# Patient Record
Sex: MALE | Race: WHITE | NOT HISPANIC OR LATINO | Employment: FULL TIME | URBAN - NONMETROPOLITAN AREA
[De-identification: names, ages, dates, MRNs, and addresses within clinical notes are randomized per-mention and may not be internally consistent; named-entity substitution may affect disease eponyms.]

---

## 2017-12-04 RX ORDER — ATORVASTATIN CALCIUM 40 MG/1
TABLET, FILM COATED ORAL
Qty: 90 TABLET | Refills: 0 | Status: SHIPPED | OUTPATIENT
Start: 2017-12-04 | End: 2017-12-27 | Stop reason: SDUPTHER

## 2017-12-18 RX ORDER — LISINOPRIL 5 MG/1
TABLET ORAL
Qty: 90 TABLET | Refills: 3 | OUTPATIENT
Start: 2017-12-18

## 2017-12-27 ENCOUNTER — OFFICE VISIT (OUTPATIENT)
Dept: CARDIOLOGY | Facility: CLINIC | Age: 52
End: 2017-12-27

## 2017-12-27 VITALS
HEART RATE: 79 BPM | OXYGEN SATURATION: 98 % | DIASTOLIC BLOOD PRESSURE: 72 MMHG | HEIGHT: 69 IN | WEIGHT: 237 LBS | SYSTOLIC BLOOD PRESSURE: 147 MMHG | BODY MASS INDEX: 35.1 KG/M2

## 2017-12-27 DIAGNOSIS — E78.2 MIXED HYPERLIPIDEMIA: ICD-10-CM

## 2017-12-27 DIAGNOSIS — I25.10 CORONARY ARTERY DISEASE INVOLVING NATIVE CORONARY ARTERY OF NATIVE HEART WITHOUT ANGINA PECTORIS: Primary | ICD-10-CM

## 2017-12-27 DIAGNOSIS — I10 ESSENTIAL HYPERTENSION: ICD-10-CM

## 2017-12-27 DIAGNOSIS — E11.59 TYPE 2 DIABETES MELLITUS WITH OTHER CIRCULATORY COMPLICATION, WITH LONG-TERM CURRENT USE OF INSULIN (HCC): ICD-10-CM

## 2017-12-27 DIAGNOSIS — Z79.4 TYPE 2 DIABETES MELLITUS WITH OTHER CIRCULATORY COMPLICATION, WITH LONG-TERM CURRENT USE OF INSULIN (HCC): ICD-10-CM

## 2017-12-27 PROCEDURE — 99214 OFFICE O/P EST MOD 30 MIN: CPT | Performed by: INTERNAL MEDICINE

## 2017-12-27 PROCEDURE — 93000 ELECTROCARDIOGRAM COMPLETE: CPT | Performed by: INTERNAL MEDICINE

## 2017-12-27 RX ORDER — LISINOPRIL 10 MG/1
10 TABLET ORAL DAILY
Qty: 90 TABLET | Refills: 3 | Status: SHIPPED | OUTPATIENT
Start: 2017-12-27 | End: 2018-11-26 | Stop reason: SDUPTHER

## 2017-12-27 RX ORDER — LISINOPRIL 5 MG/1
5 TABLET ORAL DAILY
Qty: 90 TABLET | Refills: 5 | Status: SHIPPED | OUTPATIENT
Start: 2017-12-27 | End: 2017-12-27 | Stop reason: DRUGHIGH

## 2017-12-27 RX ORDER — ATORVASTATIN CALCIUM 40 MG/1
40 TABLET, FILM COATED ORAL DAILY
Qty: 90 TABLET | Refills: 0 | Status: SHIPPED | OUTPATIENT
Start: 2017-12-27 | End: 2018-06-06 | Stop reason: SDUPTHER

## 2017-12-27 NOTE — PROGRESS NOTES
"Subjective    Luis Echeverria is a 52 y.o. male. Fu of ihd and risks    History of Present Illness     IHD:  Does not do regular aerobics but \"play basketball on Monday's\". Has plans to start regular exercise after 1/1/18.  No exertional cp and no decline in stamina over the past year. Is compliant with meds that are well tolerated. EKG today is nsc.    HLD:  He is on a potent statin and gets good lab reports from his pcp    HTN:  Checks at home '\"120-130/70\". Higher in office today    T2DM:  A1c = 7.6. Have discussed the target of <7 and he understands and is working with his pcp on this      The following portions of the patient's history were reviewed and updated as appropriate: allergies, current medications, past family history, past medical history, past social history, past surgical history and problem list.    Patient Active Problem List   Diagnosis   • CAD (coronary artery disease)   • Hypertension   • Hyperlipidemia   • MI (myocardial infarction)   • Diabetes   • Carotid stent occlusion       No Known Allergies    Family History   Problem Relation Age of Onset   • Heart disease Maternal Grandmother    • No Known Problems Mother    • No Known Problems Father        Social History     Social History   • Marital status:      Spouse name: N/A   • Number of children: N/A   • Years of education: N/A     Occupational History   • Not on file.     Social History Main Topics   • Smoking status: Never Smoker   • Smokeless tobacco: Never Used   • Alcohol use No   • Drug use: No   • Sexual activity: Defer     Other Topics Concern   • Not on file     Social History Narrative         Current Outpatient Prescriptions:   •  aspirin 81 MG EC tablet, Take 81 mg by mouth Daily., Disp: , Rfl:   •  atorvastatin (LIPITOR) 40 MG tablet, Take 1 tablet by mouth Daily., Disp: 90 tablet, Rfl: 0  •  gabapentin (NEURONTIN) 300 MG capsule, Take 300 mg by mouth 2 (Two) Times a Day., Disp: , Rfl:   •  insulin aspart (novoLOG FLEXPEN) " "100 UNIT/ML solution pen-injector sc pen, Inject  under the skin 3 (Three) Times a Day With Meals., Disp: , Rfl:   •  Insulin Degludec (TRESIBA FLEXTOUCH) 100 UNIT/ML solution pen-injector, Inject 42 mg under the skin Daily., Disp: , Rfl:   •  metFORMIN (GLUCOPHAGE) 500 MG tablet, Take 500 mg by mouth 2 (Two) Times a Day With Meals., Disp: , Rfl:   •  metoprolol tartrate (LOPRESSOR) 25 MG tablet, 1 po bid, Disp: 180 tablet, Rfl: 5  •  nitroglycerin (NITROSTAT) 0.4 MG SL tablet, Place 0.4 mg under the tongue Every 5 (Five) Minutes As Needed for chest pain. Take no more than 3 doses in 15 minutes., Disp: , Rfl:   •  Turmeric Curcumin 500 MG capsule, Take  by mouth., Disp: , Rfl:   •  lisinopril (PRINIVIL,ZESTRIL) 10 MG tablet, Take 1 tablet by mouth Daily., Disp: 90 tablet, Rfl: 3    Past Surgical History:   Procedure Laterality Date   • CAROTID STENT         Review of Systems   Constitutional: Negative for fatigue, fever and unexpected weight change.   Respiratory: Negative for apnea, chest tightness and shortness of breath.    Cardiovascular: Positive for leg swelling. Negative for chest pain and palpitations.        Occasional dep edema   Gastrointestinal: Negative for abdominal pain.   Genitourinary: Negative for dysuria.   Musculoskeletal: Negative for myalgias.   Neurological: Negative for weakness and light-headedness.   Psychiatric/Behavioral: Negative for sleep disturbance.       /72  Pulse 79  Ht 175.3 cm (69.02\")  Wt 108 kg (237 lb)  SpO2 98%  BMI 34.98 kg/m2  Procedures    Objective   Physical Exam   Constitutional: He is oriented to person, place, and time.   Mod obese   HENT:   Head: Normocephalic.   Eyes: Pupils are equal, round, and reactive to light.   Neck: No thyromegaly present.   Cardiovascular: Normal rate, regular rhythm and normal heart sounds.  Exam reveals no gallop and no friction rub.    No murmur heard.  Pulses:       Dorsalis pedis pulses are 0 on the right side, and 0 on the " left side.        Posterior tibial pulses are 0 on the right side, and 0 on the left side.   Pulmonary/Chest: Effort normal and breath sounds normal. No respiratory distress. He has no wheezes. He has no rales.   Abdominal: Soft. Bowel sounds are normal. He exhibits no distension. There is no tenderness.   Musculoskeletal: He exhibits no edema or tenderness.   Neurological: He is alert and oriented to person, place, and time.   Skin: Skin is warm and dry.   Psychiatric: He has a normal mood and affect.       Assessment/Plan   Luis was seen today for coronary artery disease, hypertension, hyperlipidemia and med refill.    Diagnoses and all orders for this visit:    Coronary artery disease involving native coronary artery of native heart without angina pectoris  Comments:  no angina - encourage daily aerobics  Orders:  -     ECG 12 Lead  -     atorvastatin (LIPITOR) 40 MG tablet; Take 1 tablet by mouth Daily.    Essential hypertension  Comments:  increase Lisinopril for tighter control    Mixed hyperlipidemia  Comments:  on potent statin    Type 2 diabetes mellitus with other circulatory complication, with long-term current use of insulin  Comments:  needs tighter control    Other orders  -     metoprolol tartrate (LOPRESSOR) 25 MG tablet; 1 po bid  -     Discontinue: lisinopril (PRINIVIL,ZESTRIL) 5 MG tablet; Take 1 tablet by mouth Daily.  -     lisinopril (PRINIVIL,ZESTRIL) 10 MG tablet; Take 1 tablet by mouth Daily.                 Return in about 1 year (around 12/27/2018) for Next scheduled follow up.  Orders Placed This Encounter   Procedures   • ECG 12 Lead     Order Specific Question:   Reason for Exam:     Answer:   fu of ihd

## 2018-06-06 DIAGNOSIS — I25.10 CORONARY ARTERY DISEASE INVOLVING NATIVE CORONARY ARTERY OF NATIVE HEART WITHOUT ANGINA PECTORIS: ICD-10-CM

## 2018-06-06 RX ORDER — ATORVASTATIN CALCIUM 40 MG/1
TABLET, FILM COATED ORAL
Qty: 90 TABLET | Refills: 1 | Status: ON HOLD | OUTPATIENT
Start: 2018-06-06 | End: 2018-11-07

## 2018-11-06 ENCOUNTER — HOSPITAL ENCOUNTER (OUTPATIENT)
Facility: HOSPITAL | Age: 53
Discharge: HOME | End: 2018-11-07
Attending: INTERNAL MEDICINE | Admitting: INTERNAL MEDICINE

## 2018-11-06 DIAGNOSIS — I25.10 CORONARY ARTERY DISEASE INVOLVING NATIVE CORONARY ARTERY OF NATIVE HEART WITHOUT ANGINA PECTORIS: ICD-10-CM

## 2018-11-06 DIAGNOSIS — I20.0 UNSTABLE ANGINA (HCC): Primary | ICD-10-CM

## 2018-11-06 LAB — GLUCOSE BLDC GLUCOMTR-MCNC: 422 MG/DL (ref 70–130)

## 2018-11-06 PROCEDURE — 93010 ELECTROCARDIOGRAM REPORT: CPT | Performed by: INTERNAL MEDICINE

## 2018-11-06 PROCEDURE — 94799 UNLISTED PULMONARY SVC/PX: CPT

## 2018-11-06 PROCEDURE — G0378 HOSPITAL OBSERVATION PER HR: HCPCS

## 2018-11-06 PROCEDURE — 0 IOPAMIDOL PER 1 ML: Performed by: INTERNAL MEDICINE

## 2018-11-06 PROCEDURE — C1760 CLOSURE DEV, VASC: HCPCS | Performed by: INTERNAL MEDICINE

## 2018-11-06 PROCEDURE — 99152 MOD SED SAME PHYS/QHP 5/>YRS: CPT | Performed by: INTERNAL MEDICINE

## 2018-11-06 PROCEDURE — 93458 L HRT ARTERY/VENTRICLE ANGIO: CPT | Performed by: INTERNAL MEDICINE

## 2018-11-06 PROCEDURE — 25010000002 HEPARIN (PORCINE) PER 1000 UNITS: Performed by: INTERNAL MEDICINE

## 2018-11-06 PROCEDURE — C1769 GUIDE WIRE: HCPCS | Performed by: INTERNAL MEDICINE

## 2018-11-06 PROCEDURE — C1894 INTRO/SHEATH, NON-LASER: HCPCS | Performed by: INTERNAL MEDICINE

## 2018-11-06 PROCEDURE — C9600 PERC DRUG-EL COR STENT SING: HCPCS | Performed by: INTERNAL MEDICINE

## 2018-11-06 PROCEDURE — 92928 PRQ TCAT PLMT NTRAC ST 1 LES: CPT | Performed by: INTERNAL MEDICINE

## 2018-11-06 PROCEDURE — 25010000002 MIDAZOLAM PER 1 MG: Performed by: INTERNAL MEDICINE

## 2018-11-06 PROCEDURE — C1887 CATHETER, GUIDING: HCPCS | Performed by: INTERNAL MEDICINE

## 2018-11-06 PROCEDURE — 94760 N-INVAS EAR/PLS OXIMETRY 1: CPT

## 2018-11-06 PROCEDURE — 82962 GLUCOSE BLOOD TEST: CPT

## 2018-11-06 PROCEDURE — 25010000002 FENTANYL CITRATE (PF) 100 MCG/2ML SOLUTION: Performed by: INTERNAL MEDICINE

## 2018-11-06 PROCEDURE — 99219 PR INITIAL OBSERVATION CARE/DAY 50 MINUTES: CPT | Performed by: INTERNAL MEDICINE

## 2018-11-06 PROCEDURE — 93005 ELECTROCARDIOGRAM TRACING: CPT | Performed by: INTERNAL MEDICINE

## 2018-11-06 PROCEDURE — C1874 STENT, COATED/COV W/DEL SYS: HCPCS | Performed by: INTERNAL MEDICINE

## 2018-11-06 DEVICE — XIENCE SIERRA™ EVEROLIMUS ELUTING CORONARY STENT SYSTEM 2.75 MM X 08 MM / RAPID-EXCHANGE
Type: IMPLANTABLE DEVICE | Status: FUNCTIONAL
Brand: XIENCE SIERRA™

## 2018-11-06 RX ORDER — SODIUM CHLORIDE 0.9 % (FLUSH) 0.9 %
3-10 SYRINGE (ML) INJECTION AS NEEDED
Status: DISCONTINUED | OUTPATIENT
Start: 2018-11-06 | End: 2018-11-06 | Stop reason: HOSPADM

## 2018-11-06 RX ORDER — LIDOCAINE HYDROCHLORIDE 20 MG/ML
INJECTION, SOLUTION INFILTRATION; PERINEURAL AS NEEDED
Status: DISCONTINUED | OUTPATIENT
Start: 2018-11-06 | End: 2018-11-06 | Stop reason: HOSPADM

## 2018-11-06 RX ORDER — DULOXETIN HYDROCHLORIDE 20 MG/1
30 CAPSULE, DELAYED RELEASE ORAL DAILY
COMMUNITY

## 2018-11-06 RX ORDER — LIDOCAINE HYDROCHLORIDE 10 MG/ML
0.1 INJECTION, SOLUTION EPIDURAL; INFILTRATION; INTRACAUDAL; PERINEURAL ONCE AS NEEDED
Status: DISCONTINUED | OUTPATIENT
Start: 2018-11-06 | End: 2018-11-06 | Stop reason: HOSPADM

## 2018-11-06 RX ORDER — DIPHENHYDRAMINE HCL 25 MG
25 CAPSULE ORAL EVERY 6 HOURS PRN
Status: DISCONTINUED | OUTPATIENT
Start: 2018-11-06 | End: 2018-11-07 | Stop reason: HOSPADM

## 2018-11-06 RX ORDER — LORAZEPAM 2 MG/ML
1 INJECTION INTRAMUSCULAR EVERY 6 HOURS PRN
Status: DISCONTINUED | OUTPATIENT
Start: 2018-11-06 | End: 2018-11-07 | Stop reason: HOSPADM

## 2018-11-06 RX ORDER — CALCIUM CARBONATE 200(500)MG
2 TABLET,CHEWABLE ORAL 2 TIMES DAILY PRN
Status: DISCONTINUED | OUTPATIENT
Start: 2018-11-06 | End: 2018-11-07 | Stop reason: HOSPADM

## 2018-11-06 RX ORDER — SODIUM CHLORIDE 9 MG/ML
3 INJECTION, SOLUTION INTRAVENOUS CONTINUOUS
Status: DISPENSED | OUTPATIENT
Start: 2018-11-06 | End: 2018-11-06

## 2018-11-06 RX ORDER — MIDAZOLAM HYDROCHLORIDE 1 MG/ML
INJECTION INTRAMUSCULAR; INTRAVENOUS AS NEEDED
Status: DISCONTINUED | OUTPATIENT
Start: 2018-11-06 | End: 2018-11-06 | Stop reason: HOSPADM

## 2018-11-06 RX ORDER — TIMOLOL MALEATE 5 MG/ML
1 SOLUTION/ DROPS OPHTHALMIC DAILY
Status: ON HOLD | COMMUNITY
End: 2021-08-30

## 2018-11-06 RX ORDER — SENNA AND DOCUSATE SODIUM 50; 8.6 MG/1; MG/1
2 TABLET, FILM COATED ORAL NIGHTLY
Status: DISCONTINUED | OUTPATIENT
Start: 2018-11-06 | End: 2018-11-07 | Stop reason: HOSPADM

## 2018-11-06 RX ORDER — LISINOPRIL 10 MG/1
10 TABLET ORAL DAILY
Status: DISCONTINUED | OUTPATIENT
Start: 2018-11-07 | End: 2018-11-07 | Stop reason: HOSPADM

## 2018-11-06 RX ORDER — BIVALIRUDIN 250 MG/5ML
INJECTION, POWDER, LYOPHILIZED, FOR SOLUTION INTRAVENOUS AS NEEDED
Status: DISCONTINUED | OUTPATIENT
Start: 2018-11-06 | End: 2018-11-06 | Stop reason: HOSPADM

## 2018-11-06 RX ORDER — ACETAMINOPHEN 325 MG/1
650 TABLET ORAL EVERY 4 HOURS PRN
Status: DISCONTINUED | OUTPATIENT
Start: 2018-11-06 | End: 2018-11-06 | Stop reason: HOSPADM

## 2018-11-06 RX ORDER — SODIUM CHLORIDE 0.9 % (FLUSH) 0.9 %
3 SYRINGE (ML) INJECTION EVERY 12 HOURS SCHEDULED
Status: DISCONTINUED | OUTPATIENT
Start: 2018-11-06 | End: 2018-11-06 | Stop reason: HOSPADM

## 2018-11-06 RX ORDER — ONDANSETRON 2 MG/ML
4 INJECTION INTRAMUSCULAR; INTRAVENOUS EVERY 6 HOURS PRN
Status: DISCONTINUED | OUTPATIENT
Start: 2018-11-06 | End: 2018-11-07 | Stop reason: HOSPADM

## 2018-11-06 RX ORDER — ONDANSETRON 4 MG/1
4 TABLET, ORALLY DISINTEGRATING ORAL EVERY 6 HOURS PRN
Status: DISCONTINUED | OUTPATIENT
Start: 2018-11-06 | End: 2018-11-07 | Stop reason: HOSPADM

## 2018-11-06 RX ORDER — ACETAMINOPHEN 325 MG/1
650 TABLET ORAL EVERY 4 HOURS PRN
Status: DISCONTINUED | OUTPATIENT
Start: 2018-11-06 | End: 2018-11-07 | Stop reason: HOSPADM

## 2018-11-06 RX ORDER — ONDANSETRON 4 MG/1
4 TABLET, FILM COATED ORAL EVERY 6 HOURS PRN
Status: DISCONTINUED | OUTPATIENT
Start: 2018-11-06 | End: 2018-11-07 | Stop reason: HOSPADM

## 2018-11-06 RX ORDER — FENTANYL CITRATE 50 UG/ML
INJECTION, SOLUTION INTRAMUSCULAR; INTRAVENOUS AS NEEDED
Status: DISCONTINUED | OUTPATIENT
Start: 2018-11-06 | End: 2018-11-06 | Stop reason: HOSPADM

## 2018-11-06 RX ORDER — NITROGLYCERIN 0.4 MG/1
0.4 TABLET SUBLINGUAL
Status: DISCONTINUED | OUTPATIENT
Start: 2018-11-06 | End: 2018-11-06 | Stop reason: HOSPADM

## 2018-11-06 RX ORDER — ZOLPIDEM TARTRATE 5 MG/1
5 TABLET ORAL NIGHTLY PRN
Status: DISCONTINUED | OUTPATIENT
Start: 2018-11-06 | End: 2018-11-07 | Stop reason: HOSPADM

## 2018-11-06 RX ORDER — ASPIRIN 81 MG/1
81 TABLET ORAL DAILY
Status: DISCONTINUED | OUTPATIENT
Start: 2018-11-07 | End: 2018-11-07 | Stop reason: HOSPADM

## 2018-11-06 RX ORDER — SODIUM CHLORIDE 9 MG/ML
1-3 INJECTION, SOLUTION INTRAVENOUS CONTINUOUS
Status: DISCONTINUED | OUTPATIENT
Start: 2018-11-06 | End: 2018-11-06 | Stop reason: HOSPADM

## 2018-11-06 RX ORDER — NITROGLYCERIN 0.4 MG/1
0.4 TABLET SUBLINGUAL
Status: DISCONTINUED | OUTPATIENT
Start: 2018-11-06 | End: 2018-11-07 | Stop reason: HOSPADM

## 2018-11-06 RX ORDER — ATORVASTATIN CALCIUM 40 MG/1
40 TABLET, FILM COATED ORAL NIGHTLY
Status: DISCONTINUED | OUTPATIENT
Start: 2018-11-06 | End: 2018-11-07 | Stop reason: HOSPADM

## 2018-11-06 RX ORDER — LORAZEPAM 2 MG/ML
1 INJECTION INTRAMUSCULAR ONCE
Status: DISCONTINUED | OUTPATIENT
Start: 2018-11-06 | End: 2018-11-06 | Stop reason: HOSPADM

## 2018-11-06 RX ORDER — SPIRONOLACTONE 25 MG/1
25 TABLET ORAL DAILY
Status: DISCONTINUED | OUTPATIENT
Start: 2018-11-06 | End: 2018-11-07 | Stop reason: HOSPADM

## 2018-11-06 RX ORDER — ONDANSETRON 2 MG/ML
4 INJECTION INTRAMUSCULAR; INTRAVENOUS EVERY 6 HOURS PRN
Status: DISCONTINUED | OUTPATIENT
Start: 2018-11-06 | End: 2018-11-06 | Stop reason: HOSPADM

## 2018-11-06 RX ADMIN — DESMOPRESSIN ACETATE 40 MG: 0.2 TABLET ORAL at 21:51

## 2018-11-06 RX ADMIN — SPIRONOLACTONE 25 MG: 25 TABLET ORAL at 17:28

## 2018-11-06 RX ADMIN — SODIUM CHLORIDE 1 ML/KG/HR: 9 INJECTION, SOLUTION INTRAVENOUS at 17:04

## 2018-11-06 RX ADMIN — METOPROLOL TARTRATE 25 MG: 25 TABLET, FILM COATED ORAL at 21:51

## 2018-11-06 NOTE — H&P
[facility] - CARDIOLOGY  HISTORY AND PHYSICAL    Date of Admission: 11/6/2018  Primary Care Physician: Rene Hogue DO    Subjective     Chief Complaint: [facility]    History of Present Illness   Has known CAD. Had 2v STEMI '13 with emergent stenting to the Cx and LAD. Has not had that kind of cp since and is compliant with meds. Has been having some non-exertional cp for 3-4 weeks that is substernal and pressure like and resolves spontaneously. Is active at work with lots of walking and has noted improving stamina. Today, had cp at rest but this time was assoc with diaphoresis and nausea like his mi pain was but this did not radiate like before. He presented to [facility] [place] where his EKG and first troponin were negative for MI. He is transferred here for cardiac cath.        Review of Systems   Constitutional: Negative for fatigue, fever and unexpected weight change.   HENT: Negative for congestion.    Eyes: Negative for visual disturbance.   Respiratory: Negative for apnea, shortness of breath and wheezing.    Cardiovascular: Positive for chest pain. Negative for palpitations and leg swelling.   Gastrointestinal: Negative for abdominal pain, blood in stool and vomiting.   Endocrine: Negative for cold intolerance and heat intolerance.   Genitourinary: Negative for difficulty urinating and hematuria.   Musculoskeletal: Negative for myalgias.   Skin: Negative for rash.   Neurological: Negative for syncope.   Hematological: Does not bruise/bleed easily.   Psychiatric/Behavioral: Negative for sleep disturbance.        Otherwise complete ROS reviewed and negative except as mentioned in the HPI.      Past Medical History:   Past Medical History:   Diagnosis Date   • CAD (coronary artery disease)    • CAD in native artery     2V STEMI 7/13 STENT TO CX AND STENTX2 TO MID AND DISTAL LAD   • Diabetes mellitus (CMS/HCC)    • History of coronary artery stent placement      x 3 - for ACUTE MI 7/2013   • Hyperlipidemia    •  Hyperlipidemia, mild    • Myocardial infarct (CMS/HCC)    • Myocardial infarction (CMS/HCC)    • Stented coronary artery        Past Surgical History:  Past Surgical History:   Procedure Laterality Date   • CAROTID STENT         Family History: family history includes Heart disease in his maternal grandmother; No Known Problems in his father and mother.    Social History:  reports that he has never smoked. He has never used smokeless tobacco. He reports that he does not drink alcohol or use drugs.    Medications:  Prior to Admission medications    Medication Sig Start Date End Date Taking? Authorizing Provider   aspirin 81 MG EC tablet Take 81 mg by mouth Daily.   Yes Neli Flores MD   atorvastatin (LIPITOR) 40 MG tablet TAKE 1 TABLET BY MOUTH EVERY DAY 6/6/18  Yes Ranjan Archer MD   Dulaglutide (TRULICITY) 0.75 MG/0.5ML solution pen-injector Inject 1 pen under the skin into the appropriate area as directed 1 (One) Time Per Week.   Yes Neli Flores MD   insulin aspart (novoLOG FLEXPEN) 100 UNIT/ML solution pen-injector sc pen Inject  under the skin 3 (Three) Times a Day With Meals.   Yes Neli Flores MD   Insulin Degludec (TRESIBA FLEXTOUCH) 100 UNIT/ML solution pen-injector Inject 42 mg under the skin Daily.   Yes Neli Flores MD   lisinopril (PRINIVIL,ZESTRIL) 10 MG tablet Take 1 tablet by mouth Daily. 12/27/17  Yes Tru Rai MD   metFORMIN (GLUCOPHAGE) 500 MG tablet Take 500 mg by mouth 2 (Two) Times a Day With Meals.   Yes Neli Flores MD   metoprolol tartrate (LOPRESSOR) 25 MG tablet 1 po bid 12/27/17  Yes Tru Rai MD   Turmeric Curcumin 500 MG capsule Take  by mouth.   Yes Neli Flores MD   nitroglycerin (NITROSTAT) 0.4 MG SL tablet Place 0.4 mg under the tongue Every 5 (Five) Minutes As Needed for chest pain. Take no more than 3 doses in 15 minutes.    Neli Flores MD   gabapentin (NEURONTIN) 300 MG capsule  "Take 300 mg by mouth 2 (Two) Times a Day.  11/6/18  Provider, MD Neli     Allergies:  No Known Allergies    Objective     Vital Signs: /65   Pulse 83   Resp 12   Ht 175.3 cm (69\")   Wt 101 kg (222 lb)   SpO2 98%   BMI 32.78 kg/m²   Physical Exam   Constitutional: He is oriented to person, place, and time. No distress.   Mod obese   HENT:   Head: Normocephalic.   Eyes: Pupils are equal, round, and reactive to light.   Neck: No thyromegaly present.   Cardiovascular: Normal rate, regular rhythm, normal heart sounds and intact distal pulses.  Exam reveals no gallop and no friction rub.    No murmur heard.  Pulmonary/Chest: Effort normal and breath sounds normal. No respiratory distress. He has no wheezes. He has no rales. He exhibits no tenderness.   Abdominal: Soft. Bowel sounds are normal. He exhibits no distension and no mass. There is no tenderness. There is no guarding.   Musculoskeletal: He exhibits no edema, tenderness or deformity.   Neurological: He is alert and oriented to person, place, and time.   Skin: Skin is warm and dry. He is not diaphoretic.   Psychiatric: He has a normal mood and affect.       Results Reviewed: labs and EKG from OSH        Assessment / Plan      Assessment & Plan  Active Hospital Problems    Diagnosis   • **Unstable angina (CMS/HCC)   • Hypertension   • Hyperlipidemia   • Diabetes (CMS/HCC)     PLACE IN COU AND PROCEED WITH CARDIAC CATH       Code Status: FULL     I discussed the patient's findings and my recommendations with: PT AND FAMILY    Estimated length of stay:  1 DAY    Tru Rai MD   11/06/18   1:27 PM              "

## 2018-11-06 NOTE — PLAN OF CARE
Problem: Patient Care Overview  Goal: Plan of Care Review  Outcome: Ongoing (interventions implemented as appropriate)   11/06/18 8174   Coping/Psychosocial   Plan of Care Reviewed With patient;family   Plan of Care Review   Progress no change   OTHER   Outcome Summary Patient s/p heart cath with stent to RCA, EF 30-35%, will need Lifevest at DC (notified tech for [employer]). No complaints, drsg has small amount of drainage, continue to monitor.     Goal: Individualization and Mutuality  Outcome: Ongoing (interventions implemented as appropriate)    Goal: Discharge Needs Assessment  Outcome: Ongoing (interventions implemented as appropriate)    Goal: Interprofessional Rounds/Family Conf  Outcome: Ongoing (interventions implemented as appropriate)      Problem: Cardiac Catheterization (Diagnostic/Interventional) (Adult)  Goal: Signs and Symptoms of Listed Potential Problems Will be Absent, Minimized or Managed (Cardiac Catheterization)  Outcome: Ongoing (interventions implemented as appropriate)    Goal: Anesthesia/Sedation Recovery  Outcome: Outcome(s) achieved Date Met: 11/06/18      Problem: Cardiac: Heart Failure (Adult)  Goal: Signs and Symptoms of Listed Potential Problems Will be Absent, Minimized or Managed (Cardiac: Heart Failure)  Outcome: Ongoing (interventions implemented as appropriate)

## 2018-11-06 NOTE — NURSING NOTE
Spoke with Jaden Foster with [facility] regarding patient order for a wearable defibrillator. I faxed over all important documents to [employer] fax line. Will notify  for tomorrow. Jaden with be here in the AM.  Debbie Long RN  11/6/2018   5:00 PM

## 2018-11-07 VITALS
WEIGHT: 222 LBS | TEMPERATURE: 97.5 F | HEART RATE: 75 BPM | SYSTOLIC BLOOD PRESSURE: 110 MMHG | DIASTOLIC BLOOD PRESSURE: 70 MMHG | OXYGEN SATURATION: 98 % | HEIGHT: 69 IN | BODY MASS INDEX: 32.88 KG/M2 | RESPIRATION RATE: 16 BRPM

## 2018-11-07 LAB
ANION GAP SERPL CALCULATED.3IONS-SCNC: 9 MMOL/L (ref 4–13)
ARTICHOKE IGE QN: 69 MG/DL (ref 0–99)
BUN BLD-MCNC: 20 MG/DL (ref 5–21)
BUN/CREAT SERPL: 18 (ref 7–25)
CALCIUM SPEC-SCNC: 8.6 MG/DL (ref 8.4–10.4)
CHLORIDE SERPL-SCNC: 102 MMOL/L (ref 98–110)
CHOLEST SERPL-MCNC: 102 MG/DL (ref 130–200)
CO2 SERPL-SCNC: 29 MMOL/L (ref 24–31)
CREAT BLD-MCNC: 1.11 MG/DL (ref 0.5–1.4)
DEPRECATED RDW RBC AUTO: 41.9 FL (ref 40–54)
ERYTHROCYTE [DISTWIDTH] IN BLOOD BY AUTOMATED COUNT: 12.9 % (ref 12–15)
GFR SERPL CREATININE-BSD FRML MDRD: 70 ML/MIN/1.73
GLUCOSE BLD-MCNC: 177 MG/DL (ref 70–100)
HCT VFR BLD AUTO: 35.6 % (ref 40–52)
HDLC SERPL-MCNC: 26 MG/DL
HGB BLD-MCNC: 11.7 G/DL (ref 14–18)
LDLC/HDLC SERPL: 2.09 {RATIO}
MCH RBC QN AUTO: 29.1 PG (ref 28–32)
MCHC RBC AUTO-ENTMCNC: 32.9 G/DL (ref 33–36)
MCV RBC AUTO: 88.6 FL (ref 82–95)
PLATELET # BLD AUTO: 237 10*3/MM3 (ref 130–400)
PMV BLD AUTO: 9.4 FL (ref 6–12)
POTASSIUM BLD-SCNC: 4.7 MMOL/L (ref 3.5–5.3)
RBC # BLD AUTO: 4.02 10*6/MM3 (ref 4.8–5.9)
SODIUM BLD-SCNC: 140 MMOL/L (ref 135–145)
TRIGL SERPL-MCNC: 108 MG/DL (ref 0–149)
WBC NRBC COR # BLD: 4.78 10*3/MM3 (ref 4.8–10.8)

## 2018-11-07 PROCEDURE — G0378 HOSPITAL OBSERVATION PER HR: HCPCS

## 2018-11-07 PROCEDURE — 85027 COMPLETE CBC AUTOMATED: CPT | Performed by: INTERNAL MEDICINE

## 2018-11-07 PROCEDURE — 94799 UNLISTED PULMONARY SVC/PX: CPT

## 2018-11-07 PROCEDURE — 80048 BASIC METABOLIC PNL TOTAL CA: CPT | Performed by: INTERNAL MEDICINE

## 2018-11-07 PROCEDURE — 99217 PR OBSERVATION CARE DISCHARGE MANAGEMENT: CPT | Performed by: INTERNAL MEDICINE

## 2018-11-07 PROCEDURE — 80061 LIPID PANEL: CPT | Performed by: NURSE PRACTITIONER

## 2018-11-07 PROCEDURE — 94760 N-INVAS EAR/PLS OXIMETRY 1: CPT

## 2018-11-07 RX ORDER — SPIRONOLACTONE 25 MG/1
25 TABLET ORAL DAILY
Qty: 30 TABLET | Refills: 11 | Status: SHIPPED | OUTPATIENT
Start: 2018-11-08 | End: 2018-11-26 | Stop reason: SDUPTHER

## 2018-11-07 RX ORDER — NITROGLYCERIN 0.4 MG/1
0.4 TABLET SUBLINGUAL
Qty: 25 TABLET | Refills: 1 | Status: SHIPPED | OUTPATIENT
Start: 2018-11-07 | End: 2020-08-05 | Stop reason: SDUPTHER

## 2018-11-07 RX ORDER — ATORVASTATIN CALCIUM 40 MG/1
40 TABLET, FILM COATED ORAL NIGHTLY
Status: ON HOLD | COMMUNITY
End: 2018-11-07

## 2018-11-07 RX ORDER — LEVOCETIRIZINE DIHYDROCHLORIDE 5 MG/1
5 TABLET, FILM COATED ORAL AS NEEDED
Status: ON HOLD | COMMUNITY
End: 2021-08-30

## 2018-11-07 RX ORDER — ATORVASTATIN CALCIUM 80 MG/1
80 TABLET, FILM COATED ORAL NIGHTLY
Qty: 30 TABLET | Refills: 11 | Status: SHIPPED | OUTPATIENT
Start: 2018-11-07 | End: 2018-11-26 | Stop reason: SDUPTHER

## 2018-11-07 RX ORDER — METOPROLOL SUCCINATE 50 MG/1
50 TABLET, EXTENDED RELEASE ORAL DAILY
Qty: 30 TABLET | Refills: 11 | Status: SHIPPED | OUTPATIENT
Start: 2018-11-07 | End: 2018-11-26 | Stop reason: SDUPTHER

## 2018-11-07 RX ADMIN — LISINOPRIL 10 MG: 10 TABLET ORAL at 09:19

## 2018-11-07 RX ADMIN — METOPROLOL TARTRATE 25 MG: 25 TABLET, FILM COATED ORAL at 09:18

## 2018-11-07 RX ADMIN — SPIRONOLACTONE 25 MG: 25 TABLET ORAL at 09:19

## 2018-11-07 RX ADMIN — ASPIRIN 81 MG: 81 TABLET, DELAYED RELEASE ORAL at 09:19

## 2018-11-07 RX ADMIN — TICAGRELOR 90 MG: 90 TABLET ORAL at 09:19

## 2018-11-07 NOTE — NURSING NOTE
Pt checked blood sugar and it was 374. Pt then administered 13 units of his own personal insulin. Please be advised.  Thanks,  Claudette Hines RN  11/06/18  9:49 PM

## 2018-11-07 NOTE — PROGRESS NOTES
Continued Stay Note   [place]     Patient Name: Luis Echeverria  MRN: [id number]  Today's Date: 11/7/2018    Admit Date: 11/6/2018          Discharge Plan     Row Name 11/07/18 1215       Plan    Final Discharge Disposition Code 01 - home or self-care    Final Note PT IS BEING DCD HOME TODAY. ORDER FOR LIFEVEST. PT WILL BE FITTED FOR LIFEVEST TODAY AND DC HOME AFTER. NO OTHER DC NEEDS              Discharge Codes    No documentation.       Expected Discharge Date and Time     Expected Discharge Date Expected Discharge Time    Nov 7, 2018             GEENA Otero

## 2018-11-07 NOTE — NURSING NOTE
Jaden Miguel called and notified me that patient's insurance has approved him for the lifevest and will be here in the morning to fit the patient.  Debbie Long RN  11/6/2018  6:28 PM

## 2018-11-07 NOTE — DISCHARGE SUMMARY
[facility] HEART GROUP DISCHARGE    Date of Discharge:  11/7/2018    Discharge Diagnosis:   -Unstable angina   -Coronary artery disease s/p KIMBERLY x 2 to mid RCA this admission; severe diabetic disease  -Ischemic cardiomyopathy- LVEF 30-35% by ventriculogram   -Chronic systolic heart failure, Stage C, Class III, currently compensated/euvolemic   -Essential hypertension  -Hyperlipidemia  -Type 2 diabetes mellitus   -Obesity       Presenting Problem/History of Present Illness  Unstable angina (CMS/HCC) [I20.0]    Per HPI on admission:   The patient has known CAD. Had 2v STEMI '13 with emergent stenting to the Cx and LAD. Has not had that kind of cp since and is compliant with meds. Has been having some non-exertional cp for 3-4 weeks that is substernal and pressure like and resolves spontaneously. Is active at work with lots of walking and has noted improving stamina. Today, had cp at rest but this time was assoc with diaphoresis and nausea like his mi pain was but this did not radiate like before. He presented to [facility] [place] [place] where his EKG and first troponin were negative for MI. He is transferred here for cardiac cath.    Hospital Course  Patient is a 53 y.o. male presented with unstable angina. He underwent cardiac cath yesterday per Dr. Rai. This revealed severe diabetic coronary artery disease, severe ischemic cardiomyopathy from previous Mis with an LVEF of 30-35%, mildly elevated LVEDP, and severe new stenosis in the mid RCA. The patient underwent successful PCI to the mid RCA with KIMBERLY x 2. A localized dissection was created proximal to the first stent and the dissected area was then stented without complication. Aldactone and Brilinta have been added to the patient's medication list, his atorvastatin has been increased and his lopressor has been changed to toprol xl due to his cardiomyopathy. He will have a BMP in 1 week. He has remained euvolemic and compensated from a heart failure standpoint. He will  be fitted with a LifeVest prior to discharge and have a repeat echo in 90 days to determine the need for a permanent AICD. He has been up ambulating without difficulty and has had no further chest pain or shortness of breath. Telemetry has been reviewed and there has been no significant ectopy. He is felt to be stable for discharge with follow ups and medical therapy as listed below, after he is fitted for his LifeVest.      Procedures Performed  Procedure(s):  Left Heart Cath     Lab Results (last 72 hours)     Procedure Component Value Units Date/Time    Lipid Panel [[id number]]  (Abnormal) Collected:  11/07/18 0636    Specimen:  Blood Updated:  11/07/18 1030     Total Cholesterol 102 (L) mg/dL      Triglycerides 108 mg/dL      HDL Cholesterol 26 (L) mg/dL      LDL Cholesterol  69 mg/dL      LDL/HDL Ratio 2.09    Basic Metabolic Panel [[id number]]  (Abnormal) Collected:  11/07/18 0636    Specimen:  Blood Updated:  11/07/18 0729     Glucose 177 (H) mg/dL      BUN 20 mg/dL      Creatinine 1.11 mg/dL      Sodium 140 mmol/L      Potassium 4.7 mmol/L      Chloride 102 mmol/L      CO2 29.0 mmol/L      Calcium 8.6 mg/dL      eGFR Non African Amer 70 mL/min/1.73      BUN/Creatinine Ratio 18.0     Anion Gap 9.0 mmol/L     Narrative:       GFR Normal >60  Chronic Kidney Disease <60  Kidney Failure <15    CBC (No Diff) [[id number]]  (Abnormal) Collected:  11/07/18 0636    Specimen:  Blood Updated:  11/07/18 0716     WBC 4.78 (L) 10*3/mm3      RBC 4.02 (L) 10*6/mm3      Hemoglobin 11.7 (L) g/dL      Hematocrit 35.6 (L) %      MCV 88.6 fL      MCH 29.1 pg      MCHC 32.9 (L) g/dL      RDW 12.9 %      RDW-SD 41.9 fl      MPV 9.4 fL      Platelets 237 10*3/mm3     POC Glucose Once [[id number]]  (Abnormal) Collected:  11/06/18 2101    Specimen:  Blood Updated:  11/06/18 2112     Glucose 422 (H) mg/dL      Comment: : [id number] Carlo LindaMeter ID: [id number]             Condition on Discharge:  Stable     Physical Exam at  Discharge  General: alert and oriented  Card: RRR  Resp: CTA  Extrem: PPP, no edema, right groin cath site CDI, no hematoma    Vital Signs  Temp:  [97.5 °F (36.4 °C)-100.7 °F (38.2 °C)] 97.5 °F (36.4 °C)  Heart Rate:  [70-95] 70  Resp:  [12-20] 16  BP: (101-139)/(62-87) 110/70    Discharge Disposition  Home or Self Care    Discharge Medications     Discharge Medications      New Medications      Instructions Start Date   metoprolol succinate XL 50 MG 24 hr tablet  Commonly known as:  TOPROL-XL   50 mg, Oral, Daily      spironolactone 25 MG tablet  Commonly known as:  ALDACTONE   25 mg, Oral, Daily      ticagrelor 90 MG tablet tablet  Commonly known as:  BRILINTA   90 mg, Oral, 2 Times Daily         Changes to Medications      Instructions Start Date   atorvastatin 80 MG tablet  Commonly known as:  LIPITOR  What changed:  · medication strength  · how much to take   80 mg, Oral, Nightly      metFORMIN 500 MG tablet  Commonly known as:  GLUCOPHAGE  What changed:  additional instructions   500 mg, Oral, 2 Times Daily With Meals, HOLD FOR 48 HOURS POST CATH         Continue These Medications      Instructions Start Date   aspirin 81 MG EC tablet   81 mg, Oral, Daily      DULoxetine 20 MG capsule  Commonly known as:  CYMBALTA   20 mg, Oral, Daily      insulin aspart 100 UNIT/ML solution pen-injector sc pen  Commonly known as:  novoLOG FLEXPEN   18 Units, Subcutaneous, 3 Times Daily With Meals      insulin aspart 100 UNIT/ML solution pen-injector sc pen  Commonly known as:  novoLOG FLEXPEN   2-7 Units, Subcutaneous, As Needed      levocetirizine 5 MG tablet  Commonly known as:  XYZAL   5 mg, Oral, Every Evening      lisinopril 10 MG tablet  Commonly known as:  PRINIVIL,ZESTRIL   10 mg, Oral, Daily      nitroglycerin 0.4 MG SL tablet  Commonly known as:  NITROSTAT   0.4 mg, Sublingual, Every 5 Minutes PRN, Take no more than 3 doses in 15 minutes.       timolol 0.5 % ophthalmic solution  Commonly known as:  TIMOPTIC   1  drop, Both Eyes, Daily      TRESIBA FLEXTOUCH 100 UNIT/ML solution pen-injector injection  Generic drug:  insulin degludec   42 Units, Subcutaneous, Every Morning      TRULICITY 0.75 MG/0.5ML solution pen-injector  Generic drug:  Dulaglutide   1 pen, Subcutaneous, Weekly         Stop These Medications    metoprolol tartrate 25 MG tablet  Commonly known as:  LOPRESSOR     Turmeric Curcumin 500 MG capsule            Discharge Diet: cardiac, diabetic, low sodium     Activity at Discharge:     Follow-up Appointments  PCP 1 week  [facility] 1-2 weeks  Dr. Rai 6-8 weeks    Additional Instructions for the Follow-ups that You Need to Schedule     Ambulatory Referral to Cardiac Rehab    As directed      Basic Metabolic Panel     Nov 14, 2018 (Approximate)      BMP 1 week    Order Comments:  BMP 1 week               The patient has been instructed on the importance of compliance with medications, signs and symptoms to report, and temporary activity restrictions following cardiac catheterization . The patient voices understanding of these instructions.     Reviewed signs and symptoms of CHF and what to report with the patient. Patient instructed to restrict sodium and weigh daily. Report weight gain of greater than 2 lbs overnight or 5 lbs in 1 week. Pt verbalized understanding of instructions and plan of care.     Laura Gomez, APRN  11/07/18  10:26 AM

## 2018-11-07 NOTE — PLAN OF CARE
Problem: Patient Care Overview  Goal: Plan of Care Review  Outcome: Ongoing (interventions implemented as appropriate)   11/07/18 0056   Coping/Psychosocial   Plan of Care Reviewed With patient;spouse   Plan of Care Review   Progress improving   OTHER   Outcome Summary VSS, no c/o pain. Right groin was oozing at start of shift but now dry and clean. Safeguard removed and dressing changed. Pt to be fitted for lifevest tomorrow. EF 30-35%. Pt administered his own insulin last night after checking his sugar. No SSI ordered. Long discussion about risk involoved in self administering meds while in hospital. His blood sugar was 376 per his device and over 400 with ours. Please be advised. Cont to monitor     Goal: Individualization and Mutuality  Outcome: Ongoing (interventions implemented as appropriate)    Goal: Discharge Needs Assessment  Outcome: Ongoing (interventions implemented as appropriate)      Problem: Cardiac Catheterization (Diagnostic/Interventional) (Adult)  Goal: Signs and Symptoms of Listed Potential Problems Will be Absent, Minimized or Managed (Cardiac Catheterization)  Outcome: Ongoing (interventions implemented as appropriate)   11/06/18 1723   Goal/Outcome Evaluation   Problems Assessed (Cardiac Catheterization) all   Problems Present (Cardiac Cath) none       Problem: Cardiac: Heart Failure (Adult)  Goal: Signs and Symptoms of Listed Potential Problems Will be Absent, Minimized or Managed (Cardiac: Heart Failure)  Outcome: Ongoing (interventions implemented as appropriate)   11/06/18 1723   Goal/Outcome Evaluation   Problems Assessed (Heart Failure) all   Problems Present (Heart Failure) cardiac pump dysfunction

## 2018-11-26 ENCOUNTER — OFFICE VISIT (OUTPATIENT)
Dept: CARDIOLOGY | Facility: CLINIC | Age: 53
End: 2018-11-26

## 2018-11-26 VITALS
DIASTOLIC BLOOD PRESSURE: 75 MMHG | WEIGHT: 223 LBS | SYSTOLIC BLOOD PRESSURE: 118 MMHG | HEART RATE: 79 BPM | OXYGEN SATURATION: 100 % | BODY MASS INDEX: 33.03 KG/M2 | HEIGHT: 69 IN | RESPIRATION RATE: 18 BRPM

## 2018-11-26 DIAGNOSIS — Z79.4 TYPE 2 DIABETES MELLITUS WITH OTHER CIRCULATORY COMPLICATION, WITH LONG-TERM CURRENT USE OF INSULIN (HCC): ICD-10-CM

## 2018-11-26 DIAGNOSIS — I25.5 ISCHEMIC CARDIOMYOPATHY: ICD-10-CM

## 2018-11-26 DIAGNOSIS — I25.10 CORONARY ARTERY DISEASE INVOLVING NATIVE CORONARY ARTERY OF NATIVE HEART WITHOUT ANGINA PECTORIS: Primary | ICD-10-CM

## 2018-11-26 DIAGNOSIS — E11.59 TYPE 2 DIABETES MELLITUS WITH OTHER CIRCULATORY COMPLICATION, WITH LONG-TERM CURRENT USE OF INSULIN (HCC): ICD-10-CM

## 2018-11-26 DIAGNOSIS — I10 ESSENTIAL HYPERTENSION: ICD-10-CM

## 2018-11-26 DIAGNOSIS — E78.2 MIXED HYPERLIPIDEMIA: ICD-10-CM

## 2018-11-26 PROCEDURE — 99214 OFFICE O/P EST MOD 30 MIN: CPT | Performed by: PHYSICIAN ASSISTANT

## 2018-11-26 PROCEDURE — 93000 ELECTROCARDIOGRAM COMPLETE: CPT | Performed by: PHYSICIAN ASSISTANT

## 2018-11-26 RX ORDER — LISINOPRIL 10 MG/1
10 TABLET ORAL DAILY
Qty: 90 TABLET | Refills: 3 | Status: ON HOLD | OUTPATIENT
Start: 2018-11-26 | End: 2018-12-13 | Stop reason: SDUPTHER

## 2018-11-26 RX ORDER — SPIRONOLACTONE 25 MG/1
25 TABLET ORAL DAILY
Qty: 90 TABLET | Refills: 3 | Status: SHIPPED | OUTPATIENT
Start: 2018-11-26 | End: 2020-03-02 | Stop reason: SDUPTHER

## 2018-11-26 RX ORDER — ATORVASTATIN CALCIUM 80 MG/1
80 TABLET, FILM COATED ORAL NIGHTLY
Qty: 90 TABLET | Refills: 3 | Status: SHIPPED | OUTPATIENT
Start: 2018-11-26 | End: 2019-12-09 | Stop reason: SDUPTHER

## 2018-11-26 RX ORDER — METOPROLOL SUCCINATE 50 MG/1
50 TABLET, EXTENDED RELEASE ORAL DAILY
Qty: 90 TABLET | Refills: 3 | Status: SHIPPED | OUTPATIENT
Start: 2018-11-26 | End: 2020-02-04 | Stop reason: SDUPTHER

## 2018-11-26 NOTE — PROGRESS NOTES
"    Subjective:     Encounter Date:11/26/2018      Patient ID: Luis Echeverria is a 53 y.o. male who presents for 3 week follow-up after KIMBERLY to RCA following unstable angina. His cardiac cath revealed a LVEF of 30-35%.  He notes he's been doing well since this time. He reports living an active lifestyle and feeling well. He reports prior to this event he was walking 6-7 miles a day and playing basketball regularly without issue. He relates that his blood pressure and weight have been stable. He is trying to improve his diet, but reports this to be difficult. He does not want to wear the lifevest anymore due to it being cumbersome. He states \"if I die, I just see it as my time to go.\"     Chief Complaint: feeling well after unstable angina    Coronary Artery Disease   Presents for follow-up visit. Pertinent negatives include no chest pain, leg swelling, palpitations, shortness of breath or weight gain. Risk factors include hyperlipidemia. The symptoms have been stable. Compliance with diet is variable. Compliance with exercise is variable. Compliance with medications is good.   Hypertension   This is a chronic problem. The current episode started more than 1 year ago. The problem is unchanged. The problem is controlled. Pertinent negatives include no chest pain, malaise/fatigue, orthopnea, palpitations, PND or shortness of breath. There are no associated agents to hypertension. Risk factors for coronary artery disease include diabetes mellitus, dyslipidemia, male gender and obesity. Past treatments include beta blockers and ACE inhibitors. Current antihypertension treatment includes ACE inhibitors and beta blockers. The current treatment provides significant improvement. Compliance problems include exercise and diet.  Hypertensive end-organ damage includes CAD/MI. There is no history of chronic renal disease.   Hyperlipidemia   This is a chronic problem. The current episode started more than 1 year ago. He has no " history of chronic renal disease. Factors aggravating his hyperlipidemia include fatty foods. Pertinent negatives include no chest pain, focal weakness, myalgias or shortness of breath. Current antihyperlipidemic treatment includes statins. Compliance problems include adherence to exercise and adherence to diet.  Risk factors for coronary artery disease include dyslipidemia, diabetes mellitus, hypertension, male sex and obesity.       The following portions of the patient's history were reviewed and updated as appropriate: allergies, current medications, past family history, past medical history, past social history, past surgical history and problem list.    Review of Systems   Constitution: Negative for malaise/fatigue and weight gain.   Cardiovascular: Negative for chest pain, claudication, dyspnea on exertion, irregular heartbeat, leg swelling, near-syncope, orthopnea, palpitations, paroxysmal nocturnal dyspnea and syncope.   Respiratory: Negative for hemoptysis and shortness of breath.    Hematologic/Lymphatic: Negative for bleeding problem.   Skin: Negative for poor wound healing.   Musculoskeletal: Negative for myalgias.   Gastrointestinal: Negative for melena, nausea and vomiting.   Genitourinary: Negative for hematuria.   Neurological: Negative for focal weakness and light-headedness.   Psychiatric/Behavioral: Negative for memory loss.   All other systems reviewed and are negative.        ECG 12 Lead  Date/Time: 11/26/2018 3:21 PM  Performed by: Bernarda Henriquez PA-C  Authorized by: Bernarda Henriquez PA-C   Comparison: compared with previous ECG from 11/6/2018  Similar to previous ECG  Rhythm: sinus rhythm  Rate: normal  QRS axis: left  Q waves: II, III, aVF, V3 and V4  Clinical impression: abnormal ECG               Objective:     Physical Exam   Constitutional: He is oriented to person, place, and time. He appears well-developed and well-nourished.   HENT:   Head: Normocephalic and atraumatic.   Eyes:  "Conjunctivae and EOM are normal. Pupils are equal, round, and reactive to light.   Neck: Normal range of motion. Neck supple. No JVD present.   Cardiovascular: Normal rate, regular rhythm, S1 normal, S2 normal, normal heart sounds, intact distal pulses and normal pulses.   No murmur heard.  Pulmonary/Chest: Effort normal and breath sounds normal. No respiratory distress.   Abdominal: Soft. Bowel sounds are normal. He exhibits no distension.   Musculoskeletal: He exhibits no edema or tenderness.   Neurological: He is alert and oriented to person, place, and time.   Skin: Skin is warm and dry.   Psychiatric: He has a normal mood and affect. Judgment normal.   Vitals reviewed.      Lab Review:  As below  /75 (BP Location: Right arm, Patient Position: Sitting, Cuff Size: Adult)   Pulse 79   Resp 18   Ht 175.3 cm (69\")   Wt 101 kg (223 lb)   SpO2 100%   BMI 32.93 kg/m²     Current Outpatient Medications:   •  aspirin 81 MG EC tablet, Take 81 mg by mouth Daily., Disp: , Rfl:   •  atorvastatin (LIPITOR) 80 MG tablet, Take 1 tablet by mouth Every Night., Disp: 90 tablet, Rfl: 3  •  Dulaglutide (TRULICITY) 0.75 MG/0.5ML solution pen-injector, Inject 1 pen under the skin into the appropriate area as directed 1 (One) Time Per Week., Disp: , Rfl:   •  DULoxetine (CYMBALTA) 20 MG capsule, Take 20 mg by mouth Daily., Disp: , Rfl:   •  insulin aspart (novoLOG FLEXPEN) 100 UNIT/ML solution pen-injector sc pen, Inject 18 Units under the skin into the appropriate area as directed 3 (Three) Times a Day With Meals., Disp: , Rfl:   •  insulin aspart (novoLOG FLEXPEN) 100 UNIT/ML solution pen-injector sc pen, Inject 2-7 Units under the skin into the appropriate area as directed As Needed., Disp: , Rfl:   •  insulin degludec (TRESIBA FLEXTOUCH) 100 UNIT/ML solution pen-injector injection, Inject 42 Units under the skin into the appropriate area as directed Every Morning., Disp: , Rfl:   •  levocetirizine (XYZAL) 5 MG tablet, " Take 5 mg by mouth Every Evening., Disp: , Rfl:   •  lisinopril (PRINIVIL,ZESTRIL) 10 MG tablet, Take 1 tablet by mouth Daily., Disp: 90 tablet, Rfl: 3  •  metFORMIN (GLUCOPHAGE) 500 MG tablet, Take 1 tablet by mouth 2 (Two) Times a Day With Meals. HOLD FOR 48 HOURS POST CATH, Disp: , Rfl:   •  metoprolol succinate XL (TOPROL-XL) 50 MG 24 hr tablet, Take 1 tablet by mouth Daily., Disp: 90 tablet, Rfl: 3  •  nitroglycerin (NITROSTAT) 0.4 MG SL tablet, Place 1 tablet under the tongue Every 5 (Five) Minutes As Needed for Chest Pain. Take no more than 3 doses in 15 minutes., Disp: 25 tablet, Rfl: 1  •  spironolactone (ALDACTONE) 25 MG tablet, Take 1 tablet by mouth Daily., Disp: 90 tablet, Rfl: 3  •  ticagrelor (BRILINTA) 90 MG tablet tablet, Take 1 tablet by mouth 2 (Two) Times a Day., Disp: 180 tablet, Rfl: 3  •  timolol (TIMOPTIC) 0.5 % ophthalmic solution, Administer 1 drop to both eyes Daily., Disp: , Rfl:   Past Medical History:   Diagnosis Date   • CAD (coronary artery disease)    • CAD in native artery     2V STEMI 7/13 STENT TO CX AND STENTX2 TO MID AND DISTAL LAD   • Diabetes mellitus (CMS/HCC)    • History of coronary artery stent placement      x 3 - for ACUTE MI 7/2013   • Hyperlipidemia    • Hyperlipidemia, mild    • Myocardial infarct (CMS/HCC)    • Myocardial infarction (CMS/HCC)    • Stented coronary artery      Past Surgical History:   Procedure Laterality Date   • CAROTID STENT       No Known Allergies  Social History     Socioeconomic History   • Marital status:      Spouse name: Not on file   • Number of children: Not on file   • Years of education: Not on file   • Highest education level: Not on file   Social Needs   • Financial resource strain: Not on file   • Food insecurity - worry: Not on file   • Food insecurity - inability: Not on file   • Transportation needs - medical: Not on file   • Transportation needs - non-medical: Not on file   Occupational History   • Not on file   Tobacco Use    • Smoking status: Never Smoker   • Smokeless tobacco: Never Used   Substance and Sexual Activity   • Alcohol use: No   • Drug use: No   • Sexual activity: Defer   Other Topics Concern   • Not on file   Social History Narrative   • Not on file     Family History   Problem Relation Age of Onset   • Heart disease Maternal Grandmother    • No Known Problems Mother    • No Known Problems Father      Patient's Body mass index is 32.93 kg/m². BMI is above normal parameters. Recommendations include: educational material, exercise counseling and nutrition counseling.  Current outpatient and discharge medications have been reconciled for the patient.  Reviewed by: Bernarda Henriquez PA-C        Assessment:          Diagnosis Plan   1. Coronary artery disease involving native coronary artery of native heart without angina pectoris      Doing well after unstable angina s/p KIMBERLY to RCA   2. Essential hypertension      Well controlled.    3. Mixed hyperlipidemia      On statin  LDL 69, HDL 26 on 11/7/18   4. Type 2 diabetes mellitus with other circulatory complication, with long-term current use of insulin (CMS/[facility])     5. Ischemic cardiomyopathy      LVEF 30-35% on cardiac cath 11/7/18          Plan:       1. Discussed at length cardiovascular risk reduction. Continue optimal medical therapy. Repeat echo in 3 months after continued optimal medical therapy. Repeat BMP in end of Jan 2019.   2. Patient does not want to wear lifevest. We discussed risks, up to including death, with associated potential for arrhythmias with LVEF < 35%. He understands but does not want to wear lifevest.   3. Follow-up as scheduled in 1 month with Dr. Rai, unless needed sooner. Monitor for signs of acute volume overload and call if any occur.   4. Verbalized understanding of instructions.

## 2018-11-26 NOTE — PATIENT INSTRUCTIONS
Preventing Heart Failure  Heart failure is a condition in which the heart has trouble pumping blood. This may mean that the heart cannot pump enough blood out to the body, or that the heart does not fill up with enough blood. Either of those problems can lead to symptoms such as fatigue, trouble breathing, and swelling throughout the body.  This is a common medical condition that affects not only the heart, but the entire body. Making certain nutrition and lifestyle changes can help you prevent heart failure and avoid serious health problems.  What nutrition changes can be made?  · If you are overweight or obese, reduce how many calories you eat each day so that you lose weight. Work with your health care provider or a diet and nutrition specialist (dietitian) to determine how many calories you need each day.  · Eat foods that are low in salt (sodium). Avoid adding extra salt to foods.  · Eat a well-balanced diet that includes a lot of:  ? Fresh fruits and vegetables.  ? Whole grains.  ? Lean meats.  ? Beans.  ? Fat-free or low-fat dairy products.  · Avoid foods that contain a lot of:  ? Trans fats.  ? Saturated fats.  ? Sugar.  ? Cholesterol.  What lifestyle changes can be made?  · Do not use any products that contain nicotine or tobacco, such as cigarettes and e-cigarettes. If you need help quitting or reducing how much you smoke, ask your health care provider.  · Stop using alcohol, or limit alcohol intake to no more than 1 drink a day for nonpregnant women and 2 drinks a day for men. One drink equals 12 oz of beer, 5 oz of wine, or 1½ oz of hard liquor.  · Exercise for at least 150 minutes each week, or as much as told by your health care provider.  ? Do moderate-intensity exercise, such as brisk walking, bicycling, or water aerobics.  ? Ask your health care provider which activities are safe for you.  · See a health care provider regularly for screening and wellness checks. Know your heart health indicators,  such as:  ? Blood pressure.  ? Cholesterol levels.  ? Blood sugar (glucose) levels.  ? Weight and BMI.  · If you have diabetes, manage your condition and follow your treatment plan as instructed.  · Try to get 7-9 hours of sleep each night. To help with sleep:  ? Keep your bedroom cool and dark.  ? Do not eat a heavy meal during the hour before you go to bed.  ? Do not drink alcohol or caffeinated drinks before bed.  ? Avoid screen time before bedtime. This means avoiding television, computers, tablets, and cell phones.  · Find ways to relax and manage stress. These may include:  ? Breathing exercises.  ? Meditation.  ? Yoga.  ? Listening to music.  Why are these changes important?  · A well-balanced diet with the appropriate amount of calories can keep your body weight at a healthy level, which reduces strain on your heart.  · A low-sodium diet can help keep your blood pressure in a normal range and keep your blood vessels working properly.  · Quitting smoking and limiting alcohol intake can reduce harmful effects that these substances have on your heart and blood vessels.  · Regular exercise can keep your heart strong so it can pump blood normally.  · Managing diabetes helps your blood circulate and can help you maintain a healthy weight.  · Managing stress helps to reduce the risk of high blood pressure and heart problems.  What can happen if changes are not made?  Heart failure can cause very serious problems that may get worse over time, such as:  · Extreme fatigue during normal physical activities.  · Shortness of breath or trouble breathing.  · Swelling in your abdomen, legs, ankles, feet, or neck.  · Fluid buildup throughout the body.  · Weight gain.  · Cough.  · Frequent urination.    What can I do to lower my risk?  You may be able to lower your risk of heart failure by:  · Losing weight or keeping your weight under control.  · Working with your health care provider to manage your:  ? Cholesterol.  ? Blood  pressure.  ? Diabetes, if this applies.  · Eating a healthy diet.  · Exercising regularly.  · Avoiding unhealthy habits, such as smoking, drinking, or using drugs.  · Getting plenty of sleep.  · Managing your stress.    How is this treated?  Heart failure cannot be cured except by heart transplant, but treatment can help to improve your quality of life. Treatment may include:  · Medicines to help:  ? Lower blood pressure.  ? Remove excess sodium from your body.  ? Relax blood vessels.  ? Improve heart function.  ? Control other symptoms of heart failure.  · Surgery to open blocked coronary arteries or repair damaged heart valves.  · Implantation of a biventricular pacemaker to improve heart muscle function (cardiac resynchronization therapy). This device paces both the right ventricle and left ventricle.  · Implantation of a device to treat serious abnormal heart rhythms (implantable cardioverter defibrillator, ICD).  · Implantation of a mechanical heart pump to improve the pumping ability of your heart (left ventricular assist device, LVAD).  · Heart transplant. This treatment is considered for certain people who do not improve with other treatments.    Where to find more information:  · National Heart, Lung, and Blood [place]: www.nhlbi.nih.gov/health/health-topics/topics/hf  · Centers for Disease Control and Prevention: www.cdc.gov/dhdsp/data_statistics/fact_sheets/fs_heart_failure.htm  · [facility]: [facility].gov/heartfailure/heartfailuredefined/01.html  · American Heart Association: www.heart.org/HEARTORG/Conditions/HeartFailure/Heart-Failure_UCM_002019_SubHomePage.jsp  Contact a health care provider if:  · You have rapid weight gain.  · You have increasing shortness of breath that is unusual for you.  · You tire easily, or you are unable to participate in your usual activities.  · You cough more than normal, especially with physical activity.  · You have any swelling or more swelling in areas  "such as your hands, feet, ankles, or abdomen.  Summary  · Heart failure can be prevented by making changes to your diet and your lifestyle.  · It is important to eat a healthy diet, manage your weight, exercise regularly, manage stress, avoid drugs and alcohol, and keep your cholesterol and blood pressure under control.  · Heart failure can cause very serious problems over time.  This information is not intended to replace advice given to you by your health care provider. Make sure you discuss any questions you have with your health care provider.  Document Released: 08/08/2017 Document Revised: 03/06/2018 Document Reviewed: 08/08/2017  [employer] Interactive Patient Education © 2018 [employer]    DASH Eating Plan  DASH stands for \"Dietary Approaches to Stop Hypertension.\" The DASH eating plan is a healthy eating plan that has been shown to reduce high blood pressure (hypertension). It may also reduce your risk for type 2 diabetes, heart disease, and stroke. The DASH eating plan may also help with weight loss.  What are tips for following this plan?  General guidelines  · Avoid eating more than 2,300 mg (milligrams) of salt (sodium) a day. If you have hypertension, you may need to reduce your sodium intake to 1,500 mg a day.  · Limit alcohol intake to no more than 1 drink a day for nonpregnant women and 2 drinks a day for men. One drink equals 12 oz of beer, 5 oz of wine, or 1½ oz of hard liquor.  · Work with your health care provider to maintain a healthy body weight or to lose weight. Ask what an ideal weight is for you.  · Get at least 30 minutes of exercise that causes your heart to beat faster (aerobic exercise) most days of the week. Activities may include walking, swimming, or biking.  · Work with your health care provider or diet and nutrition specialist (dietitian) to adjust your eating plan to your individual calorie needs.  Reading food labels  · Check food labels for the amount of sodium per serving. " "Choose foods with less than 5 percent of the Daily Value of sodium. Generally, foods with less than 300 mg of sodium per serving fit into this eating plan.  · To find whole grains, look for the word \"whole\" as the first word in the ingredient list.  Shopping  · Buy products labeled as \"low-sodium\" or \"no salt added.\"  · Buy fresh foods. Avoid canned foods and premade or frozen meals.  Cooking  · Avoid adding salt when cooking. Use salt-free seasonings or herbs instead of table salt or sea salt. Check with your health care provider or pharmacist before using salt substitutes.  · Do not randle foods. Cook foods using healthy methods such as baking, boiling, grilling, and broiling instead.  · Cook with heart-healthy oils, such as olive, canola, soybean, or sunflower oil.  Meal planning    · Eat a balanced diet that includes:  ? 5 or more servings of fruits and vegetables each day. At each meal, try to fill half of your plate with fruits and vegetables.  ? Up to 6-8 servings of whole grains each day.  ? Less than 6 oz of lean meat, poultry, or fish each day. A 3-oz serving of meat is about the same size as a deck of cards. One egg equals 1 oz.  ? 2 servings of low-fat dairy each day.  ? A serving of nuts, seeds, or beans 5 times each week.  ? Heart-healthy fats. Healthy fats called Omega-3 fatty acids are found in foods such as flaxseeds and coldwater fish, like sardines, salmon, and mackerel.  · Limit how much you eat of the following:  ? Canned or prepackaged foods.  ? Food that is high in trans fat, such as fried foods.  ? Food that is high in saturated fat, such as fatty meat.  ? Sweets, desserts, sugary drinks, and other foods with added sugar.  ? Full-fat dairy products.  · Do not salt foods before eating.  · Try to eat at least 2 vegetarian meals each week.  · Eat more home-cooked food and less restaurant, buffet, and fast food.  · When eating at a restaurant, ask that your food be prepared with less salt or no " salt, if possible.  What foods are recommended?  The items listed may not be a complete list. Talk with your dietitian about what dietary choices are best for you.  Grains  Whole-grain or whole-wheat bread. Whole-grain or whole-wheat pasta. Brown rice. Oatmeal. Quinoa. Bulgur. Whole-grain and low-sodium cereals. Kirstie bread. Low-fat, low-sodium crackers. Whole-wheat flour tortillas.  Vegetables  Fresh or frozen vegetables (raw, steamed, roasted, or grilled). Low-sodium or reduced-sodium tomato and vegetable juice. Low-sodium or reduced-sodium tomato sauce and tomato paste. Low-sodium or reduced-sodium canned vegetables.  Fruits  All fresh, dried, or frozen fruit. Canned fruit in natural juice (without added sugar).  Meat and other protein foods  Skinless chicken or turkey. Ground chicken or turkey. Pork with fat trimmed off. Fish and seafood. Egg whites. Dried beans, peas, or lentils. Unsalted nuts, nut butters, and seeds. Unsalted canned beans. Lean cuts of beef with fat trimmed off. Low-sodium, lean deli meat.  Dairy  Low-fat (1%) or fat-free (skim) milk. Fat-free, low-fat, or reduced-fat cheeses. Nonfat, low-sodium ricotta or cottage cheese. Low-fat or nonfat yogurt. Low-fat, low-sodium cheese.  Fats and oils  Soft margarine without trans fats. Vegetable oil. Low-fat, reduced-fat, or light mayonnaise and salad dressings (reduced-sodium). Canola, safflower, olive, soybean, and sunflower oils. Avocado.  Seasoning and other foods  Herbs. Spices. Seasoning mixes without salt. Unsalted popcorn and pretzels. Fat-free sweets.  What foods are not recommended?  The items listed may not be a complete list. Talk with your dietitian about what dietary choices are best for you.  Grains  Baked goods made with fat, such as croissants, muffins, or some breads. Dry pasta or rice meal packs.  Vegetables  Creamed or fried vegetables. Vegetables in a cheese sauce. Regular canned vegetables (not low-sodium or reduced-sodium). Regular  canned tomato sauce and paste (not low-sodium or reduced-sodium). Regular tomato and vegetable juice (not low-sodium or reduced-sodium). Pickles. Olives.  Fruits  Canned fruit in a light or heavy syrup. Fried fruit. Fruit in cream or butter sauce.  Meat and other protein foods  Fatty cuts of meat. Ribs. Fried meat. Rhodes. Sausage. Bologna and other processed lunch meats. Salami. Fatback. Hotdogs. Bratwurst. Salted nuts and seeds. Canned beans with added salt. Canned or smoked fish. Whole eggs or egg yolks. Chicken or turkey with skin.  Dairy  Whole or 2% milk, cream, and half-and-half. Whole or full-fat cream cheese. Whole-fat or sweetened yogurt. Full-fat cheese. Nondairy creamers. Whipped toppings. Processed cheese and cheese spreads.  Fats and oils  Butter. Stick margarine. Lard. Shortening. Ghee. Rhodes fat. Tropical oils, such as coconut, palm kernel, or palm oil.  Seasoning and other foods  Salted popcorn and pretzels. Onion salt, garlic salt, seasoned salt, table salt, and sea salt. Worcestershire sauce. Tartar sauce. Barbecue sauce. Teriyaki sauce. Soy sauce, including reduced-sodium. Steak sauce. Canned and packaged gravies. Fish sauce. Oyster sauce. Cocktail sauce. Horseradish that you find on the shelf. Ketchup. Mustard. Meat flavorings and tenderizers. Bouillon cubes. Hot sauce and Tabasco sauce. Premade or packaged marinades. Premade or packaged taco seasonings. Relishes. Regular salad dressings.  Where to find more information:  · National Heart, Lung, and Blood [place]: www.nhlbi.nih.gov  · American Heart Association: www.heart.org  Summary  · The DASH eating plan is a healthy eating plan that has been shown to reduce high blood pressure (hypertension). It may also reduce your risk for type 2 diabetes, heart disease, and stroke.  · With the DASH eating plan, you should limit salt (sodium) intake to 2,300 mg a day. If you have hypertension, you may need to reduce your sodium intake to 1,500 mg a  day.  · When on the DASH eating plan, aim to eat more fresh fruits and vegetables, whole grains, lean proteins, low-fat dairy, and heart-healthy fats.  · Work with your health care provider or diet and nutrition specialist (dietitian) to adjust your eating plan to your individual calorie needs.  This information is not intended to replace advice given to you by your health care provider. Make sure you discuss any questions you have with your health care provider.  Document Released: 12/06/2012 Document Revised: 12/11/2017 Document Reviewed: 12/11/2017  [employer] Patient Education © 2018 [employer]    Exercising to Lose Weight  Exercising can help you to lose weight. In order to lose weight through exercise, you need to do vigorous-intensity exercise. You can tell that you are exercising with vigorous intensity if you are breathing very hard and fast and cannot hold a conversation while exercising.  Moderate-intensity exercise helps to maintain your current weight. You can tell that you are exercising at a moderate level if you have a higher heart rate and faster breathing, but you are still able to hold a conversation.  How often should I exercise?  Choose an activity that you enjoy and set realistic goals. Your health care provider can help you to make an activity plan that works for you. Exercise regularly as directed by your health care provider. This may include:  · Doing resistance training twice each week, such as:  ? Push-ups.  ? Sit-ups.  ? Lifting weights.  ? Using resistance bands.  · Doing a given intensity of exercise for a given amount of time. Choose from these options:  ? 150 minutes of moderate-intensity exercise every week.  ? 75 minutes of vigorous-intensity exercise every week.  ? A mix of moderate-intensity and vigorous-intensity exercise every week.    Children, pregnant women, people who are out of shape, people who are overweight, and older adults may need to consult a health  care provider for individual recommendations. If you have any sort of medical condition, be sure to consult your health care provider before starting a new exercise program.  What are some activities that can help me to lose weight?  · Walking at a rate of at least 4.5 miles an hour.  · Jogging or running at a rate of 5 miles per hour.  · Biking at a rate of at least 10 miles per hour.  · Lap swimming.  · Roller-skating or in-line skating.  · Cross-country skiing.  · Vigorous competitive sports, such as football, basketball, and soccer.  · Jumping rope.  · Aerobic dancing.  How can I be more active in my day-to-day activities?  · Use the stairs instead of the elevator.  · Take a walk during your lunch break.  · If you drive, park your car farther away from work or school.  · If you take public transportation, get off one stop early and walk the rest of the way.  · Make all of your phone calls while standing up and walking around.  · Get up, stretch, and walk around every 30 minutes throughout the day.  What guidelines should I follow while exercising?  · Do not exercise so much that you hurt yourself, feel dizzy, or get very short of breath.  · Consult your health care provider prior to starting a new exercise program.  · Wear comfortable clothes and shoes with good support.  · Drink plenty of water while you exercise to prevent dehydration or heat stroke. Body water is lost during exercise and must be replaced.  · Work out until you breathe faster and your heart beats faster.  This information is not intended to replace advice given to you by your health care provider. Make sure you discuss any questions you have with your health care provider.  Document Released: 01/20/2012 Document Revised: 05/25/2017 Document Reviewed: 05/21/2015  [employer] Interactive Patient Education © 2018 [employer].

## 2018-12-10 ENCOUNTER — HOSPITAL ENCOUNTER (INPATIENT)
Facility: HOSPITAL | Age: 53
LOS: 3 days | Discharge: HOME | End: 2018-12-13
Attending: EMERGENCY MEDICINE | Admitting: FAMILY MEDICINE

## 2018-12-10 ENCOUNTER — APPOINTMENT (OUTPATIENT)
Dept: GENERAL RADIOLOGY | Facility: HOSPITAL | Age: 53
End: 2018-12-10

## 2018-12-10 ENCOUNTER — APPOINTMENT (OUTPATIENT)
Dept: ULTRASOUND IMAGING | Facility: HOSPITAL | Age: 53
End: 2018-12-10

## 2018-12-10 DIAGNOSIS — N17.9 AKI (ACUTE KIDNEY INJURY) (HCC): ICD-10-CM

## 2018-12-10 DIAGNOSIS — R11.2 NAUSEA AND VOMITING, INTRACTABILITY OF VOMITING NOT SPECIFIED, UNSPECIFIED VOMITING TYPE: Primary | ICD-10-CM

## 2018-12-10 PROBLEM — E86.0 DEHYDRATION: Status: ACTIVE | Noted: 2018-12-10

## 2018-12-10 PROBLEM — R10.11 RUQ PAIN: Status: ACTIVE | Noted: 2018-12-10

## 2018-12-10 LAB
ABO GROUP BLD: NORMAL
ALBUMIN SERPL-MCNC: 4.4 G/DL (ref 3.5–5)
ALBUMIN/GLOB SERPL: 1.3 G/DL (ref 1.1–2.5)
ALP SERPL-CCNC: 109 U/L (ref 24–120)
ALT SERPL W P-5'-P-CCNC: 24 U/L (ref 0–54)
ANION GAP SERPL CALCULATED.3IONS-SCNC: 17 MMOL/L (ref 4–13)
APTT PPP: 28.9 SECONDS (ref 24.1–34.8)
ARTICHOKE IGE QN: 50 MG/DL (ref 0–99)
AST SERPL-CCNC: 42 U/L (ref 7–45)
BASOPHILS # BLD AUTO: 0.04 10*3/MM3 (ref 0–0.2)
BASOPHILS NFR BLD AUTO: 0.3 % (ref 0–2)
BILIRUB SERPL-MCNC: 0.9 MG/DL (ref 0.1–1)
BILIRUB UR QL STRIP: ABNORMAL
BLD GP AB SCN SERPL QL: NEGATIVE
BUN BLD-MCNC: 36 MG/DL (ref 5–21)
BUN/CREAT SERPL: 14.8 (ref 7–25)
CALCIUM SPEC-SCNC: 9.3 MG/DL (ref 8.4–10.4)
CHLORIDE SERPL-SCNC: 94 MMOL/L (ref 98–110)
CHOLEST SERPL-MCNC: 90 MG/DL (ref 130–200)
CLARITY UR: CLEAR
CO2 SERPL-SCNC: 29 MMOL/L (ref 24–31)
COLOR UR: YELLOW
CREAT BLD-MCNC: 2.44 MG/DL (ref 0.5–1.4)
D-LACTATE SERPL-SCNC: 1.5 MMOL/L (ref 0.5–2)
D-LACTATE SERPL-SCNC: 2.8 MMOL/L (ref 0.5–2)
DEPRECATED RDW RBC AUTO: 42.6 FL (ref 40–54)
EOSINOPHIL # BLD AUTO: 0.06 10*3/MM3 (ref 0–0.7)
EOSINOPHIL NFR BLD AUTO: 0.4 % (ref 0–4)
ERYTHROCYTE [DISTWIDTH] IN BLOOD BY AUTOMATED COUNT: 13.1 % (ref 12–15)
GFR SERPL CREATININE-BSD FRML MDRD: 28 ML/MIN/1.73
GLOBULIN UR ELPH-MCNC: 3.5 GM/DL
GLUCOSE BLD-MCNC: 149 MG/DL (ref 70–100)
GLUCOSE BLDC GLUCOMTR-MCNC: 106 MG/DL (ref 70–130)
GLUCOSE BLDC GLUCOMTR-MCNC: 138 MG/DL (ref 70–130)
GLUCOSE BLDC GLUCOMTR-MCNC: 146 MG/DL (ref 70–130)
GLUCOSE UR STRIP-MCNC: ABNORMAL MG/DL
HBA1C MFR BLD: 8.3 %
HCT VFR BLD AUTO: 39.6 % (ref 40–52)
HDLC SERPL-MCNC: 26 MG/DL
HGB BLD-MCNC: 13.3 G/DL (ref 14–18)
HGB UR QL STRIP.AUTO: NEGATIVE
HOLD SPECIMEN: NORMAL
IMM GRANULOCYTES # BLD: 0.09 10*3/MM3 (ref 0–0.03)
IMM GRANULOCYTES NFR BLD: 0.6 % (ref 0–5)
INR PPP: 1 (ref 0.91–1.09)
KETONES UR QL STRIP: ABNORMAL
LDLC/HDLC SERPL: 1.79 {RATIO}
LEUKOCYTE ESTERASE UR QL STRIP.AUTO: NEGATIVE
LIPASE SERPL-CCNC: 44 U/L (ref 23–203)
LYMPHOCYTES # BLD AUTO: 0.93 10*3/MM3 (ref 0.72–4.86)
LYMPHOCYTES NFR BLD AUTO: 6.7 % (ref 15–45)
MCH RBC QN AUTO: 30 PG (ref 28–32)
MCHC RBC AUTO-ENTMCNC: 33.6 G/DL (ref 33–36)
MCV RBC AUTO: 89.2 FL (ref 82–95)
MONOCYTES # BLD AUTO: 0.89 10*3/MM3 (ref 0.19–1.3)
MONOCYTES NFR BLD AUTO: 6.4 % (ref 4–12)
NEUTROPHILS # BLD AUTO: 11.97 10*3/MM3 (ref 1.87–8.4)
NEUTROPHILS NFR BLD AUTO: 85.6 % (ref 39–78)
NITRITE UR QL STRIP: NEGATIVE
NRBC BLD MANUAL-RTO: 0 /100 WBC (ref 0–0)
PH UR STRIP.AUTO: 5.5 [PH] (ref 5–8)
PLATELET # BLD AUTO: 324 10*3/MM3 (ref 130–400)
PMV BLD AUTO: 9.5 FL (ref 6–12)
POTASSIUM BLD-SCNC: 4.1 MMOL/L (ref 3.5–5.3)
PROT SERPL-MCNC: 7.9 G/DL (ref 6.3–8.7)
PROT UR QL STRIP: ABNORMAL
PROTHROMBIN TIME: 13.5 SECONDS (ref 11.9–14.6)
RBC # BLD AUTO: 4.44 10*6/MM3 (ref 4.8–5.9)
RH BLD: POSITIVE
SODIUM BLD-SCNC: 140 MMOL/L (ref 135–145)
SP GR UR STRIP: >=1.03 (ref 1–1.03)
T&S EXPIRATION DATE: NORMAL
TRIGL SERPL-MCNC: 87 MG/DL (ref 0–149)
UROBILINOGEN UR QL STRIP: ABNORMAL
WBC NRBC COR # BLD: 13.98 10*3/MM3 (ref 4.8–10.8)

## 2018-12-10 PROCEDURE — 85730 THROMBOPLASTIN TIME PARTIAL: CPT | Performed by: EMERGENCY MEDICINE

## 2018-12-10 PROCEDURE — 85610 PROTHROMBIN TIME: CPT | Performed by: EMERGENCY MEDICINE

## 2018-12-10 PROCEDURE — 83036 HEMOGLOBIN GLYCOSYLATED A1C: CPT | Performed by: NURSE PRACTITIONER

## 2018-12-10 PROCEDURE — 71045 X-RAY EXAM CHEST 1 VIEW: CPT

## 2018-12-10 PROCEDURE — 25010000002 ONDANSETRON PER 1 MG: Performed by: EMERGENCY MEDICINE

## 2018-12-10 PROCEDURE — 82962 GLUCOSE BLOOD TEST: CPT

## 2018-12-10 PROCEDURE — 99284 EMERGENCY DEPT VISIT MOD MDM: CPT

## 2018-12-10 PROCEDURE — 87040 BLOOD CULTURE FOR BACTERIA: CPT | Performed by: EMERGENCY MEDICINE

## 2018-12-10 PROCEDURE — 94799 UNLISTED PULMONARY SVC/PX: CPT

## 2018-12-10 PROCEDURE — 86900 BLOOD TYPING SEROLOGIC ABO: CPT | Performed by: EMERGENCY MEDICINE

## 2018-12-10 PROCEDURE — 36415 COLL VENOUS BLD VENIPUNCTURE: CPT

## 2018-12-10 PROCEDURE — 83690 ASSAY OF LIPASE: CPT | Performed by: EMERGENCY MEDICINE

## 2018-12-10 PROCEDURE — 80061 LIPID PANEL: CPT | Performed by: NURSE PRACTITIONER

## 2018-12-10 PROCEDURE — 81003 URINALYSIS AUTO W/O SCOPE: CPT | Performed by: EMERGENCY MEDICINE

## 2018-12-10 PROCEDURE — 76705 ECHO EXAM OF ABDOMEN: CPT

## 2018-12-10 PROCEDURE — 94760 N-INVAS EAR/PLS OXIMETRY 1: CPT

## 2018-12-10 PROCEDURE — 80053 COMPREHEN METABOLIC PANEL: CPT | Performed by: EMERGENCY MEDICINE

## 2018-12-10 PROCEDURE — 86850 RBC ANTIBODY SCREEN: CPT | Performed by: EMERGENCY MEDICINE

## 2018-12-10 PROCEDURE — 25010000002 METOCLOPRAMIDE PER 10 MG: Performed by: EMERGENCY MEDICINE

## 2018-12-10 PROCEDURE — 86901 BLOOD TYPING SEROLOGIC RH(D): CPT | Performed by: EMERGENCY MEDICINE

## 2018-12-10 PROCEDURE — 85025 COMPLETE CBC W/AUTO DIFF WBC: CPT | Performed by: EMERGENCY MEDICINE

## 2018-12-10 PROCEDURE — 83605 ASSAY OF LACTIC ACID: CPT | Performed by: EMERGENCY MEDICINE

## 2018-12-10 PROCEDURE — 25010000002 CEFTRIAXONE PER 250 MG: Performed by: EMERGENCY MEDICINE

## 2018-12-10 RX ORDER — ASPIRIN 81 MG/1
81 TABLET ORAL DAILY
Status: DISCONTINUED | OUTPATIENT
Start: 2018-12-11 | End: 2018-12-13 | Stop reason: HOSPADM

## 2018-12-10 RX ORDER — SODIUM CHLORIDE 9 MG/ML
75 INJECTION, SOLUTION INTRAVENOUS CONTINUOUS
Status: DISCONTINUED | OUTPATIENT
Start: 2018-12-10 | End: 2018-12-12

## 2018-12-10 RX ORDER — METOPROLOL SUCCINATE 50 MG/1
50 TABLET, EXTENDED RELEASE ORAL DAILY
Status: DISCONTINUED | OUTPATIENT
Start: 2018-12-10 | End: 2018-12-13 | Stop reason: HOSPADM

## 2018-12-10 RX ORDER — HYDROCODONE BITARTRATE AND ACETAMINOPHEN 7.5; 325 MG/1; MG/1
1 TABLET ORAL EVERY 6 HOURS PRN
Status: DISCONTINUED | OUTPATIENT
Start: 2018-12-10 | End: 2018-12-13 | Stop reason: HOSPADM

## 2018-12-10 RX ORDER — TIMOLOL MALEATE 5 MG/ML
1 SOLUTION/ DROPS OPHTHALMIC DAILY
Status: DISCONTINUED | OUTPATIENT
Start: 2018-12-10 | End: 2018-12-13 | Stop reason: HOSPADM

## 2018-12-10 RX ORDER — ONDANSETRON 2 MG/ML
4 INJECTION INTRAMUSCULAR; INTRAVENOUS ONCE
Status: COMPLETED | OUTPATIENT
Start: 2018-12-10 | End: 2018-12-10

## 2018-12-10 RX ORDER — LEVOFLOXACIN 500 MG/1
500 TABLET, FILM COATED ORAL DAILY
Qty: 7 TABLET | Refills: 0 | Status: SHIPPED | OUTPATIENT
Start: 2018-12-10 | End: 2018-12-15

## 2018-12-10 RX ORDER — SODIUM CHLORIDE 0.9 % (FLUSH) 0.9 %
3 SYRINGE (ML) INJECTION EVERY 12 HOURS SCHEDULED
Status: DISCONTINUED | OUTPATIENT
Start: 2018-12-10 | End: 2018-12-13 | Stop reason: HOSPADM

## 2018-12-10 RX ORDER — ONDANSETRON 2 MG/ML
4 INJECTION INTRAMUSCULAR; INTRAVENOUS EVERY 6 HOURS PRN
Status: DISCONTINUED | OUTPATIENT
Start: 2018-12-10 | End: 2018-12-13 | Stop reason: HOSPADM

## 2018-12-10 RX ORDER — FAMOTIDINE 10 MG/ML
20 INJECTION, SOLUTION INTRAVENOUS EVERY 12 HOURS SCHEDULED
Status: DISCONTINUED | OUTPATIENT
Start: 2018-12-10 | End: 2018-12-12

## 2018-12-10 RX ORDER — DULOXETIN HYDROCHLORIDE 20 MG/1
20 CAPSULE, DELAYED RELEASE ORAL DAILY
Status: DISCONTINUED | OUTPATIENT
Start: 2018-12-10 | End: 2018-12-13 | Stop reason: HOSPADM

## 2018-12-10 RX ORDER — ONDANSETRON 4 MG/1
4 TABLET, FILM COATED ORAL EVERY 6 HOURS PRN
Status: DISCONTINUED | OUTPATIENT
Start: 2018-12-10 | End: 2018-12-13 | Stop reason: HOSPADM

## 2018-12-10 RX ORDER — SULFAMETHOXAZOLE AND TRIMETHOPRIM 400; 80 MG/1; MG/1
1 TABLET ORAL 2 TIMES DAILY
COMMUNITY
End: 2018-12-13 | Stop reason: HOSPADM

## 2018-12-10 RX ORDER — METOCLOPRAMIDE HYDROCHLORIDE 5 MG/ML
10 INJECTION INTRAMUSCULAR; INTRAVENOUS ONCE
Status: COMPLETED | OUTPATIENT
Start: 2018-12-10 | End: 2018-12-10

## 2018-12-10 RX ORDER — NICOTINE POLACRILEX 4 MG
15 LOZENGE BUCCAL
Status: DISCONTINUED | OUTPATIENT
Start: 2018-12-10 | End: 2018-12-13 | Stop reason: HOSPADM

## 2018-12-10 RX ORDER — ONDANSETRON HCL 8 MG
8 TABLET ORAL EVERY 8 HOURS PRN
Qty: 10 TABLET | Refills: 1 | Status: SHIPPED | OUTPATIENT
Start: 2018-12-10 | End: 2018-12-13 | Stop reason: SDUPTHER

## 2018-12-10 RX ORDER — DEXTROSE MONOHYDRATE 25 G/50ML
25 INJECTION, SOLUTION INTRAVENOUS
Status: DISCONTINUED | OUTPATIENT
Start: 2018-12-10 | End: 2018-12-13 | Stop reason: HOSPADM

## 2018-12-10 RX ORDER — SODIUM CHLORIDE 0.9 % (FLUSH) 0.9 %
3-10 SYRINGE (ML) INJECTION AS NEEDED
Status: DISCONTINUED | OUTPATIENT
Start: 2018-12-10 | End: 2018-12-13 | Stop reason: HOSPADM

## 2018-12-10 RX ORDER — ONDANSETRON 4 MG/1
4 TABLET, ORALLY DISINTEGRATING ORAL EVERY 6 HOURS PRN
Status: DISCONTINUED | OUTPATIENT
Start: 2018-12-10 | End: 2018-12-13 | Stop reason: HOSPADM

## 2018-12-10 RX ORDER — PROMETHAZINE HYDROCHLORIDE 25 MG/ML
12.5 INJECTION, SOLUTION INTRAMUSCULAR; INTRAVENOUS EVERY 6 HOURS PRN
Status: DISCONTINUED | OUTPATIENT
Start: 2018-12-10 | End: 2018-12-13 | Stop reason: HOSPADM

## 2018-12-10 RX ORDER — HYDROCODONE BITARTRATE AND ACETAMINOPHEN 5; 325 MG/1; MG/1
1 TABLET ORAL EVERY 4 HOURS PRN
Qty: 20 TABLET | Refills: 0 | Status: SHIPPED | OUTPATIENT
Start: 2018-12-10 | End: 2020-02-04

## 2018-12-10 RX ADMIN — HYDROCODONE BITARTRATE AND ACETAMINOPHEN 1 TABLET: 7.5; 325 TABLET ORAL at 20:06

## 2018-12-10 RX ADMIN — SODIUM CHLORIDE 75 ML/HR: 9 INJECTION, SOLUTION INTRAVENOUS at 22:11

## 2018-12-10 RX ADMIN — METOCLOPRAMIDE 10 MG: 5 INJECTION, SOLUTION INTRAMUSCULAR; INTRAVENOUS at 12:14

## 2018-12-10 RX ADMIN — FAMOTIDINE 20 MG: 10 INJECTION, SOLUTION INTRAVENOUS at 20:17

## 2018-12-10 RX ADMIN — ONDANSETRON HYDROCHLORIDE 4 MG: 2 INJECTION, SOLUTION INTRAMUSCULAR; INTRAVENOUS at 12:13

## 2018-12-10 RX ADMIN — DULOXETINE 20 MG: 20 CAPSULE, DELAYED RELEASE ORAL at 20:06

## 2018-12-10 RX ADMIN — SODIUM CHLORIDE, POTASSIUM CHLORIDE, SODIUM LACTATE AND CALCIUM CHLORIDE 1000 ML: 600; 310; 30; 20 INJECTION, SOLUTION INTRAVENOUS at 16:51

## 2018-12-10 RX ADMIN — TICAGRELOR 90 MG: 90 TABLET ORAL at 20:06

## 2018-12-10 RX ADMIN — CEFTRIAXONE SODIUM 2 G: 2 INJECTION, POWDER, FOR SOLUTION INTRAMUSCULAR; INTRAVENOUS at 16:53

## 2018-12-10 RX ADMIN — SODIUM CHLORIDE 1000 ML: 9 INJECTION, SOLUTION INTRAVENOUS at 12:13

## 2018-12-10 NOTE — ED NOTES
Pt was asked if he wanted his morphine for pain. He stated he did not at this time.      Zohreh Preston, RN  12/10/18 0339

## 2018-12-10 NOTE — ED PROVIDER NOTES
Subjective   53-year-old male resenting to the emergency department with abdominal pain.  Patient states that he has had intermittent right upper abdominal pain for the last month usually related to food.  Patient was evaluated outside hospital on Saturday with a did a CT scan of his abdomen and pelvis and found gallstones but were not able to do an ultrasound at that time.  Patient states that over the past 3 days he has had continuous nausea and vomiting and has not been able to tolerate by mouth in the pain in his right upper collagen has been constant.  Patient was discharged from the other facility with antibiotics and anti-emetics which not been helping.  Patient has had subjective fevers and chills.            Review of Systems   Gastrointestinal: Positive for abdominal pain, nausea and vomiting.   All other systems reviewed and are negative.      Past Medical History:   Diagnosis Date   • CAD (coronary artery disease)    • CAD in native artery     2V STEMI 7/13 STENT TO CX AND STENTX2 TO MID AND DISTAL LAD   • Diabetes mellitus (CMS/HCC)    • History of coronary artery stent placement      x 3 - for ACUTE MI 7/2013   • Hyperlipidemia    • Hyperlipidemia, mild    • Myocardial infarct (CMS/HCC)    • Myocardial infarction (CMS/HCC)    • Stented coronary artery        No Known Allergies    Past Surgical History:   Procedure Laterality Date   • CAROTID STENT         Family History   Problem Relation Age of Onset   • Heart disease Maternal Grandmother    • No Known Problems Mother    • No Known Problems Father        Social History     Socioeconomic History   • Marital status:      Spouse name: Not on file   • Number of children: Not on file   • Years of education: Not on file   • Highest education level: Not on file   Tobacco Use   • Smoking status: Never Smoker   • Smokeless tobacco: Never Used   Substance and Sexual Activity   • Alcohol use: No   • Drug use: No   • Sexual activity: Defer            Objective   Physical Exam   Constitutional: He is oriented to person, place, and time. He appears well-developed and well-nourished.   HENT:   Head: Normocephalic and atraumatic.   Nose: Nose normal.   Eyes: EOM are normal. Pupils are equal, round, and reactive to light.   Neck: Normal range of motion. Neck supple.   Cardiovascular: Normal rate, regular rhythm and normal heart sounds.   Pulmonary/Chest: Effort normal and breath sounds normal.   Abdominal: Soft. He exhibits no distension. There is tenderness in the right upper quadrant. There is no rebound and no guarding.   Musculoskeletal: Normal range of motion.   Neurological: He is alert and oriented to person, place, and time. No cranial nerve deficit or sensory deficit.   Skin: Skin is warm and dry. Capillary refill takes less than 2 seconds.   Psychiatric: He has a normal mood and affect. His behavior is normal.   Nursing note and vitals reviewed.      Procedures           ED Course  ED Course as of Dec 10 1616   Mon Dec 10, 2018   1606 Patient's case discussed with Dr. Burks of surgery and possible acute cholecystitis however ultrasound imaging none concerning for an acute inflammation and lab work including LFTs not significantly elevated.  Patient given fluids and monitored, gentle fluid hydration was done as patient has a history of heart failure.  Antibiotics initiated.  Patient likely dehydrated secondary to nausea and vomiting that have been ongoing.  Patient is not a surgical candidate at this time due to aunt had a platelet therapy for stent placement one month earlier.  Patient will be admitted to hospitalist service for IV hydration and monitoring as well as antibiotics.  Patient to get 2 g of ceftriaxone in the ED and then continued on Levaquin for 7 days.  [AP]      ED Course User Index  [AP] Yola Odom MD                  [facility]      Final diagnoses:   Nausea and vomiting, intractability of vomiting not specified, unspecified  vomiting type   CAREY (acute kidney injury) (CMS/HCC)            Yola Odom MD  12/10/18 8823

## 2018-12-10 NOTE — CONSULTS
Patient Care Team:  Rene Hogue DO as PCP - General (Family Medicine)  Tru Rai MD as Cardiologist (Cardiology)    Chief complaint central abdominal pain    Subjective     Subjective .     History of present illness:  Patient is a 53-year-old gentleman with history of diabetes, coronary artery disease and moderate obesity who has had previous catheterization and coronary stents placed 3 years prior and also 2 stents placed one more.  With these stents placed and he also about that time began having some substernal discomfort and right-sided discomfort.  Over the past 3 days it is been worse he traveled to his local physician and [place] and CT scan was completed showing a question of gallstones he was given Bactrim and discharged.  He continued with this pain he now presented to [facility] his liver functions are minimally elevated he is somewhat dry and his subxiphoid discomfort is improved but not resolved.  He has a significant history of having the stents placed and he is on Mylanta for blood thinning with his history of a stent and with this noted general surgery asked to see and evaluate.  His ultrasound shows a 4 mm polyp some slightly thickened walls around his gallbladder his white count is 13, his PT is 13.5.  With the above problem I do not disagree that his gallbladder is symptomatic but there is nothing that I can do as he is on a blood thinner at present his liver functions are normal.  I would suggest giving him hydration with a liter of lactated Ringer's, also 2 g of Rocephin and then discharge him on Levaquin and follow-up with his regular physician.  I will be happy to address his gallbladder once he is off the Brylinta  but this will not be until November 2019.      Past surgical history is negative except for coronary stent 3 years ago as well as one month ago abnormal 5 stents placed.  His ejection fraction is 35%.        Review of Systems  Pertinent items are  noted in HPI, all other systems reviewed and negative    Local problems significant for a two-vessel STEMI in 2013, needing emergent stenting of his circumflex and LAD.    History  Past Medical History:   Diagnosis Date   • CAD (coronary artery disease)    • CAD in native artery     2V STEMI 7/13 STENT TO CX AND STENTX2 TO MID AND DISTAL LAD   • Diabetes mellitus (CMS/HCC)    • History of coronary artery stent placement      x 3 - for ACUTE MI 7/2013   • Hyperlipidemia    • Hyperlipidemia, mild    • Myocardial infarct (CMS/HCC)    • Myocardial infarction (CMS/HCC)    • Stented coronary artery    , Past Surgical History:   Procedure Laterality Date   • CAROTID STENT     , Family History   Problem Relation Age of Onset   • Heart disease Maternal Grandmother    • No Known Problems Mother    • No Known Problems Father    , Social History     Tobacco Use   • Smoking status: Never Smoker   • Smokeless tobacco: Never Used   Substance Use Topics   • Alcohol use: No   • Drug use: No   ,   (Not in a hospital admission), Scheduled Meds:    ceftriaxone 2 g Intravenous Once   lactated ringers 1,000 mL Intravenous Once   Morphine 4 mg Intravenous Once   , Continuous Infusions:   , PRN Meds:   and Allergies:  Patient has no known allergies.    Objective      Objective     Vital Signs   Temp:  [97.3 °F (36.3 °C)] 97.3 °F (36.3 °C)  Heart Rate:  [75-83] 79  Resp:  [14] 14  BP: (88-93)/(55-58) 93/58    Intake & Output (last 3 days)       12/07 0701 - 12/08 0700 12/08 0701 - 12/09 0700 12/09 0701 - 12/10 0700 12/10 0701 - 12/11 0700    IV Piggyback    1000    Total Intake(mL/kg)    1000 (10.3)    Net    +1000                   Physical Exam:     General Appearance:    Alert, cooperative, in no acute distress   Head:    Normocephalic, without obvious abnormality, atraumatic   Eyes:            Lids and lashes normal, conjunctivae and sclerae normal, no   icterus, no pallor, corneas clear, PERRLA   Ears:    Ears appear intact with no  abnormalities noted   Throat:   No oral lesions, no thrush, oral mucosa moist   Neck:   No adenopathy, supple, trachea midline, no thyromegaly, no   carotid bruit, no JVD   Back:     No kyphosis present, no scoliosis present, no skin lesions,      erythema or scars, no tenderness to percussion or                   palpation,   range of motion normal   Lungs:     Clear to auscultation,respirations regular, even and                  unlabored    Heart:    Regular rhythm and normal rate, normal S1 and S2, no            murmur, no gallop, no rub, no click   Chest Wall:    No abnormalities observed   Abdomen:    Abdomen is obese, occasional bowel sounds, no masses noted, slight tenderness to deep palpation right upper quadrant, he has some weakness around his periumbilical region, no obvious hernia at present but significant weakness.   Rectal:     Deferred   Extremities:   Moves all extremities well, no edema, no cyanosis, no             redness   Pulses:   Pulses palpable and equal bilaterally   Skin:   No bleeding, bruising or rash   Lymph nodes:   No palpable adenopathy   Neurologic:   Cranial nerves 2 - 12 grossly intact, sensation intact, DTR       present and equal bilaterally    White count is 13.9, liver functions are normal.  Creatinine is elevated at 2.4 and 36,         Results from last 7 days   Lab Units  12/10/18   1138   WBC 10*3/mm3  13.98*   HEMOGLOBIN g/dL  13.3*   HEMATOCRIT %  39.6*   PLATELETS 10*3/mm3  324        Results from last 7 days   Lab Units  12/10/18   1138   SODIUM mmol/L  140   POTASSIUM mmol/L  4.1   CHLORIDE mmol/L  94*   CO2 mmol/L  29.0   BUN mg/dL  36*   CREATININE mg/dL  2.44*   CALCIUM mg/dL  9.3   BILIRUBIN mg/dL  0.9   ALK PHOS U/L  109   ALT (SGPT) U/L  24   AST (SGOT) U/L  42   GLUCOSE mg/dL  149*     Gallbladder ultrasound shows probable gallbladder polyp measuring 4 mm no definite gallstones, mild gallbladder thickening, duct 6 mm.    Results Review:   I reviewed the  patient's new clinical results.    Assessment/Plan     Assessment/Plan       * No active hospital problems. *      Issue with coronary artery disease, hypertension, obesity, diabetes.  Patient with the above problems noted he had a stent placed less than a month ago he is on Dilantin for this, he cannot come off the brylinta  without significant risk to his stented vessels and as such he cannot have surgery.  He is advised to avoid fatty or greasy foods, continue Levaquin given 2 g of Rocephin while he is in the hospital here and also give him a liter bolus of lactated Ringer's.  Unfortunately we cannot look toward surgical intervention given his risk with his blood thinner okay for discharge, follow-up with his regular physician once his blood thinner has been stopped we can look toward indeterminate cholecystectomy.    I discussed the patient's findings and my recommendations with patient, family and nursing staff    Bud Garcia MD  12/10/18  3:46 PM    Time: Time spent with patient 45 minutes     EMR Dragon/Transcription disclaimer: Much of this encounter note is an electronic transcription/translation of spoken language to printed text. The electronic translation of spoken language may permit erroneous, or at times, nonsensical words or phrases to be inadvertently transcribed; although I have reviewed the note for such errors, some may still exist.

## 2018-12-11 PROBLEM — K82.4 GALLBLADDER POLYP: Status: ACTIVE | Noted: 2018-12-11

## 2018-12-11 PROBLEM — I95.9 HYPOTENSION: Status: ACTIVE | Noted: 2018-12-11

## 2018-12-11 PROBLEM — N17.9 ACUTE KIDNEY INJURY: Status: ACTIVE | Noted: 2018-12-11

## 2018-12-11 LAB
ALBUMIN SERPL-MCNC: 3.3 G/DL (ref 3.5–5)
ALBUMIN/GLOB SERPL: 1 G/DL (ref 1.1–2.5)
ALP SERPL-CCNC: 101 U/L (ref 24–120)
ALT SERPL W P-5'-P-CCNC: 23 U/L (ref 0–54)
ANION GAP SERPL CALCULATED.3IONS-SCNC: 12 MMOL/L (ref 4–13)
AST SERPL-CCNC: 47 U/L (ref 7–45)
BILIRUB SERPL-MCNC: 0.7 MG/DL (ref 0.1–1)
BUN BLD-MCNC: 41 MG/DL (ref 5–21)
BUN/CREAT SERPL: 19.9 (ref 7–25)
BURR CELLS BLD QL SMEAR: ABNORMAL
CALCIUM SPEC-SCNC: 8.1 MG/DL (ref 8.4–10.4)
CHLORIDE SERPL-SCNC: 98 MMOL/L (ref 98–110)
CO2 SERPL-SCNC: 26 MMOL/L (ref 24–31)
CREAT BLD-MCNC: 2.06 MG/DL (ref 0.5–1.4)
DEPRECATED RDW RBC AUTO: 43.2 FL (ref 40–54)
ERYTHROCYTE [DISTWIDTH] IN BLOOD BY AUTOMATED COUNT: 13.3 % (ref 12–15)
GFR SERPL CREATININE-BSD FRML MDRD: 34 ML/MIN/1.73
GIANT PLATELETS: ABNORMAL
GLOBULIN UR ELPH-MCNC: 3.3 GM/DL
GLUCOSE BLD-MCNC: 229 MG/DL (ref 70–100)
GLUCOSE BLDC GLUCOMTR-MCNC: 251 MG/DL (ref 70–130)
GLUCOSE BLDC GLUCOMTR-MCNC: 346 MG/DL (ref 70–130)
GLUCOSE BLDC GLUCOMTR-MCNC: 366 MG/DL (ref 70–130)
GLUCOSE BLDC GLUCOMTR-MCNC: 391 MG/DL (ref 70–130)
HCT VFR BLD AUTO: 32.9 % (ref 40–52)
HGB BLD-MCNC: 11 G/DL (ref 14–18)
LYMPHOCYTES # BLD MANUAL: 0.4 10*3/MM3 (ref 0.72–4.86)
LYMPHOCYTES NFR BLD MANUAL: 4 % (ref 15–45)
LYMPHOCYTES NFR BLD MANUAL: 5 % (ref 4–12)
MCH RBC QN AUTO: 29.6 PG (ref 28–32)
MCHC RBC AUTO-ENTMCNC: 33.4 G/DL (ref 33–36)
MCV RBC AUTO: 88.4 FL (ref 82–95)
MONOCYTES # BLD AUTO: 0.5 10*3/MM3 (ref 0.19–1.3)
NEUTROPHILS # BLD AUTO: 8.98 10*3/MM3 (ref 1.87–8.4)
NEUTROPHILS NFR BLD MANUAL: 87 % (ref 39–78)
NEUTS BAND NFR BLD MANUAL: 2 % (ref 0–10)
PLATELET # BLD AUTO: 235 10*3/MM3 (ref 130–400)
PMV BLD AUTO: 9.9 FL (ref 6–12)
POIKILOCYTOSIS BLD QL SMEAR: ABNORMAL
POTASSIUM BLD-SCNC: 5.1 MMOL/L (ref 3.5–5.3)
PROT SERPL-MCNC: 6.6 G/DL (ref 6.3–8.7)
RBC # BLD AUTO: 3.72 10*6/MM3 (ref 4.8–5.9)
SODIUM BLD-SCNC: 136 MMOL/L (ref 135–145)
TSH SERPL DL<=0.05 MIU/L-ACNC: 3.01 MIU/ML (ref 0.47–4.68)
VARIANT LYMPHS NFR BLD MANUAL: 2 % (ref 0–5)
WBC MORPH BLD: NORMAL
WBC NRBC COR # BLD: 10.09 10*3/MM3 (ref 4.8–10.8)

## 2018-12-11 PROCEDURE — 85025 COMPLETE CBC W/AUTO DIFF WBC: CPT | Performed by: NURSE PRACTITIONER

## 2018-12-11 PROCEDURE — 85007 BL SMEAR W/DIFF WBC COUNT: CPT | Performed by: NURSE PRACTITIONER

## 2018-12-11 PROCEDURE — 94799 UNLISTED PULMONARY SVC/PX: CPT

## 2018-12-11 PROCEDURE — 94760 N-INVAS EAR/PLS OXIMETRY 1: CPT

## 2018-12-11 PROCEDURE — 63710000001 INSULIN LISPRO (HUMAN) PER 5 UNITS: Performed by: NURSE PRACTITIONER

## 2018-12-11 PROCEDURE — 25010000002 LEVOFLOXACIN PER 250 MG: Performed by: NURSE PRACTITIONER

## 2018-12-11 PROCEDURE — 84443 ASSAY THYROID STIM HORMONE: CPT | Performed by: NURSE PRACTITIONER

## 2018-12-11 PROCEDURE — 25010000002 ONDANSETRON PER 1 MG: Performed by: NURSE PRACTITIONER

## 2018-12-11 PROCEDURE — 80053 COMPREHEN METABOLIC PANEL: CPT | Performed by: NURSE PRACTITIONER

## 2018-12-11 PROCEDURE — 82962 GLUCOSE BLOOD TEST: CPT

## 2018-12-11 RX ORDER — LEVOFLOXACIN 5 MG/ML
500 INJECTION, SOLUTION INTRAVENOUS EVERY 24 HOURS
Status: COMPLETED | OUTPATIENT
Start: 2018-12-11 | End: 2018-12-12

## 2018-12-11 RX ADMIN — INSULIN LISPRO 6 UNITS: 100 INJECTION, SOLUTION INTRAVENOUS; SUBCUTANEOUS at 17:13

## 2018-12-11 RX ADMIN — ASPIRIN 81 MG: 81 TABLET, DELAYED RELEASE ORAL at 08:22

## 2018-12-11 RX ADMIN — LEVOFLOXACIN 500 MG: 5 INJECTION, SOLUTION INTRAVENOUS at 15:16

## 2018-12-11 RX ADMIN — INSULIN LISPRO 5 UNITS: 100 INJECTION, SOLUTION INTRAVENOUS; SUBCUTANEOUS at 20:56

## 2018-12-11 RX ADMIN — SODIUM CHLORIDE 75 ML/HR: 9 INJECTION, SOLUTION INTRAVENOUS at 20:50

## 2018-12-11 RX ADMIN — DULOXETINE 20 MG: 20 CAPSULE, DELAYED RELEASE ORAL at 08:22

## 2018-12-11 RX ADMIN — TICAGRELOR 90 MG: 90 TABLET ORAL at 08:22

## 2018-12-11 RX ADMIN — INSULIN LISPRO 4 UNITS: 100 INJECTION, SOLUTION INTRAVENOUS; SUBCUTANEOUS at 08:22

## 2018-12-11 RX ADMIN — SODIUM CHLORIDE 75 ML/HR: 9 INJECTION, SOLUTION INTRAVENOUS at 11:30

## 2018-12-11 RX ADMIN — FAMOTIDINE 20 MG: 10 INJECTION, SOLUTION INTRAVENOUS at 09:58

## 2018-12-11 RX ADMIN — TICAGRELOR 90 MG: 90 TABLET ORAL at 20:48

## 2018-12-11 RX ADMIN — ONDANSETRON HYDROCHLORIDE 4 MG: 2 INJECTION, SOLUTION INTRAMUSCULAR; INTRAVENOUS at 16:39

## 2018-12-11 RX ADMIN — FAMOTIDINE 20 MG: 10 INJECTION, SOLUTION INTRAVENOUS at 20:48

## 2018-12-11 RX ADMIN — METOPROLOL SUCCINATE 50 MG: 50 TABLET, FILM COATED, EXTENDED RELEASE ORAL at 08:22

## 2018-12-11 RX ADMIN — HYDROCODONE BITARTRATE AND ACETAMINOPHEN 1 TABLET: 7.5; 325 TABLET ORAL at 06:10

## 2018-12-11 RX ADMIN — INSULIN LISPRO 6 UNITS: 100 INJECTION, SOLUTION INTRAVENOUS; SUBCUTANEOUS at 11:30

## 2018-12-11 RX ADMIN — HYDROCODONE BITARTRATE AND ACETAMINOPHEN 1 TABLET: 7.5; 325 TABLET ORAL at 20:57

## 2018-12-11 NOTE — PLAN OF CARE
Problem: Patient Care Overview  Goal: Plan of Care Review  Outcome: Ongoing (interventions implemented as appropriate)   12/11/18 4528   Coping/Psychosocial   Plan of Care Reviewed With patient   Plan of Care Review   Progress no change   OTHER   Outcome Summary PT voiding. C/o of minimal pain. no pain meds requsted, offered but redused. Up ad morena. IVF cont. IV abx initated. Will cont to monitor.     Goal: Individualization and Mutuality  Outcome: Ongoing (interventions implemented as appropriate)    Goal: Discharge Needs Assessment  Outcome: Ongoing (interventions implemented as appropriate)    Goal: Interprofessional Rounds/Family Conf  Outcome: Ongoing (interventions implemented as appropriate)      Problem: Pain, Acute (Adult)  Goal: Identify Related Risk Factors and Signs and Symptoms  Outcome: Ongoing (interventions implemented as appropriate)    Goal: Acceptable Pain Control/Comfort Level  Outcome: Ongoing (interventions implemented as appropriate)

## 2018-12-11 NOTE — PLAN OF CARE
Problem: Patient Care Overview  Goal: Plan of Care Review  Outcome: Ongoing (interventions implemented as appropriate)   12/11/18 0326   Coping/Psychosocial   Plan of Care Reviewed With patient   Plan of Care Review   Progress no change   OTHER   Outcome Summary Pt c/o RUQ pain. Medicated with prn meds. IVF Voiding. Up ad morena. Continue to monitor       Problem: Pain, Acute (Adult)  Goal: Identify Related Risk Factors and Signs and Symptoms  Outcome: Ongoing (interventions implemented as appropriate)    Goal: Acceptable Pain Control/Comfort Level  Outcome: Ongoing (interventions implemented as appropriate)

## 2018-12-11 NOTE — PAYOR COMM NOTE
"Rc Echeverria (53 y.o. Male) [id number]    ATTN PRE-CERT      [facility]   thierno phone       Fax         Date of Birth Social Security Number Address Home Phone MRN    1965  [address] [facility] [address] [phone] [id number]    [employer] Marital Status          Pres[employer]        Admission Date Admission Type Admitting Provider Attending Provider Department, Room/Bed    12/10/18 Emergency Aston Webb MD Truong, Khai C, MD [facility] 3C, 388/1    Discharge Date Discharge Disposition Discharge Destination                       Attending Provider:  Aston Webb MD    Allergies:  No Known Allergies    Isolation:  None   Infection:  None   Code Status:  CPR    Ht:  175.3 cm (69\")   Wt:  97 kg (213 lb 12.8 oz)    Admission Cmt:  None   Principal Problem:  None                Active Insurance as of 12/10/2018     Primary Coverage     Payor Plan Insurance Group Employer/Plan Group    [employer] BLUE SHIELD O [id number]     Payor Plan Address Payor Plan Phone Number Payor Plan Fax Number Effective Dates    [address] [phone]  1/1/2018 - None Entered    [place] [address]       Subscriber Name Subscriber Birth Date Member ID       RC ECHEVERRIA 1965 [id number]                 Emergency Contacts      (Rel.) Home Phone Work Phone Mobile Phone    Anali Echeverria (Spouse) [phone] -- --               History & Physical      Aston Webb MD at 12/10/2018  4:02 PM              [facility]  HISTORY AND PHYSICAL    Date of Admission: 12/10/2018  Primary Care Physician: Rene Hogue DO    Subjective     Chief Complaint: Abd pain.      History of Present Illness  Pt is 53 years old  male presented ER with complaint abdomen pain.  Patient been having intermittent right upper quadrant and epigastric pain off and on for " one month.  Pain increased with food.  Patient was evaluated [facility] on Saturday and with a CT scan of his abdomen and pelvic-showing gallstones.  Patient has been very nausea, vomiting for last 3 days and progressing getting worse.  Patient also just finished course of Bactrim antibiotic and antiemetic, which did not help.  Patient complained of low-grade fever at home.  Patient denies any chills or night sweats symptoms.  Patient is morbidly obese.  Patient has recent stent placement on 11/6/18.  Patient's on Brilinta.  Patient has chronic history of coronary artery disease.  Patient has a strong history of coronary artery disease last 3 years with 5 stent placement total.  Ejection fraction was 30-35% last heart cath.    Patient presented to was in [facility] ER with slightly elevated liver function.  Right upper quadrant.  Laboratory shows worsening of acute renal injury with elevated BUN, and leukocytosis.  Ultrasound abdomen shows probable culprit.  Bladder polyp measuring 4 mm, no definite gallstone, there is mild gallbladder wall thickening,, duct measuring 6 mm slightly prominent for age, no intrahepatic duct dilation seen.     Patient has a history of diabetes, coronary artery disease with recent stent placement, diabetes.    Review of Systems   Constitutional: Positive for activity change, appetite change and fatigue. Negative for chills and fever.   HENT: Negative for hearing loss, nosebleeds, tinnitus and trouble swallowing.    Eyes: Negative for visual disturbance.   Respiratory: Negative for cough, chest tightness, shortness of breath and wheezing.    Cardiovascular: Negative for chest pain, palpitations and leg swelling.   Gastrointestinal: Positive for abdominal pain, nausea and vomiting. Negative for abdominal distention, blood in stool, constipation and diarrhea.   Endocrine: Negative for cold intolerance, heat intolerance, polydipsia, polyphagia and polyuria.   Genitourinary: Negative for  decreased urine volume, difficulty urinating, dysuria, flank pain, frequency and hematuria.   Musculoskeletal: Negative for arthralgias, joint swelling and myalgias.   Skin: Negative for rash.   Allergic/Immunologic: Negative for immunocompromised state.   Neurological: Positive for weakness. Negative for dizziness, syncope, light-headedness and headaches.   Hematological: Negative for adenopathy. Does not bruise/bleed easily.   Psychiatric/Behavioral: Negative for confusion and sleep disturbance. The patient is not nervous/anxious.         Otherwise complete ROS reviewed and negative except as mentioned in the HPI.      Past Medical History:   Past Medical History:   Diagnosis Date   • CAD (coronary artery disease)    • CAD in native artery     2V STEMI 7/13 STENT TO CX AND STENTX2 TO MID AND DISTAL LAD   • Diabetes mellitus (CMS/HCC)    • History of coronary artery stent placement      x 3 - for ACUTE MI 7/2013   • Hyperlipidemia    • Hyperlipidemia, mild    • Myocardial infarct (CMS/HCC)    • Myocardial infarction (CMS/HCC)    • Stented coronary artery        Past Surgical History:  Past Surgical History:   Procedure Laterality Date   • CAROTID STENT         Family History: family history includes Heart disease in his maternal grandmother; No Known Problems in his father and mother.    Social History:  reports that  has never smoked. he has never used smokeless tobacco. He reports that he does not drink alcohol or use drugs.    Medications:  Prior to Admission medications    Medication Sig Start Date End Date Taking? Authorizing Provider   sulfamethoxazole-trimethoprim (BACTRIM,SEPTRA) 400-80 MG tablet Take 1 tablet by mouth 2 (Two) Times a Day.   Yes Provider, MD Neli   aspirin 81 MG EC tablet Take 81 mg by mouth Daily.    Provider, MD Neli   atorvastatin (LIPITOR) 80 MG tablet Take 1 tablet by mouth Every Night. 11/26/18   Bernarda Henriquez PA-C   Dulaglutide (TRULICITY) 0.75 MG/0.5ML solution  pen-injector Inject 1 pen under the skin into the appropriate area as directed 1 (One) Time Per Week.    Neli Flores MD   DULoxetine (CYMBALTA) 20 MG capsule Take 20 mg by mouth Daily.    Neli Flores MD   HYDROcodone-acetaminophen (NORCO) 5-325 MG per tablet Take 1 tablet by mouth Every 4 (Four) Hours As Needed for Mild Pain  for up to 20 doses. 12/10/18   Bud Garcia MD   insulin aspart (novoLOG FLEXPEN) 100 UNIT/ML solution pen-injector sc pen Inject 18 Units under the skin into the appropriate area as directed 3 (Three) Times a Day With Meals.    Neli Flores MD   insulin aspart (novoLOG FLEXPEN) 100 UNIT/ML solution pen-injector sc pen Inject 2-7 Units under the skin into the appropriate area as directed As Needed.    Neli Flores MD   insulin degludec (TRESIBA FLEXTOUCH) 100 UNIT/ML solution pen-injector injection Inject 42 Units under the skin into the appropriate area as directed Every Morning.    Neli Flores MD   levocetirizine (XYZAL) 5 MG tablet Take 5 mg by mouth Every Evening.    Neli Flores MD   levoFLOXacin (LEVAQUIN) 500 MG tablet Take 1 tablet by mouth Daily for 5 days. 12/10/18 12/15/18  Bud Garcia MD   lisinopril (PRINIVIL,ZESTRIL) 10 MG tablet Take 1 tablet by mouth Daily. 11/26/18   Bernarda Henriquez PA-C   metFORMIN (GLUCOPHAGE) 500 MG tablet Take 1 tablet by mouth 2 (Two) Times a Day With Meals. HOLD FOR 48 HOURS POST CATH 11/7/18   Laura Gomez APRN   metoprolol succinate XL (TOPROL-XL) 50 MG 24 hr tablet Take 1 tablet by mouth Daily. 11/26/18   Bernarda Henriquez PA-C   nitroglycerin (NITROSTAT) 0.4 MG SL tablet Place 1 tablet under the tongue Every 5 (Five) Minutes As Needed for Chest Pain. Take no more than 3 doses in 15 minutes. 11/7/18   KneesLaura E APRLAKESHIA   spironolactone (ALDACTONE) 25 MG tablet Take 1 tablet by mouth Daily. 11/26/18   Florence, Bernarda R, PA-C   ticagrelor (BRILINTA) 90 MG tablet tablet Take 1 tablet by  "mouth 2 (Two) Times a Day. 11/26/18   Bernarda Henriquez PA-C   timolol (TIMOPTIC) 0.5 % ophthalmic solution Administer 1 drop to both eyes Daily.    Provider, MD Neli   ZOFRAN 8 MG tablet Take 1 tablet by mouth Every 8 (Eight) Hours As Needed for Nausea or Vomiting for up to 10 doses. 12/10/18   Bud Garcia MD     Allergies:  No Known Allergies    Objective     Vital Signs: BP 93/58   Pulse 79   Temp 97.3 °F (36.3 °C)   Resp 14   Ht 175.3 cm (69\")   Wt 97 kg (213 lb 12.8 oz)   SpO2 97%   BMI 31.57 kg/m²    Physical Exam   Constitutional: He is oriented to person, place, and time. He appears well-developed and well-nourished.   HENT:   Head: Normocephalic and atraumatic.   Eyes: Conjunctivae and EOM are normal. Pupils are equal, round, and reactive to light.   Neck: Neck supple. No JVD present. No thyromegaly present.   Cardiovascular: Normal rate, regular rhythm, normal heart sounds and intact distal pulses. Exam reveals no gallop and no friction rub.   No murmur heard.  Pulmonary/Chest: Effort normal and breath sounds normal. No respiratory distress. He has no wheezes. He has no rales. He exhibits no tenderness.   Abdominal: Soft. Bowel sounds are normal. He exhibits no distension. There is tenderness ( epigastric/right upper quadrant pain). There is guarding. There is no rebound.   Musculoskeletal: Normal range of motion. He exhibits no edema, tenderness or deformity.   Lymphadenopathy:     He has no cervical adenopathy.   Neurological: He is alert and oriented to person, place, and time. He displays normal reflexes. No cranial nerve deficit. He exhibits normal muscle tone.   Skin: Skin is warm and dry. No rash noted.   Psychiatric: He has a normal mood and affect. His behavior is normal. Judgment and thought content normal.   Nursing note and vitals reviewed.          Results Reviewed:    Lab Results (last 24 hours)     Procedure Component Value Units Date/Time    Lactic Acid, Reflex Timer (This " will reflex a repeat order 3-3:15 hours after ordered.) [[id number]] Collected:  12/10/18 1138    Specimen:  Blood Updated:  12/10/18 1546     Extra Tube Hold for add-ons.     Comment: Auto resulted.       Blood Culture With POOL - Blood, Arm, Right [[id number]] Collected:  12/10/18 1135    Specimen:  Blood from Arm, Right Updated:  12/10/18 1239    Lactic Acid, Plasma [[id number]]  (Abnormal) Collected:  12/10/18 1138    Specimen:  Blood Updated:  12/10/18 1232     Lactate 2.8 mmol/L     Blood Culture With POOL - Blood, Arm, Right [[id number]] Collected:  12/10/18 1145    Specimen:  Blood from Arm, Right Updated:  12/10/18 1226    Urinalysis With Microscopic If Indicated (No Culture) - Urine, Clean Catch [[id number]]  (Abnormal) Collected:  12/10/18 1203    Specimen:  Urine, Clean Catch Updated:  12/10/18 1224     Color, UA Yellow     Appearance, UA Clear     pH, UA 5.5     Specific Gravity, UA >=1.030     Glucose,  mg/dL (1+)     Ketones, UA 15 mg/dL (1+)     Bilirubin, UA Small (1+)     Blood, UA Negative     Protein, UA Trace     Leuk Esterase, UA Negative     Nitrite, UA Negative     Urobilinogen, UA 0.2 E.U./dL    Narrative:       Urine microscopic not indicated.    Lipase [[id number]]  (Normal) Collected:  12/10/18 1138    Specimen:  Blood Updated:  12/10/18 1222     Lipase 44 U/L     Comprehensive Metabolic Panel [[id number]]  (Abnormal) Collected:  12/10/18 1138    Specimen:  Blood Updated:  12/10/18 1222     Glucose 149 mg/dL      BUN 36 mg/dL      Creatinine 2.44 mg/dL      Sodium 140 mmol/L      Potassium 4.1 mmol/L      Chloride 94 mmol/L      CO2 29.0 mmol/L      Calcium 9.3 mg/dL      Total Protein 7.9 g/dL      Albumin 4.40 g/dL      ALT (SGPT) 24 U/L      AST (SGOT) 42 U/L      Alkaline Phosphatase 109 U/L      Total Bilirubin 0.9 mg/dL      eGFR Non African Amer 28 mL/min/1.73      Globulin 3.5 gm/dL      A/G Ratio 1.3 g/dL      BUN/Creatinine Ratio 14.8     Anion Gap 17.0 mmol/L     Protime-INR  [[id number]]  (Normal) Collected:  12/10/18 1138    Specimen:  Blood Updated:  12/10/18 1215     Protime 13.5 Seconds      INR 1.00    aPTT [[id number]]  (Normal) Collected:  12/10/18 1138    Specimen:  Blood Updated:  12/10/18 1215     PTT 28.9 seconds     CBC & Differential [[id number]] Collected:  12/10/18 1138    Specimen:  Blood Updated:  12/10/18 1207    Narrative:       The following orders were created for panel order CBC & Differential.  Procedure                               Abnormality         Status                     ---------                               -----------         ------                     CBC Auto Differential[[id number]]        Abnormal            Final result                 Please view results for these tests on the individual orders.    CBC Auto Differential [[id number]]  (Abnormal) Collected:  12/10/18 1138    Specimen:  Blood Updated:  12/10/18 1207     WBC 13.98 10*3/mm3      RBC 4.44 10*6/mm3      Hemoglobin 13.3 g/dL      Hematocrit 39.6 %      MCV 89.2 fL      MCH 30.0 pg      MCHC 33.6 g/dL      RDW 13.1 %      RDW-SD 42.6 fl      MPV 9.5 fL      Platelets 324 10*3/mm3      Neutrophil % 85.6 %      Lymphocyte % 6.7 %      Monocyte % 6.4 %      Eosinophil % 0.4 %      Basophil % 0.3 %      Immature Grans % 0.6 %      Neutrophils, Absolute 11.97 10*3/mm3      Lymphocytes, Absolute 0.93 10*3/mm3      Monocytes, Absolute 0.89 10*3/mm3      Eosinophils, Absolute 0.06 10*3/mm3      Basophils, Absolute 0.04 10*3/mm3      Immature Grans, Absolute 0.09 10*3/mm3      nRBC 0.0 /100 WBC     POC Glucose Once [[id number]]  (Abnormal) Collected:  12/10/18 1118    Specimen:  Blood Updated:  12/10/18 1129     Glucose 146 mg/dL      Comment: : [id number] Brennan ID: [id number]              Radiology Data:    Imaging Results (last 24 hours)     Procedure Component Value Units Date/Time    US Abdomen Limited [[id number]] Collected:  12/10/18 1429     Updated:  12/10/18 1435     Narrative:       EXAMINATION:  US ABDOMEN LIMITED-  12/10/2018 1:30 PM CST     HISTORY: RUQ pain. Concern for acute cholecystitis.     COMPARISON: No comparison study.     TECHNIQUE: Multiple sonographic images were obtained.     FINDINGS: The liver echogenicity is normal. There is normal direction of  portal vein blood flow. No definite gallstones are appreciated. There is  a nonmoving 4 mm probable gallbladder polyp. The gallbladder wall is  mildly thickened measuring 3.4 mm. The right kidney measures 12.1 cm.  The cortical thickness and echogenicity are normal. The common bile duct  measures 6 mm.       Impression:       1. Probable gallbladder polyp measuring 4 mm. No definite gallstones.  There is mild gallbladder wall thickening.  2. The common duct measures 6 mm which is slightly prominent for age. No  intrahepatic ductal dilatation is seen.  This report was finalized on 12/10/2018 14:31 by Dr. Cristofer Isaac MD.    XR Chest 1 View [[id number]] Collected:  12/10/18 1255     Updated:  12/10/18 1259    Narrative:       EXAMINATION:  XR CHEST 1 VW-  12/10/2018 12:28 PM CST     HISTORY: Hypotension. There is concern for sepsis and infection.     COMPARISON: 7/22/2013.     FINDINGS:  There is no dense infiltrate or effusion. There is mild  bronchial wall thickening, stable. The heart is normal in size.       Impression:       1. No focal infiltrate or effusion.  2. Mild bronchial wall thickening, stable.        This report was finalized on 12/10/2018 12:56 by Dr. Cristofer Isaac MD.          I have personally reviewed and interpreted the radiology studies and ECG obtained at time of admission.     Assessment / Plan      Assessment & Plan  Active Hospital Problems    Diagnosis   • RUQ pain   • Dehydration   • CAD (coronary artery disease)   • Carotid stent occlusion (CMS/HCC)   • Diabetes (CMS/HCC)     Plans    Right upper quadrant pain. Patient presented to was in [facility] ER with slightly elevated liver function.   Right upper quadrant.  Laboratory shows worsening of acute renal injury with elevated BUN, and leukocytosis.  Ultrasound abdomen shows probable culprit.  Bladder polyp measuring 4 mm, no definite gallstone, there is mild gallbladder wall thickening, duct measuring 6 mm slightly prominent for age, no intrahepatic duct dilation seen.  1 L of elective and was given in ER.   2 g of Rocephin was given in ER.  Unable to to do surgical intervention because risks of bleeding on Brilinta.  Recommendation surgical intervention at this time.  Patient to begin Rocephin antibiotic for now.  Patient will to go home with Levaquin antibiotics for another 7 days.  Follow his primary care doctor until he is off Brilinta.  Then we can look forward intermediate cholecystectomy.  This is the recommendation from general surgery.    CAD.  Status post right circumflex artery stent placement on 11/6/18.  Ejection fraction 30-35%. .  Continue aspirin and Brilinta.     Acute kidney injury/dehydration.  Slow IV hydration due to CHF.    Nausea/vomit- Phenergan, Zofran, Pepcid.    Diabetes.  Sliding scale.  Hemoglobin A1c.  Lipid profile.  TSH.      Code Status: full code     I discussed the patient's findings and my recommendations with: patient    Estimated length of stay: 2-4 days.     Aston Webb MD   12/10/18   4:03 PM              Electronically signed by Aston Webb MD at 12/10/2018  4:54 PM          Emergency Department Notes      Yola Odom MD at 12/10/2018 12:23 PM          Subjective   53-year-old male resenting to the emergency department with abdominal pain.  Patient states that he has had intermittent right upper abdominal pain for the last month usually related to food.  Patient was evaluated outside hospital on Saturday with a did a CT scan of his abdomen and pelvis and found gallstones but were not able to do an ultrasound at that time.  Patient states that over the past 3 days he has had continuous nausea and  vomiting and has not been able to tolerate by mouth in the pain in his right upper collagen has been constant.  Patient was discharged from the other facility with antibiotics and anti-emetics which not been helping.  Patient has had subjective fevers and chills.            Review of Systems   Gastrointestinal: Positive for abdominal pain, nausea and vomiting.   All other systems reviewed and are negative.      Past Medical History:   Diagnosis Date   • CAD (coronary artery disease)    • CAD in native artery     2V STEMI 7/13 STENT TO CX AND STENTX2 TO MID AND DISTAL LAD   • Diabetes mellitus (CMS/HCC)    • History of coronary artery stent placement      x 3 - for ACUTE MI 7/2013   • Hyperlipidemia    • Hyperlipidemia, mild    • Myocardial infarct (CMS/HCC)    • Myocardial infarction (CMS/[facility])    • Stented coronary artery        No Known Allergies    Past Surgical History:   Procedure Laterality Date   • CAROTID STENT         Family History   Problem Relation Age of Onset   • Heart disease Maternal Grandmother    • No Known Problems Mother    • No Known Problems Father        Social History     Socioeconomic History   • Marital status:      Spouse name: Not on file   • Number of children: Not on file   • Years of education: Not on file   • Highest education level: Not on file   Tobacco Use   • Smoking status: Never Smoker   • Smokeless tobacco: Never Used   Substance and Sexual Activity   • Alcohol use: No   • Drug use: No   • Sexual activity: Defer           Objective   Physical Exam   Constitutional: He is oriented to person, place, and time. He appears well-developed and well-nourished.   HENT:   Head: Normocephalic and atraumatic.   Nose: Nose normal.   Eyes: EOM are normal. Pupils are equal, round, and reactive to light.   Neck: Normal range of motion. Neck supple.   Cardiovascular: Normal rate, regular rhythm and normal heart sounds.   Pulmonary/Chest: Effort normal and breath sounds normal.    Abdominal: Soft. He exhibits no distension. There is tenderness in the right upper quadrant. There is no rebound and no guarding.   Musculoskeletal: Normal range of motion.   Neurological: He is alert and oriented to person, place, and time. No cranial nerve deficit or sensory deficit.   Skin: Skin is warm and dry. Capillary refill takes less than 2 seconds.   Psychiatric: He has a normal mood and affect. His behavior is normal.   Nursing note and vitals reviewed.      Procedures          ED Course  ED Course as of Dec 10 1616   Mon Dec 10, 2018   1606 Patient's case discussed with Dr. Burks of surgery and possible acute cholecystitis however ultrasound imaging none concerning for an acute inflammation and lab work including LFTs not significantly elevated.  Patient given fluids and monitored, gentle fluid hydration was done as patient has a history of heart failure.  Antibiotics initiated.  Patient likely dehydrated secondary to nausea and vomiting that have been ongoing.  Patient is not a surgical candidate at this time due to aunt had a platelet therapy for stent placement one month earlier.  Patient will be admitted to hospitalist service for IV hydration and monitoring as well as antibiotics.  Patient to get 2 g of ceftriaxone in the ED and then continued on Levaquin for 7 days.  [AP]      ED Course User Index  [AP] Yola Odom MD                  [facility]      Final diagnoses:   Nausea and vomiting, intractability of vomiting not specified, unspecified vomiting type   CAREY (acute kidney injury) (CMS/[facility])            Yola Odom MD  12/10/18 1616      Electronically signed by Yola Odom MD at 12/10/2018  4:16 PM     Zohreh Preston RN at 12/10/2018  2:10 PM        Pt was asked if he wanted his morphine for pain. He stated he did not at this time.      Zohreh Preston RN  12/10/18 1410      Electronically signed by Zohreh Preston, RN at 12/10/2018  2:10 PM     Zohreh Preston RN at 12/10/2018   4:06 PM        Rocephin requested from pharmacy     Zohreh Preston RN  12/10/18 6931      Electronically signed by Zohreh Preston RN at 12/10/2018  4:06 PM       Hospital Medications (active)       Dose Frequency Start End    aspirin EC tablet 81 mg 81 mg Daily 12/11/2018     Sig - Route: Take 1 tablet by mouth Daily. - Oral    cefTRIAXone (ROCEPHIN) 2 g/20mL IV PUSH syringe 2 g Once 12/10/2018 12/10/2018    Sig - Route: Infuse 20 mL into a venous catheter 1 (One) Time. - Intravenous    dextrose (D50W) 25 g/ 50mL Intravenous Solution 25 g 25 g Every 15 Minutes PRN 12/10/2018     Sig - Route: Infuse 50 mL into a venous catheter Every 15 (Fifteen) Minutes As Needed for Low Blood Sugar (Blood Sugar Less Than 70). - Intravenous    dextrose (GLUTOSE) oral gel 15 g 15 g Every 15 Minutes PRN 12/10/2018     Sig - Route: Take 15 g by mouth Every 15 (Fifteen) Minutes As Needed for Low Blood Sugar (Blood sugar less than 70). - Oral    DULoxetine (CYMBALTA) DR capsule 20 mg 20 mg Daily 12/10/2018     Sig - Route: Take 1 capsule by mouth Daily. - Oral    famotidine (PEPCID) injection 20 mg 20 mg Every 12 Hours Scheduled 12/10/2018     Sig - Route: Infuse 2 mL into a venous catheter Every 12 (Twelve) Hours. - Intravenous    glucagon (human recombinant) (GLUCAGEN DIAGNOSTIC) injection 1 mg 1 mg As Needed 12/10/2018     Sig - Route: Inject 1 mg under the skin into the appropriate area as directed As Needed (Blood Glucose Less Than 70). - Subcutaneous    HYDROcodone-acetaminophen (NORCO) 7.5-325 MG per tablet 1 tablet 1 tablet Every 6 Hours PRN 12/10/2018 12/20/2018    Sig - Route: Take 1 tablet by mouth Every 6 (Six) Hours As Needed for Moderate Pain . - Oral    insulin lispro (humaLOG) injection 2-7 Units 2-7 Units 4 Times Daily With Meals & Nightly 12/10/2018     Sig - Route: Inject 2-7 Units under the skin into the appropriate area as directed 4 (Four) Times a Day With Meals & at Bedtime. - Subcutaneous    lactated ringers  "bolus 1,000 mL 1,000 mL Once 12/10/2018 12/10/2018    Sig - Route: Infuse 1,000 mL into a venous catheter 1 (One) Time. - Intravenous    metoclopramide (REGLAN) injection 10 mg 10 mg Once 12/10/2018 12/10/2018    Sig - Route: Infuse 2 mL into a venous catheter 1 (One) Time. - Intravenous    metoprolol succinate XL (TOPROL-XL) 24 hr tablet 50 mg 50 mg Daily 12/10/2018     Sig - Route: Take 1 tablet by mouth Daily. - Oral    morphine injection 4 mg 4 mg Once 12/10/2018     Sig - Route: Infuse 1 mL into a venous catheter 1 (One) Time. - Intravenous    ondansetron (ZOFRAN) injection 4 mg 4 mg Once 12/10/2018 12/10/2018    Sig - Route: Infuse 2 mL into a venous catheter 1 (One) Time. - Intravenous    ondansetron (ZOFRAN) injection 4 mg 4 mg Every 6 Hours PRN 12/10/2018     Sig - Route: Infuse 2 mL into a venous catheter Every 6 (Six) Hours As Needed for Nausea or Vomiting. - Intravenous    Linked Group 1:  \"Or\" Linked Group Details        ondansetron (ZOFRAN) tablet 4 mg 4 mg Every 6 Hours PRN 12/10/2018     Sig - Route: Take 1 tablet by mouth Every 6 (Six) Hours As Needed for Nausea or Vomiting. - Oral    Linked Group 1:  \"Or\" Linked Group Details        ondansetron ODT (ZOFRAN-ODT) disintegrating tablet 4 mg 4 mg Every 6 Hours PRN 12/10/2018     Sig - Route: Take 1 tablet by mouth Every 6 (Six) Hours As Needed for Nausea or Vomiting. - Oral    Linked Group 1:  \"Or\" Linked Group Details        promethazine (PHENERGAN) injection 12.5 mg 12.5 mg Every 6 Hours PRN 12/10/2018     Sig - Route: Infuse 0.5 mL into a venous catheter Every 6 (Six) Hours As Needed for Nausea or Vomiting. - Intravenous    sodium chloride 0.9 % bolus 1,000 mL 1,000 mL Once 12/10/2018 12/10/2018    Sig - Route: Infuse 1,000 mL into a venous catheter 1 (One) Time. - Intravenous    sodium chloride 0.9 % flush 3 mL 3 mL Every 12 Hours Scheduled 12/10/2018     Sig - Route: Infuse 3 mL into a venous catheter Every 12 (Twelve) Hours. - Intravenous    " sodium chloride 0.9 % flush 3-10 mL 3-10 mL As Needed 12/10/2018     Sig - Route: Infuse 3-10 mL into a venous catheter As Needed for Line Care. - Intravenous    sodium chloride 0.9 % infusion 75 mL/hr Continuous 12/10/2018     Sig - Route: Infuse 75 mL/hr into a venous catheter Continuous. - Intravenous    ticagrelor (BRILINTA) tablet 90 mg 90 mg 2 Times Daily 12/10/2018     Sig - Route: Take 1 tablet by mouth 2 (Two) Times a Day. - Oral    timolol (TIMOPTIC) 0.5 % ophthalmic solution 1 drop 1 drop Daily 12/10/2018     Sig - Route: Administer 1 drop to both eyes Daily. - Both Eyes    cefTRIAXone (ROCEPHIN) 2 g/20mL IV PUSH syringe (Discontinued) 2 g Once 12/10/2018 12/10/2018    Sig - Route: Infuse 20 mL into a venous catheter 1 (One) Time. - Intravenous    Reason for Discontinue: Duplicate order    sodium chloride 0.9% - IBW for BMI > 30 bolus 2,121 mL (Discontinued) 30 mL/kg × 70.7 kg (Ideal) Once 12/10/2018 12/10/2018    Sig - Route: Infuse 2,121 mL into a venous catheter 1 (One) Time. - Intravenous          Physician Progress Notes (last 24 hours) (Notes from 12/10/2018  9:11 AM through 12/11/2018  9:11 AM)     No notes of this type exist for this encounter.           Consult Notes (last 24 hours) (Notes from 12/10/2018  9:11 AM through 12/11/2018  9:11 AM)      Bud Garcia MD at 12/10/2018  3:46 PM              Patient Care Team:  Rene Hogue DO as PCP - General (Family Medicine)  Tru Rai MD as Cardiologist (Cardiology)    Chief complaint central abdominal pain    Subjective     Subjective .     History of present illness:  Patient is a 53-year-old gentleman with history of diabetes, coronary artery disease and moderate obesity who has had previous catheterization and coronary stents placed 3 years prior and also 2 stents placed one more.  With these stents placed and he also about that time began having some substernal discomfort and right-sided discomfort.  Over the past 3  days it is been worse he traveled to his local physician and [place] and CT scan was completed showing a question of gallstones he was given Bactrim and discharged.  He continued with this pain he now presented to [facility] his liver functions are minimally elevated he is somewhat dry and his subxiphoid discomfort is improved but not resolved.  He has a significant history of having the stents placed and he is on Mylanta for blood thinning with his history of a stent and with this noted general surgery asked to see and evaluate.  His ultrasound shows a 4 mm polyp some slightly thickened walls around his gallbladder his white count is 13, his PT is 13.5.  With the above problem I do not disagree that his gallbladder is symptomatic but there is nothing that I can do as he is on a blood thinner at present his liver functions are normal.  I would suggest giving him hydration with a liter of lactated Ringer's, also 2 g of Rocephin and then discharge him on Levaquin and follow-up with his regular physician.  I will be happy to address his gallbladder once he is off the Brylinta  but this will not be until November 2019.      Past surgical history is negative except for coronary stent 3 years ago as well as one month ago abnormal 5 stents placed.  His ejection fraction is 35%.        Review of Systems  Pertinent items are noted in HPI, all other systems reviewed and negative    Local problems significant for a two-vessel STEMI in 2013, needing emergent stenting of his circumflex and LAD.    History  Past Medical History:   Diagnosis Date   • CAD (coronary artery disease)    • CAD in native artery     2V STEMI 7/13 STENT TO CX AND STENTX2 TO MID AND DISTAL LAD   • Diabetes mellitus (CMS/HCC)    • History of coronary artery stent placement      x 3 - for ACUTE MI 7/2013   • Hyperlipidemia    • Hyperlipidemia, mild    • Myocardial infarct (CMS/HCC)    • Myocardial infarction (CMS/HCC)    • Stented coronary artery    ,  Past Surgical History:   Procedure Laterality Date   • CAROTID STENT     , Family History   Problem Relation Age of Onset   • Heart disease Maternal Grandmother    • No Known Problems Mother    • No Known Problems Father    , Social History     Tobacco Use   • Smoking status: Never Smoker   • Smokeless tobacco: Never Used   Substance Use Topics   • Alcohol use: No   • Drug use: No   ,   (Not in a hospital admission), Scheduled Meds:    ceftriaxone 2 g Intravenous Once   lactated ringers 1,000 mL Intravenous Once   Morphine 4 mg Intravenous Once   , Continuous Infusions:   , PRN Meds:   and Allergies:  Patient has no known allergies.    Objective      Objective     Vital Signs   Temp:  [97.3 °F (36.3 °C)] 97.3 °F (36.3 °C)  Heart Rate:  [75-83] 79  Resp:  [14] 14  BP: (88-93)/(55-58) 93/58    Intake & Output (last 3 days)       12/07 0701 - 12/08 0700 12/08 0701 - 12/09 0700 12/09 0701 - 12/10 0700 12/10 0701 - 12/11 0700    IV Piggyback    1000    Total Intake(mL/kg)    1000 (10.3)    Net    +1000                   Physical Exam:     General Appearance:    Alert, cooperative, in no acute distress   Head:    Normocephalic, without obvious abnormality, atraumatic   Eyes:            Lids and lashes normal, conjunctivae and sclerae normal, no   icterus, no pallor, corneas clear, PERRLA   Ears:    Ears appear intact with no abnormalities noted   Throat:   No oral lesions, no thrush, oral mucosa moist   Neck:   No adenopathy, supple, trachea midline, no thyromegaly, no   carotid bruit, no JVD   Back:     No kyphosis present, no scoliosis present, no skin lesions,      erythema or scars, no tenderness to percussion or                   palpation,   range of motion normal   Lungs:     Clear to auscultation,respirations regular, even and                  unlabored    Heart:    Regular rhythm and normal rate, normal S1 and S2, no            murmur, no gallop, no rub, no click   Chest Wall:    No abnormalities observed    Abdomen:    Abdomen is obese, occasional bowel sounds, no masses noted, slight tenderness to deep palpation right upper quadrant, he has some weakness around his periumbilical region, no obvious hernia at present but significant weakness.   Rectal:     Deferred   Extremities:   Moves all extremities well, no edema, no cyanosis, no             redness   Pulses:   Pulses palpable and equal bilaterally   Skin:   No bleeding, bruising or rash   Lymph nodes:   No palpable adenopathy   Neurologic:   Cranial nerves 2 - 12 grossly intact, sensation intact, DTR       present and equal bilaterally    White count is 13.9, liver functions are normal.  Creatinine is elevated at 2.4 and 36,         Results from last 7 days   Lab Units  12/10/18   1138   WBC 10*3/mm3  13.98*   HEMOGLOBIN g/dL  13.3*   HEMATOCRIT %  39.6*   PLATELETS 10*3/mm3  324        Results from last 7 days   Lab Units  12/10/18   1138   SODIUM mmol/L  140   POTASSIUM mmol/L  4.1   CHLORIDE mmol/L  94*   CO2 mmol/L  29.0   BUN mg/dL  36*   CREATININE mg/dL  2.44*   CALCIUM mg/dL  9.3   BILIRUBIN mg/dL  0.9   ALK PHOS U/L  109   ALT (SGPT) U/L  24   AST (SGOT) U/L  42   GLUCOSE mg/dL  149*     Gallbladder ultrasound shows probable gallbladder polyp measuring 4 mm no definite gallstones, mild gallbladder thickening, duct 6 mm.    Results Review:   I reviewed the patient's new clinical results.    Assessment/Plan     Assessment/Plan       * No active hospital problems. *      Issue with coronary artery disease, hypertension, obesity, diabetes.  Patient with the above problems noted he had a stent placed less than a month ago he is on Dilantin for this, [facility]  without significant risk to his stented vessels and as such he cannot have surgery.  He is advised to avoid fatty or greasy foods, continue Levaquin given 2 g of Rocephin while he is in the hospital here and also give him a liter bolus of lactated Ringer's.  Unfortunately we  cannot look toward surgical intervention given his risk with his blood thinner okay for discharge, follow-up with his regular physician once his blood thinner has been stopped we can look toward indeterminate cholecystectomy.    I discussed the patient's findings and my recommendations with patient, family and nursing staff    Bud Garcia MD  12/10/18  3:46 PM    Time: Time spent with patient 45 minutes     EMR Dragon/Transcription disclaimer: Much of this encounter note is an electronic transcription/translation of spoken language to printed text. The electronic translation of spoken language may permit erroneous, or at times, nonsensical words or phrases to be inadvertently transcribed; although I have reviewed the note for such errors, some may still exist.        Electronically signed by Bud Garcia MD at 12/10/2018  3:55 PM

## 2018-12-11 NOTE — PROGRESS NOTES
[facility] Medicine Services  INPATIENT PROGRESS NOTE    Patient Name: Luis Echeverria  Date of Admission: 12/10/2018  Today's Date: 12/11/18  Length of Stay: 1  Primary Care Physician: Rene Hogue DO    Subjective   Chief Complaint: right upper quadrant pain  HPI   Pt continues to complain of right upper quadrant pain. No diarrhea. States nausea is better. He is tolerating clear liquids. He was very diaphoretic this am when I entered the room. He was sleeping. Blood pressure was stable. Blood sugar was 251. No fever. He apparently had some elevated temp of 99.4 at 0400. I placed him on telemetry and he is sinus rhythm. He denies any chest pain.     Review of Systems   All pertinent negatives and positives are as above. All other systems have been reviewed and are negative unless otherwise stated.     Objective    Temp:  [97.8 °F (36.6 °C)-100.4 °F (38 °C)] 98.4 °F (36.9 °C)  Heart Rate:  [60-98] 83  Resp:  [14-18] 14  BP: ()/(47-76) 108/53  Physical Exam   Constitutional: He is oriented to person, place, and time. He appears well-developed and well-nourished.   HENT:   Head: Normocephalic and atraumatic.   Eyes: EOM are normal. Pupils are equal, round, and reactive to light. No scleral icterus.   Neck: Normal range of motion. Neck supple. No JVD present. Carotid bruit is not present. No tracheal deviation present. No thyromegaly present.   Cardiovascular: Normal rate and regular rhythm. Exam reveals no gallop and no friction rub.   No murmur heard.  Sinus 92   Pulmonary/Chest: Effort normal and breath sounds normal. No respiratory distress. He has no wheezes. He has no rales. He exhibits no tenderness.   Abdominal: Soft. Bowel sounds are normal. He exhibits no distension. There is tenderness (RUQ).   Musculoskeletal: Normal range of motion. He exhibits no edema.   Neurological: He is alert and oriented to person, place, and time. No cranial nerve deficit.   Skin:  Skin is warm. No rash noted. He is diaphoretic.   Psychiatric: He has a normal mood and affect.     Results Review:  I have reviewed the labs, radiology results, and diagnostic studies.    Laboratory Data:   Results from last 7 days   Lab Units  12/11/18   0533  12/10/18   1138   WBC 10*3/mm3  10.09  13.98*   HEMOGLOBIN g/dL  11.0*  13.3*   HEMATOCRIT %  32.9*  39.6*   PLATELETS 10*3/mm3  235  324        Results from last 7 days   Lab Units  12/11/18   0533  12/10/18   1138   SODIUM mmol/L  136  140   POTASSIUM mmol/L  5.1  4.1   CHLORIDE mmol/L  98  94*   CO2 mmol/L  26.0  29.0   BUN mg/dL  41*  36*   CREATININE mg/dL  2.06*  2.44*   CALCIUM mg/dL  8.1*  9.3   BILIRUBIN mg/dL  0.7  0.9   ALK PHOS U/L  101  109   ALT (SGPT) U/L  23  24   AST (SGOT) U/L  47*  42   GLUCOSE mg/dL  229*  149*       Culture Data:   Blood Culture   Date Value Ref Range Status   12/10/2018 No growth at less than 24 hours  Preliminary   12/10/2018 No growth at less than 24 hours  Preliminary       Radiology Data:   Imaging Results (last 24 hours)     Procedure Component Value Units Date/Time    US Abdomen Limited [[id number]] Collected:  12/10/18 1429     Updated:  12/10/18 1435    Narrative:       EXAMINATION:  US ABDOMEN LIMITED-  12/10/2018 1:30 PM CST     HISTORY: RUQ pain. Concern for acute cholecystitis.     COMPARISON: No comparison study.     TECHNIQUE: Multiple sonographic images were obtained.     FINDINGS: The liver echogenicity is normal. There is normal direction of  portal vein blood flow. No definite gallstones are appreciated. There is  a nonmoving 4 mm probable gallbladder polyp. The gallbladder wall is  mildly thickened measuring 3.4 mm. The right kidney measures 12.1 cm.  The cortical thickness and echogenicity are normal. The common bile duct  measures 6 mm.       Impression:       1. Probable gallbladder polyp measuring 4 mm. No definite gallstones.  There is mild gallbladder wall thickening.  2. The common duct measures  6 mm which is slightly prominent for age. No  intrahepatic ductal dilatation is seen.  This report was finalized on 12/10/2018 14:31 by Dr. Cristofer Isaac MD.          I have reviewed the patient's current medications.     Assessment/Plan     Active Hospital Problems    Diagnosis   • **RUQ pain   • Acute kidney injury (CMS/HCC)   • Hypotension     Secondary to volume depletion.      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 Will continue IV fluids for now.   2. Advance to full liquids.   3. Brilinta continues.   4. Levaquin 500 mg IV starting today. Will transition to oral when he is tolerating a diet.   5. Metformin and ACEI on hold.     Discharge Planning: I expect the patient to be discharged to home in 1 days.    ANDRES Farris   12/11/18   2:02 PM     I personally evaluated and examined the patient in conjunction with ANDRES Reyes and agree with the assessment, treatment plan, and disposition of the patient as recorded by her. My history, exam, and further recommendations are: I have reviewed and agree with the plans. Kt.

## 2018-12-12 LAB
ANION GAP SERPL CALCULATED.3IONS-SCNC: 14 MMOL/L (ref 4–13)
BUN BLD-MCNC: 30 MG/DL (ref 5–21)
BUN/CREAT SERPL: 23.3 (ref 7–25)
CALCIUM SPEC-SCNC: 7.8 MG/DL (ref 8.4–10.4)
CHLORIDE SERPL-SCNC: 95 MMOL/L (ref 98–110)
CO2 SERPL-SCNC: 22 MMOL/L (ref 24–31)
CREAT BLD-MCNC: 1.29 MG/DL (ref 0.5–1.4)
GFR SERPL CREATININE-BSD FRML MDRD: 59 ML/MIN/1.73
GLUCOSE BLD-MCNC: 502 MG/DL (ref 70–100)
GLUCOSE BLDC GLUCOMTR-MCNC: 165 MG/DL (ref 70–130)
GLUCOSE BLDC GLUCOMTR-MCNC: 210 MG/DL (ref 70–130)
GLUCOSE BLDC GLUCOMTR-MCNC: 424 MG/DL (ref 70–130)
POTASSIUM BLD-SCNC: 5.7 MMOL/L (ref 3.5–5.3)
SODIUM BLD-SCNC: 131 MMOL/L (ref 135–145)

## 2018-12-12 PROCEDURE — 80048 BASIC METABOLIC PNL TOTAL CA: CPT | Performed by: NURSE PRACTITIONER

## 2018-12-12 PROCEDURE — 82962 GLUCOSE BLOOD TEST: CPT

## 2018-12-12 PROCEDURE — 25010000002 LEVOFLOXACIN PER 250 MG: Performed by: NURSE PRACTITIONER

## 2018-12-12 PROCEDURE — 63710000001 INSULIN DETEMIR PER 5 UNITS: Performed by: NURSE PRACTITIONER

## 2018-12-12 PROCEDURE — 63710000001 INSULIN LISPRO (HUMAN) PER 5 UNITS: Performed by: NURSE PRACTITIONER

## 2018-12-12 PROCEDURE — 94799 UNLISTED PULMONARY SVC/PX: CPT

## 2018-12-12 RX ORDER — SODIUM POLYSTYRENE SULFONATE 15 G/60ML
15 SUSPENSION ORAL; RECTAL ONCE
Status: COMPLETED | OUTPATIENT
Start: 2018-12-12 | End: 2018-12-12

## 2018-12-12 RX ORDER — LEVOFLOXACIN 500 MG/1
500 TABLET, FILM COATED ORAL EVERY 24 HOURS
Status: DISCONTINUED | OUTPATIENT
Start: 2018-12-13 | End: 2018-12-13 | Stop reason: HOSPADM

## 2018-12-12 RX ORDER — FAMOTIDINE 20 MG/1
20 TABLET, FILM COATED ORAL DAILY
Status: DISCONTINUED | OUTPATIENT
Start: 2018-12-12 | End: 2018-12-13 | Stop reason: HOSPADM

## 2018-12-12 RX ADMIN — ASPIRIN 81 MG: 81 TABLET, DELAYED RELEASE ORAL at 08:42

## 2018-12-12 RX ADMIN — SODIUM CHLORIDE, PRESERVATIVE FREE 3 ML: 5 INJECTION INTRAVENOUS at 20:58

## 2018-12-12 RX ADMIN — METOPROLOL SUCCINATE 50 MG: 50 TABLET, FILM COATED, EXTENDED RELEASE ORAL at 08:42

## 2018-12-12 RX ADMIN — FAMOTIDINE 20 MG: 20 TABLET, FILM COATED ORAL at 10:32

## 2018-12-12 RX ADMIN — TICAGRELOR 90 MG: 90 TABLET ORAL at 20:58

## 2018-12-12 RX ADMIN — INSULIN DETEMIR 25 UNITS: 100 INJECTION, SOLUTION SUBCUTANEOUS at 10:31

## 2018-12-12 RX ADMIN — TICAGRELOR 90 MG: 90 TABLET ORAL at 08:43

## 2018-12-12 RX ADMIN — INSULIN LISPRO 18 UNITS: 100 INJECTION, SOLUTION INTRAVENOUS; SUBCUTANEOUS at 12:01

## 2018-12-12 RX ADMIN — INSULIN LISPRO 3 UNITS: 100 INJECTION, SOLUTION INTRAVENOUS; SUBCUTANEOUS at 17:29

## 2018-12-12 RX ADMIN — INSULIN LISPRO 18 UNITS: 100 INJECTION, SOLUTION INTRAVENOUS; SUBCUTANEOUS at 17:29

## 2018-12-12 RX ADMIN — INSULIN LISPRO 2 UNITS: 100 INJECTION, SOLUTION INTRAVENOUS; SUBCUTANEOUS at 20:58

## 2018-12-12 RX ADMIN — TIMOLOL MALEATE 1 DROP: 5 SOLUTION/ DROPS OPHTHALMIC at 08:43

## 2018-12-12 RX ADMIN — LEVOFLOXACIN 500 MG: 5 INJECTION, SOLUTION INTRAVENOUS at 14:19

## 2018-12-12 RX ADMIN — INSULIN LISPRO 7 UNITS: 100 INJECTION, SOLUTION INTRAVENOUS; SUBCUTANEOUS at 12:02

## 2018-12-12 RX ADMIN — INSULIN LISPRO 10 UNITS: 100 INJECTION, SOLUTION INTRAVENOUS; SUBCUTANEOUS at 10:33

## 2018-12-12 RX ADMIN — SODIUM POLYSTYRENE SULFONATE 15 G: 15 SUSPENSION ORAL; RECTAL at 11:40

## 2018-12-12 RX ADMIN — DULOXETINE 20 MG: 20 CAPSULE, DELAYED RELEASE ORAL at 08:43

## 2018-12-12 NOTE — PAYOR COMM NOTE
"Rc Echeverria (53 y.o. Male) [id number]   Additional progress notes   Please add to what faxed 12/11  [facility] phone   Fax        Date of Birth Social Security Number Address Home Phone MRN    1965  [address] [place] [address] [phone] [id number]    Restorationist Marital Status          [employer]        Admission Date Admission Type Admitting Provider Attending Provider Department, Room/Bed    12/10/18 Emergency Aston Webb MD Truong, Khai C, MD [facility] 3C, 388/1    Discharge Date Discharge Disposition Discharge Destination                       Attending Provider:  Aston Webb MD    Allergies:  No Known Allergies    Isolation:  None   Infection:  None   Code Status:  CPR    Ht:  175.3 cm (69.02\")   Wt:  97 kg (213 lb 13.5 oz)    Admission Cmt:  None   Principal Problem:  RUQ pain [R10.11]                 Active Insurance as of 12/10/2018     Primary Coverage     Payor Plan Insurance Group Employer/Plan Group    [employer] BLUE [employer] PPO [id number]     Payor Plan Address Payor Plan Phone Number Payor Plan Fax Number Effective Dates    [address] [phone]  1/1/2018 - None Entered    [place] [address]       Subscriber Name Subscriber Birth Date Member ID       RC ECHEVERRIA 1965 [id number]                 Emergency Contacts      (Rel.) Home Phone Work Phone Mobile Phone    Anali Echeverria (Spouse) [phone] -- --               Physician Progress Notes (last 72 hours) (Notes from 12/9/2018 12:34 PM through 12/12/2018 12:34 PM)      Hillary Goodwin APRN at 12/12/2018  9:23 AM              [facility] Medicine Services  INPATIENT PROGRESS NOTE    Length of Stay: 2  Date of Admission: 12/10/2018  Primary Care Physician: Rene Hogue DO    Subjective   Chief Complaint: Follow-up RUQ pain  HPI   The patient is resting " in bed with wife at bedside. He tells me he feels ok today. He did have some nausea last night but this is better. He is still not eating much and states he really is not that hungry. He drank some milk for breakfast and states that tasted pretty good. He denies any chest pain or shortness of breath. He has some abdominal soreness, which he states is worse with deep breathing and coughing. He is passing gas but has not had a bowel movement since Friday. He is not ambulating in the gutierrez.     Review of Systems   All pertinent negatives and positives are as above. All other systems have been reviewed and are negative unless otherwise stated.     Objective    Temp:  [97.7 °F (36.5 °C)-98.7 °F (37.1 °C)] 98.7 °F (37.1 °C)  Heart Rate:  [60-96] 93  Resp:  [14-18] 16  BP: (107-139)/(51-79) 121/60  Physical Exam   Constitutional: He is oriented to person, place, and time. He appears well-developed and well-nourished. No distress.   HENT:   Head: Normocephalic and atraumatic.   Neck: Normal range of motion. Neck supple. No JVD present. No tracheal deviation present. No thyromegaly present.   Cardiovascular: Normal rate, regular rhythm, normal heart sounds and intact distal pulses. Exam reveals no gallop and no friction rub.   No murmur heard.  Normal sinus rhythm 77-98   Pulmonary/Chest: Effort normal. He has no wheezes. He has no rales.   Diminished breath sounds bilaterally   Abdominal: Soft. Bowel sounds are normal. He exhibits no distension and no mass. There is tenderness (RUQ). There is no guarding.   Musculoskeletal: Normal range of motion. He exhibits no edema or tenderness.   Lymphadenopathy:     He has no cervical adenopathy.   Neurological: He is alert and oriented to person, place, and time. No cranial nerve deficit.   Skin: Skin is warm and dry. No rash noted. No erythema.   Psychiatric: He has a normal mood and affect. His behavior is normal. Judgment and thought content normal.   Vitals reviewed.    Results  Review:  I have reviewed the labs, radiology results, and diagnostic studies.    Laboratory Data:   Results from last 7 days   Lab Units  12/11/18   0533  12/10/18   1138   WBC 10*3/mm3  10.09  13.98*   HEMOGLOBIN g/dL  11.0*  13.3*   HEMATOCRIT %  32.9*  39.6*   PLATELETS 10*3/mm3  235  324     Results from last 7 days   Lab Units  12/11/18   0533  12/10/18   1138   SODIUM mmol/L  136  140   POTASSIUM mmol/L  5.1  4.1   CHLORIDE mmol/L  98  94*   CO2 mmol/L  26.0  29.0   BUN mg/dL  41*  36*   CREATININE mg/dL  2.06*  2.44*   CALCIUM mg/dL  8.1*  9.3   BILIRUBIN mg/dL  0.7  0.9   ALK PHOS U/L  101  109   ALT (SGPT) U/L  23  24   AST (SGOT) U/L  47*  42   GLUCOSE mg/dL  229*  149*     I have reviewed the patient current medications.     Assessment/Plan   Assessment:  1.  Gallbladder polyp  2.  Right upper quadrant pain secondary to above  3.  Acute kidney injury, resolved  4.  History of essential hypertension with Hypotension secondary to volume depletion- improved  5.  Dehydration   6.  Nausea/vomiting  7.  Ischemic cardiomyopathy  8.  Coronary artery disease s/p PCI with stent placement, most recent 11/2018- on Brilinta  9.  Insulin dependent Diabetes Mellitus type II with hyperglycemia    Plan:  1.  Keep on full liquids for lunch and advance to gi soft/bland/low fat diet for dinner. Have encouraged patient to go slow with this  2.  Ambulation in the halls at least TID  3.  Continue Levaquin, Day 2. Will plan to convert to oral therapy tomorrow  4.  Last blood glucoses- 391, 366, 346.  Resume humalog with meals. Will also resume long-acting insulin at lower dosage. Continue accuchecks and sliding scale insulin. Continue to hold Metformin for now  5.  Stat BMP checked, renal function has returned to normal. Corrected sodium for hyperglycemia is 137-141. K-5.7, will give kayexalate x, continue to hold Ace inhibitor.  7.  BMP in AM     Discharge Planning: I expect the patient to be discharged to home in 1-2  days.    ANDRES Gibson   12/12/18   9:23 AM      Electronically signed by Hillary Goodwin APRN at 12/12/2018 10:28 AM     Aston Webb MD at 12/11/2018  7:28 AM              [facility] Medicine Services  INPATIENT PROGRESS NOTE    Patient Name: Luis Echeverria  Date of Admission: 12/10/2018  Today's Date: 12/11/18  Length of Stay: 1  Primary Care Physician: Rene Hogue DO    Subjective   Chief Complaint: right upper quadrant pain  HPI   Pt continues to complain of right upper quadrant pain. No diarrhea. States nausea is better. He is tolerating clear liquids. He was very diaphoretic this am when I entered the room. He was sleeping. Blood pressure was stable. Blood sugar was 251. No fever. He apparently had some elevated temp of 99.4 at 0400. I placed him on telemetry and he is sinus rhythm. He denies any chest pain.     Review of Systems   All pertinent negatives and positives are as above. All other systems have been reviewed and are negative unless otherwise stated.     Objective    Temp:  [97.8 °F (36.6 °C)-100.4 °F (38 °C)] 98.4 °F (36.9 °C)  Heart Rate:  [60-98] 83  Resp:  [14-18] 14  BP: ()/(47-76) 108/53  Physical Exam   Constitutional: He is oriented to person, place, and time. He appears well-developed and well-nourished.   HENT:   Head: Normocephalic and atraumatic.   Eyes: EOM are normal. Pupils are equal, round, and reactive to light. No scleral icterus.   Neck: Normal range of motion. Neck supple. No JVD present. Carotid bruit is not present. No tracheal deviation present. No thyromegaly present.   Cardiovascular: Normal rate and regular rhythm. Exam reveals no gallop and no friction rub.   No murmur heard.  Sinus 92   Pulmonary/Chest: Effort normal and breath sounds normal. No respiratory distress. He has no wheezes. He has no rales. He exhibits no tenderness.   Abdominal: Soft. Bowel sounds are normal. He exhibits no distension. There is  tenderness (RUQ).   Musculoskeletal: Normal range of motion. He exhibits no edema.   Neurological: He is alert and oriented to person, place, and time. No cranial nerve deficit.   Skin: Skin is warm. No rash noted. He is diaphoretic.   Psychiatric: He has a normal mood and affect.     Results Review:  I have reviewed the labs, radiology results, and diagnostic studies.    Laboratory Data:   Results from last 7 days   Lab Units  12/11/18   0533  12/10/18   1138   WBC 10*3/mm3  10.09  13.98*   HEMOGLOBIN g/dL  11.0*  13.3*   HEMATOCRIT %  32.9*  39.6*   PLATELETS 10*3/mm3  235  324        Results from last 7 days   Lab Units  12/11/18   0533  12/10/18   1138   SODIUM mmol/L  136  140   POTASSIUM mmol/L  5.1  4.1   CHLORIDE mmol/L  98  94*   CO2 mmol/L  26.0  29.0   BUN mg/dL  41*  36*   CREATININE mg/dL  2.06*  2.44*   CALCIUM mg/dL  8.1*  9.3   BILIRUBIN mg/dL  0.7  0.9   ALK PHOS U/L  101  109   ALT (SGPT) U/L  23  24   AST (SGOT) U/L  47*  42   GLUCOSE mg/dL  229*  149*       Culture Data:   Blood Culture   Date Value Ref Range Status   12/10/2018 No growth at less than 24 hours  Preliminary   12/10/2018 No growth at less than 24 hours  Preliminary       Radiology Data:   Imaging Results (last 24 hours)     Procedure Component Value Units Date/Time    US Abdomen Limited [[id number]] Collected:  12/10/18 1429     Updated:  12/10/18 1435    Narrative:       EXAMINATION:  US ABDOMEN LIMITED-  12/10/2018 1:30 PM CST     HISTORY: RUQ pain. Concern for acute cholecystitis.     COMPARISON: No comparison study.     TECHNIQUE: Multiple sonographic images were obtained.     FINDINGS: The liver echogenicity is normal. There is normal direction of  portal vein blood flow. No definite gallstones are appreciated. There is  a nonmoving 4 mm probable gallbladder polyp. The gallbladder wall is  mildly thickened measuring 3.4 mm. The right kidney measures 12.1 cm.  The cortical thickness and echogenicity are normal. The common bile  duct  measures 6 mm.       Impression:       1. Probable gallbladder polyp measuring 4 mm. No definite gallstones.  There is mild gallbladder wall thickening.  2. The common duct measures 6 mm which is slightly prominent for age. No  intrahepatic ductal dilatation is seen.  This report was finalized on 12/10/2018 14:31 by Dr. Cristofer Isaac MD.          I have reviewed the patient's current medications.     Assessment/Plan     Active Hospital Problems    Diagnosis   • **RUQ pain   • Acute kidney injury (CMS/HCC)   • Hypotension     Secondary to volume depletion.      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 Will continue IV fluids for now.   2. Advance to full liquids.   3. Brilinta continues.   4. Levaquin 500 mg IV starting today. Will transition to oral when he is tolerating a diet.   5. Metformin and ACEI on hold.     Discharge Planning: I expect the patient to be discharged to home in 1 days.    ANDRES Farris   12/11/18   2:02 PM     I personally evaluated and examined the patient in conjunction with ANDRES Reyes and agree with the assessment, treatment plan, and disposition of the patient as recorded by her. My history, exam, and further recommendations are: I have reviewed and agree with the plans. Kt.       Electronically signed by Aston Webb MD at 12/11/2018  5:20 PM          Consult Notes (last 72 hours) (Notes from 12/9/2018 12:34 PM through 12/12/2018 12:34 PM)      Bud Garcia MD at 12/10/2018  3:46 PM              Patient Care Team:  Rene Hogue DO as PCP - General (Family Medicine)  Tru Rai MD as Cardiologist (Cardiology)    Chief complaint central abdominal pain    Subjective     Subjective .     History of present illness:  Patient is a 53-year-old gentleman with history of  diabetes, coronary artery disease and moderate obesity who has had previous catheterization and coronary stents placed 3 years prior and also 2 stents placed one more.  With these stents placed and he also about that time began having some substernal discomfort and right-sided discomfort.  Over the past 3 days it is been worse he traveled to his local physician and [place] and CT scan was completed showing a question of gallstones he was given Bactrim and discharged.  He continued with this pain he now presented to [facility] his liver functions are minimally elevated he is somewhat dry and his subxiphoid discomfort is improved but not resolved.  He has a significant history of having the stents placed and he is on Mylanta for blood thinning with his history of a stent and with this noted general surgery asked to see and evaluate.  His ultrasound shows a 4 mm polyp some slightly thickened walls around his gallbladder his white count is 13, his PT is 13.5.  With the above problem I do not disagree that his gallbladder is symptomatic but there is nothing that I can do as he is on a blood thinner at present his liver functions are normal.  I would suggest giving him hydration with a liter of lactated Ringer's, also 2 g of Rocephin and then discharge him on Levaquin and follow-up with his regular physician.  I will be happy to address his gallbladder once he is off the Brylinta  but this will not be until November 2019.      Past surgical history is negative except for coronary stent 3 years ago as well as one month ago abnormal 5 stents placed.  His ejection fraction is 35%.        Review of Systems  Pertinent items are noted in HPI, all other systems reviewed and negative    Local problems significant for a two-vessel STEMI in 2013, needing emergent stenting of his circumflex and LAD.    History  Past Medical History:   Diagnosis Date   • CAD (coronary artery disease)    • CAD in native artery     2V STEMI 7/13  STENT TO CX AND STENTX2 TO MID AND DISTAL LAD   • Diabetes mellitus (CMS/HCC)    • History of coronary artery stent placement      x 3 - for ACUTE MI 7/2013   • Hyperlipidemia    • Hyperlipidemia, mild    • Myocardial infarct (CMS/HCC)    • Myocardial infarction (CMS/HCC)    • Stented coronary artery    , Past Surgical History:   Procedure Laterality Date   • CAROTID STENT     , Family History   Problem Relation Age of Onset   • Heart disease Maternal Grandmother    • No Known Problems Mother    • No Known Problems Father    , Social History     Tobacco Use   • Smoking status: Never Smoker   • Smokeless tobacco: Never Used   Substance Use Topics   • Alcohol use: No   • Drug use: No   ,   (Not in a hospital admission), Scheduled Meds:    ceftriaxone 2 g Intravenous Once   lactated ringers 1,000 mL Intravenous Once   Morphine 4 mg Intravenous Once   , Continuous Infusions:   , PRN Meds:   and Allergies:  Patient has no known allergies.    Objective      Objective     Vital Signs   Temp:  [97.3 °F (36.3 °C)] 97.3 °F (36.3 °C)  Heart Rate:  [75-83] 79  Resp:  [14] 14  BP: (88-93)/(55-58) 93/58    Intake & Output (last 3 days)       12/07 0701 - 12/08 0700 12/08 0701 - 12/09 0700 12/09 0701 - 12/10 0700 12/10 0701 - 12/11 0700    IV Piggyback    1000    Total Intake(mL/kg)    1000 (10.3)    Net    +1000                   Physical Exam:     General Appearance:    Alert, cooperative, in no acute distress   Head:    Normocephalic, without obvious abnormality, atraumatic   Eyes:            Lids and lashes normal, conjunctivae and sclerae normal, no   icterus, no pallor, corneas clear, PERRLA   Ears:    Ears appear intact with no abnormalities noted   Throat:   No oral lesions, no thrush, oral mucosa moist   Neck:   No adenopathy, supple, trachea midline, no thyromegaly, no   carotid bruit, no JVD   Back:     No kyphosis present, no scoliosis present, no skin lesions,      erythema or scars, no tenderness to percussion or                    palpation,   range of motion normal   Lungs:     Clear to auscultation,respirations regular, even and                  unlabored    Heart:    Regular rhythm and normal rate, normal S1 and S2, no            murmur, no gallop, no rub, no click   Chest Wall:    No abnormalities observed   Abdomen:    Abdomen is obese, occasional bowel sounds, no masses noted, slight tenderness to deep palpation right upper quadrant, he has some weakness around his periumbilical region, no obvious hernia at present but significant weakness.   Rectal:     Deferred   Extremities:   Moves all extremities well, no edema, no cyanosis, no             redness   Pulses:   Pulses palpable and equal bilaterally   Skin:   No bleeding, bruising or rash   Lymph nodes:   No palpable adenopathy   Neurologic:   Cranial nerves 2 - 12 grossly intact, sensation intact, DTR       present and equal bilaterally    White count is 13.9, liver functions are normal.  Creatinine is elevated at 2.4 and 36,         Results from last 7 days   Lab Units  12/10/18   1138   WBC 10*3/mm3  13.98*   HEMOGLOBIN g/dL  13.3*   HEMATOCRIT %  39.6*   PLATELETS 10*3/mm3  324        Results from last 7 days   Lab Units  12/10/18   1138   SODIUM mmol/L  140   POTASSIUM mmol/L  4.1   CHLORIDE mmol/L  94*   CO2 mmol/L  29.0   BUN mg/dL  36*   CREATININE mg/dL  2.44*   CALCIUM mg/dL  9.3   BILIRUBIN mg/dL  0.9   ALK PHOS U/L  109   ALT (SGPT) U/L  24   AST (SGOT) U/L  42   GLUCOSE mg/dL  149*     Gallbladder ultrasound shows probable gallbladder polyp measuring 4 mm no definite gallstones, mild gallbladder thickening, duct 6 mm.    Results Review:   I reviewed the patient's new clinical results.    Assessment/Plan     Assessment/Plan       * No active hospital problems. *      Issue with coronary artery disease, hypertension, obesity, diabetes.  Patient with the above problems noted he had a stent placed less than a month ago he is on Dilantin for this, he cannot come  off the brylinta  without significant risk to his stented vessels and as such he cannot have surgery.  He is advised to avoid fatty or greasy foods, continue Levaquin given 2 g of Rocephin while he is in the hospital here and also give him a liter bolus of lactated Ringer's.  Unfortunately we cannot look toward surgical intervention given his risk with his blood thinner okay for discharge, follow-up with his regular physician once his blood thinner has been stopped we can look toward indeterminate cholecystectomy.    I discussed the patient's findings and my recommendations with patient, family and nursing staff    Bud Garcia MD  12/10/18  3:46 PM    Time: Time spent with patient 45 minutes     EMR Dragon/Transcription disclaimer: Much of this encounter note is an electronic transcription/translation of spoken language to printed text. The electronic translation of spoken language may permit erroneous, or at times, nonsensical words or phrases to be inadvertently transcribed; although I have reviewed the note for such errors, some may still exist.        Electronically signed by Bud Garcia MD at 12/10/2018  3:55 PM

## 2018-12-12 NOTE — PLAN OF CARE
Problem: Patient Care Overview  Goal: Plan of Care Review  Outcome: Ongoing (interventions implemented as appropriate)   12/12/18 0242   Coping/Psychosocial   Plan of Care Reviewed With patient   Plan of Care Review   Progress no change   OTHER   Outcome Summary Pt having minimal pain. Medicated with Norco. IVF. Voiding. Tele on. HS blood sugar 346. Continue to monitor       Problem: Pain, Acute (Adult)  Goal: Identify Related Risk Factors and Signs and Symptoms  Outcome: Ongoing (interventions implemented as appropriate)    Goal: Acceptable Pain Control/Comfort Level  Outcome: Ongoing (interventions implemented as appropriate)

## 2018-12-12 NOTE — PAYOR COMM NOTE
"[id number]  12/11 CLINICAL UPDATE  UR  [phone]    Rc Echeverria (53 y.o. Male)     Date of Birth Social Security Number Address Home Phone MRN    1965  [address]  [facility] [phone] [id number]    Bahai Marital Status          [employer]        Admission Date Admission Type Admitting Provider Attending Provider Department, Room/Bed    12/10/18 Emergency Aston Webb MD Truong, Khai C, MD [facility] 3C, 388/1    Discharge Date Discharge Disposition Discharge Destination                       Attending Provider:  Aston Webb MD    Allergies:  No Known Allergies    Isolation:  None   Infection:  None   Code Status:  CPR    Ht:  175.3 cm (69.02\")   Wt:  97 kg (213 lb 13.5 oz)    Admission Cmt:  None   Principal Problem:  RUQ pain [R10.11]                 Active Insurance as of 12/10/2018     Primary Coverage     Payor Plan Insurance Group Employer/Plan Group    [employer] BLUE [employer]OSS [facility] [id number]     Payor Plan Address Payor Plan Phone Number Payor Plan Fax Number Effective Dates    [address] [phone]  1/1/2018 - None Entered    [facility] [id number]       Subscriber Name Subscriber Birth Date Member ID       RC ECHEVERRIA 1965 [id number]                 Emergency Contacts      (Rel.) Home Phone Work Phone Mobile Phone    Anali Echeverria (Spouse) [phone] -- --            ICU Vital Signs     Row Name 12/12/18 1143 12/12/18 1141 12/12/18 0942 12/12/18 0828 12/12/18 0440       Height and Weight    Height  --  175.3 cm (69.02\")  --  --  --    Weight  --  97 kg (213 lb 13.5 oz)  --  --  --    Ideal Body Weight (IBW) (kg)  --  73.73  --  --  --    BSA (Calculated - sq m)  --  2.13 sq meters  --  --  --    BMI (Calculated)  --  31.6  --  --  --    Weight in (lb) to have BMI = 25  --  169  --  --  --       Vitals    Temp  98.9 °F (37.2 °C)  --  --  98.7 °F (37.1 °C)  98 °F (36.7 °C)    Temp [facility]  --  --  " --  --  Oral    Pulse  83  --  --  93  90    Heart Rate Source  --  --  --  --  Monitor    Resp  16  --  --  16  16    Resp Rate Source  --  --  --  --  Visual    BP  98/52  --  --  121/60  127/68    BP Location  Left arm  --  --  Left arm  Left arm    BP Method  Automatic  --  --  Automatic  Automatic    Patient Position  Lying  --  --  Lying  Lying       Oxygen Therapy    SpO2  95 %  --  --  97 %  98 %    Device (Oxygen Therapy)  room air  --  room air pt. in bathroom  room air  room air    Row Name 12/11/18 2358 12/11/18 2139 12/11/18 2102 12/11/18 2056 12/11/18 1500       Vitals    Temp  97.7 °F (36.5 °C)  --  98.7 °F (37.1 °C)  --  98.6 °F (37 °C)    Temp src  Oral  --  Oral  --  Oral    Pulse  84  91  96  --  86    Heart Rate Source  Monitor  Monitor  Monitor  --  Monitor    Resp  16  16  16  --  16    Resp Rate Source  Visual  Visual  Visual  --  Visual    BP  116/64  --  107/51  --  127/79    BP Location  Right arm  --  Right arm  --  Right arm    BP Method  Automatic  --  Automatic  --  Automatic    Patient Position  Lying  --  Lying  --  Lying       Oxygen Therapy    SpO2  96 %  96 %  94 %  --  --    Pulse Oximetry Type  --  Intermittent  --  --  --    Device (Oxygen Therapy)  room air  room air  room air  room air  --        Intake & Output (last day)       12/11 0701 - 12/12 0700 12/12 0701 - 12/13 0700    P.O.  360    IV Piggyback 100     Total Intake(mL/kg) 100 (1) 360 (3.7)    Urine (mL/kg/hr)      Total Output      Net +100 +360          Urine Unmeasured Occurrence  3 x        Lines, Drains & Airways    Active LDAs     Name:   Placement date:   Placement time:   Site:   Days:    Peripheral IV 12/10/18 1145 Right Forearm   12/10/18    1145    Forearm   2                Hospital Medications (active)       Dose Frequency Start End    aspirin EC tablet 81 mg 81 mg Daily 12/11/2018     Sig - Route: Take 1 tablet by mouth Daily. - Oral    dextrose (D50W) 25 g/ 50mL Intravenous Solution 25 g 25 g Every 15  Minutes PRN 12/10/2018     Sig - Route: Infuse 50 mL into a venous catheter Every 15 (Fifteen) Minutes As Needed for Low Blood Sugar (Blood Sugar Less Than 70). - Intravenous    dextrose (GLUTOSE) oral gel 15 g 15 g Every 15 Minutes PRN 12/10/2018     Sig - Route: Take 15 g by mouth Every 15 (Fifteen) Minutes As Needed for Low Blood Sugar (Blood sugar less than 70). - Oral    DULoxetine (CYMBALTA) DR capsule 20 mg 20 mg Daily 12/10/2018     Sig - Route: Take 1 capsule by mouth Daily. - Oral    famotidine (PEPCID) tablet 20 mg 20 mg Daily 12/12/2018     Sig - Route: Take 1 tablet by mouth Daily. - Oral    glucagon (human recombinant) (GLUCAGEN DIAGNOSTIC) injection 1 mg 1 mg As Needed 12/10/2018     Sig - Route: Inject 1 mg under the skin into the appropriate area as directed As Needed (Blood Glucose Less Than 70). - Subcutaneous    HYDROcodone-acetaminophen (NORCO) 7.5-325 MG per tablet 1 tablet 1 tablet Every 6 Hours PRN 12/10/2018 12/20/2018    Sig - Route: Take 1 tablet by mouth Every 6 (Six) Hours As Needed for Moderate Pain . - Oral    insulin detemir (LEVEMIR) injection 25 Units 25 Units Every Morning 12/12/2018     Sig - Route: Inject 25 Units under the skin into the appropriate area as directed Every Morning. - Subcutaneous    insulin lispro (humaLOG) injection 10 Units 10 Units Once 12/12/2018 12/12/2018    Sig - Route: Inject 10 Units under the skin into the appropriate area as directed 1 (One) Time. - Subcutaneous    insulin lispro (humaLOG) injection 18 Units 18 Units 3 Times Daily With Meals 12/12/2018     Sig - Route: Inject 18 Units under the skin into the appropriate area as directed 3 (Three) Times a Day With Meals. - Subcutaneous    insulin lispro (humaLOG) injection 2-7 Units 2-7 Units 4 Times Daily With Meals & Nightly 12/10/2018     Sig - Route: Inject 2-7 Units under the skin into the appropriate area as directed 4 (Four) Times a Day With Meals & at Bedtime. - Subcutaneous    levoFLOXacin  "(LEVAQUIN) 500 mg/100 mL D5W (premix) (LEVAQUIN) 500 mg 500 mg Every 24 Hours 12/11/2018 12/13/2018    Sig - Route: Infuse 100 mL into a venous catheter Daily. - Intravenous    levoFLOXacin (LEVAQUIN) tablet 500 mg 500 mg Every 24 Hours 12/13/2018 12/18/2018    Sig - Route: Take 1 tablet by mouth Daily. - Oral    metoprolol succinate XL (TOPROL-XL) 24 hr tablet 50 mg 50 mg Daily 12/10/2018     Sig - Route: Take 1 tablet by mouth Daily. - Oral    morphine injection 4 mg 4 mg Once 12/10/2018     Sig - Route: Infuse 1 mL into a venous catheter 1 (One) Time. - Intravenous    ondansetron (ZOFRAN) injection 4 mg 4 mg Every 6 Hours PRN 12/10/2018     Sig - Route: Infuse 2 mL into a venous catheter Every 6 (Six) Hours As Needed for Nausea or Vomiting. - Intravenous    Linked Group 1:  \"Or\" Linked Group Details        ondansetron (ZOFRAN) tablet 4 mg 4 mg Every 6 Hours PRN 12/10/2018     Sig - Route: Take 1 tablet by mouth Every 6 (Six) Hours As Needed for Nausea or Vomiting. - Oral    Linked Group 1:  \"Or\" Linked Group Details        ondansetron ODT (ZOFRAN-ODT) disintegrating tablet 4 mg 4 mg Every 6 Hours PRN 12/10/2018     Sig - Route: Take 1 tablet by mouth Every 6 (Six) Hours As Needed for Nausea or Vomiting. - Oral    Linked Group 1:  \"Or\" Linked Group Details        promethazine (PHENERGAN) injection 12.5 mg 12.5 mg Every 6 Hours PRN 12/10/2018     Sig - Route: Infuse 0.5 mL into a venous catheter Every 6 (Six) Hours As Needed for Nausea or Vomiting. - Intravenous    sodium chloride 0.9 % flush 3 mL 3 mL Every 12 Hours Scheduled 12/10/2018     Sig - Route: Infuse 3 mL into a venous catheter Every 12 (Twelve) Hours. - Intravenous    sodium chloride 0.9 % flush 3-10 mL 3-10 mL As Needed 12/10/2018     Sig - Route: Infuse 3-10 mL into a venous catheter As Needed for Line Care. - Intravenous    sodium polystyrene (KAYEXALATE) 15 GM/60ML suspension 15 g 15 g Once 12/12/2018 12/12/2018    Sig - Route: Take 60 mL by mouth 1 " (One) Time. - Oral    ticagrelor (BRILINTA) tablet 90 mg 90 mg 2 Times Daily 12/10/2018     Sig - Route: Take 1 tablet by mouth 2 (Two) Times a Day. - Oral    timolol (TIMOPTIC) 0.5 % ophthalmic solution 1 drop 1 drop Daily 12/10/2018     Sig - Route: Administer 1 drop to both eyes Daily. - Both Eyes    famotidine (PEPCID) injection 20 mg (Discontinued) 20 mg Every 12 Hours Scheduled 12/10/2018 12/12/2018    Sig - Route: Infuse 2 mL into a venous catheter Every 12 (Twelve) Hours. - Intravenous    insulin lispro (humaLOG) injection 10 Units (Discontinued) 10 Units 3 Times Daily With Meals 12/12/2018 12/12/2018    Sig - Route: Inject 10 Units under the skin into the appropriate area as directed 3 (Three) Times a Day With Meals. - Subcutaneous    sodium chloride 0.9 % infusion (Discontinued) 75 mL/hr Continuous 12/10/2018 12/12/2018    Sig - Route: Infuse 75 mL/hr into a venous catheter Continuous. - Intravenous          Blood Administration Record (From admission, onward)    None          Lab Results (last 24 hours)     Procedure Component Value Units Date/Time    Blood Culture With POOL - Blood, Arm, Right [[id number]] Collected:  12/10/18 1135    Specimen:  Blood from Arm, Right Updated:  12/12/18 1246     Blood Culture No growth at 2 days    Blood Culture With POOL - Blood, Arm, Right [[id number]] Collected:  12/10/18 1145    Specimen:  Blood from Arm, Right Updated:  12/12/18 1231     Blood Culture No growth at 2 days    POC Glucose Once [[id number]]  (Abnormal) Collected:  12/12/18 1139    Specimen:  Blood Updated:  12/12/18 1150     Glucose 424 mg/dL      Comment: : [id number] Mouser ElizabethMeter ID: [id number]       Basic Metabolic Panel [[id number]]  (Abnormal) Collected:  12/12/18 0939    Specimen:  Blood Updated:  12/12/18 1015     Glucose 502 mg/dL      BUN 30 mg/dL      Creatinine 1.29 mg/dL      Sodium 131 mmol/L      Potassium 5.7 mmol/L      Chloride 95 mmol/L      CO2 22.0 mmol/L      Calcium 7.8 mg/dL       eGFR Non African Amer 59 mL/min/1.73      BUN/Creatinine Ratio 23.3     Anion Gap 14.0 mmol/L     Narrative:       GFR Normal >60  Chronic Kidney Disease <60  Kidney Failure <15    POC Glucose Once [[id number]]  (Abnormal) Collected:  12/11/18 2046    Specimen:  Blood Updated:  12/11/18 2058     Glucose 346 mg/dL      Comment: : [id number] Kamar Chambers  LMeter ID: [id number]       POC Glucose Once [[id number]]  (Abnormal) Collected:  12/11/18 1621    Specimen:  Blood Updated:  12/11/18 1637     Glucose 366 mg/dL      Comment: : [id number] Soham HusainlynMeter ID: [id number]           Imaging Results (last 24 hours)     ** No results found for the last 24 hours. **        Orders (last 24 hrs)     Start     Ordered    12/13/18 1500  levoFLOXacin (LEVAQUIN) tablet 500 mg  Every 24 Hours      12/12/18 0938    12/13/18 0600  Basic Metabolic Panel  Morning Draw      12/12/18 0943    12/12/18 1400  Diet Regular; GI Soft/[place], Low Fat  Diet Effective 1400,   Status:  Canceled      12/12/18 0923    12/12/18 1400  Diet Regular; GI Soft/[place], Low Fat, Consistent Carbohydrate  Diet Effective 1400      12/12/18 0943    12/12/18 1300  Ambulate In [place]  3 Times Daily      12/12/18 0943    12/12/18 1200  insulin lispro (humaLOG) injection 10 Units  3 Times Daily With Meals,   Status:  Discontinued      12/12/18 0912    12/12/18 1200  insulin lispro (humaLOG) injection 18 Units  3 Times Daily With Meals      12/12/18 1026    12/12/18 1151  POC Glucose Once  Once      12/12/18 1139    12/12/18 1115  insulin lispro (humaLOG) injection 10 Units  Once      12/12/18 1026    12/12/18 1115  sodium polystyrene (KAYEXALATE) 15 GM/60ML suspension 15 g  Once      12/12/18 1028    12/12/18 1030  famotidine (PEPCID) tablet 20 mg  Daily      12/12/18 0940    12/12/18 1030  insulin detemir (LEVEMIR) injection 25 Units  Every Morning      12/12/18 0941    12/12/18 0912  Basic Metabolic Panel  STAT      12/12/18 0912    12/11/18  2058  POC Glucose Once  Once      12/11/18 2046    12/11/18 1638  POC Glucose Once  Once      12/11/18 1621    12/11/18 1500  levoFLOXacin (LEVAQUIN) 500 mg/100 mL D5W (premix) (LEVAQUIN) 500 mg  Every 24 Hours      12/11/18 1407    12/11/18 1407  Diet Full Liquid; Consistent Carbohydrate  Diet Effective Now,   Status:  Canceled      12/11/18 1406    12/11/18 0900  aspirin EC tablet 81 mg  Daily      12/10/18 1847    12/10/18 2200  POC Glucose 4x Daily AC & at Bedtime  4 Times Daily Before Meals & at Bedtime      12/10/18 1821    12/10/18 2100  sodium chloride 0.9 % flush 3 mL  Every 12 Hours Scheduled      12/10/18 1821    12/10/18 2100  ticagrelor (BRILINTA) tablet 90 mg  2 Times Daily      12/10/18 1822    12/10/18 2100  famotidine (PEPCID) injection 20 mg  Every 12 Hours Scheduled,   Status:  Discontinued      12/10/18 1821    12/10/18 2100  insulin lispro (humaLOG) injection 2-7 Units  4 Times Daily With Meals & Nightly      12/10/18 1821    12/10/18 1933  HYDROcodone-acetaminophen (NORCO) 7.5-325 MG per tablet 1 tablet  Every 6 Hours PRN      12/10/18 1933    12/10/18 1915  metoprolol succinate XL (TOPROL-XL) 24 hr tablet 50 mg  Daily      12/10/18 1822    12/10/18 1915  timolol (TIMOPTIC) 0.5 % ophthalmic solution 1 drop  Daily      12/10/18 1822    12/10/18 1915  DULoxetine (CYMBALTA) DR capsule 20 mg  Daily      12/10/18 1822    12/10/18 1915  sodium chloride 0.9 % infusion  Continuous,   Status:  Discontinued      12/10/18 1821    12/10/18 1821  ondansetron (ZOFRAN) tablet 4 mg  Every 6 Hours PRN      12/10/18 1821    12/10/18 1821  ondansetron ODT (ZOFRAN-ODT) disintegrating tablet 4 mg  Every 6 Hours PRN      12/10/18 1821    12/10/18 1821  ondansetron (ZOFRAN) injection 4 mg  Every 6 Hours PRN      12/10/18 1821    12/10/18 1821  promethazine (PHENERGAN) injection 12.5 mg  Every 6 Hours PRN      12/10/18 1821    12/10/18 1821  dextrose (GLUTOSE) oral gel 15 g  Every 15 Minutes PRN      12/10/18 1821     12/10/18 1821  dextrose (D50W) 25 g/ 50mL Intravenous Solution 25 g  Every 15 Minutes PRN      12/10/18 1821    12/10/18 1821  glucagon (human recombinant) (GLUCAGEN DIAGNOSTIC) injection 1 mg  As Needed      12/10/18 1821    12/10/18 1821  sodium chloride 0.9 % flush 3-10 mL  As Needed      12/10/18 1821    12/10/18 1159  morphine injection 4 mg  Once      12/10/18 1157    12/10/18 0000  levoFLOXacin (LEVAQUIN) 500 MG tablet  Daily      12/10/18 1557    12/10/18 0000  ZOFRAN 8 MG tablet  Every 8 Hours PRN      12/10/18 1557    12/10/18 0000  HYDROcodone-acetaminophen (NORCO) 5-325 MG per tablet  Every 4 Hours PRN      12/10/18 1557    Unscheduled  Up in Chair  As Needed      12/10/18 1821    --  sulfamethoxazole-trimethoprim (BACTRIM,SEPTRA) 400-80 MG tablet  2 Times Daily      12/10/18 1219          Ventilator/Non-Invasive Ventilation Settings (From admission, onward)    None           Physician Progress Notes (last 72 hours) (Notes from 12/9/2018  1:56 PM through 12/12/2018  1:56 PM)      Hillary Goodwin APRN at 12/12/2018  9:23 AM              [facility] Medicine Services  INPATIENT PROGRESS NOTE    Length of Stay: 2  Date of Admission: 12/10/2018  Primary Care Physician: Rene Hogue DO    Subjective   Chief Complaint: Follow-up RUQ pain  HPI   The patient is resting in bed with wife at bedside. He tells me he feels ok today. He did have some nausea last night but this is better. He is still not eating much and states he really is not that hungry. He drank some milk for breakfast and states that tasted pretty good. He denies any chest pain or shortness of breath. He has some abdominal soreness, which he states is worse with deep breathing and coughing. He is passing gas but has not had a bowel movement since Friday. He is not ambulating in the gutierrez.     Review of Systems   All pertinent negatives and positives are as above. All other systems have been reviewed and  are negative unless otherwise stated.     Objective    Temp:  [97.7 °F (36.5 °C)-98.7 °F (37.1 °C)] 98.7 °F (37.1 °C)  Heart Rate:  [60-96] 93  Resp:  [14-18] 16  BP: (107-139)/(51-79) 121/60  Physical Exam   Constitutional: He is oriented to person, place, and time. He appears well-developed and well-nourished. No distress.   HENT:   Head: Normocephalic and atraumatic.   Neck: Normal range of motion. Neck supple. No JVD present. No tracheal deviation present. No thyromegaly present.   Cardiovascular: Normal rate, regular rhythm, normal heart sounds and intact distal pulses. Exam reveals no gallop and no friction rub.   No murmur heard.  Normal sinus rhythm 77-98   Pulmonary/Chest: Effort normal. He has no wheezes. He has no rales.   Diminished breath sounds bilaterally   Abdominal: Soft. Bowel sounds are normal. He exhibits no distension and no mass. There is tenderness (RUQ). There is no guarding.   Musculoskeletal: Normal range of motion. He exhibits no edema or tenderness.   Lymphadenopathy:     He has no cervical adenopathy.   Neurological: He is alert and oriented to person, place, and time. No cranial nerve deficit.   Skin: Skin is warm and dry. No rash noted. No erythema.   Psychiatric: He has a normal mood and affect. His behavior is normal. Judgment and thought content normal.   Vitals reviewed.    Results Review:  I have reviewed the labs, radiology results, and diagnostic studies.    Laboratory Data:   Results from last 7 days   Lab Units  12/11/18   0533  12/10/18   1138   WBC 10*3/mm3  10.09  13.98*   HEMOGLOBIN g/dL  11.0*  13.3*   HEMATOCRIT %  32.9*  39.6*   PLATELETS 10*3/mm3  235  324     Results from last 7 days   Lab Units  12/11/18   0533  12/10/18   1138   SODIUM mmol/L  136  140   POTASSIUM mmol/L  5.1  4.1   CHLORIDE mmol/L  98  94*   CO2 mmol/L  26.0  29.0   BUN mg/dL  41*  36*   CREATININE mg/dL  2.06*  2.44*   CALCIUM mg/dL  8.1*  9.3   BILIRUBIN mg/dL  0.7  0.9   ALK PHOS U/L  101  109    ALT (SGPT) U/L  23  24   AST (SGOT) U/L  47*  42   GLUCOSE mg/dL  229*  149*     I have reviewed the patient current medications.     Assessment/Plan   Assessment:  1.  Gallbladder polyp  2.  Right upper quadrant pain secondary to above  3.  Acute kidney injury, resolved  4.  History of essential hypertension with Hypotension secondary to volume depletion- improved  5.  Dehydration   6.  Nausea/vomiting  7.  Ischemic cardiomyopathy  8.  Coronary artery disease s/p PCI with stent placement, most recent 11/2018- on Brilinta  9.  Insulin dependent Diabetes Mellitus type II with hyperglycemia    Plan:  1.  Keep on full liquids for lunch and advance to gi soft/bland/low fat diet for dinner. Have encouraged patient to go slow with this  2.  Ambulation in the halls at least TID  3.  Continue Levaquin, Day 2. Will plan to convert to oral therapy tomorrow  4.  Last blood glucoses- 391, 366, 346.  Resume humalog with meals. Will also resume long-acting insulin at lower dosage. Continue accuchecks and sliding scale insulin. Continue to hold Metformin for now  5.  Stat BMP checked, renal function has returned to normal. Corrected sodium for hyperglycemia is 137-141. K-5.7, will give kayexalate x, continue to hold Ace inhibitor.  7.  BMP in AM     Discharge Planning: I expect the patient to be discharged to home in 1-2 days.    ANDRES Gibson   12/12/18   9:23 AM      Electronically signed by Hillary Goodwin APRN at 12/12/2018 10:28 AM     Aston Webb MD at 12/11/2018  7:28 AM              [facility] Medicine Services  INPATIENT PROGRESS NOTE    Patient Name: Luis Echeverria  Date of Admission: 12/10/2018  Today's Date: 12/11/18  Length of Stay: 1  Primary Care Physician: Rene Hogue DO    Subjective   Chief Complaint: right upper quadrant pain  HPI   Pt continues to complain of right upper quadrant pain. No diarrhea. States nausea is better. He is tolerating clear  liquids. He was very diaphoretic this am when I entered the room. He was sleeping. Blood pressure was stable. Blood sugar was 251. No fever. He apparently had some elevated temp of 99.4 at 0400. I placed him on telemetry and he is sinus rhythm. He denies any chest pain.     Review of Systems   All pertinent negatives and positives are as above. All other systems have been reviewed and are negative unless otherwise stated.     Objective    Temp:  [97.8 °F (36.6 °C)-100.4 °F (38 °C)] 98.4 °F (36.9 °C)  Heart Rate:  [60-98] 83  Resp:  [14-18] 14  BP: ()/(47-76) 108/53  Physical Exam   Constitutional: He is oriented to person, place, and time. He appears well-developed and well-nourished.   HENT:   Head: Normocephalic and atraumatic.   Eyes: EOM are normal. Pupils are equal, round, and reactive to light. No scleral icterus.   Neck: Normal range of motion. Neck supple. No JVD present. Carotid bruit is not present. No tracheal deviation present. No thyromegaly present.   Cardiovascular: Normal rate and regular rhythm. Exam reveals no gallop and no friction rub.   No murmur heard.  Sinus 92   Pulmonary/Chest: Effort normal and breath sounds normal. No respiratory distress. He has no wheezes. He has no rales. He exhibits no tenderness.   Abdominal: Soft. Bowel sounds are normal. He exhibits no distension. There is tenderness (RUQ).   Musculoskeletal: Normal range of motion. He exhibits no edema.   Neurological: He is alert and oriented to person, place, and time. No cranial nerve deficit.   Skin: Skin is warm. No rash noted. He is diaphoretic.   Psychiatric: He has a normal mood and affect.     Results Review:  I have reviewed the labs, radiology results, and diagnostic studies.    Laboratory Data:   Results from last 7 days   Lab Units  12/11/18   0533  12/10/18   1138   WBC 10*3/mm3  10.09  13.98*   HEMOGLOBIN g/dL  11.0*  13.3*   HEMATOCRIT %  32.9*  39.6*   PLATELETS 10*3/mm3  235  324        Results from last 7  days   Lab Units  12/11/18   0533  12/10/18   1138   SODIUM mmol/L  136  140   POTASSIUM mmol/L  5.1  4.1   CHLORIDE mmol/L  98  94*   CO2 mmol/L  26.0  29.0   BUN mg/dL  41*  36*   CREATININE mg/dL  2.06*  2.44*   CALCIUM mg/dL  8.1*  9.3   BILIRUBIN mg/dL  0.7  0.9   ALK PHOS U/L  101  109   ALT (SGPT) U/L  23  24   AST (SGOT) U/L  47*  42   GLUCOSE mg/dL  229*  149*       Culture Data:   Blood Culture   Date Value Ref Range Status   12/10/2018 No growth at less than 24 hours  Preliminary   12/10/2018 No growth at less than 24 hours  Preliminary       Radiology Data:   Imaging Results (last 24 hours)     Procedure Component Value Units Date/Time    US Abdomen Limited [[id number]] Collected:  12/10/18 1429     Updated:  12/10/18 1435    Narrative:       EXAMINATION:  US ABDOMEN LIMITED-  12/10/2018 1:30 PM CST     HISTORY: RUQ pain. Concern for acute cholecystitis.     COMPARISON: No comparison study.     TECHNIQUE: Multiple sonographic images were obtained.     FINDINGS: The liver echogenicity is normal. There is normal direction of  portal vein blood flow. No definite gallstones are appreciated. There is  a nonmoving 4 mm probable gallbladder polyp. The gallbladder wall is  mildly thickened measuring 3.4 mm. The right kidney measures 12.1 cm.  The cortical thickness and echogenicity are normal. The common bile duct  measures 6 mm.       Impression:       1. Probable gallbladder polyp measuring 4 mm. No definite gallstones.  There is mild gallbladder wall thickening.  2. The common duct measures 6 mm which is slightly prominent for age. No  intrahepatic ductal dilatation is seen.  This report was finalized on 12/10/2018 14:31 by Dr. Cristofer Isaac MD.          I have reviewed the patient's current medications.     Assessment/Plan     Active Hospital Problems    Diagnosis   • **RUQ pain   • Acute kidney injury (CMS/HCC)   • Hypotension     Secondary to volume depletion.      • Gallbladder polyp   • Dehydration   •  Nausea and vomiting   • Ischemic cardiomyopathy   • CAD (coronary artery disease)   • Insulin dependent diabetes mellitus with complications (CMS/HCC)     Plan:  1. Will continue IV fluids for now.   2. Advance to full liquids.   3. Brilinta continues.   4. Levaquin 500 mg IV starting today. Will transition to oral when he is tolerating a diet.   5. Metformin and ACEI on hold.     Discharge Planning: I expect the patient to be discharged to home in 1 days.    ANDRES Farris   12/11/18   2:02 PM     I personally evaluated and examined the patient in conjunction with ANDRES Reyes and agree with the assessment, treatment plan, and disposition of the patient as recorded by her. My history, exam, and further recommendations are: I have reviewed and agree with the plans. Kt.       Electronically signed by Aston Webb MD at 12/11/2018  5:20 PM          Consult Notes (last 72 hours) (Notes from 12/9/2018  1:56 PM through 12/12/2018  1:56 PM)      Bud Garcia MD at 12/10/2018  3:46 PM              Patient Care Team:  Rene Hogue DO as PCP - General (Family Medicine)  Tru Rai MD as Cardiologist (Cardiology)    Chief complaint central abdominal pain    Subjective     Subjective .     History of present illness:  Patient is a 53-year-old gentleman with history of diabetes, coronary artery disease and moderate obesity who has had previous catheterization and coronary stents placed 3 years prior and also 2 stents placed one more.  With these stents placed and he also about that time began having some substernal discomfort and right-sided discomfort.  Over the past 3 days it is been worse he traveled to his local physician and [place] and CT scan was completed showing a question of gallstones he was given Bactrim and discharged.  He continued with this pain he now presented to [facility] his liver functions are minimally elevated he is somewhat dry and his subxiphoid  discomfort is improved but not resolved.  He has a significant history of having the stents placed and he is on Mylanta for blood thinning with his history of a stent and with this noted general surgery asked to see and evaluate.  His ultrasound shows a 4 mm polyp some slightly thickened walls around his gallbladder his white count is 13, his PT is 13.5.  With the above problem I do not disagree that his gallbladder is symptomatic but there is nothing that I can do as he is on a blood thinner at present his liver functions are normal.  I would suggest giving him hydration with a liter of lactated Ringer's, also 2 g of Rocephin and then discharge him on Levaquin and follow-up with his regular physician.  I will be happy to address his gallbladder once he is off the Brylinta  but this will not be until November 2019.      Past surgical history is negative except for coronary stent 3 years ago as well as one month ago abnormal 5 stents placed.  His ejection fraction is 35%.        Review of Systems  Pertinent items are noted in HPI, all other systems reviewed and negative    Local problems significant for a two-vessel STEMI in 2013, needing emergent stenting of his circumflex and LAD.    History  Past Medical History:   Diagnosis Date   • CAD (coronary artery disease)    • CAD in native artery     2V STEMI 7/13 STENT TO CX AND STENTX2 TO MID AND DISTAL LAD   • Diabetes mellitus (CMS/HCC)    • History of coronary artery stent placement      x 3 - for ACUTE MI 7/2013   • Hyperlipidemia    • Hyperlipidemia, mild    • Myocardial infarct (CMS/HCC)    • Myocardial infarction (CMS/HCC)    • Stented coronary artery    , Past Surgical History:   Procedure Laterality Date   • CAROTID STENT     , Family History   Problem Relation Age of Onset   • Heart disease Maternal Grandmother    • No Known Problems Mother    • No Known Problems Father    , Social History     Tobacco Use   • Smoking status: Never Smoker   • Smokeless tobacco:  Never Used   Substance Use Topics   • Alcohol use: No   • Drug use: No   ,   (Not in a hospital admission), Scheduled Meds:    ceftriaxone 2 g Intravenous Once   lactated ringers 1,000 mL Intravenous Once   Morphine 4 mg Intravenous Once   , Continuous Infusions:   , PRN Meds:   and Allergies:  Patient has no known allergies.    Objective      Objective     Vital Signs   Temp:  [97.3 °F (36.3 °C)] 97.3 °F (36.3 °C)  Heart Rate:  [75-83] 79  Resp:  [14] 14  BP: (88-93)/(55-58) 93/58    Intake & Output (last 3 days)       12/07 0701 - 12/08 0700 12/08 0701 - 12/09 0700 12/09 0701 - 12/10 0700 12/10 0701 - 12/11 0700    IV Piggyback    1000    Total Intake(mL/kg)    1000 (10.3)    Net    +1000                   Physical Exam:     General Appearance:    Alert, cooperative, in no acute distress   Head:    Normocephalic, without obvious abnormality, atraumatic   Eyes:            Lids and lashes normal, conjunctivae and sclerae normal, no   icterus, no pallor, corneas clear, PERRLA   Ears:    Ears appear intact with no abnormalities noted   Throat:   No oral lesions, no thrush, oral mucosa moist   Neck:   No adenopathy, supple, trachea midline, no thyromegaly, no   carotid bruit, no JVD   Back:     No kyphosis present, no scoliosis present, no skin lesions,      erythema or scars, no tenderness to percussion or                   palpation,   range of motion normal   Lungs:     Clear to auscultation,respirations regular, even and                  unlabored    Heart:    Regular rhythm and normal rate, normal S1 and S2, no            murmur, no gallop, no rub, no click   Chest Wall:    No abnormalities observed   Abdomen:    Abdomen is obese, occasional bowel sounds, no masses noted, slight tenderness to deep palpation right upper quadrant, he has some weakness around his periumbilical region, no obvious hernia at present but significant weakness.   Rectal:     Deferred   Extremities:   Moves all extremities well, no edema,  no cyanosis, no             redness   Pulses:   Pulses palpable and equal bilaterally   Skin:   No bleeding, bruising or rash   Lymph nodes:   No palpable adenopathy   Neurologic:   Cranial nerves 2 - 12 grossly intact, sensation intact, DTR       present and equal bilaterally    White count is 13.9, liver functions are normal.  Creatinine is elevated at 2.4 and 36,         Results from last 7 days   Lab Units  12/10/18   1138   WBC 10*3/mm3  13.98*   HEMOGLOBIN g/dL  13.3*   HEMATOCRIT %  39.6*   PLATELETS 10*3/mm3  324        Results from last 7 days   Lab Units  12/10/18   1138   SODIUM mmol/L  140   POTASSIUM mmol/L  4.1   CHLORIDE mmol/L  94*   CO2 mmol/L  29.0   BUN mg/dL  36*   CREATININE mg/dL  2.44*   CALCIUM mg/dL  9.3   BILIRUBIN mg/dL  0.9   ALK PHOS U/L  109   ALT (SGPT) U/L  24   AST (SGOT) U/L  42   GLUCOSE mg/dL  149*     Gallbladder ultrasound shows probable gallbladder polyp measuring 4 mm no definite gallstones, mild gallbladder thickening, duct 6 mm.    Results Review:   I reviewed the patient's new clinical results.    Assessment/Plan     Assessment/Plan       * No active hospital problems. *      Issue with coronary artery disease, hypertension, obesity, diabetes.  Patient with the above problems noted he had a stent placed less than a month ago he is on Dilantin for this, he cannot come off the brylinta  without significant risk to his stented vessels and as such he cannot have surgery.  He is advised to avoid fatty or greasy foods, continue Levaquin given 2 g of Rocephin while he is in the hospital here and also give him a liter bolus of lactated Ringer's.  Unfortunately we cannot look toward surgical intervention given his risk with his blood thinner okay for discharge, follow-up with his regular physician once his blood thinner has been stopped we can look toward indeterminate cholecystectomy.    I discussed the patient's findings and my recommendations with patient, family and nursing  staff    Bud Garcia MD  12/10/18  3:46 PM    Time: Time spent with patient 45 minutes     EMR Dragon/Transcription disclaimer: Much of this encounter note is an electronic transcription/translation of spoken language to printed text. The electronic translation of spoken language may permit erroneous, or at times, nonsensical words or phrases to be inadvertently transcribed; although I have reviewed the note for such errors, some may still exist.        Electronically signed by Bud Garcia MD at 12/10/2018  3:55 PM

## 2018-12-12 NOTE — PROGRESS NOTES
[facility] Medicine Services  INPATIENT PROGRESS NOTE    Length of Stay: 2  Date of Admission: 12/10/2018  Primary Care Physician: Rene Hogue DO    Subjective   Chief Complaint: Follow-up RUQ pain  HPI   The patient is resting in bed with wife at bedside. He tells me he feels ok today. He did have some nausea last night but this is better. He is still not eating much and states he really is not that hungry. He drank some milk for breakfast and states that tasted pretty good. He denies any chest pain or shortness of breath. He has some abdominal soreness, which he states is worse with deep breathing and coughing. He is passing gas but has not had a bowel movement since Friday. He is not ambulating in the gutierrez.     Review of Systems   All pertinent negatives and positives are as above. All other systems have been reviewed and are negative unless otherwise stated.     Objective    Temp:  [97.7 °F (36.5 °C)-98.7 °F (37.1 °C)] 98.7 °F (37.1 °C)  Heart Rate:  [60-96] 93  Resp:  [14-18] 16  BP: (107-139)/(51-79) 121/60  Physical Exam   Constitutional: He is oriented to person, place, and time. He appears well-developed and well-nourished. No distress.   HENT:   Head: Normocephalic and atraumatic.   Neck: Normal range of motion. Neck supple. No JVD present. No tracheal deviation present. No thyromegaly present.   Cardiovascular: Normal rate, regular rhythm, normal heart sounds and intact distal pulses. Exam reveals no gallop and no friction rub.   No murmur heard.  Normal sinus rhythm 77-98   Pulmonary/Chest: Effort normal. He has no wheezes. He has no rales.   Diminished breath sounds bilaterally   Abdominal: Soft. Bowel sounds are normal. He exhibits no distension and no mass. There is tenderness (RUQ). There is no guarding.   Musculoskeletal: Normal range of motion. He exhibits no edema or tenderness.   Lymphadenopathy:     He has no cervical adenopathy.   Neurological: He  is alert and oriented to person, place, and time. No cranial nerve deficit.   Skin: Skin is warm and dry. No rash noted. No erythema.   Psychiatric: He has a normal mood and affect. His behavior is normal. Judgment and thought content normal.   Vitals reviewed.    Results Review:  I have reviewed the labs, radiology results, and diagnostic studies.    Laboratory Data:   Results from last 7 days   Lab Units  12/11/18   0533  12/10/18   1138   WBC 10*3/mm3  10.09  13.98*   HEMOGLOBIN g/dL  11.0*  13.3*   HEMATOCRIT %  32.9*  39.6*   PLATELETS 10*3/mm3  235  324     Results from last 7 days   Lab Units  12/11/18   0533  12/10/18   1138   SODIUM mmol/L  136  140   POTASSIUM mmol/L  5.1  4.1   CHLORIDE mmol/L  98  94*   CO2 mmol/L  26.0  29.0   BUN mg/dL  41*  36*   CREATININE mg/dL  2.06*  2.44*   CALCIUM mg/dL  8.1*  9.3   BILIRUBIN mg/dL  0.7  0.9   ALK PHOS U/L  101  109   ALT (SGPT) U/L  23  24   AST (SGOT) U/L  47*  42   GLUCOSE mg/dL  229*  149*     I have reviewed the patient current medications.     Assessment/Plan   Assessment:  1.  Gallbladder polyp  2.  Right upper quadrant pain secondary to above  3.  Acute kidney injury, resolved  4.  History of essential hypertension with Hypotension secondary to volume depletion- improved  5.  Dehydration   6.  Nausea/vomiting  7.  Ischemic cardiomyopathy  8.  Coronary artery disease s/p PCI with stent placement, most recent 11/2018- on Brilinta  9.  Insulin dependent Diabetes Mellitus type II with hyperglycemia    Plan:  1.  Keep on full liquids for lunch and advance to gi soft/bland/low fat diet for dinner. Have encouraged patient to go slow with this  2.  Ambulation in the halls at least TID  3.  Continue Levaquin, Day 2. Will plan to convert to oral therapy tomorrow  4.  Last blood glucoses- 391, 366, 346.  Resume humalog with meals. Will also resume long-acting insulin at lower dosage. Continue accuchecks and sliding scale insulin. Continue to hold Metformin for  now  5.  Stat BMP checked, renal function has returned to normal. Corrected sodium for hyperglycemia is 137-141. K-5.7, will give kayexalate x, continue to hold Ace inhibitor.  7.  BMP in AM     Discharge Planning: I expect the patient to be discharged to home in 1-2 days.    ANDRES Gibson   12/12/18   9:23 AM       I personally evaluated and examined the patient in conjunction with ANDRES Dan and agree with the assessment, treatment plan, and disposition of the patient as recorded by her. My history, exam, and further recommendations are: I have reviewed and agree with the plans. Yogesh.

## 2018-12-13 VITALS
TEMPERATURE: 98.8 F | OXYGEN SATURATION: 97 % | RESPIRATION RATE: 16 BRPM | SYSTOLIC BLOOD PRESSURE: 111 MMHG | WEIGHT: 213.85 LBS | HEIGHT: 69 IN | HEART RATE: 79 BPM | DIASTOLIC BLOOD PRESSURE: 62 MMHG | BODY MASS INDEX: 31.67 KG/M2

## 2018-12-13 LAB
ANION GAP SERPL CALCULATED.3IONS-SCNC: 11 MMOL/L (ref 4–13)
BUN BLD-MCNC: 21 MG/DL (ref 5–21)
BUN/CREAT SERPL: 18.6 (ref 7–25)
CALCIUM SPEC-SCNC: 8 MG/DL (ref 8.4–10.4)
CHLORIDE SERPL-SCNC: 99 MMOL/L (ref 98–110)
CO2 SERPL-SCNC: 26 MMOL/L (ref 24–31)
CREAT BLD-MCNC: 1.13 MG/DL (ref 0.5–1.4)
GFR SERPL CREATININE-BSD FRML MDRD: 68 ML/MIN/1.73
GLUCOSE BLD-MCNC: 81 MG/DL (ref 70–100)
POTASSIUM BLD-SCNC: 4.2 MMOL/L (ref 3.5–5.3)
SODIUM BLD-SCNC: 136 MMOL/L (ref 135–145)

## 2018-12-13 PROCEDURE — 63710000001 INSULIN LISPRO (HUMAN) PER 5 UNITS: Performed by: NURSE PRACTITIONER

## 2018-12-13 PROCEDURE — 80048 BASIC METABOLIC PNL TOTAL CA: CPT | Performed by: NURSE PRACTITIONER

## 2018-12-13 PROCEDURE — 63710000001 INSULIN DETEMIR PER 5 UNITS: Performed by: NURSE PRACTITIONER

## 2018-12-13 RX ORDER — ONDANSETRON HCL 8 MG
8 TABLET ORAL EVERY 8 HOURS PRN
Qty: 12 TABLET | Refills: 0 | Status: SHIPPED | OUTPATIENT
Start: 2018-12-13 | End: 2020-02-04

## 2018-12-13 RX ORDER — FAMOTIDINE 20 MG/1
20 TABLET, FILM COATED ORAL DAILY
Qty: 30 TABLET | Refills: 1 | Status: SHIPPED | OUTPATIENT
Start: 2018-12-13 | End: 2020-08-04

## 2018-12-13 RX ORDER — LISINOPRIL 2.5 MG/1
2.5 TABLET ORAL DAILY
Qty: 30 TABLET | Refills: 1 | Status: SHIPPED | OUTPATIENT
Start: 2018-12-13 | End: 2019-02-20 | Stop reason: SDUPTHER

## 2018-12-13 RX ORDER — HYDROCODONE BITARTRATE AND ACETAMINOPHEN 5; 325 MG/1; MG/1
1 TABLET ORAL EVERY 6 HOURS PRN
Qty: 12 TABLET | Refills: 0 | Status: SHIPPED | OUTPATIENT
Start: 2018-12-13 | End: 2018-12-28

## 2018-12-13 RX ORDER — ONDANSETRON HCL 8 MG
8 TABLET ORAL EVERY 8 HOURS PRN
Qty: 12 TABLET | Refills: 0 | Status: SHIPPED | OUTPATIENT
Start: 2018-12-13 | End: 2018-12-13 | Stop reason: SDUPTHER

## 2018-12-13 RX ORDER — LEVOFLOXACIN 500 MG/1
500 TABLET, FILM COATED ORAL EVERY 24 HOURS
Qty: 5 TABLET | Refills: 0 | Status: SHIPPED | OUTPATIENT
Start: 2018-12-13 | End: 2018-12-18

## 2018-12-13 RX ADMIN — INSULIN DETEMIR 25 UNITS: 100 INJECTION, SOLUTION SUBCUTANEOUS at 08:24

## 2018-12-13 RX ADMIN — FAMOTIDINE 20 MG: 20 TABLET, FILM COATED ORAL at 08:25

## 2018-12-13 RX ADMIN — INSULIN LISPRO 18 UNITS: 100 INJECTION, SOLUTION INTRAVENOUS; SUBCUTANEOUS at 08:24

## 2018-12-13 RX ADMIN — TIMOLOL MALEATE 1 DROP: 5 SOLUTION/ DROPS OPHTHALMIC at 08:25

## 2018-12-13 RX ADMIN — METOPROLOL SUCCINATE 50 MG: 50 TABLET, FILM COATED, EXTENDED RELEASE ORAL at 08:25

## 2018-12-13 RX ADMIN — TICAGRELOR 90 MG: 90 TABLET ORAL at 08:25

## 2018-12-13 RX ADMIN — DULOXETINE 20 MG: 20 CAPSULE, DELAYED RELEASE ORAL at 08:25

## 2018-12-13 RX ADMIN — ASPIRIN 81 MG: 81 TABLET, DELAYED RELEASE ORAL at 08:25

## 2018-12-13 NOTE — DISCHARGE SUMMARY
[facility] Medicine Services  DISCHARGE SUMMARY       Date of Admission: 12/10/2018  Date of Discharge:  12/13/2018  Primary Care Physician: Rene Hogue DO    Presenting Problem/History of Present Illness:  Nausea and vomiting, intractability of vomiting not specified, unspecified vomiting type [R11.2]     Final Discharge Diagnoses:  Active Hospital Problems    Diagnosis   • **RUQ pain   • Acute kidney injury (CMS/HCC)   • Hypotension     Secondary to volume depletion.      • Gallbladder polyp   • Dehydration   • Nausea and vomiting   • Ischemic cardiomyopathy   • CAD (coronary artery disease)   • Insulin dependent diabetes mellitus with complications (CMS/HCC)     Consults:   1.  Dr. Bud Garcia- general surgery    Procedures Performed: None    Pertinent Test Results:   Lab Results (all)     Procedure Component Value Units Date/Time    Basic Metabolic Panel [[id number]]  (Abnormal) Collected:  12/13/18 0408    Specimen:  Blood Updated:  12/13/18 0449     Glucose 81 mg/dL      BUN 21 mg/dL      Creatinine 1.13 mg/dL      Sodium 136 mmol/L      Potassium 4.2 mmol/L      Chloride 99 mmol/L      CO2 26.0 mmol/L      Calcium 8.0 mg/dL      eGFR Non African Amer 68 mL/min/1.73      BUN/Creatinine Ratio 18.6     Anion Gap 11.0 mmol/L     POC Glucose Once [[id number]]  (Abnormal) Collected:  12/12/18 2035    Specimen:  Blood Updated:  12/12/18 2046     Glucose 165 mg/dL      Comment: : [id number] Epifanio Goode AmberMeter ID: [id number]       POC Glucose Once [[id number]]  (Abnormal) Collected:  12/12/18 1656    Specimen:  Blood Updated:  12/12/18 1707     Glucose 210 mg/dL      Comment: : [id number] Dawood LoftonbethMeter ID: [id number]       Blood Culture With POOL - Blood, Arm, Right [[id number]] Collected:  12/10/18 1135    Specimen:  Blood from Arm, Right Updated:  12/12/18 1246     Blood Culture No growth at 2 days    Blood Culture With POOL - Blood, Arm, Right  [[id number]] Collected:  12/10/18 1145    Specimen:  Blood from Arm, Right Updated:  12/12/18 1231     Blood Culture No growth at 2 days    POC Glucose Once [[id number]]  (Abnormal) Collected:  12/12/18 1139    Specimen:  Blood Updated:  12/12/18 1150     Glucose 424 mg/dL      Comment: : [id number] Mouser KirstybethMeter ID: [id number]       Basic Metabolic Panel [[id number]]  (Abnormal) Collected:  12/12/18 0939    Specimen:  Blood Updated:  12/12/18 1015     Glucose 502 mg/dL      BUN 30 mg/dL      Creatinine 1.29 mg/dL      Sodium 131 mmol/L      Potassium 5.7 mmol/L      Chloride 95 mmol/L      CO2 22.0 mmol/L      Calcium 7.8 mg/dL      eGFR Non African Amer 59 mL/min/1.73      BUN/Creatinine Ratio 23.3     Anion Gap 14.0 mmol/L     POC Glucose Once [[id number]]  (Abnormal) Collected:  12/11/18 2046    Specimen:  Blood Updated:  12/11/18 2058     Glucose 346 mg/dL      Comment: : [id number] Kamar Chambers  LMeter ID: [id number]       POC Glucose Once [[id number]]  (Abnormal) Collected:  12/11/18 1621    Specimen:  Blood Updated:  12/11/18 1637     Glucose 366 mg/dL      Comment: : [id number] Soham ThompsonparulnMeter ID: [id number]       POC Glucose Once [[id number]]  (Abnormal) Collected:  12/11/18 1104    Specimen:  Blood Updated:  12/11/18 1116     Glucose 391 mg/dL      Comment: : [id number] Soham ThompsonparulnMeter ID: [id number]       POC Glucose Once [[id number]]  (Abnormal) Collected:  12/11/18 0745    Specimen:  Blood Updated:  12/11/18 0806     Glucose 251 mg/dL      Comment: : [id number] Soham ThompsonstepanlynMeter ID: [id number]       TSH [[id number]]  (Normal) Collected:  12/11/18 0533    Specimen:  Blood Updated:  12/11/18 0700     TSH 3.010 mIU/mL     CBC Auto Differential [[id number]]  (Abnormal) Collected:  12/11/18 0533    Specimen:  Blood Updated:  12/11/18 0642     WBC 10.09 10*3/mm3      RBC 3.72 10*6/mm3      Hemoglobin 11.0 g/dL      Hematocrit 32.9 %      MCV 88.4 fL      MCH 29.6 pg       MCHC 33.4 g/dL      RDW 13.3 %      RDW-SD 43.2 fl      MPV 9.9 fL      Platelets 235 10*3/mm3     Narrative:       The previously reported component NRBC is no longer being reported.    Manual Differential [[id number]]  (Abnormal) Collected:  12/11/18 0533    Specimen:  Blood Updated:  12/11/18 0642     Neutrophil % 87.0 %      Lymphocyte % 4.0 %      Monocyte % 5.0 %      Bands %  2.0 %      Atypical Lymphocyte % 2.0 %      Neutrophils Absolute 8.98 10*3/mm3      Lymphocytes Absolute 0.40 10*3/mm3      Monocytes Absolute 0.50 10*3/mm3      Crenated RBC's Slight/1+     Poikilocytes Slight/1+     WBC Morphology Normal     Giant Platelets Slight/1+    Comprehensive Metabolic Panel [[id number]]  (Abnormal) Collected:  12/11/18 0533    Specimen:  Blood Updated:  12/11/18 0638     Glucose 229 mg/dL      BUN 41 mg/dL      Creatinine 2.06 mg/dL      Sodium 136 mmol/L      Potassium 5.1 mmol/L      Chloride 98 mmol/L      CO2 26.0 mmol/L      Calcium 8.1 mg/dL      Total Protein 6.6 g/dL      Albumin 3.30 g/dL      ALT (SGPT) 23 U/L      AST (SGOT) 47 U/L      Alkaline Phosphatase 101 U/L      Total Bilirubin 0.7 mg/dL      eGFR Non African Amer 34 mL/min/1.73      Globulin 3.3 gm/dL      A/G Ratio 1.0 g/dL      BUN/Creatinine Ratio 19.9     Anion Gap 12.0 mmol/L     POC Glucose Once [[id number]]  (Abnormal) Collected:  12/10/18 2010    Specimen:  Blood Updated:  12/10/18 2021     Glucose 138 mg/dL      Comment: : [id number] Chambers  LMeter ID: [id number]       Hemoglobin A1c [[id number]] Collected:  12/10/18 1138    Specimen:  Blood Updated:  12/10/18 1941     Hemoglobin A1C 8.3 %     Narrative:       Less than 6.0           Non-Diabetic Range  6.0-7.0                 ADA Therapeutic Target  Greater than 7.0        Action Suggested    Lipid Panel [[id number]]  (Abnormal) Collected:  12/10/18 1910    Specimen:  Blood Updated:  12/10/18 1938     Total Cholesterol 90 mg/dL      Triglycerides 87 mg/dL      HDL  Cholesterol 26 mg/dL      LDL Cholesterol  50 mg/dL      LDL/HDL Ratio 1.79    POC Glucose Once [[id number]]  (Normal) Collected:  12/10/18 1640    Specimen:  Blood Updated:  12/10/18 1657     Glucose 106 mg/dL      Comment: : [id number] Penyn Brennan ID: [id number]       Lactic Acid, Reflex [[id number]]  (Normal) Collected:  12/10/18 1618    Specimen:  Blood Updated:  12/10/18 1635     Lactate 1.5 mmol/L     Lactic Acid, Reflex Timer (This will reflex a repeat order 3-3:15 hours after ordered.) [[id number]] Collected:  12/10/18 1138    Specimen:  Blood Updated:  12/10/18 1546     Extra Tube Hold for add-ons.     Comment: Auto resulted.       Lactic Acid, Plasma [[id number]]  (Abnormal) Collected:  12/10/18 1138    Specimen:  Blood Updated:  12/10/18 1232     Lactate 2.8 mmol/L     Urinalysis With Microscopic If Indicated (No Culture) - Urine, Clean Catch [[id number]]  (Abnormal) Collected:  12/10/18 1203    Specimen:  Urine, Clean Catch Updated:  12/10/18 1224     Color, UA Yellow     Appearance, UA Clear     pH, UA 5.5     Specific Gravity, UA >=1.030     Glucose,  mg/dL (1+)     Ketones, UA 15 mg/dL (1+)     Bilirubin, UA Small (1+)     Blood, UA Negative     Protein, UA Trace     Leuk Esterase, UA Negative     Nitrite, UA Negative     Urobilinogen, UA 0.2 E.U./dL    Narrative:       Urine microscopic not indicated.    Lipase [[id number]]  (Normal) Collected:  12/10/18 1138    Specimen:  Blood Updated:  12/10/18 1222     Lipase 44 U/L     Comprehensive Metabolic Panel [[id number]]  (Abnormal) Collected:  12/10/18 1138    Specimen:  Blood Updated:  12/10/18 1222     Glucose 149 mg/dL      BUN 36 mg/dL      Creatinine 2.44 mg/dL      Sodium 140 mmol/L      Potassium 4.1 mmol/L      Chloride 94 mmol/L      CO2 29.0 mmol/L      Calcium 9.3 mg/dL      Total Protein 7.9 g/dL      Albumin 4.40 g/dL      ALT (SGPT) 24 U/L      AST (SGOT) 42 U/L      Alkaline Phosphatase 109 U/L      Total Bilirubin 0.9 mg/dL       eGFR Non African Amer 28 mL/min/1.73      Globulin 3.5 gm/dL      A/G Ratio 1.3 g/dL      BUN/Creatinine Ratio 14.8     Anion Gap 17.0 mmol/L     Protime-INR [[id number]]  (Normal) Collected:  12/10/18 1138    Specimen:  Blood Updated:  12/10/18 1215     Protime 13.5 Seconds      INR 1.00    aPTT [[id number]]  (Normal) Collected:  12/10/18 1138    Specimen:  Blood Updated:  12/10/18 1215     PTT 28.9 seconds     CBC Auto Differential [[id number]]  (Abnormal) Collected:  12/10/18 1138    Specimen:  Blood Updated:  12/10/18 1207     WBC 13.98 10*3/mm3      RBC 4.44 10*6/mm3      Hemoglobin 13.3 g/dL      Hematocrit 39.6 %      MCV 89.2 fL      MCH 30.0 pg      MCHC 33.6 g/dL      RDW 13.1 %      RDW-SD 42.6 fl      MPV 9.5 fL      Platelets 324 10*3/mm3      Neutrophil % 85.6 %      Lymphocyte % 6.7 %      Monocyte % 6.4 %      Eosinophil % 0.4 %      Basophil % 0.3 %      Immature Grans % 0.6 %      Neutrophils, Absolute 11.97 10*3/mm3      Lymphocytes, Absolute 0.93 10*3/mm3      Monocytes, Absolute 0.89 10*3/mm3      Eosinophils, Absolute 0.06 10*3/mm3      Basophils, Absolute 0.04 10*3/mm3      Immature Grans, Absolute 0.09 10*3/mm3      nRBC 0.0 /100 WBC     POC Glucose Once [[id number]]  (Abnormal) Collected:  12/10/18 1118    Specimen:  Blood Updated:  12/10/18 1129     Glucose 146 mg/dL      Comment: : [id number] [facility] ID: [id number]           Imaging Results (all)     Procedure Component Value Units Date/Time    US Abdomen Limited [[id number]] Collected:  12/10/18 1429     Updated:  12/10/18 1435    Narrative:       EXAMINATION:  US ABDOMEN LIMITED-  12/10/2018 1:30 PM CST     HISTORY: RUQ pain. Concern for acute cholecystitis.     COMPARISON: No comparison study.     TECHNIQUE: Multiple sonographic images were obtained.     FINDINGS: The liver echogenicity is normal. There is normal direction of  portal vein blood flow. No definite gallstones are appreciated. There is  a nonmoving 4 mm  probable gallbladder polyp. The gallbladder wall is  mildly thickened measuring 3.4 mm. The right kidney measures 12.1 cm.  The cortical thickness and echogenicity are normal. The common bile duct  measures 6 mm.       Impression:       1. Probable gallbladder polyp measuring 4 mm. No definite gallstones.  There is mild gallbladder wall thickening.  2. The common duct measures 6 mm which is slightly prominent for age. No  intrahepatic ductal dilatation is seen.  This report was finalized on 12/10/2018 14:31 by Dr. Cristofer Isaac MD.    XR Chest 1 View [[id number]] Collected:  12/10/18 1255     Updated:  12/10/18 1259    Narrative:       EXAMINATION:  XR CHEST 1 VW-  12/10/2018 12:28 PM CST     HISTORY: Hypotension. There is concern for sepsis and infection.     COMPARISON: 7/22/2013.     FINDINGS:  There is no dense infiltrate or effusion. There is mild  bronchial wall thickening, stable. The heart is normal in size.       Impression:       1. No focal infiltrate or effusion.  2. Mild bronchial wall thickening, stable.        This report was finalized on 12/10/2018 12:56 by Dr. Cristofer Isaac MD.        History of Present Illness on Day of Discharge: The patient is resting in bed with wife at bedside.  He tells me he is feeling better today. He tolerated regular food last night without any difficulty. He is really not having any pain, just states his abdomen is a little sore when he takes a deep breath or coughs. He is passing a lot of gas but has not had a bowel movement yet. He is eager for discharge home today    Hospital Course:  Mr. Echeverria is a 53-year-old  male who follows Dr. Shree Hogue for primary care.  He has a past medical history significant for coronary artery disease status post PCI with stent placement-most recently in November 2018, systolic heart failure, insulin dependent Diabetes Mellitus type II, hypertension, and hyperlipidemia.  The patient presented to the [facility]  emergency department on 12/10/2018 with complaints of abdominal pain which had been intermittently occurring for at least 1 month.  Pain was increased with food intake.  The patient had been evaluated at [facility] on December 8 and had a CT scan showing gallstones.  He was discharged home but his pain kept getting worse, therefore he presented to our emergency department for further evaluation.  In the emergency department, the patient was noted have an acute kidney injury with BUN of 36 and creatinine of 2.44.  Lactic Acid was elevated at 2.8.  White blood cell count was also elevated at 13.9.  Abdominal ultrasound showed a probable gallbladder polyp measuring 4 mm without definite gallstones.  There is also mild gallbladder wall thickening and a slightly prominent common duct measuring 6 mm.  The patient was admitted to the hospitalist service for further evaluation and management.    The patient was seen in consultation by general surgery.  Given the patient's recent stent placement less than 1 month ago, he cannot come off his Brilinta for at least 1 year, making surgery not feasible at this time.  The patient has been given IV fluid resuscitation, and his renal function has returned back to normal.  Today, his BUN is 21 and creatinine is 1.13.  He has been given IV Levaquin, which has been converted to oral therapy, and has been recommended to continue by general surgery.  We will continue this for a total 7 days of antibiotic therapy.  His diet has been slowly advanced from clear liquids to a low fat/GI bland diet.  He is tolerating this without difficulty.  The patient is overall hemodynamically stable and appropriate for discharge home today.  He will need to follow-up with his primary care physician in one week.  He will need eventual follow-up with Dr. Garcia after it has been deemed safe by his cardiologist to come off of the Brilinta next year.    Condition on Discharge:   "Stable    Physical Exam on Discharge:  /56 (BP Location: Left arm, Patient Position: Lying)   Pulse 82   Temp 99.2 °F (37.3 °C) (Oral)   Resp 16   Ht 175.3 cm (69.02\")   Wt 97 kg (213 lb 13.5 oz)   SpO2 98%   BMI 31.56 kg/m²   Physical Exam  Constitutional: He is oriented to person, place, and time. He appears well-developed and well-nourished. No distress.   HENT:   Head: Normocephalic and atraumatic.   Neck: Normal range of motion. Neck supple. No JVD present. No tracheal deviation present. No thyromegaly present.   Cardiovascular: Normal rate, regular rhythm, normal heart sounds and intact distal pulses. Exam reveals no gallop and no friction rub.   No murmur heard.  Normal sinus rhythm 77-98   Pulmonary/Chest: Effort normal. He has no wheezes. He has no rales.   Diminished breath sounds bilaterally   Abdominal: Soft. Bowel sounds are normal. He exhibits no distension and no mass. There is tenderness (RUQ). There is no guarding.   Musculoskeletal: Normal range of motion. He exhibits no edema or tenderness.   Lymphadenopathy:     He has no cervical adenopathy.   Neurological: He is alert and oriented to person, place, and time. No cranial nerve deficit.   Skin: Skin is warm and dry. No rash noted. No erythema.   Psychiatric: He has a normal mood and affect. His behavior is normal. Judgment and thought content normal.   Vitals reviewed.    Discharge Disposition:  Home or Self Care    Discharge Medications:     Discharge Medications      New Medications      Instructions Start Date   famotidine 20 MG tablet  Commonly known as:  PEPCID   20 mg, Oral, Daily      HYDROcodone-acetaminophen 5-325 MG per tablet  Commonly known as:  NORCO   1 tablet, Oral, Every 4 Hours PRN      HYDROcodone-acetaminophen 5-325 MG per tablet  Commonly known as:  NORCO   1 tablet, Oral, Every 6 Hours PRN      levoFLOXacin 500 MG tablet  Commonly known as:  LEVAQUIN   500 mg, Oral, Daily      levoFLOXacin 500 MG tablet  Commonly " known as:  LEVAQUIN   500 mg, Oral, Every 24 Hours      ZOFRAN 8 MG tablet  Generic drug:  ondansetron   8 mg, Oral, Every 8 Hours PRN         Changes to Medications      Instructions Start Date   lisinopril 2.5 MG tablet  Commonly known as:  NICOLÁS HURIL  What changed:    · medication strength  · how much to take   2.5 mg, Oral, Daily         Continue These Medications      Instructions Start Date   aspirin 81 MG EC tablet   81 mg, Oral, Daily      atorvastatin 80 MG tablet  Commonly known as:  LIPITOR   80 mg, Oral, Nightly      DULoxetine 20 MG capsule  Commonly known as:  CYMBALTA   20 mg, Oral, Daily      insulin aspart 100 UNIT/ML solution pen-injector sc pen  Commonly known as:  novoLOG FLEXPEN   18 Units, Subcutaneous, 3 Times Daily With Meals      levocetirizine 5 MG tablet  Commonly known as:  XYZAL   5 mg, Oral, Every Evening      metFORMIN 500 MG tablet  Commonly known as:  GLUCOPHAGE   500 mg, Oral, 2 Times Daily With Meals, HOLD FOR 48 HOURS POST CATH      metoprolol succinate XL 50 MG 24 hr tablet  Commonly known as:  TOPROL-XL   50 mg, Oral, Daily      nitroglycerin 0.4 MG SL tablet  Commonly known as:  NITROSTAT   0.4 mg, Sublingual, Every 5 Minutes PRN, Take no more than 3 doses in 15 minutes.       spironolactone 25 MG tablet  Commonly known as:  ALDACTONE   25 mg, Oral, Daily      ticagrelor 90 MG tablet tablet  Commonly known as:  BRILINTA   90 mg, Oral, 2 Times Daily      timolol 0.5 % ophthalmic solution  Commonly known as:  TIMOPTIC   1 drop, Both Eyes, Daily      TRESIBA FLEXTOUCH 100 UNIT/ML solution pen-injector injection  Generic drug:  insulin degludec   42 Units, Subcutaneous, Every Morning      TRULICITY 0.75 MG/0.5ML solution pen-injector  Generic drug:  Dulaglutide   0.75 mg, Subcutaneous, Weekly         Stop These Medications    sulfamethoxazole-trimethoprim 400-80 MG tablet  Commonly known as:  BACTRIM,SEPTRA          Discharge Diet:   Diet Instructions     Diet: Consistent  Carbohydrate, Cardiac, Specialty Diet; Thin Liquids, No Restrictions; Low Fat      Discharge Diet:   Consistent Carbohydrate  Cardiac  Specialty Diet       Fluid Consistency:  Thin Liquids, No Restrictions    Specialty Diets:  Low Fat        Activity at Discharge:   Activity Instructions     Activity as Tolerated          Discharge Care Plan/Instructions:   1.  Return for any acute or worsening symptoms  2.  Norco as needed for pain, Zofran as needed for nausea  3.  Complete course of Levaquin to total 7 days of antibiotic therapy  4.  New medication Pepcid  5.  Lisinopril decreased from 10 mg to 2.5 mg    Follow-up Appointments:   1.  PCP in 1 week  Future Appointments   Date Time Provider Department Center   1/7/2019  2:00 PM [facility] NP [facility]       Test Results Pending at Discharge: Will follow blood cultures to completion. No growth to date.    ANDRES Gibson  12/13/18  8:07 AM    Time: 35 minutes    I personally evaluated and examined the patient in conjunction with ANDRES Dan and agree with the assessment, treatment plan, and disposition of the patient as recorded by her. My history, exam, and further recommendations are: I have reviewed and agree with the plans. Laurita Webb MD  12/13/18  4:29 PM

## 2018-12-13 NOTE — PLAN OF CARE
Problem: Patient Care Overview  Goal: Plan of Care Review  Outcome: Ongoing (interventions implemented as appropriate)   12/13/18 0240   Coping/Psychosocial   Plan of Care Reviewed With patient   Plan of Care Review   Progress no change   OTHER   Outcome Summary HS accucheck 165 with SS coverage. No c/o pain. Up ad morena. Possible home today.        Problem: Pain, Acute (Adult)  Goal: Identify Related Risk Factors and Signs and Symptoms  Outcome: Ongoing (interventions implemented as appropriate)    Goal: Acceptable Pain Control/Comfort Level  Outcome: Ongoing (interventions implemented as appropriate)

## 2018-12-13 NOTE — PAYOR COMM NOTE
"MD HOME 12-13-18  [id number]  UR  [phone]    Rc Echeverria (53 y.o. Male)     Date of Birth Social Security Number Address [address] Phone MRN    1965  [address] [facility] [address] [phone] [id number]    [employer] Status          [employer]        Admission Date Admission Type Admitting Provider Attending Provider Department, Room/Bed    12/10/18 Emergency Aston Webb MD  [facility] 3C, 388/1    Discharge Date Discharge Disposition Discharge Destination        12/13/2018 Home or Self Care              Attending Provider:  (none)   Allergies:  No Known Allergies    Isolation:  None   Infection:  None   Code Status:  CPR    Ht:  175.3 cm (69.02\")   Wt:  97 kg (213 lb 13.5 oz)    Admission Cmt:  None   Principal Problem:  RUQ pain [R10.11]                 Active Insurance as of 12/10/2018     Primary Coverage     Payor Plan Insurance Group Employer/Plan Group    [employer] [facility] [id number]     Payor Plan Address Payor Plan Phone Number Payor Plan Fax Number Effective Dates    [address] [phone]  1/1/2018 - None Entered    [place] [address]       Subscriber Name Subscriber Birth Date Member ID       RC CEHEVERRIA 1965 [id number]                 Emergency Contacts      (Rel.) Home Phone Work Phone Mobile Phone    Anali Echeverria (Spouse) [phone] -- --            Physician Progress Notes (last 24 hours) (Notes from 12/12/2018 10:58 AM through 12/13/2018 10:58 AM)     No notes of this type exist for this encounter.        Consult Notes (last 24 hours) (Notes from 12/12/2018 10:58 AM through 12/13/2018 10:58 AM)     No notes of this type exist for this encounter.           Discharge Summary      Hillary Goodwin APRN at 12/13/2018  8:06 AM              [facility] Medicine Services  DISCHARGE SUMMARY       Date of Admission: 12/10/2018  Date of Discharge:  " 12/13/2018  Primary Care Physician: Rene Hogue DO    Presenting Problem/History of Present Illness:  Nausea and vomiting, intractability of vomiting not specified, unspecified vomiting type [R11.2]     Final Discharge Diagnoses:  Active Hospital Problems    Diagnosis   • **RUQ pain   • Acute kidney injury (CMS/HCC)   • Hypotension     Secondary to volume depletion.      • Gallbladder polyp   • Dehydration   • Nausea and vomiting   • Ischemic cardiomyopathy   • CAD (coronary artery disease)   • Insulin dependent diabetes mellitus with complications (CMS/HCC)     Consults:   1.  Dr. Bud Garcia- general surgery    Procedures Performed: None    Pertinent Test Results:   Lab Results (all)     Procedure Component Value Units Date/Time    Basic Metabolic Panel [[id number]]  (Abnormal) Collected:  12/13/18 0408    Specimen:  Blood Updated:  12/13/18 0449     Glucose 81 mg/dL      BUN 21 mg/dL      Creatinine 1.13 mg/dL      Sodium 136 mmol/L      Potassium 4.2 mmol/L      Chloride 99 mmol/L      CO2 26.0 mmol/L      Calcium 8.0 mg/dL      eGFR Non African Amer 68 mL/min/1.73      BUN/Creatinine Ratio 18.6     Anion Gap 11.0 mmol/L     POC Glucose Once [[id number]]  (Abnormal) Collected:  12/12/18 2035    Specimen:  Blood Updated:  12/12/18 2046     Glucose 165 mg/dL      Comment: : [id number] Epifanio Goode AmberMeter ID: [id number]       POC Glucose Once [[id number]]  (Abnormal) Collected:  12/12/18 1656    Specimen:  Blood Updated:  12/12/18 1707     Glucose 210 mg/dL      Comment: : [id number] Dawood AlmaguerthMeter ID: [id number]       Blood Culture With POOL - Blood, Arm, Right [[id number]] Collected:  12/10/18 1135    Specimen:  Blood from Arm, Right Updated:  12/12/18 1246     Blood Culture No growth at 2 days    Blood Culture With POOL - Blood, Arm, Right [[id number]] Collected:  12/10/18 1145    Specimen:  Blood from Arm, Right Updated:  12/12/18 1231     Blood Culture No growth at 2 days    POC  Glucose Once [[id number]]  (Abnormal) Collected:  12/12/18 1139    Specimen:  Blood Updated:  12/12/18 1150     Glucose 424 mg/dL      Comment: : [id number] Mouseemiliano FuentesMeter ID: [id number]       Basic Metabolic Panel [[id number]]  (Abnormal) Collected:  12/12/18 0939    Specimen:  Blood Updated:  12/12/18 1015     Glucose 502 mg/dL      BUN 30 mg/dL      Creatinine 1.29 mg/dL      Sodium 131 mmol/L      Potassium 5.7 mmol/L      Chloride 95 mmol/L      CO2 22.0 mmol/L      Calcium 7.8 mg/dL      eGFR Non African Amer 59 mL/min/1.73      BUN/Creatinine Ratio 23.3     Anion Gap 14.0 mmol/L     POC Glucose Once [[id number]]  (Abnormal) Collected:  12/11/18 2046    Specimen:  Blood Updated:  12/11/18 2058     Glucose 346 mg/dL      Comment: : [id number] Kamar Chambers  LMeter ID: [id number]       POC Glucose Once [[id number]]  (Abnormal) Collected:  12/11/18 1621    Specimen:  Blood Updated:  12/11/18 1637     Glucose 366 mg/dL      Comment: : [id number] Rousseau The Scripps Research InstituteitlynMeter ID: [id number]       POC Glucose Once [[id number]]  (Abnormal) Collected:  12/11/18 1104    Specimen:  Blood Updated:  12/11/18 1116     Glucose 391 mg/dL      Comment: : [id number] Rousseau KaitlynMeter ID: [id number]       POC Glucose Once [[id number]]  (Abnormal) Collected:  12/11/18 0745    Specimen:  Blood Updated:  12/11/18 0806     Glucose 251 mg/dL      Comment: : [id number] Rousseau KaitlynMeter ID: [id number]       TSH [[id number]]  (Normal) Collected:  12/11/18 0533    Specimen:  Blood Updated:  12/11/18 0700     TSH 3.010 mIU/mL     CBC Auto Differential [[id number]]  (Abnormal) Collected:  12/11/18 0533    Specimen:  Blood Updated:  12/11/18 0642     WBC 10.09 10*3/mm3      RBC 3.72 10*6/mm3      Hemoglobin 11.0 g/dL      Hematocrit 32.9 %      MCV 88.4 fL      MCH 29.6 pg      MCHC 33.4 g/dL      RDW 13.3 %      RDW-SD 43.2 fl      MPV 9.9 fL      Platelets 235 10*3/mm3     Narrative:       The previously reported  component NRBC is no longer being reported.    Manual Differential [[id number]]  (Abnormal) Collected:  12/11/18 0533    Specimen:  Blood Updated:  12/11/18 0642     Neutrophil % 87.0 %      Lymphocyte % 4.0 %      Monocyte % 5.0 %      Bands %  2.0 %      Atypical Lymphocyte % 2.0 %      Neutrophils Absolute 8.98 10*3/mm3      Lymphocytes Absolute 0.40 10*3/mm3      Monocytes Absolute 0.50 10*3/mm3      Crenated RBC's Slight/1+     Poikilocytes Slight/1+     WBC Morphology Normal     Giant Platelets Slight/1+    Comprehensive Metabolic Panel [[id number]]  (Abnormal) Collected:  12/11/18 0533    Specimen:  Blood Updated:  12/11/18 0638     Glucose 229 mg/dL      BUN 41 mg/dL      Creatinine 2.06 mg/dL      Sodium 136 mmol/L      Potassium 5.1 mmol/L      Chloride 98 mmol/L      CO2 26.0 mmol/L      Calcium 8.1 mg/dL      Total Protein 6.6 g/dL      Albumin 3.30 g/dL      ALT (SGPT) 23 U/L      AST (SGOT) 47 U/L      Alkaline Phosphatase 101 U/L      Total Bilirubin 0.7 mg/dL      eGFR Non African Amer 34 mL/min/1.73      Globulin 3.3 gm/dL      A/G Ratio 1.0 g/dL      BUN/Creatinine Ratio 19.9     Anion Gap 12.0 mmol/L     POC Glucose Once [[id number]]  (Abnormal) Collected:  12/10/18 2010    Specimen:  Blood Updated:  12/10/18 2021     Glucose 138 mg/dL      Comment: : Sue Chambers  LMeter ID: [id number]       Hemoglobin A1c [[id number]] Collected:  12/10/18 1138    Specimen:  Blood Updated:  12/10/18 1941     Hemoglobin A1C 8.3 %     Narrative:       Less than 6.0           Non-Diabetic Range  6.0-7.0                 ADA Therapeutic Target  Greater than 7.0        Action Suggested    Lipid Panel [[id number]]  (Abnormal) Collected:  12/10/18 1910    Specimen:  Blood Updated:  12/10/18 1938     Total Cholesterol 90 mg/dL      Triglycerides 87 mg/dL      HDL Cholesterol 26 mg/dL      LDL Cholesterol  50 mg/dL      LDL/HDL Ratio 1.79    POC Glucose Once [[id number]]  (Normal) Collected:  12/10/18 1640     Specimen:  Blood Updated:  12/10/18 1657     Glucose 106 mg/dL      Comment: : [id number] Penny AguilarWe[facility] Weatherfordrub[facility] ID: [id number]       Lactic Acid, Reflex [[id number]]  (Normal) Collected:  12/10/18 1618    Specimen:  Blood Updated:  12/10/18 1635     Lactate 1.5 mmol/L     Lactic Acid, Reflex Timer (This will reflex a repeat order 3-3:15 hours after ordered.) [[id number]] Collected:  12/10/18 1138    Specimen:  Blood Updated:  12/10/18 1546     Extra Tube Hold for add-ons.     Comment: Auto resulted.       Lactic Acid, Plasma [[id number]]  (Abnormal) Collected:  12/10/18 1138    Specimen:  Blood Updated:  12/10/18 1232     Lactate 2.8 mmol/L     Urinalysis With Microscopic If Indicated (No Culture) - Urine, Clean Catch [[id number]]  (Abnormal) Collected:  12/10/18 1203    Specimen:  Urine, Clean Catch Updated:  12/10/18 1224     Color, UA Yellow     Appearance, UA Clear     pH, UA 5.5     Specific Gravity, UA >=1.030     Glucose,  mg/dL (1+)     Ketones, UA 15 mg/dL (1+)     Bilirubin, UA Small (1+)     Blood, UA Negative     Protein, UA Trace     Leuk Esterase, UA Negative     Nitrite, UA Negative     Urobilinogen, UA 0.2 E.U./dL    Narrative:       Urine microscopic not indicated.    Lipase [[id number]]  (Normal) Collected:  12/10/18 1138    Specimen:  Blood Updated:  12/10/18 1222     Lipase 44 U/L     Comprehensive Metabolic Panel [[id number]]  (Abnormal) Collected:  12/10/18 1138    Specimen:  Blood Updated:  12/10/18 1222     Glucose 149 mg/dL      BUN 36 mg/dL      Creatinine 2.44 mg/dL      Sodium 140 mmol/L      Potassium 4.1 mmol/L      Chloride 94 mmol/L      CO2 29.0 mmol/L      Calcium 9.3 mg/dL      Total Protein 7.9 g/dL      Albumin 4.40 g/dL      ALT (SGPT) 24 U/L      AST (SGOT) 42 U/L      Alkaline Phosphatase 109 U/L      Total Bilirubin 0.9 mg/dL      eGFR Non African Amer 28 mL/min/1.73      Globulin 3.5 gm/dL      A/G Ratio 1.3 g/dL      BUN/Creatinine Ratio 14.8     Anion Gap 17.0  mmol/L     Protime-INR [[id number]]  (Normal) Collected:  12/10/18 1138    Specimen:  Blood Updated:  12/10/18 1215     Protime 13.5 Seconds      INR 1.00    aPTT [[id number]]  (Normal) Collected:  12/10/18 1138    Specimen:  Blood Updated:  12/10/18 1215     PTT 28.9 seconds     CBC Auto Differential [[id number]]  (Abnormal) Collected:  12/10/18 1138    Specimen:  Blood Updated:  12/10/18 1207     WBC 13.98 10*3/mm3      RBC 4.44 10*6/mm3      Hemoglobin 13.3 g/dL      Hematocrit 39.6 %      MCV 89.2 fL      MCH 30.0 pg      MCHC 33.6 g/dL      RDW 13.1 %      RDW-SD 42.6 fl      MPV 9.5 fL      Platelets 324 10*3/mm3      Neutrophil % 85.6 %      Lymphocyte % 6.7 %      Monocyte % 6.4 %      Eosinophil % 0.4 %      Basophil % 0.3 %      Immature Grans % 0.6 %      Neutrophils, Absolute 11.97 10*3/mm3      Lymphocytes, Absolute 0.93 10*3/mm3      Monocytes, Absolute 0.89 10*3/mm3      Eosinophils, Absolute 0.06 10*3/mm3      Basophils, Absolute 0.04 10*3/mm3      Immature Grans, Absolute 0.09 10*3/mm3      nRBC 0.0 /100 WBC     POC Glucose Once [[id number]]  (Abnormal) Collected:  12/10/18 1118    Specimen:  Blood Updated:  12/10/18 1129     Glucose 146 mg/dL      Comment: : [id number] AguilarSomerville Hospital ID: [id number]           Imaging Results (all)     Procedure Component Value Units Date/Time    US Abdomen Limited [[id number]] Collected:  12/10/18 1429     Updated:  12/10/18 1435    Narrative:       EXAMINATION:  US ABDOMEN LIMITED-  12/10/2018 1:30 PM CST     HISTORY: RUQ pain. Concern for acute cholecystitis.     COMPARISON: No comparison study.     TECHNIQUE: Multiple sonographic images were obtained.     FINDINGS: The liver echogenicity is normal. There is normal direction of  portal vein blood flow. No definite gallstones are appreciated. There is  a nonmoving 4 mm probable gallbladder polyp. The gallbladder wall is  mildly thickened measuring 3.4 mm. The right kidney measures 12.1 cm.  The cortical  thickness and echogenicity are normal. The common bile duct  measures 6 mm.       Impression:       1. Probable gallbladder polyp measuring 4 mm. No definite gallstones.  There is mild gallbladder wall thickening.  2. The common duct measures 6 mm which is slightly prominent for age. No  intrahepatic ductal dilatation is seen.  This report was finalized on 12/10/2018 14:31 by Dr. Cristofer Isaac MD.    XR Chest 1 View [[id number]] Collected:  12/10/18 1255     Updated:  12/10/18 1259    Narrative:       EXAMINATION:  XR CHEST 1 VW-  12/10/2018 12:28 PM CST     HISTORY: Hypotension. There is concern for sepsis and infection.     COMPARISON: 7/22/2013.     FINDINGS:  There is no dense infiltrate or effusion. There is mild  bronchial wall thickening, stable. The heart is normal in size.       Impression:       1. No focal infiltrate or effusion.  2. Mild bronchial wall thickening, stable.        This report was finalized on 12/10/2018 12:56 by Dr. Cristofer Isaac MD.        History of Present Illness on Day of Discharge: The patient is resting in bed with wife at bedside.  He tells me he is feeling better today. He tolerated regular food last night without any difficulty. He is really not having any pain, just states his abdomen is a little sore when he takes a deep breath or coughs. He is passing a lot of gas but has not had a bowel movement yet. He is eager for discharge home today    Hospital Course:  Mr. Echeverria is a 53-year-old  male who follows Dr. Shree Hogue for primary care.  He has a past medical history significant for coronary artery disease status post PCI with stent placement-most recently in November 2018, systolic heart failure, insulin dependent Diabetes Mellitus type II, hypertension, and hyperlipidemia.  The patient presented to the [facility] emergency department on 12/10/2018 with complaints of abdominal pain which had been intermittently occurring for at least 1 month.  Pain was  increased with food intake.  The patient had been evaluated at [facility] on December 8 and had a CT scan showing gallstones.  He was discharged home but his pain kept getting worse, therefore he presented to our emergency department for further evaluation.  In the emergency department, the patient was noted have an acute kidney injury with BUN of 36 and creatinine of 2.44.  Lactic Acid was elevated at 2.8.  White blood cell count was also elevated at 13.9.  Abdominal ultrasound showed a probable gallbladder polyp measuring 4 mm without definite gallstones.  There is also mild gallbladder wall thickening and a slightly prominent common duct measuring 6 mm.  The patient was admitted to the hospitalist service for further evaluation and management.    The patient was seen in consultation by general surgery.  Given the patient's recent stent placement less than 1 month ago, he cannot come off his Brilinta for at least 1 year, making surgery not feasible at this time.  The patient has been given IV fluid resuscitation, and his renal function has returned back to normal.  Today, his BUN is 21 and creatinine is 1.13.  He has been given IV Levaquin, which has been converted to oral therapy, and has been recommended to continue by general surgery.  We will continue this for a total 7 days of antibiotic therapy.  His diet has been slowly advanced from clear liquids to a low fat/GI bland diet.  He is tolerating this without difficulty.  The patient is overall hemodynamically stable and appropriate for discharge home today.  He will need to follow-up with his primary care physician in one week.  He will need eventual follow-up with Dr. Garcia after it has been deemed safe by his cardiologist to come off of the Brilinta next year.    Condition on Discharge:  Stable    Physical Exam on Discharge:  /56 (BP Location: Left arm, Patient Position: Lying)   Pulse 82   Temp 99.2 °F (37.3 °C) (Oral)   Resp  "16   Ht 175.3 cm (69.02\")   Wt 97 kg (213 lb 13.5 oz)   SpO2 98%   BMI 31.56 kg/m²    Physical Exam  Constitutional: He is oriented to person, place, and time. He appears well-developed and well-nourished. No distress.   HENT:   Head: Normocephalic and atraumatic.   Neck: Normal range of motion. Neck supple. No JVD present. No tracheal deviation present. No thyromegaly present.   Cardiovascular: Normal rate, regular rhythm, normal heart sounds and intact distal pulses. Exam reveals no gallop and no friction rub.   No murmur heard.  Normal sinus rhythm 77-98   Pulmonary/Chest: Effort normal. He has no wheezes. He has no rales.   Diminished breath sounds bilaterally   Abdominal: Soft. Bowel sounds are normal. He exhibits no distension and no mass. There is tenderness (RUQ). There is no guarding.   Musculoskeletal: Normal range of motion. He exhibits no edema or tenderness.   Lymphadenopathy:     He has no cervical adenopathy.   Neurological: He is alert and oriented to person, place, and time. No cranial nerve deficit.   Skin: Skin is warm and dry. No rash noted. No erythema.   Psychiatric: He has a normal mood and affect. His behavior is normal. Judgment and thought content normal.   Vitals reviewed.    Discharge Disposition:  Home or Self Care    Discharge Medications:     Discharge Medications      New Medications      Instructions Start Date   famotidine 20 MG tablet  Commonly known as:  PEPCID   20 mg, Oral, Daily      HYDROcodone-acetaminophen 5-325 MG per tablet  Commonly known as:  NORCO   1 tablet, Oral, Every 4 Hours PRN      HYDROcodone-acetaminophen 5-325 MG per tablet  Commonly known as:  NORCO   1 tablet, Oral, Every 6 Hours PRN      levoFLOXacin 500 MG tablet  Commonly known as:  LEVAQUIN   500 mg, Oral, Daily      levoFLOXacin 500 MG tablet  Commonly known as:  LEVAQUIN   500 mg, Oral, Every 24 Hours      ZOFRAN 8 MG tablet  Generic drug:  ondansetron   8 mg, Oral, Every 8 Hours PRN         Changes " to Medications      Instructions Start Date   lisinopril 2.5 MG tablet  Commonly known as:  PRINIVIL,ZESTRIL  What changed:    · medication strength  · how much to take   2.5 mg, Oral, Daily         Continue These Medications      Instructions Start Date   aspirin 81 MG EC tablet   81 mg, Oral, Daily      atorvastatin 80 MG tablet  Commonly known as:  LIPITOR   80 mg, Oral, Nightly      DULoxetine 20 MG capsule  Commonly known as:  CYMBALTA   20 mg, Oral, Daily      insulin aspart 100 UNIT/ML solution pen-injector sc pen  Commonly known as:  novoLOG FLEXPEN   18 Units, Subcutaneous, 3 Times Daily With Meals      levocetirizine 5 MG tablet  Commonly known as:  XYZAL   5 mg, Oral, Every Evening      metFORMIN 500 MG tablet  Commonly known as:  GLUCOPHAGE   500 mg, Oral, 2 Times Daily With Meals, HOLD FOR 48 HOURS POST CATH      metoprolol succinate XL 50 MG 24 hr tablet  Commonly known as:  TOPROL-XL   50 mg, Oral, Daily      nitroglycerin 0.4 MG SL tablet  Commonly known as:  NITROSTAT   0.4 mg, Sublingual, Every 5 Minutes PRN, Take no more than 3 doses in 15 minutes.       spironolactone 25 MG tablet  Commonly known as:  ALDACTONE   25 mg, Oral, Daily      ticagrelor 90 MG tablet tablet  Commonly known as:  BRILINTA   90 mg, Oral, 2 Times Daily      timolol 0.5 % ophthalmic solution  Commonly known as:  TIMOPTIC   1 drop, Both Eyes, Daily      TRESIBA FLEXTOUCH 100 UNIT/ML solution pen-injector injection  Generic drug:  insulin degludec   42 Units, Subcutaneous, Every Morning      TRULICITY 0.75 MG/0.5ML solution pen-injector  Generic drug:  Dulaglutide   0.75 mg, Subcutaneous, Weekly         Stop These Medications    sulfamethoxazole-trimethoprim 400-80 MG tablet  Commonly known as:  BACTRIM,SEPTRA          Discharge Diet:   Diet Instructions     Diet: Consistent Carbohydrate, Cardiac, Specialty Diet; Thin Liquids, No Restrictions; Low Fat      Discharge Diet:   Consistent Carbohydrate  Cardiac  Specialty Diet        Fluid Consistency:  Thin Liquids, No Restrictions    Specialty Diets:  Low Fat        Activity at Discharge:   Activity Instructions     Activity as Tolerated          Discharge Care Plan/Instructions:   1.  Return for any acute or worsening symptoms  2.  Norco as needed for pain, Zofran as needed for nausea  3.  Complete course of Levaquin to total 7 days of antibiotic therapy  4.  New medication Pepcid  5.  Lisinopril decreased from 10 mg to 2.5 mg    Follow-up Appointments:   1.  PCP in 1 week  Future Appointments   Date Time Provider Department Center   1/7/2019  2:00 PM [facility] NP [facility]       Test Results Pending at Discharge: Will follow blood cultures to completion. No growth to date.    ADNRES Gibson  12/13/18  8:07 AM    Time: 35 minutes          Electronically signed by Hillary Goodwin APRN at 12/13/2018  8:25 AM

## 2018-12-14 ENCOUNTER — READMISSION MANAGEMENT (OUTPATIENT)
Dept: CALL CENTER | Facility: HOSPITAL | Age: 53
End: 2018-12-14

## 2018-12-14 NOTE — OUTREACH NOTE
Prep Survey      Responses   Facility patient discharged from?  [facility]   Is patient eligible?  Yes   Discharge diagnosis  RUQ pain,  CAREY,  gallbladder polyp   Does the patient have one of the following disease processes/diagnoses(primary or secondary)?  Other   Does the patient have Home health ordered?  No   Is there a DME ordered?  No   General alerts for this patient  will need surgery in 12 months, when it is safe to be off brilinta    Prep survey completed?  Yes          Bela Salter RN

## 2018-12-15 LAB
BACTERIA SPEC AEROBE CULT: NORMAL
BACTERIA SPEC AEROBE CULT: NORMAL

## 2018-12-17 ENCOUNTER — READMISSION MANAGEMENT (OUTPATIENT)
Dept: CALL CENTER | Facility: HOSPITAL | Age: 53
End: 2018-12-17

## 2018-12-17 NOTE — OUTREACH NOTE
Medical Week 1 Survey      Responses   Facility patient discharged from?  [facility]   Does the patient have one of the following disease processes/diagnoses(primary or secondary)?  Other   Is there a successful TCM telephone encounter documented?  No   Week 1 attempt successful?  Yes   Call start time  1549   Call end time  1556   General alerts for this patient  will need surgery in 12 months, when it is safe to be off brilinta    Discharge diagnosis  RUQ pain,  CAREY,  gallbladder polyp   Is patient permission given to speak with other caregiver?  Yes   Person spoke with today (if not patient) and relationship  Spouse   Meds reviewed with patient/caregiver?  Yes   Is the patient having any side effects they believe may be caused by any medication additions or changes?  No   Does the patient have all medications ordered at discharge?  Yes   Is the patient taking all medications as directed (includes completed medication regime)?  Yes   Does the patient have a primary care provider?   Yes   Does the patient have an appointment with their PCP within 7 days of discharge?  Yes   Has the patient kept scheduled appointments due by today?  Yes   Comments  Reviewed all appts.   Has home health visited the patient within 72 hours of discharge?  N/A   Psychosocial issues?  No   Comments  Not much improvement. He still throws up occasionally. Advised to speak to MD about alternative to Zofran as it doesnt seem to help too much.   Did the patient receive a copy of their discharge instructions?  Yes   Nursing interventions  Reviewed instructions with patient   What is the patient's perception of their health status since discharge?  Same   Is the patient/caregiver able to teach back signs and symptoms related to disease process for when to call PCP?  Yes   Is the patient/caregiver able to teach back signs and symptoms related to disease process for when to call 911?  Yes   Is the patient/caregiver able to teach back the hierarchy  of who to call/visit for symptoms/problems? PCP, Specialist, Home health nurse, Urgent Care, ED, 911  Yes   Week 1 call completed?  Yes          Hernan Deleon RN

## 2018-12-28 ENCOUNTER — OFFICE VISIT (OUTPATIENT)
Dept: CARDIOLOGY | Facility: CLINIC | Age: 53
End: 2018-12-28

## 2018-12-28 ENCOUNTER — READMISSION MANAGEMENT (OUTPATIENT)
Dept: CALL CENTER | Facility: HOSPITAL | Age: 53
End: 2018-12-28

## 2018-12-28 VITALS
HEART RATE: 76 BPM | WEIGHT: 213 LBS | HEIGHT: 69 IN | OXYGEN SATURATION: 98 % | SYSTOLIC BLOOD PRESSURE: 100 MMHG | DIASTOLIC BLOOD PRESSURE: 80 MMHG | BODY MASS INDEX: 31.55 KG/M2

## 2018-12-28 DIAGNOSIS — I10 ESSENTIAL HYPERTENSION: ICD-10-CM

## 2018-12-28 DIAGNOSIS — E78.2 MIXED HYPERLIPIDEMIA: ICD-10-CM

## 2018-12-28 DIAGNOSIS — I25.10 CORONARY ARTERY DISEASE INVOLVING NATIVE CORONARY ARTERY OF NATIVE HEART WITHOUT ANGINA PECTORIS: Primary | ICD-10-CM

## 2018-12-28 DIAGNOSIS — I25.5 ISCHEMIC CARDIOMYOPATHY: ICD-10-CM

## 2018-12-28 PROCEDURE — 99213 OFFICE O/P EST LOW 20 MIN: CPT | Performed by: INTERNAL MEDICINE

## 2018-12-28 NOTE — OUTREACH NOTE
Medical Week 2 Survey      Responses   Facility patient discharged from?  [facility]   Does the patient have one of the following disease processes/diagnoses(primary or secondary)?  Other   Week 2 attempt successful?  Yes   Call start time  1044   Discharge diagnosis  RUQ pain,  CAREY,  gallbladder polyp   Call end time  1048   Meds reviewed with patient/caregiver?  Yes   Is the patient having any side effects they believe may be caused by any medication additions or changes?  No   Does the patient have all medications ordered at discharge?  Yes   Is the patient taking all medications as directed (includes completed medication regime)?  Yes   Does the patient have a primary care provider?   Yes   Does the patient have an appointment with their PCP within 7 days of discharge?  Yes   Has the patient kept scheduled appointments due by today?  Yes   Has home health visited the patient within 72 hours of discharge?  N/A   Psychosocial issues?  No   Comments  Able to eat and not throw up this week.   Did the patient receive a copy of their discharge instructions?  Yes   Nursing interventions  Reviewed instructions with patient   What is the patient's perception of their health status since discharge?  Improving   Is the patient/caregiver able to teach back signs and symptoms related to disease process for when to call PCP?  Yes   Is the patient/caregiver able to teach back signs and symptoms related to disease process for when to call 911?  Yes   Is the patient/caregiver able to teach back the hierarchy of who to call/visit for symptoms/problems? PCP, Specialist, Home health nurse, Urgent Care, ED, 911  Yes   Additional teach back comments  2 New meds for Stomach--Carafate and one other and increased Pepcid to BID as needed.   Week 2 Call Completed?  Yes          Davida Turner RN

## 2018-12-28 NOTE — PROGRESS NOTES
Subjective    Luis Echeverria is a 53 y.o. male. Fu of ihd and isch cmo    History of Present Illness     IHD:  Has diabetic type cad. Since RCA stenting 11/18 has felt good and has returned to playing basketball at low intensity. His stamina has improved. He is active but does not exercise regularly. He is very compliant with meds and heart healthy diet. No cp or unusual sob. He has had some recent GB probs and is seeing Dr Garcia and trying to post-pone surgery as long as possible d/t the recent cor stenting. With diet adjustment he is doing ok so far. Have had a 5 min discussion today re: the declining risk over time if he can post-pone GB surgery.     ISCH CMO:  Has had small infarcts in the LAD and CX distributions in the past and recent cath shows LVEF 30-35%. He has had a return to normal stamina since his RCA stenting. His functional class is 2C. He has no edema or nocturnal sob or sleep disturbance. Is compliant with meds that are well tolerated. His insurance denied the Life-Vest and he can't afford it otherwise. He is willing to consider and implantable AICD so will order a fu ECHO 3 mo from RCA stenting    HTN:  bp is always running low now but he has no light-headedness with standing and has good stamina    HLD:  Tolerates his potent statin tx without myalgias and has good LDL control (50).      The following portions of the patient's history were reviewed and updated as appropriate: allergies, current medications, past family history, past medical history, past social history, past surgical history and problem list.    Patient Active Problem List   Diagnosis   • CAD (coronary artery disease)   • Hypertension   • Hyperlipidemia   • MI (myocardial infarction) (CMS/HCC)   • Insulin dependent diabetes mellitus with complications (CMS/HCC)   • Carotid stent occlusion (CMS/HCC)   • Unstable angina (CMS/HCC)   • Ischemic cardiomyopathy   • RUQ pain   • Dehydration   • Nausea and vomiting   • Acute kidney  injury (CMS/HCC)   • Hypotension   • Gallbladder polyp       No Known Allergies    Family History   Problem Relation Age of Onset   • Heart disease Maternal Grandmother    • No Known Problems Mother    • No Known Problems Father        Social History     Socioeconomic History   • Marital status:      Spouse name: Not on file   • Number of children: Not on file   • Years of education: Not on file   • Highest education level: Not on file   Social Needs   • Financial resource strain: Not on file   • Food insecurity - worry: Not on file   • Food insecurity - inability: Not on file   • Transportation needs - medical: Not on file   • Transportation needs - non-medical: Not on file   Occupational History   • Not on file   Tobacco Use   • Smoking status: Never Smoker   • Smokeless tobacco: Never Used   Substance and Sexual Activity   • Alcohol use: No   • Drug use: No   • Sexual activity: Defer   Other Topics Concern   • Not on file   Social History Narrative   • Not on file         Current Outpatient Medications:   •  aspirin 81 MG EC tablet, Take 81 mg by mouth Daily., Disp: , Rfl:   •  atorvastatin (LIPITOR) 80 MG tablet, Take 1 tablet by mouth Every Night., Disp: 90 tablet, Rfl: 3  •  Dulaglutide (TRULICITY) 0.75 MG/0.5ML solution pen-injector, Inject 0.75 mg under the skin into the appropriate area as directed 1 (One) Time Per Week., Disp: , Rfl:   •  DULoxetine (CYMBALTA) 20 MG capsule, Take 20 mg by mouth Daily., Disp: , Rfl:   •  famotidine (PEPCID) 20 MG tablet, Take 1 tablet by mouth Daily., Disp: 30 tablet, Rfl: 1  •  HYDROcodone-acetaminophen (NORCO) 5-325 MG per tablet, Take 1 tablet by mouth Every 4 (Four) Hours As Needed for Mild Pain  for up to 20 doses., Disp: 20 tablet, Rfl: 0  •  insulin aspart (novoLOG FLEXPEN) 100 UNIT/ML solution pen-injector sc pen, Inject 18 Units under the skin into the appropriate area as directed 3 (Three) Times a Day With Meals., Disp: , Rfl:   •  insulin degludec (TRESIBA  FLEXTOUCH) 100 UNIT/ML solution pen-injector injection, Inject 42 Units under the skin into the appropriate area as directed Every Morning., Disp: , Rfl:   •  levocetirizine (XYZAL) 5 MG tablet, Take 5 mg by mouth Every Evening., Disp: , Rfl:   •  lisinopril (PRINIVIL,ZESTRIL) 2.5 MG tablet, Take 1 tablet by mouth Daily., Disp: 30 tablet, Rfl: 1  •  metFORMIN (GLUCOPHAGE) 500 MG tablet, Take 1 tablet by mouth 2 (Two) Times a Day With Meals. HOLD FOR 48 HOURS POST CATH, Disp: , Rfl:   •  metoprolol succinate XL (TOPROL-XL) 50 MG 24 hr tablet, Take 1 tablet by mouth Daily., Disp: 90 tablet, Rfl: 3  •  nitroglycerin (NITROSTAT) 0.4 MG SL tablet, Place 1 tablet under the tongue Every 5 (Five) Minutes As Needed for Chest Pain. Take no more than 3 doses in 15 minutes., Disp: 25 tablet, Rfl: 1  •  spironolactone (ALDACTONE) 25 MG tablet, Take 1 tablet by mouth Daily., Disp: 90 tablet, Rfl: 3  •  ticagrelor (BRILINTA) 90 MG tablet tablet, Take 1 tablet by mouth 2 (Two) Times a Day., Disp: 180 tablet, Rfl: 3  •  timolol (TIMOPTIC) 0.5 % ophthalmic solution, Administer 1 drop to both eyes Daily., Disp: , Rfl:   •  ZOFRAN 8 MG tablet, Take 1 tablet by mouth Every 8 (Eight) Hours As Needed for Nausea or Vomiting for up to 10 doses., Disp: 12 tablet, Rfl: 0    Past Surgical History:   Procedure Laterality Date   • CARDIAC CATHETERIZATION N/A 11/6/2018    Procedure: Left Heart Cath;  Surgeon: Tru Rai MD;  Location:  [facility];  Service: Cardiology   • CAROTID STENT         Review of Systems   Constitutional: Negative for fatigue, fever and unexpected weight change.   Respiratory: Negative for apnea, chest tightness and shortness of breath.    Cardiovascular: Negative for chest pain, palpitations and leg swelling.   Gastrointestinal: Negative for abdominal pain and blood in stool.   Genitourinary: Negative for dysuria and hematuria.   Musculoskeletal: Positive for back pain. Negative for  "myalgias.   Neurological: Negative for weakness and light-headedness.   Psychiatric/Behavioral: Negative for sleep disturbance.       /80   Pulse 76   Ht 175.3 cm (69\")   Wt 96.6 kg (213 lb)   SpO2 98%   BMI 31.45 kg/m²   Procedures    Objective   Physical Exam   Constitutional: He is oriented to person, place, and time. No distress.   Mild to mod obese   HENT:   Head: Normocephalic.   Eyes: Pupils are equal, round, and reactive to light.   Neck: No thyromegaly present.   Cardiovascular: Normal rate, regular rhythm and normal heart sounds. Exam reveals decreased pulses. Exam reveals no gallop and no friction rub.   No murmur heard.  Pulmonary/Chest: Effort normal and breath sounds normal. No stridor. No respiratory distress. He has no wheezes. He has no rales.   Abdominal: Soft. Bowel sounds are normal. He exhibits no distension and no mass. There is no tenderness. There is no guarding.   Musculoskeletal: He exhibits no edema or tenderness.   Neurological: He is alert and oriented to person, place, and time.   Skin: Skin is warm and dry. He is not diaphoretic.   Psychiatric: He has a normal mood and affect.       Assessment/Plan   Luis was seen today for coronary artery disease, hypertension and hyperlipidemia.    Diagnoses and all orders for this visit:    Coronary artery disease involving native coronary artery of native heart without angina pectoris  Comments:  STABLE ON CURRENT MEDS - MUCH IMPROVEMENT AFTER RCA STENTING 11/18    Essential hypertension  Comments:  TIGHT CONTROL    Mixed hyperlipidemia  Comments:  TIGHT CONTROL    Ischemic cardiomyopathy  Comments:  CL 2C CHF - INSURANCE DENIED LIFE-VEST. RE-CHECK ECHO 2/19. CPT  Orders:  -     Adult Transthoracic Echo Complete W/ Cont if Necessary Per Protocol; Future    Other orders  -     Cancel: ECG 12 Lead                 Return in about 3 months (around 3/28/2019).  Orders Placed This Encounter   Procedures   • Adult Transthoracic Echo Complete " W/ Cont if Necessary Per Protocol     Standing Status:   Future     Standing Expiration Date:   12/28/2019     Order Specific Question:   Reason for exam?     Answer:   Heart Failure, Cardiomyopathy, or Sytemic or Pulmonary Hypertension

## 2019-01-04 ENCOUNTER — READMISSION MANAGEMENT (OUTPATIENT)
Dept: CALL CENTER | Facility: HOSPITAL | Age: 54
End: 2019-01-04

## 2019-01-05 NOTE — OUTREACH NOTE
Medical Week 3 Survey      Responses   Facility patient discharged from?  [facility]   Does the patient have one of the following disease processes/diagnoses(primary or secondary)?  Other   Week 3 attempt successful?  No   Unsuccessful attempts  Attempt 1          Laura Moreno RN

## 2019-01-07 ENCOUNTER — READMISSION MANAGEMENT (OUTPATIENT)
Dept: CALL CENTER | Facility: HOSPITAL | Age: 54
End: 2019-01-07

## 2019-01-07 NOTE — OUTREACH NOTE
Medical Week 3 Survey      Responses   Facility patient discharged from?  [facility]   Does the patient have one of the following disease processes/diagnoses(primary or secondary)?  Other   Week 3 attempt successful?  Yes   Call start time  1645   Call end time  1650   Discharge diagnosis  RUQ pain,  CAREY,  gallbladder polyp   Is patient permission given to speak with other caregiver?  Yes   Person spoke with today (if not patient) and relationship  Spouse   Meds reviewed with patient/caregiver?  Yes   Is the patient having any side effects they believe may be caused by any medication additions or changes?  No   Does the patient have all medications ordered at discharge?  Yes   Is the patient taking all medications as directed (includes completed medication regime)?  Yes   Medication comments  abx, carafate, hycosamine   Does the patient have a primary care provider?   Yes   Does the patient have an appointment with their PCP within 7 days of discharge?  Yes   Has the patient kept scheduled appointments due by today?  Yes   Comments  had fever and vomiting last week,  had bloodwork per  instruction   Has home health visited the patient within 72 hours of discharge?  N/A   Psychosocial issues?  No   Did the patient receive a copy of their discharge instructions?  Yes   Nursing interventions  Reviewed instructions with patient   What is the patient's perception of their health status since discharge?  Worsening   Is the patient/caregiver able to teach back signs and symptoms related to disease process for when to call PCP?  Yes   Is the patient/caregiver able to teach back signs and symptoms related to disease process for when to call 911?  Yes   Is the patient/caregiver able to teach back the hierarchy of who to call/visit for symptoms/problems? PCP, Specialist, Home health nurse, Urgent Care, ED, 911  Yes   Week 3 Call Completed?  Yes   Graduated  Yes   Did the patient feel the follow up calls were helpful during  their recovery period?  Yes          Navya Kirk RN

## 2019-02-08 ENCOUNTER — HOSPITAL ENCOUNTER (OUTPATIENT)
Dept: CARDIOLOGY | Facility: HOSPITAL | Age: 54
Discharge: HOME | End: 2019-02-08
Attending: INTERNAL MEDICINE | Admitting: INTERNAL MEDICINE

## 2019-02-08 VITALS
BODY MASS INDEX: 31.55 KG/M2 | SYSTOLIC BLOOD PRESSURE: 100 MMHG | DIASTOLIC BLOOD PRESSURE: 80 MMHG | HEIGHT: 69 IN | WEIGHT: 213 LBS

## 2019-02-08 DIAGNOSIS — I25.5 ISCHEMIC CARDIOMYOPATHY: ICD-10-CM

## 2019-02-08 PROCEDURE — 25010000002 PERFLUTREN 6.52 MG/ML SUSPENSION: Performed by: INTERNAL MEDICINE

## 2019-02-08 PROCEDURE — 93306 TTE W/DOPPLER COMPLETE: CPT

## 2019-02-08 PROCEDURE — 93306 TTE W/DOPPLER COMPLETE: CPT | Performed by: INTERNAL MEDICINE

## 2019-02-08 RX ADMIN — PERFLUTREN 8.48 MG: 6.52 INJECTION, SUSPENSION INTRAVENOUS at 10:33

## 2019-02-11 LAB
BH CV ECHO MEAS - AO MAX PG (FULL): 4.8 MMHG
BH CV ECHO MEAS - AO MAX PG: 5.9 MMHG
BH CV ECHO MEAS - AO MEAN PG (FULL): 2 MMHG
BH CV ECHO MEAS - AO MEAN PG: 3 MMHG
BH CV ECHO MEAS - AO ROOT AREA (BSA CORRECTED): 1.6
BH CV ECHO MEAS - AO ROOT AREA: 9.1 CM^2
BH CV ECHO MEAS - AO ROOT DIAM: 3.4 CM
BH CV ECHO MEAS - AO V2 MAX: 121 CM/SEC
BH CV ECHO MEAS - AO V2 MEAN: 85.2 CM/SEC
BH CV ECHO MEAS - AO V2 VTI: 25.7 CM
BH CV ECHO MEAS - AVA(I,A): 1.6 CM^2
BH CV ECHO MEAS - AVA(I,D): 1.6 CM^2
BH CV ECHO MEAS - AVA(V,A): 1.5 CM^2
BH CV ECHO MEAS - AVA(V,D): 1.5 CM^2
BH CV ECHO MEAS - BSA(HAYCOCK): 2.2 M^2
BH CV ECHO MEAS - BSA: 2.1 M^2
BH CV ECHO MEAS - BZI_BMI: 31.5 KILOGRAMS/M^2
BH CV ECHO MEAS - BZI_METRIC_HEIGHT: 175.3 CM
BH CV ECHO MEAS - BZI_METRIC_WEIGHT: 96.6 KG
BH CV ECHO MEAS - EDV(CUBED): 121.3 ML
BH CV ECHO MEAS - EDV(MOD-SP2): 124 ML
BH CV ECHO MEAS - EDV(TEICH): 115.5 ML
BH CV ECHO MEAS - EF(CUBED): 39.8 %
BH CV ECHO MEAS - EF(MOD-SP2): 33.5 %
BH CV ECHO MEAS - EF(TEICH): 32.7 %
BH CV ECHO MEAS - ESV(CUBED): 73 ML
BH CV ECHO MEAS - ESV(MOD-SP2): 82.4 ML
BH CV ECHO MEAS - ESV(TEICH): 77.7 ML
BH CV ECHO MEAS - FS: 15.6 %
BH CV ECHO MEAS - IVS/LVPW: 0.99
BH CV ECHO MEAS - IVSD: 0.93 CM
BH CV ECHO MEAS - LA DIMENSION: 4.4 CM
BH CV ECHO MEAS - LA/AO: 1.3
BH CV ECHO MEAS - LAT PEAK E' VEL: 8.2 CM/SEC
BH CV ECHO MEAS - LV MASS(C)D: 163.8 GRAMS
BH CV ECHO MEAS - LV MASS(C)DI: 77.2 GRAMS/M^2
BH CV ECHO MEAS - LV MAX PG: 1.1 MMHG
BH CV ECHO MEAS - LV MEAN PG: 1 MMHG
BH CV ECHO MEAS - LV V1 MAX: 52.2 CM/SEC
BH CV ECHO MEAS - LV V1 MEAN: 35.7 CM/SEC
BH CV ECHO MEAS - LV V1 VTI: 12.1 CM
BH CV ECHO MEAS - LVIDD: 5 CM
BH CV ECHO MEAS - LVIDS: 4.2 CM
BH CV ECHO MEAS - LVLD AP2: 8.6 CM
BH CV ECHO MEAS - LVLS AP2: 8.1 CM
BH CV ECHO MEAS - LVOT AREA (M): 3.5 CM^2
BH CV ECHO MEAS - LVOT AREA: 3.5 CM^2
BH CV ECHO MEAS - LVOT DIAM: 2.1 CM
BH CV ECHO MEAS - LVPWD: 0.94 CM
BH CV ECHO MEAS - MED PEAK E' VEL: 4.3 CM/SEC
BH CV ECHO MEAS - MR MAX PG: 90.6 MMHG
BH CV ECHO MEAS - MR MAX VEL: 471.5 CM/SEC
BH CV ECHO MEAS - MR MEAN PG: 61 MMHG
BH CV ECHO MEAS - MR MEAN VEL: 351 CM/SEC
BH CV ECHO MEAS - MR VTI: 188 CM
BH CV ECHO MEAS - MV A MAX VEL: 81 CM/SEC
BH CV ECHO MEAS - MV DEC SLOPE: 489 CM/SEC^2
BH CV ECHO MEAS - MV DEC TIME: 0.13 SEC
BH CV ECHO MEAS - MV E MAX VEL: 83.4 CM/SEC
BH CV ECHO MEAS - MV E/A: 1
BH CV ECHO MEAS - MV P1/2T MAX VEL: 114 CM/SEC
BH CV ECHO MEAS - MV P1/2T: 68.3 MSEC
BH CV ECHO MEAS - MVA P1/2T LCG: 1.9 CM^2
BH CV ECHO MEAS - MVA(P1/2T): 3.2 CM^2
BH CV ECHO MEAS - SI(AO): 110 ML/M^2
BH CV ECHO MEAS - SI(CUBED): 22.7 ML/M^2
BH CV ECHO MEAS - SI(LVOT): 19.8 ML/M^2
BH CV ECHO MEAS - SI(MOD-SP2): 19.6 ML/M^2
BH CV ECHO MEAS - SI(TEICH): 17.8 ML/M^2
BH CV ECHO MEAS - SV(AO): 233.3 ML
BH CV ECHO MEAS - SV(CUBED): 48.3 ML
BH CV ECHO MEAS - SV(LVOT): 41.9 ML
BH CV ECHO MEAS - SV(MOD-SP2): 41.6 ML
BH CV ECHO MEAS - SV(TEICH): 37.8 ML
BH CV ECHO MEASUREMENTS AVERAGE E/E' RATIO: 13.34
LEFT ATRIUM VOLUME INDEX: 19 ML/M2
MAXIMAL PREDICTED HEART RATE: 169 BPM
STRESS TARGET HR: 144 BPM

## 2019-02-20 RX ORDER — LISINOPRIL 2.5 MG/1
2.5 TABLET ORAL DAILY
Qty: 90 TABLET | Refills: 3 | Status: SHIPPED | OUTPATIENT
Start: 2019-02-20 | End: 2019-12-09 | Stop reason: SDUPTHER

## 2019-03-26 ENCOUNTER — OFFICE VISIT (OUTPATIENT)
Dept: CARDIOLOGY | Facility: CLINIC | Age: 54
End: 2019-03-26

## 2019-03-26 VITALS
WEIGHT: 216 LBS | HEART RATE: 74 BPM | BODY MASS INDEX: 31.99 KG/M2 | HEIGHT: 69 IN | SYSTOLIC BLOOD PRESSURE: 110 MMHG | DIASTOLIC BLOOD PRESSURE: 62 MMHG

## 2019-03-26 DIAGNOSIS — I10 ESSENTIAL HYPERTENSION: ICD-10-CM

## 2019-03-26 DIAGNOSIS — I25.5 ISCHEMIC CARDIOMYOPATHY: Primary | ICD-10-CM

## 2019-03-26 DIAGNOSIS — E78.2 MIXED HYPERLIPIDEMIA: ICD-10-CM

## 2019-03-26 DIAGNOSIS — I25.10 CORONARY ARTERY DISEASE INVOLVING NATIVE CORONARY ARTERY OF NATIVE HEART WITHOUT ANGINA PECTORIS: ICD-10-CM

## 2019-03-26 PROCEDURE — 99214 OFFICE O/P EST MOD 30 MIN: CPT | Performed by: NURSE PRACTITIONER

## 2019-03-26 PROCEDURE — 93000 ELECTROCARDIOGRAM COMPLETE: CPT | Performed by: NURSE PRACTITIONER

## 2019-03-26 NOTE — PROGRESS NOTES
Subjective:     Encounter Date:03/26/2019      Patient ID: Luis Echeverria is a 53 y.o. male.    Chief Complaint:  Congestive Heart Failure   Presents for follow-up visit. Associated symptoms include shortness of breath. Pertinent negatives include no abdominal pain, chest pain, chest pressure, claudication, edema, fatigue, muscle weakness, near-syncope, nocturia, orthopnea, palpitations, paroxysmal nocturnal dyspnea or unexpected weight change. The symptoms have been stable. His past medical history is significant for CAD. Compliance with total regimen is %. Compliance with diet is %. Compliance with exercise is %. Compliance with medications is %.   Coronary Artery Disease   Presents for follow-up visit. Symptoms include shortness of breath. Pertinent negatives include no chest pain, chest pressure, dizziness, leg swelling, muscle weakness, palpitations or weight gain. Risk factors include hyperlipidemia. His past medical history is significant for CHF. The symptoms have been stable. Compliance with diet is good. Compliance with exercise is good. Compliance with medications is good.   Hyperlipidemia   This is a chronic problem. The current episode started more than 1 year ago. The problem is controlled. Recent lipid tests were reviewed and are normal. Associated symptoms include shortness of breath. Pertinent negatives include no chest pain. Current antihyperlipidemic treatment includes statins. The current treatment provides significant improvement of lipids. There are no compliance problems.    Hypertension   This is a chronic problem. The current episode started more than 1 year ago. The problem is controlled. Associated symptoms include shortness of breath. Pertinent negatives include no chest pain, headaches, malaise/fatigue, orthopnea, palpitations or PND. Current antihypertension treatment includes ACE inhibitors, beta blockers and diuretics. The current treatment provides  significant improvement.     Patient presents today for discussion for AICD implantation. Patient has a history of coronary artery disease withSTEMI '13 with emergent stenting to the Cx and LAD. In November of 2018 he had unstable angina for which he had a heart cath. He received a drug eluting stent x 2 to RCA at that time. He was found to have an EF of 30-35%. He was placed on optimized medical therapy for 3 months and repeat echo was done which showed a persistent low EF of 30-35%. Patient has remained euvolemic. He is on Lisinopril, Toprol and Aldactone. Patient and wife have a list of questions regarding the AICD and all were answered.    The following portions of the patient's history were reviewed and updated as appropriate: allergies, current medications, past family history, past medical history, past social history, past surgical history and problem list.   Prior to Admission medications    Medication Sig Start Date End Date Taking? Authorizing Provider   aspirin 81 MG EC tablet Take 81 mg by mouth Daily.   Yes Neli Flores MD   atorvastatin (LIPITOR) 80 MG tablet Take 1 tablet by mouth Every Night. 11/26/18  Yes Bernarda Henriquez PA-C   Dulaglutide (TRULICITY) 0.75 MG/0.5ML solution pen-injector Inject 0.75 mg under the skin into the appropriate area as directed 1 (One) Time Per Week.   Yes Neli Flores MD   DULoxetine (CYMBALTA) 20 MG capsule Take 20 mg by mouth Daily.   Yes Neli Flores MD   famotidine (PEPCID) 20 MG tablet Take 1 tablet by mouth Daily. 12/13/18  Yes Hillary Goodwin APRN   HYDROcodone-acetaminophen (NORCO) 5-325 MG per tablet Take 1 tablet by mouth Every 4 (Four) Hours As Needed for Mild Pain  for up to 20 doses. 12/10/18  Yes Bud Garcia MD   insulin aspart (novoLOG FLEXPEN) 100 UNIT/ML solution pen-injector sc pen Inject 18 Units under the skin into the appropriate area as directed 3 (Three) Times a Day With Meals.   Yes Neli Flores MD    insulin degludec (TRESIBA FLEXTOUCH) 100 UNIT/ML solution pen-injector injection Inject 42 Units under the skin into the appropriate area as directed Every Morning.   Yes Neli Flores MD   levocetirizine (XYZAL) 5 MG tablet Take 5 mg by mouth Every Evening.   Yes ProviderNeli MD   lisinopril (PRINIVIL,ZESTRIL) 2.5 MG tablet Take 1 tablet by mouth Daily. 2/20/19  Yes Tru Rai MD   metFORMIN (GLUCOPHAGE) 500 MG tablet Take 1 tablet by mouth 2 (Two) Times a Day With Meals. HOLD FOR 48 HOURS POST CATH 11/7/18  Yes Laura Gomez APRN   metoprolol succinate XL (TOPROL-XL) 50 MG 24 hr tablet Take 1 tablet by mouth Daily. 11/26/18  Yes Bernarda Henriquez PA-C   nitroglycerin (NITROSTAT) 0.4 MG SL tablet Place 1 tablet under the tongue Every 5 (Five) Minutes As Needed for Chest Pain. Take no more than 3 doses in 15 minutes. 11/7/18  Yes Laura Gomez APRN   spironolactone (ALDACTONE) 25 MG tablet Take 1 tablet by mouth Daily. 11/26/18  Yes Bernarda Henriquez PA-C   ticagrelor (BRILINTA) 90 MG tablet tablet Take 1 tablet by mouth 2 (Two) Times a Day. 11/26/18  Yes Bernarda Henriuqez PA-C   timolol (TIMOPTIC) 0.5 % ophthalmic solution Administer 1 drop to both eyes Daily.   Yes ProviderNeli MD   ZOFRAN 8 MG tablet Take 1 tablet by mouth Every 8 (Eight) Hours As Needed for Nausea or Vomiting for up to 10 doses. 12/13/18  Yes Hillary Goodwin APRN     Past Medical History:   Diagnosis Date   • CAD (coronary artery disease)    • CAD in native artery     2V STEMI 7/13 STENT TO CX AND STENTX2 TO MID AND DISTAL LAD   • Diabetes mellitus (CMS/HCC)    • History of coronary artery stent placement      x 3 - for ACUTE MI 7/2013   • Hyperlipidemia    • Hyperlipidemia, mild    • Myocardial infarct (CMS/HCC)    • Myocardial infarction (CMS/HCC)    • Stented coronary artery      Review of Systems   Constitution: Negative for chills, decreased appetite, fatigue, fever, malaise/fatigue, unexpected weight  change, weight gain and weight loss.   HENT: Negative for nosebleeds.    Eyes: Negative for visual disturbance.   Cardiovascular: Negative for chest pain, claudication, dyspnea on exertion, leg swelling, near-syncope, orthopnea, palpitations, paroxysmal nocturnal dyspnea and syncope.   Respiratory: Positive for shortness of breath. Negative for cough, hemoptysis and snoring.    Endocrine: Negative for cold intolerance and heat intolerance.   Hematologic/Lymphatic: Negative for bleeding problem. Does not bruise/bleed easily.   Skin: Negative for rash.   Musculoskeletal: Negative for back pain, falls and muscle weakness.   Gastrointestinal: Negative for abdominal pain, constipation, diarrhea, heartburn, melena, nausea and vomiting.   Genitourinary: Negative for hematuria and nocturia.   Neurological: Negative for dizziness, headaches and light-headedness.   Psychiatric/Behavioral: Negative for altered mental status.   Allergic/Immunologic: Negative for persistent infections.         ECG 12 Lead  Date/Time: 3/26/2019 12:11 PM  Performed by: Garry Persaud APRN  Authorized by: Garry Persaud APRN   Comparison: compared with previous ECG from 11/26/2018  Similar to previous ECG  Rhythm: sinus rhythm               Objective:     Physical Exam   Constitutional: He is oriented to person, place, and time. He appears well-developed and well-nourished.   HENT:   Head: Normocephalic and atraumatic.   Eyes: Pupils are equal, round, and reactive to light.   Neck: Normal range of motion. Neck supple. No JVD present. Carotid bruit is not present.   Cardiovascular: Normal rate, regular rhythm, normal heart sounds and intact distal pulses.   Pulmonary/Chest: Effort normal and breath sounds normal.   Abdominal: Soft. Bowel sounds are normal.   Musculoskeletal: Normal range of motion.   Neurological: He is alert and oriented to person, place, and time. He has normal reflexes.   Skin: Skin is warm and dry.   Psychiatric: He has a  "normal mood and affect. His behavior is normal. Judgment and thought content normal.     Blood pressure 110/62, pulse 74, height 175.3 cm (69\"), weight 98 kg (216 lb).      Lab Review:       Assessment:          Diagnosis Plan   1. Ischemic cardiomyopathy  Case Request Cath Lab: ICD new   2. Coronary artery disease involving native coronary artery of native heart without angina pectoris     3. Essential hypertension     4. Mixed hyperlipidemia     5. BMI 31.0-31.9,adult            Plan:       1. Ischemic cardiomyopathy -  Class II symptoms EF 30-35% s/p stenting and optimized medical therapy - with maximally tolerated, ACE, BB and Aldactone. Needs AICD. Discussed with Dr. Duran will stop Aspirin and continue Brilinta. He will decide whether to resume Aspirin after procedure. Educated patient on the importance of daily weights. Call office if 2-3 pound weight gain overnight or 5 pounds in a week. Discussed low sodium diet, less than 2g daily.     Shared Decision Making for AICD implantation has been discussed with patient. I have reviewed [facility] [employer] \"A decision aid for Implantable Cardioverter-Defibrillatiors (ICD)\". Risks and benefits of procedure discussed with patient. Post op limitations discussed with patient. Patient verbalizes understanding and wishes to proceed with AICD implantation. Patient asking if okay to wait until after tax season and basketball game for AICD- I explained to him the severity of his condition and this didn't need to wait.   2. Coronary Artery Disease- no clinical evidence of ischemia. On Aspirin, Brilinta and Lipitor. On ACE and BB. History of MI with stents to Cx and LAD in the past. Most recent stent to RCA in 2018.   3. Blood Pressure - controlled, borderline low   4. Mixed Hyperlipidemia- followed by Dr. Hogue. On Lipitor 80  5. Patient's Body mass index is 31.9 kg/m². BMI is above normal parameters. Recommendations include: exercise counseling and " nutrition counseling.

## 2019-05-01 ENCOUNTER — APPOINTMENT (OUTPATIENT)
Dept: GENERAL RADIOLOGY | Facility: HOSPITAL | Age: 54
End: 2019-05-01

## 2019-05-01 ENCOUNTER — HOSPITAL ENCOUNTER (OUTPATIENT)
Facility: HOSPITAL | Age: 54
Discharge: HOME | End: 2019-05-02
Attending: INTERNAL MEDICINE | Admitting: INTERNAL MEDICINE

## 2019-05-01 DIAGNOSIS — I25.5 ISCHEMIC CARDIOMYOPATHY: ICD-10-CM

## 2019-05-01 LAB
ALBUMIN SERPL-MCNC: 3.8 G/DL (ref 3.5–5)
ALBUMIN/GLOB SERPL: 1.2 G/DL (ref 1.1–2.5)
ALP SERPL-CCNC: 95 U/L (ref 24–120)
ALT SERPL W P-5'-P-CCNC: 39 U/L (ref 0–54)
ANION GAP SERPL CALCULATED.3IONS-SCNC: 11 MMOL/L (ref 4–13)
AST SERPL-CCNC: 35 U/L (ref 7–45)
BILIRUB SERPL-MCNC: 0.4 MG/DL (ref 0.1–1)
BUN BLD-MCNC: 25 MG/DL (ref 5–21)
BUN/CREAT SERPL: 26.9 (ref 7–25)
CALCIUM SPEC-SCNC: 9 MG/DL (ref 8.4–10.4)
CHLORIDE SERPL-SCNC: 102 MMOL/L (ref 98–110)
CO2 SERPL-SCNC: 27 MMOL/L (ref 24–31)
CREAT BLD-MCNC: 0.93 MG/DL (ref 0.5–1.4)
DEPRECATED RDW RBC AUTO: 42.3 FL (ref 40–54)
ERYTHROCYTE [DISTWIDTH] IN BLOOD BY AUTOMATED COUNT: 13.2 % (ref 12–15)
GFR SERPL CREATININE-BSD FRML MDRD: 86 ML/MIN/1.73
GLOBULIN UR ELPH-MCNC: 3.1 GM/DL
GLUCOSE BLD-MCNC: 184 MG/DL (ref 70–100)
GLUCOSE BLDC GLUCOMTR-MCNC: 151 MG/DL (ref 70–130)
GLUCOSE BLDC GLUCOMTR-MCNC: 194 MG/DL (ref 70–130)
HCT VFR BLD AUTO: 35.8 % (ref 40–52)
HGB BLD-MCNC: 12 G/DL (ref 14–18)
MCH RBC QN AUTO: 29.2 PG (ref 28–32)
MCHC RBC AUTO-ENTMCNC: 33.5 G/DL (ref 33–36)
MCV RBC AUTO: 87.1 FL (ref 82–95)
PLATELET # BLD AUTO: 301 10*3/MM3 (ref 130–400)
PMV BLD AUTO: 9.3 FL (ref 6–12)
POTASSIUM BLD-SCNC: 4.5 MMOL/L (ref 3.5–5.3)
PROT SERPL-MCNC: 6.9 G/DL (ref 6.3–8.7)
RBC # BLD AUTO: 4.11 10*6/MM3 (ref 4.8–5.9)
SODIUM BLD-SCNC: 140 MMOL/L (ref 135–145)
WBC NRBC COR # BLD: 6.62 10*3/MM3 (ref 4.8–10.8)

## 2019-05-01 PROCEDURE — 25010000002 VANCOMYCIN 1 G RECONSTITUTED SOLUTION: Performed by: INTERNAL MEDICINE

## 2019-05-01 PROCEDURE — 80053 COMPREHEN METABOLIC PANEL: CPT | Performed by: INTERNAL MEDICINE

## 2019-05-01 PROCEDURE — 82962 GLUCOSE BLOOD TEST: CPT

## 2019-05-01 PROCEDURE — 99153 MOD SED SAME PHYS/QHP EA: CPT | Performed by: INTERNAL MEDICINE

## 2019-05-01 PROCEDURE — 99152 MOD SED SAME PHYS/QHP 5/>YRS: CPT | Performed by: INTERNAL MEDICINE

## 2019-05-01 PROCEDURE — 33263 RMVL & RPLCMT DFB GEN 2 LEAD: CPT | Performed by: INTERNAL MEDICINE

## 2019-05-01 PROCEDURE — 25010000002 MIDAZOLAM PER 1 MG: Performed by: INTERNAL MEDICINE

## 2019-05-01 PROCEDURE — 71045 X-RAY EXAM CHEST 1 VIEW: CPT

## 2019-05-01 PROCEDURE — C1898 LEAD, PMKR, OTHER THAN TRANS: HCPCS | Performed by: INTERNAL MEDICINE

## 2019-05-01 PROCEDURE — C1892 INTRO/SHEATH,FIXED,PEEL-AWAY: HCPCS | Performed by: INTERNAL MEDICINE

## 2019-05-01 PROCEDURE — 25010000002 FENTANYL CITRATE (PF) 100 MCG/2ML SOLUTION: Performed by: INTERNAL MEDICINE

## 2019-05-01 PROCEDURE — C1721 AICD, DUAL CHAMBER: HCPCS | Performed by: INTERNAL MEDICINE

## 2019-05-01 PROCEDURE — 85027 COMPLETE CBC AUTOMATED: CPT | Performed by: INTERNAL MEDICINE

## 2019-05-01 PROCEDURE — C1777 LEAD, AICD, ENDO SINGLE COIL: HCPCS | Performed by: INTERNAL MEDICINE

## 2019-05-01 DEVICE — LD PM TENDRIL STS 6F52CM 2088TC52: Type: IMPLANTABLE DEVICE | Status: FUNCTIONAL

## 2019-05-01 DEVICE — LD DEFIB DURATA SJ4 58CM 7122Q58: Type: IMPLANTABLE DEVICE | Status: FUNCTIONAL

## 2019-05-01 DEVICE — ICD FORTIFY ASSURA NEXTGEN DR DF4 CONN: Type: IMPLANTABLE DEVICE | Status: FUNCTIONAL

## 2019-05-01 RX ORDER — SODIUM CHLORIDE 0.9 % (FLUSH) 0.9 %
3 SYRINGE (ML) INJECTION EVERY 12 HOURS SCHEDULED
Status: DISCONTINUED | OUTPATIENT
Start: 2019-05-01 | End: 2019-05-02 | Stop reason: HOSPADM

## 2019-05-01 RX ORDER — SUCRALFATE 1 G/1
1 TABLET ORAL
Status: DISCONTINUED | OUTPATIENT
Start: 2019-05-01 | End: 2019-05-02 | Stop reason: HOSPADM

## 2019-05-01 RX ORDER — HYOSCYAMINE SULFATE 0.125 MG
125 TABLET,DISINTEGRATING ORAL EVERY 6 HOURS PRN
Status: DISCONTINUED | OUTPATIENT
Start: 2019-05-01 | End: 2019-05-02 | Stop reason: HOSPADM

## 2019-05-01 RX ORDER — CETIRIZINE HYDROCHLORIDE 10 MG/1
10 TABLET ORAL DAILY
Status: DISCONTINUED | OUTPATIENT
Start: 2019-05-01 | End: 2019-05-02 | Stop reason: HOSPADM

## 2019-05-01 RX ORDER — DIPHENHYDRAMINE HYDROCHLORIDE 50 MG/ML
25 INJECTION INTRAMUSCULAR; INTRAVENOUS NIGHTLY PRN
Status: DISCONTINUED | OUTPATIENT
Start: 2019-05-01 | End: 2019-05-02 | Stop reason: HOSPADM

## 2019-05-01 RX ORDER — LIDOCAINE HYDROCHLORIDE 20 MG/ML
INJECTION, SOLUTION INFILTRATION; PERINEURAL AS NEEDED
Status: DISCONTINUED | OUTPATIENT
Start: 2019-05-01 | End: 2019-05-01 | Stop reason: HOSPADM

## 2019-05-01 RX ORDER — SODIUM CHLORIDE 9 MG/ML
20 INJECTION, SOLUTION INTRAVENOUS CONTINUOUS
Status: DISCONTINUED | OUTPATIENT
Start: 2019-05-01 | End: 2019-05-02 | Stop reason: HOSPADM

## 2019-05-01 RX ORDER — HYDROCODONE BITARTRATE AND ACETAMINOPHEN 5; 325 MG/1; MG/1
1 TABLET ORAL EVERY 4 HOURS PRN
Status: DISCONTINUED | OUTPATIENT
Start: 2019-05-01 | End: 2019-05-02 | Stop reason: HOSPADM

## 2019-05-01 RX ORDER — DULOXETIN HYDROCHLORIDE 20 MG/1
20 CAPSULE, DELAYED RELEASE ORAL DAILY
Status: DISCONTINUED | OUTPATIENT
Start: 2019-05-01 | End: 2019-05-02 | Stop reason: HOSPADM

## 2019-05-01 RX ORDER — FAMOTIDINE 20 MG/1
20 TABLET, FILM COATED ORAL DAILY
Status: DISCONTINUED | OUTPATIENT
Start: 2019-05-01 | End: 2019-05-02 | Stop reason: HOSPADM

## 2019-05-01 RX ORDER — ATORVASTATIN CALCIUM 40 MG/1
80 TABLET, FILM COATED ORAL NIGHTLY
Status: DISCONTINUED | OUTPATIENT
Start: 2019-05-01 | End: 2019-05-02 | Stop reason: HOSPADM

## 2019-05-01 RX ORDER — NALOXONE HCL 0.4 MG/ML
0.4 VIAL (ML) INJECTION
Status: DISCONTINUED | OUTPATIENT
Start: 2019-05-01 | End: 2019-05-02 | Stop reason: HOSPADM

## 2019-05-01 RX ORDER — ONDANSETRON 2 MG/ML
4 INJECTION INTRAMUSCULAR; INTRAVENOUS EVERY 6 HOURS PRN
Status: DISCONTINUED | OUTPATIENT
Start: 2019-05-01 | End: 2019-05-02 | Stop reason: HOSPADM

## 2019-05-01 RX ORDER — FENTANYL CITRATE 50 UG/ML
INJECTION, SOLUTION INTRAMUSCULAR; INTRAVENOUS AS NEEDED
Status: DISCONTINUED | OUTPATIENT
Start: 2019-05-01 | End: 2019-05-01 | Stop reason: HOSPADM

## 2019-05-01 RX ORDER — DIPHENHYDRAMINE HCL 25 MG
25 CAPSULE ORAL NIGHTLY PRN
Status: DISCONTINUED | OUTPATIENT
Start: 2019-05-01 | End: 2019-05-02 | Stop reason: HOSPADM

## 2019-05-01 RX ORDER — SODIUM CHLORIDE 0.9 % (FLUSH) 0.9 %
3 SYRINGE (ML) INJECTION EVERY 12 HOURS SCHEDULED
Status: DISCONTINUED | OUTPATIENT
Start: 2019-05-01 | End: 2019-05-01 | Stop reason: HOSPADM

## 2019-05-01 RX ORDER — LISINOPRIL 2.5 MG/1
2.5 TABLET ORAL DAILY
Status: DISCONTINUED | OUTPATIENT
Start: 2019-05-02 | End: 2019-05-02 | Stop reason: HOSPADM

## 2019-05-01 RX ORDER — SODIUM CHLORIDE 0.9 % (FLUSH) 0.9 %
3-10 SYRINGE (ML) INJECTION AS NEEDED
Status: DISCONTINUED | OUTPATIENT
Start: 2019-05-01 | End: 2019-05-01 | Stop reason: HOSPADM

## 2019-05-01 RX ORDER — SUCRALFATE 1 G/1
1 TABLET ORAL AS NEEDED
COMMUNITY
End: 2021-02-24

## 2019-05-01 RX ORDER — ASPIRIN 81 MG/1
81 TABLET ORAL DAILY
Status: DISCONTINUED | OUTPATIENT
Start: 2019-05-01 | End: 2019-05-02 | Stop reason: HOSPADM

## 2019-05-01 RX ORDER — TIMOLOL MALEATE 5 MG/ML
1 SOLUTION/ DROPS OPHTHALMIC DAILY
Status: DISCONTINUED | OUTPATIENT
Start: 2019-05-02 | End: 2019-05-02 | Stop reason: HOSPADM

## 2019-05-01 RX ORDER — NITROGLYCERIN 0.4 MG/1
0.4 TABLET SUBLINGUAL
Status: DISCONTINUED | OUTPATIENT
Start: 2019-05-01 | End: 2019-05-02 | Stop reason: HOSPADM

## 2019-05-01 RX ORDER — CLINDAMYCIN HYDROCHLORIDE 150 MG/1
300 CAPSULE ORAL EVERY 8 HOURS
Status: DISCONTINUED | OUTPATIENT
Start: 2019-05-02 | End: 2019-05-02 | Stop reason: HOSPADM

## 2019-05-01 RX ORDER — SODIUM CHLORIDE 0.9 % (FLUSH) 0.9 %
3-10 SYRINGE (ML) INJECTION AS NEEDED
Status: DISCONTINUED | OUTPATIENT
Start: 2019-05-01 | End: 2019-05-02 | Stop reason: HOSPADM

## 2019-05-01 RX ORDER — METOPROLOL SUCCINATE 50 MG/1
50 TABLET, EXTENDED RELEASE ORAL DAILY
Status: DISCONTINUED | OUTPATIENT
Start: 2019-05-02 | End: 2019-05-02 | Stop reason: HOSPADM

## 2019-05-01 RX ORDER — SPIRONOLACTONE 25 MG/1
25 TABLET ORAL DAILY
Status: DISCONTINUED | OUTPATIENT
Start: 2019-05-02 | End: 2019-05-02 | Stop reason: HOSPADM

## 2019-05-01 RX ORDER — ONDANSETRON 4 MG/1
8 TABLET, FILM COATED ORAL EVERY 8 HOURS PRN
Status: DISCONTINUED | OUTPATIENT
Start: 2019-05-01 | End: 2019-05-02 | Stop reason: HOSPADM

## 2019-05-01 RX ORDER — HYDROMORPHONE HYDROCHLORIDE 1 MG/ML
0.5 INJECTION, SOLUTION INTRAMUSCULAR; INTRAVENOUS; SUBCUTANEOUS
Status: DISCONTINUED | OUTPATIENT
Start: 2019-05-01 | End: 2019-05-02 | Stop reason: HOSPADM

## 2019-05-01 RX ORDER — VANCOMYCIN HYDROCHLORIDE 1 G/200ML
1 INJECTION, SOLUTION INTRAVENOUS ONCE
Status: COMPLETED | OUTPATIENT
Start: 2019-05-02 | End: 2019-05-02

## 2019-05-01 RX ORDER — HYOSCYAMINE SULFATE 0.125 MG
TABLET,DISINTEGRATING ORAL AS NEEDED
COMMUNITY
End: 2020-08-04

## 2019-05-01 RX ORDER — MIDAZOLAM HYDROCHLORIDE 1 MG/ML
INJECTION INTRAMUSCULAR; INTRAVENOUS AS NEEDED
Status: DISCONTINUED | OUTPATIENT
Start: 2019-05-01 | End: 2019-05-01 | Stop reason: HOSPADM

## 2019-05-01 RX ADMIN — SODIUM CHLORIDE 20 ML/HR: 9 INJECTION, SOLUTION INTRAVENOUS at 11:30

## 2019-05-01 RX ADMIN — Medication 1 APPLICATION: at 21:00

## 2019-05-01 RX ADMIN — DESMOPRESSIN ACETATE 80 MG: 0.2 TABLET ORAL at 21:00

## 2019-05-01 RX ADMIN — SODIUM CHLORIDE, PRESERVATIVE FREE 3 ML: 5 INJECTION INTRAVENOUS at 21:11

## 2019-05-01 RX ADMIN — VANCOMYCIN HYDROCHLORIDE 1000 MG: 1 INJECTION, POWDER, LYOPHILIZED, FOR SOLUTION INTRAVENOUS at 12:50

## 2019-05-01 RX ADMIN — TICAGRELOR 90 MG: 90 TABLET ORAL at 21:00

## 2019-05-01 NOTE — H&P
[facility] - CARDIOLOGY  HISTORY AND PHYSICAL    Date of Admission: 5/1/2019  Primary Care Physician: Rene Hogue DO    Subjective     Chief Complaint: SEVERE ISCHEMIC CARDIOMYOPATHY    History of Present Illness   Pt with severe and diffuse 'diabetic type' cad with a severe ischemic cmo. Had uap with cath and RCA collette 11/6/18 and LVEF was noted to be <35%. A Life-Vest AED could not get approval from his insurance company at the time of discharge. He has not had any syncope and has CL2c CHF but repeat ECHO 2/819 shows that the LVEF is still less than 35% putting him at significant risk for SCD in the future. An AICD has been approved for prophylaxis and he desires to proceed.        Review of Systems   Constitutional: Negative for fatigue, fever and unexpected weight change.   HENT: Negative for congestion.    Eyes: Negative for visual disturbance.   Respiratory: Negative for apnea, shortness of breath and wheezing.    Cardiovascular: Negative for chest pain, palpitations and leg swelling.   Gastrointestinal: Negative for abdominal pain and vomiting.   Endocrine: Negative for cold intolerance and heat intolerance.   Genitourinary: Negative for difficulty urinating.   Musculoskeletal: Negative for myalgias.   Skin: Negative for rash.   Neurological: Negative for syncope.   Hematological: Does not bruise/bleed easily.   Psychiatric/Behavioral: Negative for sleep disturbance.        Otherwise complete ROS reviewed and negative except as mentioned in the HPI.      Past Medical History:   Past Medical History:   Diagnosis Date   • CAD (coronary artery disease)    • CAD in native artery     2V STEMI 7/13 STENT TO CX AND STENTX2 TO MID AND DISTAL LAD   • Diabetes mellitus (CMS/HCC)    • History of coronary artery stent placement      x 3 - for ACUTE MI 7/2013   • Hyperlipidemia    • Hyperlipidemia, mild    • Myocardial infarct (CMS/HCC)    • Myocardial infarction (CMS/HCC)    • Stented coronary artery        Past  Surgical History:  Past Surgical History:   Procedure Laterality Date   • CARDIAC CATHETERIZATION N/A 11/6/2018    Procedure: Left Heart Cath;  Surgeon: Tru Rai MD;  Location: [facility] CATH INVASIVE LOCATION;  Service: Cardiology   • CAROTID STENT         Family History: family history includes Heart disease in his maternal grandmother; No Known Problems in his father and mother.    Social History:  reports that he has never smoked. He has never used smokeless tobacco. He reports that he does not drink alcohol or use drugs.    Medications:  Prior to Admission medications    Medication Sig Start Date End Date Taking? Authorizing Provider   atorvastatin (LIPITOR) 80 MG tablet Take 1 tablet by mouth Every Night. 11/26/18  Yes Bernarda Henriquez PA-C   Dulaglutide (TRULICITY) 0.75 MG/0.5ML solution pen-injector Inject 0.75 mg under the skin into the appropriate area as directed 1 (One) Time Per Week.   Yes Neli Flores MD   DULoxetine (CYMBALTA) 20 MG capsule Take 20 mg by mouth Daily.   Yes Neli Flores MD   insulin aspart (novoLOG FLEXPEN) 100 UNIT/ML solution pen-injector sc pen Inject 18 Units under the skin into the appropriate area as directed 3 (Three) Times a Day With Meals.   Yes Neli Flores MD   insulin degludec (TRESIBA FLEXTOUCH) 100 UNIT/ML solution pen-injector injection Inject 42 Units under the skin into the appropriate area as directed Every Morning.   Yes Neli Flores MD   lisinopril (PRINIVIL,ZESTRIL) 2.5 MG tablet Take 1 tablet by mouth Daily. 2/20/19  Yes Tru Rai MD   metoprolol succinate XL (TOPROL-XL) 50 MG 24 hr tablet Take 1 tablet by mouth Daily. 11/26/18  Yes Bernarda Henriquez PA-C   spironolactone (ALDACTONE) 25 MG tablet Take 1 tablet by mouth Daily. 11/26/18  Yes Bernarda Henriquez PA-C   timolol (TIMOPTIC) 0.5 % ophthalmic solution Administer 1 drop to both eyes Daily.   Yes Neli Flores MD   aspirin 81 MG EC tablet Take  "81 mg by mouth Daily.    ProviderNeli MD   famotidine (PEPCID) 20 MG tablet Take 1 tablet by mouth Daily. 12/13/18   Hillary Godowin APRN   HYDROcodone-acetaminophen (NORCO) 5-325 MG per tablet Take 1 tablet by mouth Every 4 (Four) Hours As Needed for Mild Pain  for up to 20 doses. 12/10/18   Bud Garcia MD   hyoscyamine sulfate (ANASPAZ) 0.125 MG tablet dispersible disintegrating tablet Take  by mouth As Needed.    ProviderNeli MD   levocetirizine (XYZAL) 5 MG tablet Take 5 mg by mouth Every Evening.    ProviderNeli MD   metFORMIN (GLUCOPHAGE) 500 MG tablet Take 1 tablet by mouth 2 (Two) Times a Day With Meals. HOLD FOR 48 HOURS POST CATH 11/7/18   Laura Gomez APRN   nitroglycerin (NITROSTAT) 0.4 MG SL tablet Place 1 tablet under the tongue Every 5 (Five) Minutes As Needed for Chest Pain. Take no more than 3 doses in 15 minutes. 11/7/18   Laura Gomez APRN   sucralfate (CARAFATE) 1 g tablet Take 1 g by mouth As Needed.    ProviderNeli MD   ticagrelor (BRILINTA) 90 MG tablet tablet Take 1 tablet by mouth 2 (Two) Times a Day. 11/26/18   Bernarda Henriquez, PRIMO   ZOFRAN 8 MG tablet Take 1 tablet by mouth Every 8 (Eight) Hours As Needed for Nausea or Vomiting for up to 10 doses. 12/13/18   Hillary Goodwin APRN     Allergies:  No Known Allergies    Objective     Vital Signs: /76   Pulse 60   Temp 98.2 °F (36.8 °C) (Temporal)   Resp 16   Ht 175.3 cm (69\")   Wt 99.1 kg (218 lb 6.4 oz)   SpO2 100%   BMI 32.25 kg/m²   Physical Exam   Constitutional: He is oriented to person, place, and time. He appears well-developed and well-nourished. No distress.   HENT:   Head: Normocephalic.   Eyes: Pupils are equal, round, and reactive to light.   Neck: No thyromegaly present.   Cardiovascular: Normal rate, regular rhythm, normal heart sounds and intact distal pulses. Exam reveals no gallop and no friction rub.   No murmur heard.  Pulmonary/Chest: Effort normal and breath " sounds normal. No stridor. No respiratory distress. He has no wheezes. He has no rales.   Abdominal: Soft. Bowel sounds are normal. He exhibits no distension and no mass. There is no tenderness. There is no guarding.   Musculoskeletal: He exhibits no edema or tenderness.   Neurological: He is alert and oriented to person, place, and time.   Skin: Skin is warm and dry. He is not diaphoretic.   Psychiatric: He has a normal mood and affect.       Results Reviewed: labs        Assessment / Plan      Assessment & Plan  Active Hospital Problems    Diagnosis   • **Ischemic cardiomyopathy   • CAD (coronary artery disease)   • Hypertension   • Hyperlipidemia   • Insulin dependent diabetes mellitus with complications (CMS/[facility])     POBR's of AICD have been explained       Code Status: FULL     I discussed the patient's findings and my recommendations with: pt and family    Estimated length of stay: overnight    Tru Rai MD   05/01/19   3:14 PM

## 2019-05-01 NOTE — PLAN OF CARE
Problem: Patient Care Overview  Goal: Plan of Care Review  Outcome: Ongoing (interventions implemented as appropriate)   05/01/19 1658   Coping/Psychosocial   Plan of Care Reviewed With patient;spouse   Plan of Care Review   Progress no change   OTHER   Outcome Summary PATIENT ADMITTED TO  FROM [facility] POST AICD PLACEMENT. INCISION TO LEFT CHEST C/D/I. SHABANA. NO SWELLING. LEFT ARM IN SLING. HOB AT 45 DEGREES. ADMISSION COMPLETED. CONTINUE TO MONITOR.      Goal: Individualization and Mutuality  Outcome: Ongoing (interventions implemented as appropriate)   05/01/19 1658   Individualization   Patient Specific Goals (Include Timeframe) NO BLEEDING/PAIN FROM INCISION   Patient Specific Interventions HOB 45 DEGREES, SLING IN PLACE   Mutuality/Individual Preferences   What Anxieties, Fears, Concerns, or Questions Do You Have About Your Care? NONE       Problem: Cardiac Rhythm Management Device (Adult)  Goal: Signs and Symptoms of Listed Potential Problems Will be Absent, Minimized or Managed (Cardiac Rhythm Management Device)  Outcome: Ongoing (interventions implemented as appropriate)   05/01/19 1658   Goal/Outcome Evaluation   Problems Assessed (Cardiac Rhythm Management Device) all   Problems Present (Cardiac Rhythm Dev) none

## 2019-05-02 VITALS
BODY MASS INDEX: 32.27 KG/M2 | HEIGHT: 69 IN | HEART RATE: 64 BPM | TEMPERATURE: 97.8 F | RESPIRATION RATE: 20 BRPM | OXYGEN SATURATION: 98 % | DIASTOLIC BLOOD PRESSURE: 61 MMHG | WEIGHT: 217.9 LBS | SYSTOLIC BLOOD PRESSURE: 104 MMHG

## 2019-05-02 PROCEDURE — 25010000002 VANCOMYCIN PER 500 MG: Performed by: INTERNAL MEDICINE

## 2019-05-02 PROCEDURE — 93010 ELECTROCARDIOGRAM REPORT: CPT | Performed by: INTERNAL MEDICINE

## 2019-05-02 PROCEDURE — 99024 POSTOP FOLLOW-UP VISIT: CPT | Performed by: INTERNAL MEDICINE

## 2019-05-02 PROCEDURE — 93005 ELECTROCARDIOGRAM TRACING: CPT | Performed by: INTERNAL MEDICINE

## 2019-05-02 RX ADMIN — CLINDAMYCIN HYDROCHLORIDE 300 MG: 150 CAPSULE ORAL at 09:31

## 2019-05-02 RX ADMIN — SPIRONOLACTONE 25 MG: 25 TABLET ORAL at 09:31

## 2019-05-02 RX ADMIN — Medication 1 APPLICATION: at 09:31

## 2019-05-02 RX ADMIN — SODIUM CHLORIDE, PRESERVATIVE FREE 3 ML: 5 INJECTION INTRAVENOUS at 09:34

## 2019-05-02 RX ADMIN — VANCOMYCIN HYDROCHLORIDE 1 G: 1 INJECTION, SOLUTION INTRAVENOUS at 01:55

## 2019-05-02 RX ADMIN — LISINOPRIL 2.5 MG: 2.5 TABLET ORAL at 09:31

## 2019-05-02 RX ADMIN — TICAGRELOR 90 MG: 90 TABLET ORAL at 09:31

## 2019-05-02 RX ADMIN — HYDROCODONE BITARTRATE AND ACETAMINOPHEN 1 TABLET: 5; 325 TABLET ORAL at 03:26

## 2019-05-02 RX ADMIN — METOPROLOL SUCCINATE 50 MG: 50 TABLET, FILM COATED, EXTENDED RELEASE ORAL at 09:31

## 2019-05-02 NOTE — DISCHARGE INSTRUCTIONS
1. NO BATHS OR HOT-TUBS FOR 1 WEEK  2. NO DRIVING FOR 1 WEEK  3. LEFT ARM SLING FOR 1 WEEK  4. PHYSICAL ACTIVITY LIMITED TO WHAT CAN BE DONE WITH THE LEFT ARM SLING IN PLACE  5. REPORT ANY SWELLING OR INFLAMMATION AT THE SURGICAL SITE  6. TYLENOL AS DIRECTED FOR POST-OP PAIN - CALL FOR PAIN NOT THUS RELIEVED

## 2019-05-02 NOTE — DISCHARGE SUMMARY
Date of Discharge:  5/2/2019    Discharge Diagnosis: S/P PACER / AICD    Presenting Problem/History of Present Illness  Ischemic cardiomyopathy [I25.5]  Ischemic cardiomyopathy [I25.5]     KNOWN DIFFUSE DIABETIC CAD  LVEF<35% FOR MORE THAN 90 DAYS    Hospital Course  Patient is a 53 y.o. male presented for SCD Primary Prention with an implantable AICD. This was accomplished on the first day and was without complications. His home meds were continued and he was monitored over-night on Telemetry. The device was noted to be functioning well and was interrogated with good parameters found on the am of discharge. He received education and was ambulating well at discharge. He had minimal site pain and an excellent incision appearance at discharge.    Procedures Performed  Procedure(s):  ICD new       Consults:   Consults     No orders found for last 30 day(s).          Pertinent Test Results: labs: labs and cxr are all ok for him    Condition on Discharge:  STABLE    Vital Signs  Temp:  [97.8 °F (36.6 °C)-98.7 °F (37.1 °C)] 97.8 °F (36.6 °C)  Heart Rate:  [57-77] 64  Resp:  [10-20] 20  BP: ()/(51-76) 104/61    Physical Exam:     General Appearance:    Alert, cooperative, in no acute distress   Head:    Normocephalic, without obvious abnormality, atraumatic   Eyes:            Lids and lashes normal, conjunctivae and sclerae normal, no   icterus, no pallor, corneas clear, PERRLA   Ears:    Ears appear intact with no abnormalities noted   Throat:   No oral lesions, no thrush, oral mucosa moist   Neck:   No adenopathy, supple, trachea midline, no thyromegaly, no   carotid bruit, no JVD   Back:     No kyphosis present, no scoliosis present, no skin lesions,      erythema or scars, no tenderness to percussion or                   palpation,   range of motion normal   Lungs:     Clear to auscultation,respirations regular, even and                  unlabored    Heart:    Regular rhythm and normal rate, normal S1 and S2,  no            murmur, no gallop, no rub, no click   Chest Wall:    No abnormalities observed. aicd site is dry and flat and not inflammed   Abdomen:     Normal bowel sounds, no masses, no organomegaly, soft        non-tender, non-distended, no guarding, no rebound                tenderness   Rectal:     Deferred   Extremities:   Moves all extremities well, no edema, no cyanosis, no             redness   Pulses:   Pulses palpable and equal bilaterally   Skin:   No bleeding, bruising or rash   Lymph nodes:   No palpable adenopathy   Neurologic:   Cranial nerves 2 - 12 grossly intact, sensation intact, DTR       present and equal bilaterally       Discharge Disposition  Home or Self Care    Discharge Medications     Discharge Medications      Continue These Medications      Instructions Start Date   aspirin 81 MG EC tablet   81 mg, Oral, Daily      atorvastatin 80 MG tablet  Commonly known as:  LIPITOR   80 mg, Oral, Nightly      DULoxetine 20 MG capsule  Commonly known as:  CYMBALTA   20 mg, Oral, Daily      famotidine 20 MG tablet  Commonly known as:  PEPCID   20 mg, Oral, Daily      HYDROcodone-acetaminophen 5-325 MG per tablet  Commonly known as:  NORCO   1 tablet, Oral, Every 4 Hours PRN      hyoscyamine sulfate 0.125 MG tablet dispersible disintegrating tablet  Commonly known as:  ANASPAZ   Oral, As Needed      insulin aspart 100 UNIT/ML solution pen-injector sc pen  Commonly known as:  novoLOG FLEXPEN   18 Units, Subcutaneous, 3 Times Daily With Meals      levocetirizine 5 MG tablet  Commonly known as:  XYZAL   5 mg, Oral, Every Evening      lisinopril 2.5 MG tablet  Commonly known as:  PRINIVIL,ZESTRIL   2.5 mg, Oral, Daily      metFORMIN 500 MG tablet  Commonly known as:  GLUCOPHAGE   500 mg, Oral, 2 Times Daily With Meals, HOLD FOR 48 HOURS POST CATH      metoprolol succinate XL 50 MG 24 hr tablet  Commonly known as:  TOPROL-XL   50 mg, Oral, Daily      nitroglycerin 0.4 MG SL tablet  Commonly known as:   NITROSTAT   0.4 mg, Sublingual, Every 5 Minutes PRN, Take no more than 3 doses in 15 minutes.       spironolactone 25 MG tablet  Commonly known as:  ALDACTONE   25 mg, Oral, Daily      sucralfate 1 g tablet  Commonly known as:  CARAFATE   1 g, Oral, As Needed      ticagrelor 90 MG tablet tablet  Commonly known as:  BRILINTA   90 mg, Oral, 2 Times Daily      timolol 0.5 % ophthalmic solution  Commonly known as:  TIMOPTIC   1 drop, Both Eyes, Daily      TRESIBA FLEXTOUCH 100 UNIT/ML solution pen-injector injection  Generic drug:  insulin degludec   42 Units, Subcutaneous, Every Morning      TRULICITY 0.75 MG/0.5ML solution pen-injector  Generic drug:  Dulaglutide   0.75 mg, Subcutaneous, Weekly      ZOFRAN 8 MG tablet  Generic drug:  ondansetron   8 mg, Oral, Every 8 Hours PRN             Discharge Diet: MEDITERRANEAN CALORIE CONTROLLED    Activity at Discharge: LIMITED BY A LEFT ARM SLING FOR 1 WEEK    Follow-up Appointments  Future Appointments   Date Time Provider Department Center   6/18/2019 12:15 PM [facility]         Test Results Pending at Discharge - NONE       Tru Rai MD  05/02/19  11:03 AM    Time: Discharge 35 min

## 2019-05-02 NOTE — PLAN OF CARE
Problem: Patient Care Overview  Goal: Plan of Care Review  Outcome: Ongoing (interventions implemented as appropriate)      Problem: Cardiac Rhythm Management Device (Adult)  Goal: Signs and Symptoms of Listed Potential Problems Will be Absent, Minimized or Managed (Cardiac Rhythm Management Device)  Outcome: Ongoing (interventions implemented as appropriate)   05/02/19 1886   Goal/Outcome Evaluation   Problems Assessed (Cardiac Rhythm Management Device) pain   Problems Present (Cardiac Rhythm Dev) pain

## 2019-06-18 ENCOUNTER — CLINICAL SUPPORT NO REQUIREMENTS (OUTPATIENT)
Dept: CARDIOLOGY | Facility: CLINIC | Age: 54
End: 2019-06-18

## 2019-06-18 DIAGNOSIS — I25.5 ISCHEMIC CARDIOMYOPATHY: ICD-10-CM

## 2019-06-18 DIAGNOSIS — Z95.810 AICD (AUTOMATIC CARDIOVERTER/DEFIBRILLATOR) PRESENT: Primary | ICD-10-CM

## 2019-06-18 PROCEDURE — 93283 PRGRMG EVAL IMPLANTABLE DFB: CPT | Performed by: INTERNAL MEDICINE

## 2019-06-18 NOTE — PROGRESS NOTES
Dual Chamber AICD Evaluation Report  IN OFFICE INTERROGATION    June 18, 2019    Primary Cardiologist: Cecelia  : [employer] Model: Liudmila Lopez DR [id number] ICD  Implant date: 5/1/2019    Reason for evaluation: new implant follow up  Indication for AICD: ischemic cardiomyopathy    Measurements  Atrial sensing - P wave: 4 mV  Atrial threshold: 0.75 V @ 0.5 ms  Atrial lead impedance: 400 ohms  Ventricular sensing - R wave: >12 mV  Ventricular threshold: 0.5 V @ 0.5 ms  Ventricular lead impedance:  460 ohms  Shock impedance:  RV- 71 ohms      Diagnostic Data  Atrial paced: 1.4 %  Ventricular paced: <1 %    Episodes recorded since 5/1/2019:  No episodes, ATP, or shocks.    Incision:  Well approximated without erythema, edema, warmth, open areas, or drainage of any kind.  Denies fevers since time of implant.  Tip of suture visible at left end of incision, flush with skin, not able to be removed (RN did not attempt--too flush with skin).  Patient instructed to notify staff if suture site developed signs/symptoms of infection. Understanding verbalized.    Home monitor:  Functioning.    Battery status: satisfactory  Estimated 7.3-8.8 years remaining      Final Parameters  Mode: DDD  Lower rate: 60 bpm Upper rate: 120 bpm  AV Delay: Paced- 200 ms    Sensed- 150 ms     Atrial - Amplitude: 2 V Pulse width: 0.5 ms Sensitivity: Auto mV   Ventricular - Amplitude: 2 V Pulse width: 0.5 ms Sensitivity: Auto mV   Changes made: Normal francisco atrial output changed to 2V; normal francisco ventricular output changed to 2V; CorVue Threshold 14 days; Impedance monitoring ON.  Conclusions: normal AICD function, no therapy delivered, stable pacing and sensing thresholds and adequate battery reserve    Follow up: Every 3 months via Merlin, annually in office (6/23/2020)

## 2019-06-21 ENCOUNTER — TELEPHONE (OUTPATIENT)
Dept: CARDIOLOGY | Facility: CLINIC | Age: 54
End: 2019-06-21

## 2019-06-21 NOTE — TELEPHONE ENCOUNTER
Patient was asymptomatic at the time and reports using an edger to trim around his driveway after mowing.  Patient instructed to not use edger again until he hears from RN.  Understanding verbalized.  [employer] rep, notified.

## 2019-06-21 NOTE — TELEPHONE ENCOUNTER
RN noted several episodes of non-sustained RV oversensing on patient's AICD transmission.  Episodes occurred between 4:00-4:36 p.m. Thursday, 6/20/2019.  RN called to see what patient was doing during that time and to see if he was symptomatic.  Message left for patient to return call.      RN also discussed with Toro Jade, [employer] Device Rep, who will call tech services for reprogramming recommendations.

## 2019-06-24 NOTE — TELEPHONE ENCOUNTER
Per [employer] Device Rep, Toro Jade [facility] services recommends adjusting ventricular lead sensitivity as patient is oversensing T waves.  RN will forward to Dr. Rai to see if he is amenable to programming changes and will bring patient in office if so.      Recommended changes:  Increase post paced threshold start from 1 mV to 1.3 mV  Decrease decay delay from 95 ms to 0 ms.

## 2019-06-24 NOTE — TELEPHONE ENCOUNTER
RN noted additional episodes of nonsustained RV oversensing on 6/23/2019 (Sunday) from 7:20-9:43 a.m.  RN attempted to call patient to see what he was doing at that time.  Message left for patient to return call.

## 2019-06-24 NOTE — TELEPHONE ENCOUNTER
Patient called he stated he was outside working on building his deck. He stated he was digging holes for the post.

## 2019-07-01 ENCOUNTER — CLINICAL SUPPORT NO REQUIREMENTS (OUTPATIENT)
Dept: CARDIOLOGY | Facility: CLINIC | Age: 54
End: 2019-07-01

## 2019-07-01 DIAGNOSIS — I25.5 ISCHEMIC CARDIOMYOPATHY: ICD-10-CM

## 2019-07-01 DIAGNOSIS — Z95.810 AICD (AUTOMATIC CARDIOVERTER/DEFIBRILLATOR) PRESENT: Primary | ICD-10-CM

## 2019-07-01 PROCEDURE — 93283 PRGRMG EVAL IMPLANTABLE DFB: CPT | Performed by: INTERNAL MEDICINE

## 2019-07-01 NOTE — PROGRESS NOTES
Dual Chamber AICD Evaluation Report  IN OFFICE INTERROGATION BY COMPANY DEVICE REP    July 1, 2019    Primary Cardiologist: Cecelia  : [employer] Model: Liudmila Lopez DR [id number] ICD  Implant date: 5/1/2019    Reason for evaluation: provider requested to adjust t wave sensitivity  Indication for AICD: ischemic cardiomyopathy    Measurements  Atrial sensing - P wave: 3.8 mV  Atrial threshold: 0.75 V @ 0.5 ms  Atrial lead impedance: 400 ohms  Ventricular sensing - R wave: >12 mV  Ventricular threshold: 0.5 V @ 0.5 ms  Ventricular lead impedance:  460 ohms  Shock impedance:  RV- 70 ohms      Diagnostic Data  Atrial paced: <1 %  Ventricular paced: 2.2 %    Episodes recorded since 6/18/2019:  Numerous nonsustained episodes of ventricular oversensing.  Per technical services, device is oversensing t waves.      Battery status: satisfactory  Estimated 7.3-8.6 years remaining      Final Parameters  Mode: DDD  Lower rate: 60 bpm Upper rate: 120 bpm  AV Delay: Paced- 200 ms    Sensed- 150 ms     Atrial - Amplitude: 2 V Pulse width: 0.5 ms Sensitivity: Auto mV   Ventricular - Amplitude: 2 V Pulse width: 0.5 ms Sensitivity: Auto mV   Changes made: Post-paced ventricular decay delay changed from 95 ms to 30 ms; post-paced ventricular threshold start changed from 1mV to 1.3mV.  Conclusions: normal AICD function, no therapy delivered, stable pacing and sensing thresholds and adequate battery reserve    Follow up: Every 3 months via Merlin, annually in office (6/23/2020)

## 2019-08-06 ENCOUNTER — LAB (OUTPATIENT)
Dept: LAB | Facility: HOSPITAL | Age: 54
End: 2019-08-06

## 2019-08-06 ENCOUNTER — OFFICE VISIT (OUTPATIENT)
Dept: CARDIOLOGY | Facility: CLINIC | Age: 54
End: 2019-08-06

## 2019-08-06 VITALS
WEIGHT: 223 LBS | SYSTOLIC BLOOD PRESSURE: 118 MMHG | HEIGHT: 69 IN | DIASTOLIC BLOOD PRESSURE: 78 MMHG | BODY MASS INDEX: 33.03 KG/M2 | HEART RATE: 71 BPM

## 2019-08-06 DIAGNOSIS — I10 ESSENTIAL HYPERTENSION: ICD-10-CM

## 2019-08-06 DIAGNOSIS — Z95.810 S/P IMPLANTATION OF AUTOMATIC CARDIOVERTER/DEFIBRILLATOR (AICD): ICD-10-CM

## 2019-08-06 DIAGNOSIS — I25.10 CORONARY ARTERY DISEASE INVOLVING NATIVE CORONARY ARTERY OF NATIVE HEART WITHOUT ANGINA PECTORIS: ICD-10-CM

## 2019-08-06 DIAGNOSIS — I25.10 CORONARY ARTERY DISEASE INVOLVING NATIVE CORONARY ARTERY OF NATIVE HEART WITHOUT ANGINA PECTORIS: Primary | ICD-10-CM

## 2019-08-06 DIAGNOSIS — E78.01 FAMILIAL HYPERCHOLESTEROLEMIA: Primary | ICD-10-CM

## 2019-08-06 DIAGNOSIS — IMO0001 INSULIN DEPENDENT DIABETES MELLITUS WITH COMPLICATIONS: ICD-10-CM

## 2019-08-06 DIAGNOSIS — I25.5 ISCHEMIC CARDIOMYOPATHY: ICD-10-CM

## 2019-08-06 DIAGNOSIS — I20.0 UNSTABLE ANGINA (HCC): ICD-10-CM

## 2019-08-06 DIAGNOSIS — E78.2 MIXED HYPERLIPIDEMIA: ICD-10-CM

## 2019-08-06 LAB
ANION GAP SERPL CALCULATED.3IONS-SCNC: 8 MMOL/L (ref 4–13)
ARTICHOKE IGE QN: 95 MG/DL (ref 0–99)
BUN BLD-MCNC: 25 MG/DL (ref 5–21)
BUN/CREAT SERPL: 21.2 (ref 7–25)
CALCIUM SPEC-SCNC: 9.4 MG/DL (ref 8.4–10.4)
CHLORIDE SERPL-SCNC: 103 MMOL/L (ref 98–110)
CHOLEST SERPL-MCNC: 149 MG/DL (ref 130–200)
CO2 SERPL-SCNC: 28 MMOL/L (ref 24–31)
CREAT BLD-MCNC: 1.18 MG/DL (ref 0.5–1.4)
GFR SERPL CREATININE-BSD FRML MDRD: 65 ML/MIN/1.73
GLUCOSE BLD-MCNC: 102 MG/DL (ref 70–100)
HDLC SERPL-MCNC: 42 MG/DL
LDLC/HDLC SERPL: 2.07 {RATIO}
POTASSIUM BLD-SCNC: 4.6 MMOL/L (ref 3.5–5.3)
SODIUM BLD-SCNC: 139 MMOL/L (ref 135–145)
TRIGL SERPL-MCNC: 101 MG/DL (ref 0–149)

## 2019-08-06 PROCEDURE — 99214 OFFICE O/P EST MOD 30 MIN: CPT | Performed by: INTERNAL MEDICINE

## 2019-08-06 PROCEDURE — 93000 ELECTROCARDIOGRAM COMPLETE: CPT | Performed by: INTERNAL MEDICINE

## 2019-08-06 PROCEDURE — 36415 COLL VENOUS BLD VENIPUNCTURE: CPT

## 2019-08-06 PROCEDURE — 80061 LIPID PANEL: CPT | Performed by: INTERNAL MEDICINE

## 2019-08-06 PROCEDURE — 80048 BASIC METABOLIC PNL TOTAL CA: CPT | Performed by: INTERNAL MEDICINE

## 2019-08-06 NOTE — PROGRESS NOTES
"Subjective    Luis Echeverria is a 53 y.o. male. Fu of ihd with cmo    History of Present Illness     IHD - SEVERE AND DIFFUSE:  No cp since KIMBERLY to RCA 11/18. Is active but does not exercise and \"do what I want to\". Is compliant with meds but not diet. Has been recovering from GB surgery and \"eat what I want to now that my appetite is back\".  EKG is nsc today    ISCH CMO (LVEF<35%):  Still functional class 2c without edema or unusual sob or decline in stamina.     DUAL CH PACER / AICD:  Interrogates ok 7/2/19. No vt seen. Rare [place] tx needed.     T2DM:  Is on Insulin + metformin but likes sweets and last A1c=9.3. He knows that better control would reduce risk of MACCE and kid dz but is not interested in changing his diet.    HLD:  Is on a potent statin regimen but has not had his lipids checked recently. No myalgias        The following portions of the patient's history were reviewed and updated as appropriate: allergies, current medications, past family history, past medical history, past social history, past surgical history and problem list.    Patient Active Problem List   Diagnosis   • CAD (coronary artery disease)   • Hypertension   • Hyperlipidemia   • MI (myocardial infarction) (CMS/HCC)   • Insulin dependent diabetes mellitus with complications (CMS/HCC)   • Carotid stent occlusion (CMS/HCC)   • Unstable angina (CMS/HCC)   • Ischemic cardiomyopathy   • RUQ pain   • Dehydration   • Nausea and vomiting   • Acute kidney injury (CMS/HCC)   • Hypotension   • Gallbladder polyp   • BMI 31.0-31.9,adult   • S/P implantation of automatic cardioverter/defibrillator (AICD)       No Known Allergies    Family History   Problem Relation Age of Onset   • Heart disease Maternal Grandmother    • No Known Problems Mother    • No Known Problems Father        Social History     Socioeconomic History   • Marital status:      Spouse name: Not on file   • Number of children: Not on file   • Years of education: Not on file "   • Highest education level: Not on file   Tobacco Use   • Smoking status: Never Smoker   • Smokeless tobacco: Never Used   Substance and Sexual Activity   • Alcohol use: No   • Drug use: No   • Sexual activity: Defer         Current Outpatient Medications:   •  aspirin 81 MG EC tablet, Take 81 mg by mouth Daily., Disp: , Rfl:   •  atorvastatin (LIPITOR) 80 MG tablet, Take 1 tablet by mouth Every Night., Disp: 90 tablet, Rfl: 3  •  Dulaglutide (TRULICITY) 0.75 MG/0.5ML solution pen-injector, Inject 0.75 mg under the skin into the appropriate area as directed 1 (One) Time Per Week., Disp: , Rfl:   •  DULoxetine (CYMBALTA) 20 MG capsule, Take 20 mg by mouth Daily., Disp: , Rfl:   •  famotidine (PEPCID) 20 MG tablet, Take 1 tablet by mouth Daily., Disp: 30 tablet, Rfl: 1  •  HYDROcodone-acetaminophen (NORCO) 5-325 MG per tablet, Take 1 tablet by mouth Every 4 (Four) Hours As Needed for Mild Pain  for up to 20 doses., Disp: 20 tablet, Rfl: 0  •  hyoscyamine sulfate (ANASPAZ) 0.125 MG tablet dispersible disintegrating tablet, Take  by mouth As Needed., Disp: , Rfl:   •  insulin aspart (novoLOG FLEXPEN) 100 UNIT/ML solution pen-injector sc pen, Inject 18 Units under the skin into the appropriate area as directed 3 (Three) Times a Day With Meals., Disp: , Rfl:   •  insulin degludec (TRESIBA FLEXTOUCH) 100 UNIT/ML solution pen-injector injection, Inject 42 Units under the skin into the appropriate area as directed Every Morning., Disp: , Rfl:   •  levocetirizine (XYZAL) 5 MG tablet, Take 5 mg by mouth Every Evening., Disp: , Rfl:   •  lisinopril (PRINIVIL,ZESTRIL) 2.5 MG tablet, Take 1 tablet by mouth Daily., Disp: 90 tablet, Rfl: 3  •  metFORMIN (GLUCOPHAGE) 500 MG tablet, Take 1 tablet by mouth 2 (Two) Times a Day With Meals. HOLD FOR 48 HOURS POST CATH, Disp: , Rfl:   •  metoprolol succinate XL (TOPROL-XL) 50 MG 24 hr tablet, Take 1 tablet by mouth Daily., Disp: 90 tablet, Rfl: 3  •  nitroglycerin (NITROSTAT) 0.4 MG SL  "tablet, Place 1 tablet under the tongue Every 5 (Five) Minutes As Needed for Chest Pain. Take no more than 3 doses in 15 minutes., Disp: 25 tablet, Rfl: 1  •  spironolactone (ALDACTONE) 25 MG tablet, Take 1 tablet by mouth Daily., Disp: 90 tablet, Rfl: 3  •  sucralfate (CARAFATE) 1 g tablet, Take 1 g by mouth As Needed., Disp: , Rfl:   •  ticagrelor (BRILINTA) 90 MG tablet tablet, Take 1 tablet by mouth 2 (Two) Times a Day., Disp: 180 tablet, Rfl: 3  •  timolol (TIMOPTIC) 0.5 % ophthalmic solution, Administer 1 drop to both eyes Daily., Disp: , Rfl:   •  ZOFRAN 8 MG tablet, Take 1 tablet by mouth Every 8 (Eight) Hours As Needed for Nausea or Vomiting for up to 10 doses., Disp: 12 tablet, Rfl: 0    Current Facility-Administered Medications:   •  Evolocumab solution prefilled syringe 140 mg, 140 mg, Subcutaneous, Q14 Days, Tru Rai MD    Past Surgical History:   Procedure Laterality Date   • CARDIAC CATHETERIZATION N/A 11/6/2018    Procedure: Left Heart Cath;  Surgeon: Tru Rai MD;  Location:  [facility];  Service: Cardiology   • CARDIAC ELECTROPHYSIOLOGY PROCEDURE N/A 5/1/2019    Procedure: ICD new;  Surgeon: Tru Rai MD;  Location:  [facility];  Service: Cardiology   • CAROTID STENT         Review of Systems   Constitutional: Negative for fatigue, fever and unexpected weight change.   Respiratory: Negative for apnea, chest tightness and shortness of breath.    Cardiovascular: Negative for chest pain, palpitations and leg swelling.   Gastrointestinal: Negative for abdominal pain and blood in stool.   Genitourinary: Negative for dysuria and hematuria.   Musculoskeletal: Negative for myalgias.   Neurological: Positive for numbness. Negative for weakness and light-headedness.   Hematological: Bruises/bleeds easily.   Psychiatric/Behavioral: Negative for sleep disturbance.       /78   Pulse 71   Ht 175.3 cm (69\")   Wt 101 kg (223 " lb)   BMI 32.93 kg/m²   Procedures    Objective   Physical Exam   Constitutional: He is oriented to person, place, and time. No distress.   Mod obese   HENT:   Head: Normocephalic.   Eyes: Pupils are equal, round, and reactive to light.   Cardiovascular: Regular rhythm, normal heart sounds and intact distal pulses. Exam reveals no gallop and no friction rub.   No murmur heard.  Pulmonary/Chest: Effort normal. No stridor. No respiratory distress. He has no wheezes. He has no rales.   Diminished bs bilat   Abdominal: Soft. Bowel sounds are normal. He exhibits no distension and no mass. There is no tenderness. There is no guarding.   Musculoskeletal: He exhibits no edema, tenderness or deformity.   Neurological: He is alert and oriented to person, place, and time.   Skin: Skin is warm and dry. He is not diaphoretic.   Psychiatric: He has a normal mood and affect.       Assessment/Plan   Luis was seen today for coronary artery disease, hypertension and hyperlipidemia.    Diagnoses and all orders for this visit:    Coronary artery disease involving native coronary artery of native heart without angina pectoris  Comments:  no angina now  Orders:  -     ECG 12 Lead    Mixed hyperlipidemia  Comments:  check lipids - need LDL as low as poss and certainly < 70 in light of severe diffuse cad and young age  Orders:  -     Lipid Panel; Future    Essential hypertension  Comments:  controlled    Ischemic cardiomyopathy  Comments:  functional class 2c    Insulin dependent diabetes mellitus with complications (CMS/HCC)  Comments:  not to goal with A1c - have spent 3-5 mins today counseling him on need to avoid sweets and lose wt - he is disinterested at this time    S/P implantation of automatic cardioverter/defibrillator (AICD)                 Return in about 6 months (around 2/6/2020) for Next scheduled follow up with apc.  Orders Placed This Encounter   Procedures   • Lipid Panel     Standing Status:   Future     Number of  Occurrences:   1     Standing Expiration Date:   8/6/2020   • ECG 12 Lead     Order Specific Question:   Reason for Exam:     Answer:   [email or web address]

## 2019-10-02 ENCOUNTER — CLINICAL SUPPORT (OUTPATIENT)
Dept: CARDIOLOGY | Facility: CLINIC | Age: 54
End: 2019-10-02

## 2019-10-02 DIAGNOSIS — I25.5 ISCHEMIC CARDIOMYOPATHY: ICD-10-CM

## 2019-10-02 PROCEDURE — 93296 REM INTERROG EVL PM/IDS: CPT | Performed by: PHYSICIAN ASSISTANT

## 2019-10-02 PROCEDURE — 93295 DEV INTERROG REMOTE 1/2/MLT: CPT | Performed by: PHYSICIAN ASSISTANT

## 2019-10-08 NOTE — PROGRESS NOTES
Dual Chamber Pacemaker Evaluation Report  [facility]    October 8, 2019    Primary Cardiologist: Cecelia  : [employer] Model: Fortify  Implant date: 5/1/19    Reason for evaluation: routine  Indication for pacemaker: ischemic cardiomyopathy    Measurements  Atrial sensing - P wave: 2.9 mV  Atrial threshold: 0.625 V@ 0.5 ms  Atrial lead impedance: 350 ohms  Ventricular sensing - R wave: >12 mV  Ventricular threshold: n/r  Ventricular lead impedance:   450 ohms     Diagnostic Data  Atrial paced: 1.1 %  Ventricular paced: 1.0 %  Other: no new episodes noted  Battery status: satisfactory         Final Parameters  Mode:  DDD  Lower rate: 60 bpm   Upper rate: 120 bpm  AV Delay: paced- 200 ms  Sensed-150 ms  Atrial - Amplitude: 2.0 V   Pulse width: 0.5 ms   Sensitivity: auto     Ventricular - Amplitude: 2.0 V  Pulse width: 0.5 ms  Sensitivity: auto    Changes made: n/a  Conclusions: normal pacemaker function    Follow up: 3 months

## 2019-12-10 RX ORDER — LISINOPRIL 2.5 MG/1
2.5 TABLET ORAL DAILY
Qty: 90 TABLET | Refills: 3 | Status: SHIPPED | OUTPATIENT
Start: 2019-12-10 | End: 2021-02-01

## 2019-12-10 RX ORDER — ATORVASTATIN CALCIUM 80 MG/1
80 TABLET, FILM COATED ORAL NIGHTLY
Qty: 90 TABLET | Refills: 3 | Status: SHIPPED | OUTPATIENT
Start: 2019-12-10 | End: 2021-02-24 | Stop reason: SDUPTHER

## 2019-12-27 ENCOUNTER — TELEPHONE (OUTPATIENT)
Dept: CARDIOLOGY | Facility: CLINIC | Age: 54
End: 2019-12-27

## 2019-12-27 NOTE — TELEPHONE ENCOUNTER
Patient was noted to have several episodes of nonsustained ventricular oversensing on AICD remote transmission on 12/18 and 12/19/2019, all at approximately 5:15 AM.  Per patient, he was coughing severely at the time s/t bronchitis.  RN discussed with device rep, Toro Jade, who stated that there was no need to bring patient in the office at this time as it was most likely an isolated incident.  RN will continue to monitor device remotely and will bring patient in if additional episodes occur.

## 2020-01-02 ENCOUNTER — CLINICAL SUPPORT (OUTPATIENT)
Dept: CARDIOLOGY | Facility: CLINIC | Age: 55
End: 2020-01-02

## 2020-01-02 DIAGNOSIS — I25.5 ISCHEMIC CARDIOMYOPATHY: ICD-10-CM

## 2020-01-02 PROCEDURE — 93294 REM INTERROG EVL PM/LDLS PM: CPT | Performed by: PHYSICIAN ASSISTANT

## 2020-01-02 PROCEDURE — 93296 REM INTERROG EVL PM/IDS: CPT | Performed by: PHYSICIAN ASSISTANT

## 2020-01-02 NOTE — PROGRESS NOTES
Dual Chamber Pacemaker Evaluation Report  [facility]    January 2, 2020    Primary Cardiologist: Cecelia  : [employer] Model: Fortify  Implant date: 5/1/19    Reason for evaluation: routine  Indication for pacemaker: ischemic cardiomyopathy    Measurements  Atrial sensing - P wave: 4.3 mV  Atrial threshold: 0.625 V@ 0.5 ms  Atrial lead impedance: 400 ohms  Ventricular sensing - R wave: 12.3 mV  Ventricular threshold: n/r  Ventricular lead impedance:   530 ohms       Diagnostic Data  Atrial paced: 5.7 %  Ventricular paced: <1 %  Other: Ventricular high rate alerts noted.  No strips available to correlate.  Battery status: satisfactory         Final Parameters  Mode:  DDD  Lower rate: 60 bpm   Upper rate: 120 bpm  AV Delay: paced- 200 ms  Sensed-150 ms  Atrial - Amplitude: 2.0 V   Pulse width: 0.5 ms   Sensitivity: auto     Ventricular - Amplitude: 2.0 V  Pulse width: 0.5 ms  Sensitivity: auto    Changes made: n/a  Conclusions: normal pacemaker function    Follow up: 1 month

## 2020-02-04 ENCOUNTER — OFFICE VISIT (OUTPATIENT)
Dept: CARDIOLOGY | Facility: CLINIC | Age: 55
End: 2020-02-04

## 2020-02-04 ENCOUNTER — DOCUMENTATION (OUTPATIENT)
Dept: CARDIOLOGY | Facility: CLINIC | Age: 55
End: 2020-02-04

## 2020-02-04 VITALS
WEIGHT: 234 LBS | SYSTOLIC BLOOD PRESSURE: 122 MMHG | HEART RATE: 73 BPM | DIASTOLIC BLOOD PRESSURE: 84 MMHG | BODY MASS INDEX: 34.66 KG/M2 | OXYGEN SATURATION: 99 % | HEIGHT: 69 IN

## 2020-02-04 DIAGNOSIS — I10 ESSENTIAL HYPERTENSION: ICD-10-CM

## 2020-02-04 DIAGNOSIS — I25.10 CORONARY ARTERY DISEASE INVOLVING NATIVE CORONARY ARTERY OF NATIVE HEART WITHOUT ANGINA PECTORIS: Primary | ICD-10-CM

## 2020-02-04 DIAGNOSIS — IMO0001 INSULIN DEPENDENT DIABETES MELLITUS WITH COMPLICATIONS: ICD-10-CM

## 2020-02-04 DIAGNOSIS — I50.22 CHRONIC SYSTOLIC CHF (CONGESTIVE HEART FAILURE), NYHA CLASS 2 (HCC): ICD-10-CM

## 2020-02-04 DIAGNOSIS — E78.2 MIXED HYPERLIPIDEMIA: ICD-10-CM

## 2020-02-04 DIAGNOSIS — I25.5 ISCHEMIC CARDIOMYOPATHY: ICD-10-CM

## 2020-02-04 PROCEDURE — 99214 OFFICE O/P EST MOD 30 MIN: CPT | Performed by: NURSE PRACTITIONER

## 2020-02-04 PROCEDURE — 93000 ELECTROCARDIOGRAM COMPLETE: CPT | Performed by: NURSE PRACTITIONER

## 2020-02-04 RX ORDER — METOPROLOL SUCCINATE 50 MG/1
75 TABLET, EXTENDED RELEASE ORAL DAILY
Qty: 145 TABLET | Refills: 3 | Status: SHIPPED | OUTPATIENT
Start: 2020-02-04 | End: 2021-02-01

## 2020-02-04 RX ORDER — ASPIRIN 81 MG/1
81 TABLET ORAL DAILY
Start: 2020-02-04

## 2020-02-04 NOTE — PROGRESS NOTES
Case discussed with Dr. Rai. Resume aspirin 81 mg daily. Reduce Brilinta dose to 60 mg BID. Pt notified of the changes. He voices understanding.

## 2020-02-04 NOTE — PROGRESS NOTES
"    Subjective:     Encounter Date:02/04/2020      Patient ID: Luis Echeverria is a 54 y.o. male.    Chief Complaint: follow up CAD, ischemic cardiomyopathy/ chronic systolic CHF   The patient presents today to follow up regarding his CAD and ischemic cardiomyopathy/ chronic systolic CHF. He states he is feeling well overall. He reports a gradual weight gain, which he relates to his eating habits and not fluid volume secondary to heart failure. He denies chest pain, shortness of breath, edema, orthopnea, PND, palpitations, syncope or pre syncope. He denies any decline in his stamina. He reports good blood pressure control and compliance with his medications. He continues to take brilinta, but is no longer on aspirin. He reports he was instructed to stop aspirin a few months ago per [facility] office. Repatha was prescribed 8/21019 per Dr. Rai for an LDL above goal (95). However, he states his PCP repeated his lipid panel a few months later and it was \"good.\" He was not received or started the Repatha at this point.       The following portions of the patient's history were reviewed and updated as appropriate: allergies, current medications, past family history, past medical history, past social history, past surgical history and problem list.  /84   Pulse 73   Ht 175.3 cm (69\")   Wt 106 kg (234 lb)   SpO2 99%   BMI 34.56 kg/m²   No Known Allergies    Current Outpatient Medications:   •  atorvastatin (LIPITOR) 80 MG tablet, Take 1 tablet by mouth Every Night., Disp: 90 tablet, Rfl: 3  •  DULoxetine (CYMBALTA) 20 MG capsule, Take 20 mg by mouth Daily., Disp: , Rfl:   •  famotidine (PEPCID) 20 MG tablet, Take 1 tablet by mouth Daily., Disp: 30 tablet, Rfl: 1  •  hyoscyamine sulfate (ANASPAZ) 0.125 MG tablet dispersible disintegrating tablet, Take  by mouth As Needed., Disp: , Rfl:   •  insulin aspart (novoLOG FLEXPEN) 100 UNIT/ML solution pen-injector sc pen, Inject 18 Units under the skin into the appropriate " area as directed 3 (Three) Times a Day With Meals., Disp: , Rfl:   •  insulin degludec (TRESIBA FLEXTOUCH) 100 UNIT/ML solution pen-injector injection, Inject 42 Units under the skin into the appropriate area as directed Every Morning., Disp: , Rfl:   •  levocetirizine (XYZAL) 5 MG tablet, Take 5 mg by mouth Every Evening., Disp: , Rfl:   •  lisinopril (PRINIVIL,ZESTRIL) 2.5 MG tablet, Take 1 tablet by mouth Daily., Disp: 90 tablet, Rfl: 3  •  metoprolol succinate XL (TOPROL-XL) 50 MG 24 hr tablet, Take 1.5 tablets by mouth Daily., Disp: 145 tablet, Rfl: 3  •  nitroglycerin (NITROSTAT) 0.4 MG SL tablet, Place 1 tablet under the tongue Every 5 (Five) Minutes As Needed for Chest Pain. Take no more than 3 doses in 15 minutes., Disp: 25 tablet, Rfl: 1  •  spironolactone (ALDACTONE) 25 MG tablet, Take 1 tablet by mouth Daily., Disp: 90 tablet, Rfl: 3  •  sucralfate (CARAFATE) 1 g tablet, Take 1 g by mouth As Needed., Disp: , Rfl:   •  ticagrelor (BRILINTA) 90 MG tablet tablet, Take 1 tablet by mouth 2 (Two) Times a Day., Disp: 180 tablet, Rfl: 3  •  timolol (TIMOPTIC) 0.5 % ophthalmic solution, Administer 1 drop to both eyes Daily., Disp: , Rfl:     Current Facility-Administered Medications:   •  Evolocumab solution prefilled syringe 140 mg, 140 mg, Subcutaneous, Q14 Days, Tru Rai MD  Past Medical History:   Diagnosis Date   • CAD (coronary artery disease)    • CAD in native artery     2V STEMI 7/13 STENT TO CX AND STENTX2 TO MID AND DISTAL LAD   • Chronic systolic CHF (congestive heart failure), NYHA class 2 (CMS/HCC) 2/4/2020   • Diabetes mellitus (CMS/HCC)    • History of coronary artery stent placement      x 3 - for ACUTE MI 7/2013   • Hyperlipidemia    • Hyperlipidemia, mild    • Myocardial infarct (CMS/HCC)    • Myocardial infarction (CMS/HCC)    • Stented coronary artery      Past Surgical History:   Procedure Laterality Date   • CARDIAC CATHETERIZATION N/A 11/6/2018    Procedure: Left Heart  Cath;  Surgeon: Tru Rai MD;  Location:  [facility];  Service: Cardiology   • CARDIAC ELECTROPHYSIOLOGY PROCEDURE N/A 5/1/2019    Procedure: ICD new;  Surgeon: Tru Rai MD;  Location:  [facility];  Service: Cardiology   • CAROTID STENT       Social History     Socioeconomic History   • Marital status:      Spouse name: Not on file   • Number of children: Not on file   • Years of education: Not on file   • Highest education level: Not on file   Tobacco Use   • Smoking status: Never Smoker   • Smokeless tobacco: Never Used   Substance and Sexual Activity   • Alcohol use: No   • Drug use: No   • Sexual activity: Defer     Family History   Problem Relation Age of Onset   • Heart disease Maternal Grandmother    • No Known Problems Mother    • No Known Problems Father        Review of Systems   Constitution: Negative for chills, diaphoresis and fever.   HENT: Negative for nosebleeds.    Eyes: Negative for visual disturbance.   Cardiovascular: Negative for chest pain, claudication, cyanosis, dyspnea on exertion, irregular heartbeat, leg swelling, near-syncope, orthopnea, palpitations, paroxysmal nocturnal dyspnea and syncope.   Respiratory: Negative for cough, hemoptysis, shortness of breath, sputum production and wheezing.    Hematologic/Lymphatic: Negative for bleeding problem.   Skin: Negative for color change and flushing.   Musculoskeletal: Negative for falls.   Gastrointestinal: Negative for bloating, abdominal pain, hematemesis, hematochezia, melena, nausea and vomiting.   Genitourinary: Negative for hematuria.   Neurological: Negative for dizziness, light-headedness and weakness.   Psychiatric/Behavioral: Negative for altered mental status.         ECG 12 Lead  Date/Time: 2/4/2020 9:22 AM  Performed by: Laura Gomez APRN  Authorized by: Laura Gomez APRN   Comparison: compared with previous ECG from 8/6/2019  Similar to previous  ECG  Comparison to previous ECG: Non specific T wave abnormalities - no new changes   Rhythm: sinus rhythm  BPM: 73  Other findings: low voltage               Objective:     Physical Exam   Constitutional: He is oriented to person, place, and time. He appears well-developed and well-nourished. No distress.   HENT:   Head: Normocephalic and atraumatic.   Eyes: Pupils are equal, round, and reactive to light.   Neck: Normal range of motion. Neck supple. No JVD present. No thyromegaly present.   Cardiovascular: Normal rate, regular rhythm, normal heart sounds and intact distal pulses. Exam reveals no gallop and no friction rub.   No murmur heard.  Pulmonary/Chest: Effort normal and breath sounds normal. No respiratory distress. He has no wheezes. He has no rales. He exhibits no tenderness.   Abdominal: Soft. Bowel sounds are normal. He exhibits no distension. There is no tenderness.   Musculoskeletal: Normal range of motion. He exhibits no edema.   Neurological: He is alert and oriented to person, place, and time. No cranial nerve deficit.   Skin: Skin is warm and dry. He is not diaphoretic.   Psychiatric: He has a normal mood and affect. His behavior is normal.       Lab Review:   Lab Results   Component Value Date    CHOL 149 08/06/2019    TRIG 101 08/06/2019    HDL 42 08/06/2019    LDL 95 08/06/2019         Assessment:          Diagnosis Plan   1. Coronary artery disease involving native coronary artery of native heart without angina pectoris    Stable, no angina   11/2018 KIMBERLY x 2 to RCA; severe diabetic coronary disease noted   2. Ischemic cardiomyopathy    S/p DC ICD- 1/2020 interrogation reviewed - normal function and battery life. Discussed ventricular high rate note with Elisa Theodore RN in device clinic- per her review- no episodes or NSVT     3. Chronic systolic CHF (congestive heart failure), NYHA class 2 (CMS/[facility])    Stage C  Currently compensated/euvolemic  Up titrate toprol xl from 50 to 75 mg daily         4. Mixed hyperlipidemia    Continue high intensity statin - LDL 95- repatha prescribed by Dr. Rai - pt has not yet started this     5. Essential hypertension    Controlled    6. Insulin dependent diabetes mellitus with complications (CMS/[facility])  Followed by PCP           Plan:       As noted above  Obtain more recent lipid panel - if LDL remains above 70 despite atorvastatin 80 mg daily, will start Repatha  Up titrate toprol xl to 75 mg daily   Continue current medical therapy listed above otherwise   Follow up 3-6 months, sooner with symptoms or concerns     Reviewed signs and symptoms of CHF and what to report with the patient. Patient instructed to restrict sodium and weigh daily. Report weight gain of greater than 2 lbs overnight or 5 lbs in 1 week. Pt verbalized understanding of instructions and plan of care.

## 2020-02-10 DIAGNOSIS — E78.5 HYPERLIPIDEMIA LDL GOAL <70: Primary | ICD-10-CM

## 2020-03-03 RX ORDER — SPIRONOLACTONE 25 MG/1
25 TABLET ORAL DAILY
Qty: 90 TABLET | Refills: 4 | Status: SHIPPED | OUTPATIENT
Start: 2020-03-03 | End: 2021-02-24 | Stop reason: SDUPTHER

## 2020-03-27 ENCOUNTER — DOCUMENTATION (OUTPATIENT)
Dept: CARDIOLOGY | Facility: CLINIC | Age: 55
End: 2020-03-27

## 2020-03-27 DIAGNOSIS — Z95.810 S/P IMPLANTATION OF AUTOMATIC CARDIOVERTER/DEFIBRILLATOR (AICD): Primary | ICD-10-CM

## 2020-03-27 DIAGNOSIS — I25.5 ISCHEMIC CARDIOMYOPATHY: ICD-10-CM

## 2020-03-27 NOTE — PROGRESS NOTES
Dual Chamber AICD Evaluation Report  REMOTE/[facility]LIN    March 27, 2020    Primary Cardiologist: Cecelia  : [employer] Model: Liudmila Lopez DR [id number] ICD  Implant date: 5/1/2019    Reason for evaluation: remote transmission with nonsustained RV oversensing  Indication for AICD: ischemic cardiomyopathy    Measurements  Atrial sensing - P wave: 4.2 mV  Atrial threshold: 1.125 V @ 0.5 ms  Atrial lead impedance: 450 ohms  Ventricular sensing - R wave: >12 mV  Ventricular threshold: N/P  Ventricular lead impedance:  >12 ohms  Shock impedance:  RV- 79 ohms    Diagnostic Data  Atrial paced: 8.7 %  Ventricular paced: <1 %    Episodes recorded since 1/2/2020:  8 episodes of nonsustained RV oversensing noted on 3/26/2020 between 4:31 PM and 4:36 PM.  According to patient, he was weedeating at the time.  RN encouraged patient to ensure weedeater is as far away from defibrillator as he can safely hold it.  Understanding verbalized.  RN spoke with device rep, Toro Jade,  who recommended that we just monitor for now and will consider reprogramming if episode frequency increases.      Battery status: satisfactory  Estimated 6.9-8.2 years remaining    Final Parameters  Mode: DDD  Lower rate: 60 bpm Upper rate: 120 bpm  AV Delay: Paced- 200 ms    Sensed- 150 ms     Atrial - Amplitude: 2 V Pulse width: 0.5 ms Sensitivity: Auto mV   Ventricular - Amplitude: 2 V Pulse width: 0.5 ms Sensitivity: Auto mV   Changes made: No changes made.  Conclusions: normal AICD function, no therapy delivered, stable pacing and sensing thresholds, adequate battery reserve and nonsustained RV oversensing noted    Follow up: Every 3 months via [facility], annually in office (6/23/2020)

## 2020-06-23 ENCOUNTER — CLINICAL SUPPORT NO REQUIREMENTS (OUTPATIENT)
Dept: CARDIOLOGY | Facility: CLINIC | Age: 55
End: 2020-06-23

## 2020-06-23 DIAGNOSIS — I25.5 ISCHEMIC CARDIOMYOPATHY: ICD-10-CM

## 2020-06-23 DIAGNOSIS — Z95.810 S/P IMPLANTATION OF AUTOMATIC CARDIOVERTER/DEFIBRILLATOR (AICD): Primary | ICD-10-CM

## 2020-06-23 PROCEDURE — 93283 PRGRMG EVAL IMPLANTABLE DFB: CPT | Performed by: INTERNAL MEDICINE

## 2020-07-07 ENCOUNTER — CLINICAL SUPPORT NO REQUIREMENTS (OUTPATIENT)
Dept: CARDIOLOGY | Facility: CLINIC | Age: 55
End: 2020-07-07

## 2020-07-07 DIAGNOSIS — I25.5 ISCHEMIC CARDIOMYOPATHY: ICD-10-CM

## 2020-07-07 DIAGNOSIS — Z95.810 S/P IMPLANTATION OF AUTOMATIC CARDIOVERTER/DEFIBRILLATOR (AICD): Primary | ICD-10-CM

## 2020-07-07 PROCEDURE — 93283 PRGRMG EVAL IMPLANTABLE DFB: CPT | Performed by: INTERNAL MEDICINE

## 2020-08-04 ENCOUNTER — OFFICE VISIT (OUTPATIENT)
Dept: CARDIOLOGY | Facility: CLINIC | Age: 55
End: 2020-08-04

## 2020-08-04 VITALS
RESPIRATION RATE: 18 BRPM | WEIGHT: 230 LBS | HEART RATE: 61 BPM | DIASTOLIC BLOOD PRESSURE: 75 MMHG | OXYGEN SATURATION: 98 % | HEIGHT: 69 IN | BODY MASS INDEX: 34.07 KG/M2 | SYSTOLIC BLOOD PRESSURE: 128 MMHG

## 2020-08-04 DIAGNOSIS — I25.10 CORONARY ARTERY DISEASE INVOLVING NATIVE CORONARY ARTERY OF NATIVE HEART WITHOUT ANGINA PECTORIS: Primary | ICD-10-CM

## 2020-08-04 DIAGNOSIS — E11.8 DM (DIABETES MELLITUS), TYPE 2 WITH COMPLICATIONS (HCC): ICD-10-CM

## 2020-08-04 DIAGNOSIS — I25.5 ISCHEMIC CARDIOMYOPATHY: ICD-10-CM

## 2020-08-04 DIAGNOSIS — I10 ESSENTIAL HYPERTENSION: ICD-10-CM

## 2020-08-04 DIAGNOSIS — E78.2 MIXED HYPERLIPIDEMIA: ICD-10-CM

## 2020-08-04 PROCEDURE — 99214 OFFICE O/P EST MOD 30 MIN: CPT | Performed by: NURSE PRACTITIONER

## 2020-08-04 NOTE — PROGRESS NOTES
Subjective:     Encounter Date:08/04/2020      Patient ID: Luis Echeverria is a 54 y.o. male.    Chief Complaint:  Congestive Heart Failure   Presents for follow-up visit. Pertinent negatives include no abdominal pain, chest pain, chest pressure, claudication, edema, fatigue, muscle weakness, near-syncope, nocturia, orthopnea, palpitations, paroxysmal nocturnal dyspnea, shortness of breath or unexpected weight change. The symptoms have been stable. His past medical history is significant for CAD. Compliance with total regimen is %. Compliance with diet is %. Compliance with exercise is %. Compliance with medications is %.   Coronary Artery Disease   Presents for follow-up visit. Pertinent negatives include no chest pain, chest pressure, dizziness, leg swelling, muscle weakness, palpitations, shortness of breath or weight gain. Risk factors include hyperlipidemia. His past medical history is significant for CHF. The symptoms have been stable. Compliance with diet is good. Compliance with exercise is good. Compliance with medications is good.   Hyperlipidemia   This is a chronic problem. The current episode started more than 1 year ago. The problem is controlled. Recent lipid tests were reviewed and are normal. Pertinent negatives include no chest pain or shortness of breath. Current antihyperlipidemic treatment includes statins. The current treatment provides significant improvement of lipids. There are no compliance problems.    Hypertension   This is a chronic problem. The current episode started more than 1 year ago. The problem is controlled. Pertinent negatives include no chest pain, headaches, malaise/fatigue, orthopnea, palpitations, PND or shortness of breath. Current antihypertension treatment includes ACE inhibitors, beta blockers and diuretics. The current treatment provides significant improvement.     Patient presents today for routine follow up for coronary artery disease and  ischemic cardiomyopathy. Patient has a history of coronary artery disease with STEMI  In 2013 with emergent stenting to the Cx and LAD. In November of 2018 he had unstable angina for which he had a heart cath. He received a drug eluting stent x 2 to RCA at that time. He was found to have an EF of 30-35%. He was placed on optimized medical therapy for 3 months and repeat echo was done which showed a persistent low EF of 30-35%. Patient has remained euvolemic. He is on Lisinopril, Toprol and Aldactone. He had an AICD placed in May 2019. Patient overall has been doing well. He is active without limitations. He is fortunate and has never experienced heart failure symptoms. Nonsmoker. He is a diabetic- that is difficult to control. Hyperlipidemia with good control.     The following portions of the patient's history were reviewed and updated as appropriate: allergies, current medications, past family history, past medical history, past social history, past surgical history and problem list.   Prior to Admission medications    Medication Sig Start Date End Date Taking? Authorizing Provider   aspirin 81 MG EC tablet Take 1 tablet by mouth Daily. 2/4/20  Yes Laura Gomez APRN   atorvastatin (LIPITOR) 80 MG tablet Take 1 tablet by mouth Every Night. 12/10/19  Yes Tru Rai MD   DULoxetine (CYMBALTA) 20 MG capsule Take 20 mg by mouth Daily.   Yes Neli Flores MD   insulin aspart (novoLOG FLEXPEN) 100 UNIT/ML solution pen-injector sc pen Inject 18 Units under the skin into the appropriate area as directed 3 (Three) Times a Day With Meals.   Yes Neli Flores MD   insulin degludec (TRESIBA FLEXTOUCH) 100 UNIT/ML solution pen-injector injection Inject 42 Units under the skin into the appropriate area as directed Every Morning.   Yes Neli Flores MD   levocetirizine (XYZAL) 5 MG tablet Take 5 mg by mouth As Needed.   Yes Neli Flores MD   lisinopril (PRINIVIL,ZESTRIL) 2.5 MG  tablet Take 1 tablet by mouth Daily. 12/10/19  Yes Tru Rai MD   metFORMIN (GLUCOPHAGE) 500 MG tablet Take 500 mg by mouth 2 (Two) Times a Day With Meals.   Yes ProviderNeli MD   metoprolol succinate XL (TOPROL-XL) 50 MG 24 hr tablet Take 1.5 tablets by mouth Daily. 2/4/20  Yes Laura Gomez APRN   nitroglycerin (NITROSTAT) 0.4 MG SL tablet Place 1 tablet under the tongue Every 5 (Five) Minutes As Needed for Chest Pain. Take no more than 3 doses in 15 minutes. 11/7/18  Yes Laura Gomez APRN   spironolactone (ALDACTONE) 25 MG tablet Take 1 tablet by mouth Daily. 3/3/20  Yes Tru Rai MD   sucralfate (CARAFATE) 1 g tablet Take 1 g by mouth As Needed.   Yes ProviderNeli MD   ticagrelor (BRILINTA) 60 MG tablet tablet Take 1 tablet by mouth 2 (Two) Times a Day. 2/24/20  Yes Laura Gomez APRN   timolol (TIMOPTIC) 0.5 % ophthalmic solution Administer 1 drop to both eyes Daily.   Yes Provider, MD Neli       Past Medical History:   Diagnosis Date   • CAD (coronary artery disease)    • CAD in native artery     2V STEMI 7/13 STENT TO CX AND STENTX2 TO MID AND DISTAL LAD   • Chronic systolic CHF (congestive heart failure), NYHA class 2 (CMS/[facility]) 2/4/2020   • Diabetes mellitus (CMS/[facility])    • History of coronary artery stent placement      x 3 - for ACUTE MI 7/2013   • Hyperlipidemia    • Hyperlipidemia, mild    • Myocardial infarct (CMS/HCC)    • Myocardial infarction (CMS/HCC)    • Stented coronary artery      Review of Systems   Constitution: Negative for chills, decreased appetite, fatigue, fever, malaise/fatigue, unexpected weight change, weight gain and weight loss.   HENT: Negative for nosebleeds.    Eyes: Negative for visual disturbance.   Cardiovascular: Negative for chest pain, claudication, dyspnea on exertion, leg swelling, near-syncope, orthopnea, palpitations, paroxysmal nocturnal dyspnea and syncope.   Respiratory: Negative for cough, hemoptysis, shortness  "of breath and snoring.    Endocrine: Negative for cold intolerance and heat intolerance.   Hematologic/Lymphatic: Negative for bleeding problem. Does not bruise/bleed easily.   Skin: Negative for rash.   Musculoskeletal: Negative for back pain, falls and muscle weakness.   Gastrointestinal: Negative for abdominal pain, constipation, diarrhea, heartburn, melena, nausea and vomiting.   Genitourinary: Negative for hematuria and nocturia.   Neurological: Negative for dizziness, headaches and light-headedness.   Psychiatric/Behavioral: Negative for altered mental status.   Allergic/Immunologic: Negative for persistent infections.       Procedures       Objective:     Physical Exam   Constitutional: He is oriented to person, place, and time. He appears well-developed and well-nourished.   HENT:   Head: Normocephalic and atraumatic.   Eyes: Pupils are equal, round, and reactive to light.   Neck: Normal range of motion. Neck supple. No JVD present. Carotid bruit is not present.   Cardiovascular: Normal rate, regular rhythm, normal heart sounds and intact distal pulses.   Pulmonary/Chest: Effort normal and breath sounds normal.   Abdominal: Soft. Bowel sounds are normal.   Musculoskeletal: Normal range of motion.   Neurological: He is alert and oriented to person, place, and time. He has normal reflexes.   Skin: Skin is warm and dry.   Psychiatric: He has a normal mood and affect. His behavior is normal. Judgment and thought content normal.     Blood pressure 128/75, pulse 61, resp. rate 18, height 175.3 cm (69\"), weight 104 kg (230 lb), SpO2 98 %.      Lab Review:   I have reviewed labs:        Assessment:          Diagnosis Plan   1. Coronary artery disease involving native coronary artery of native heart without angina pectoris     2. Ischemic cardiomyopathy     3. Essential hypertension     4. Mixed hyperlipidemia     5. DM (diabetes mellitus), type 2 with complications (CMS/[facility])            Plan:       1. Coronary Artery " Disease- no clinical evidence of ischemia. On Aspirin, Brilinta and high intensity Lipitor. On ACE and BB. History of MI with stents to Cx and LAD in the past. Most recent stent to RCA in 2018. Stable  2. Ischemic cardiomyopathy -  Class II symptoms EF 30-35% s/p stenting and optimized medical therapy - with maximally tolerated, ACE, BB and Aldactone. AICD in place. Educated patient on the importance of daily weights. Call office if 2-3 pound weight gain overnight or 5 pounds in a week. Discussed low sodium diet, less than 2g daily.   3. Blood Pressure - controlled, stable  4. Mixed Hyperlipidemia- followed by Dr. Hogue. On Lipitor 80. LDL 51  5. Type II DM- managed by PCP. On insluin and metformin. Discussed possibly adding SGL2- he will discuss with PCP.   6. Patient's Body mass index is 33.97 kg/m². BMI is above normal parameters. Recommendations include: exercise counseling and nutrition counseling.

## 2020-08-05 RX ORDER — NITROGLYCERIN 0.4 MG/1
0.4 TABLET SUBLINGUAL
Qty: 25 TABLET | Refills: 1 | Status: SHIPPED | OUTPATIENT
Start: 2020-08-05

## 2020-08-05 RX ORDER — NITROGLYCERIN 0.4 MG/1
0.4 TABLET SUBLINGUAL
Qty: 25 TABLET | Refills: 1 | OUTPATIENT
Start: 2020-08-05

## 2020-12-20 RX ORDER — TICAGRELOR 60 MG/1
TABLET ORAL
Qty: 180 TABLET | Refills: 3 | Status: SHIPPED | OUTPATIENT
Start: 2020-12-20 | End: 2021-12-13

## 2020-12-22 NOTE — PROGRESS NOTES
Dual Chamber AICD Evaluation Report  IN OFFICE INTERROGATION BY COMPANY DEVICE [employer]    July 7, 2020    Primary Cardiologist: Cecelia  : [employer] Medical Model: Fortify Assura  Implant date: 5/1/2019    Reason for evaluation: provider requested to reprogram for RV oversensing  Indication for AICD: ischemic cardiomyopathy    Measurements  Atrial sensing - P wave: 4 mV  Atrial threshold: 1.25 V @ 0.5 ms  Atrial lead impedance: 440 ohms  Ventricular sensing - R wave: >12 mV  Ventricular threshold: 0.5 V @ 0.5 ms  Ventricular lead impedance:  450 ohms  Shock impedance:  RV- 77 ohms      Diagnostic Data  Atrial paced: 6.5 %  Ventricular paced: <1 %  Other: Nonsustained RV oversensing.  No other episodes.  Battery status: satisfactory  Estimated 7.9 years remaining      Final Parameters  Mode: DDD  Lower rate: 60 bpm Upper rate: 110 bpm  AV Delay: Paced- 250 ms    Sensed- 250 ms     Atrial - Amplitude: 2.5 V Pulse width: 0.5 ms Sensitivity: Auto mV   Ventricular - Amplitude: 2.5  V Pulse width: 0.5 ms Sensitivity: Auto mV   Changes made: Max track rate changed to 110 bpm; Paced AV delay changed to 250 ms; Sensed AV delay changed to 250 ms; VIP extension 100 ms  Conclusions: normal AICD function, no therapy delivered, stable pacing and sensing thresholds and adequate battery reserve    Follow up: Every 3 months via merlin, annually in office

## 2021-02-01 RX ORDER — LISINOPRIL 2.5 MG/1
TABLET ORAL
Qty: 90 TABLET | Refills: 3 | Status: SHIPPED | OUTPATIENT
Start: 2021-02-01 | End: 2021-11-29

## 2021-02-01 RX ORDER — METOPROLOL SUCCINATE 50 MG/1
TABLET, EXTENDED RELEASE ORAL
Qty: 135 TABLET | Refills: 4 | Status: SHIPPED | OUTPATIENT
Start: 2021-02-01 | End: 2022-04-04 | Stop reason: SDUPTHER

## 2021-02-24 ENCOUNTER — OFFICE VISIT (OUTPATIENT)
Dept: CARDIOLOGY | Facility: CLINIC | Age: 56
End: 2021-02-24

## 2021-02-24 VITALS
WEIGHT: 238 LBS | BODY MASS INDEX: 35.25 KG/M2 | HEIGHT: 69 IN | SYSTOLIC BLOOD PRESSURE: 110 MMHG | DIASTOLIC BLOOD PRESSURE: 63 MMHG | HEART RATE: 69 BPM

## 2021-02-24 DIAGNOSIS — I25.5 ISCHEMIC CARDIOMYOPATHY: ICD-10-CM

## 2021-02-24 DIAGNOSIS — E66.01 MORBIDLY OBESE (HCC): ICD-10-CM

## 2021-02-24 DIAGNOSIS — I25.10 CORONARY ARTERY DISEASE INVOLVING NATIVE CORONARY ARTERY OF NATIVE HEART WITHOUT ANGINA PECTORIS: Primary | ICD-10-CM

## 2021-02-24 DIAGNOSIS — Z79.4 TYPE 2 DIABETES MELLITUS WITH OTHER CIRCULATORY COMPLICATION, WITH LONG-TERM CURRENT USE OF INSULIN (HCC): ICD-10-CM

## 2021-02-24 DIAGNOSIS — I10 ESSENTIAL HYPERTENSION: ICD-10-CM

## 2021-02-24 DIAGNOSIS — I50.22 CHRONIC SYSTOLIC CHF (CONGESTIVE HEART FAILURE), NYHA CLASS 2 (HCC): ICD-10-CM

## 2021-02-24 DIAGNOSIS — Z95.810 S/P IMPLANTATION OF AUTOMATIC CARDIOVERTER/DEFIBRILLATOR (AICD): ICD-10-CM

## 2021-02-24 DIAGNOSIS — E11.59 TYPE 2 DIABETES MELLITUS WITH OTHER CIRCULATORY COMPLICATION, WITH LONG-TERM CURRENT USE OF INSULIN (HCC): ICD-10-CM

## 2021-02-24 DIAGNOSIS — E78.2 MIXED HYPERLIPIDEMIA: ICD-10-CM

## 2021-02-24 PROCEDURE — 99214 OFFICE O/P EST MOD 30 MIN: CPT | Performed by: INTERNAL MEDICINE

## 2021-02-24 PROCEDURE — 93000 ELECTROCARDIOGRAM COMPLETE: CPT | Performed by: INTERNAL MEDICINE

## 2021-02-24 RX ORDER — ATORVASTATIN CALCIUM 80 MG/1
80 TABLET, FILM COATED ORAL NIGHTLY
Qty: 90 TABLET | Refills: 3 | Status: ON HOLD | OUTPATIENT
Start: 2021-02-24 | End: 2021-08-30

## 2021-02-24 RX ORDER — ATORVASTATIN CALCIUM 80 MG/1
TABLET, FILM COATED ORAL
Qty: 90 TABLET | Refills: 3 | Status: SHIPPED | OUTPATIENT
Start: 2021-02-24 | End: 2022-03-01

## 2021-02-24 RX ORDER — SPIRONOLACTONE 25 MG/1
25 TABLET ORAL DAILY
Qty: 90 TABLET | Refills: 4 | Status: SHIPPED | OUTPATIENT
Start: 2021-02-24 | End: 2022-03-16

## 2021-02-24 NOTE — PROGRESS NOTES
"Subjective    Luis Echeverria is a 55 y.o. male. Fu of chf, ihd and risks    History of Present Illness     HFrEF:  He has had no decline in stamina since LOV. Is compliant with meds and restricts salt. Has no unusual soa or cp. No edema and no palpitations. EKG is nsc.    AICD / DDD PACER:  No pain at the site. Interrogates well. No tachy events noted and rare need for pacing.    IHD:  Has no cp but has known severe and diffuse cad. Is not compliant with exercise or diet - is active at work at [employer]. He is happy with his current life-style and has no desire to change. He remains on DAPT without any known bleeding problems noted.    HTN:  homr readings are \"like today's\". Stable meds that are well tolerated.    HLD:  Chol check with his pcp is pending. He could not get approval for PCSK-9 meds but tolerates a potent statin without myalgias now.    T2DM:  Had previously been on Metformin but this has been d/c'd by his pcp in favor of increased Insulin dose. I have discussed the renal and cardio-protective benefits from the SGLT-2 meds and he is willing to start one. He relates that his last A1c was \">7\".    OBESITY:  His wt continues to increase and this has been called to his attention today. He is not interested in changing his diet or starting an exercise program at this time.        The following portions of the patient's history were reviewed and updated as appropriate: allergies, current medications, past family history, past medical history, past social history, past surgical history and problem list.    Patient Active Problem List   Diagnosis   • CAD (coronary artery disease)   • Hypertension   • Hyperlipidemia   • MI (myocardial infarction) (CMS/HCC)   • Diabetes mellitus (CMS/HCC)   • Carotid stent occlusion (CMS/HCC)   • Unstable angina (CMS/HCC)   • Ischemic cardiomyopathy   • RUQ pain   • Dehydration   • Nausea and vomiting   • Acute kidney injury (CMS/HCC)   • Hypotension   • Gallbladder polyp   • BMI " 31.0-31.9,adult   • S/P implantation of automatic cardioverter/defibrillator (AICD)   • Chronic systolic CHF (congestive heart failure), NYHA class 2 (CMS/HCC)   • Morbidly obese (CMS/HCC)       No Known Allergies    Family History   Problem Relation Age of Onset   • Heart disease Maternal Grandmother    • No Known Problems Mother    • No Known Problems Father        Social History     Socioeconomic History   • Marital status:      Spouse name: Not on file   • Number of children: Not on file   • Years of education: Not on file   • Highest education level: Not on file   Tobacco Use   • Smoking status: Never Smoker   • Smokeless tobacco: Never Used   Substance and Sexual Activity   • Alcohol use: No   • Drug use: No   • Sexual activity: Defer         Current Outpatient Medications:   •  aspirin 81 MG EC tablet, Take 1 tablet by mouth Daily., Disp: , Rfl:   •  atorvastatin (LIPITOR) 80 MG tablet, Take 1 tablet by mouth Every Night., Disp: 90 tablet, Rfl: 3  •  Brilinta 60 MG tablet tablet, TAKE 1 TABLET BY MOUTH TWICE A DAY, Disp: 180 tablet, Rfl: 3  •  DULoxetine (CYMBALTA) 20 MG capsule, Take 20 mg by mouth Daily., Disp: , Rfl:   •  insulin aspart (novoLOG FLEXPEN) 100 UNIT/ML solution pen-injector sc pen, Inject 18 Units under the skin into the appropriate area as directed 3 (Three) Times a Day With Meals., Disp: , Rfl:   •  insulin degludec (TRESIBA FLEXTOUCH) 100 UNIT/ML solution pen-injector injection, Inject 42 Units under the skin into the appropriate area as directed Every Morning., Disp: , Rfl:   •  levocetirizine (XYZAL) 5 MG tablet, Take 5 mg by mouth As Needed., Disp: , Rfl:   •  lisinopril (PRINIVIL,ZESTRIL) 2.5 MG tablet, TAKE 1 TABLET BY MOUTH EVERY DAY, Disp: 90 tablet, Rfl: 3  •  metoprolol succinate XL (TOPROL-XL) 50 MG 24 hr tablet, TAKE 1 AND 1/2 TABLETS BY MOUTH EVERY DAY, Disp: 135 tablet, Rfl: 4  •  nitroglycerin (NITROSTAT) 0.4 MG SL tablet, Place 1 tablet under the tongue Every 5 (Five)  "Minutes As Needed for Chest Pain. Take no more than 3 doses in 15 minutes., Disp: 25 tablet, Rfl: 1  •  spironolactone (ALDACTONE) 25 MG tablet, Take 1 tablet by mouth Daily., Disp: 90 tablet, Rfl: 4  •  timolol (TIMOPTIC) 0.5 % ophthalmic solution, Administer 1 drop to both eyes Daily., Disp: , Rfl:   •  Empagliflozin 10 MG tablet, Take 10 mg by mouth Daily., Disp: 90 tablet, Rfl: 3  No current facility-administered medications for this visit.     Past Surgical History:   Procedure Laterality Date   • CARDIAC CATHETERIZATION N/A 11/6/2018    Procedure: Left Heart Cath;  Surgeon: Tru Rai MD;  Location:  [facility];  Service: Cardiology   • CARDIAC ELECTROPHYSIOLOGY PROCEDURE N/A 5/1/2019    Procedure: ICD new;  Surgeon: Tru Rai MD;  Location:  [facility];  Service: Cardiology   • CAROTID STENT         Review of Systems   Constitutional: Negative for activity change, appetite change, fatigue and unexpected weight change.   Respiratory: Negative for shortness of breath.    Cardiovascular: Negative for chest pain, palpitations and leg swelling.   Gastrointestinal: Negative for abdominal pain and blood in stool.   Genitourinary: Negative for difficulty urinating and hematuria.   Musculoskeletal: Negative for arthralgias.       /63   Pulse 69   Ht 175.3 cm (69\")   Wt 108 kg (238 lb)   BMI 35.15 kg/m²   Procedures    Objective   Physical Exam  Constitutional:       Appearance: Normal appearance. He is obese.   HENT:      Head: Normocephalic.   Cardiovascular:      Rate and Rhythm: Normal rate and regular rhythm.      Pulses: Normal pulses.      Heart sounds: Normal heart sounds. No murmur. No friction rub. No gallop.    Pulmonary:      Effort: Pulmonary effort is normal.      Breath sounds: Normal breath sounds. No wheezing or rales.   Chest:      Chest wall: No tenderness.   Abdominal:      General: Bowel sounds are normal.      Palpations: " Abdomen is soft.      Tenderness: There is no abdominal tenderness.      Hernia: A hernia is present.      Comments: Small juan miguel-umbilical hernia noted   Musculoskeletal:      Right lower leg: No edema.      Left lower leg: No edema.   Skin:     General: Skin is warm and dry.   Neurological:      General: No focal deficit present.      Mental Status: He is oriented to person, place, and time.   Psychiatric:         Mood and Affect: Mood normal.         Thought Content: Thought content normal.         Assessment/Plan   Diagnoses and all orders for this visit:    1. Coronary artery disease involving native coronary artery of native heart without angina pectoris (Primary)  Comments:  no angina and fair risk factor modification - continue to encourage regular exercise and adherence to Mediterranean style of eating  Orders:  -     ECG 12 Lead    2. Essential hypertension  Comments:  good control    3. Mixed hyperlipidemia  Comments:  on potent statin with previously good control - lipid check with pcp is pending  Orders:  -     atorvastatin (LIPITOR) 80 MG tablet; Take 1 tablet by mouth Every Night.  Dispense: 90 tablet; Refill: 3    4. Ischemic cardiomyopathy    5. Chronic systolic CHF (congestive heart failure), NYHA class 2 (CMS/[facility])  Comments:  functional class 2c - stable  Orders:  -     spironolactone (ALDACTONE) 25 MG tablet; Take 1 tablet by mouth Daily.  Dispense: 90 tablet; Refill: 4    6. S/P implantation of automatic cardioverter/defibrillator (AICD)  Comments:  good function    7. Type 2 diabetes mellitus with other circulatory complication, with long-term current use of insulin (CMS/[facility])  Comments:  followed closely by pcp - will a -gliflozin for cardiac and renal protection  Orders:  -     Empagliflozin 10 MG tablet; Take 10 mg by mouth Daily.  Dispense: 90 tablet; Refill: 3    8. Morbidly obese (CMS/[facility])  Comments:  he is not interested in change of activity or diet at this time                 Return in  about 6 months (around 8/24/2021).  Orders Placed This Encounter   Procedures   • ECG 12 Lead     Order Specific Question:   Reason for Exam:     Answer:   [email or web address]

## 2021-04-17 PROCEDURE — 93295 DEV INTERROG REMOTE 1/2/MLT: CPT | Performed by: INTERNAL MEDICINE

## 2021-04-17 PROCEDURE — 93296 REM INTERROG EVL PM/IDS: CPT | Performed by: INTERNAL MEDICINE

## 2021-06-22 ENCOUNTER — CLINICAL SUPPORT NO REQUIREMENTS (OUTPATIENT)
Dept: CARDIOLOGY | Facility: CLINIC | Age: 56
End: 2021-06-22

## 2021-06-22 DIAGNOSIS — I25.5 ISCHEMIC CARDIOMYOPATHY: ICD-10-CM

## 2021-06-22 PROCEDURE — 93288 INTERROG EVL PM/LDLS PM IP: CPT | Performed by: PHYSICIAN ASSISTANT

## 2021-07-05 NOTE — PROGRESS NOTES
Dual Chamber AICD Evaluation Report  Clinic Interrogation    July 5, 2021    Primary Cardiologist: Cecelia  : [employer] Medical Model: Assura  Implant date: 5/1/19    Reason for evaluation: routine  Indication for AICD: ischemic cardiomyopathy    Measurements  Atrial sensing - P wave: 5.0 mV  Atrial threshold: 1.0 V @ 0.5 ms  Atrial lead impedance: 460 ohms  Ventricular sensing - R wave: >12 mV  Ventricular threshold: 0.75 V @ 0.5 ms  Ventricular lead impedance:  480 ohms  Shock impedance:  RV- 79 ohms         Diagnostic Data  Atrial paced: 12 %  Ventricular paced: <1 %  Other: No new episodes noted.  Battery status: satisfactory        Final Parameters  Mode: DDD  Lower rate: 60 bpm Upper rate: 130 bpm  AV Delay: Paced- 250 ms    Sensed- 250 ms     Atrial - Amplitude: 2.0 V Pulse width: 0.5 ms Sensitivity: auto   Ventricular - Amplitude: 2.0 V Pulse width: 0.5 ms Sensitivity: auto   Changes made: none  Conclusions: normal AICD function    Follow up: Every 3 months via Merlin, annually in office

## 2021-08-29 ENCOUNTER — HOSPITAL ENCOUNTER (INPATIENT)
Facility: HOSPITAL | Age: 56
LOS: 4 days | Discharge: HOME HEALTH | End: 2021-09-02
Attending: INTERNAL MEDICINE | Admitting: FAMILY MEDICINE

## 2021-08-29 ENCOUNTER — APPOINTMENT (OUTPATIENT)
Dept: CT IMAGING | Facility: HOSPITAL | Age: 56
End: 2021-08-29

## 2021-08-29 ENCOUNTER — APPOINTMENT (OUTPATIENT)
Dept: GENERAL RADIOLOGY | Facility: HOSPITAL | Age: 56
End: 2021-08-29

## 2021-08-29 DIAGNOSIS — Z91.199 NONCOMPLIANCE: ICD-10-CM

## 2021-08-29 DIAGNOSIS — I25.10 CORONARY ARTERY DISEASE INVOLVING NATIVE CORONARY ARTERY OF NATIVE HEART WITHOUT ANGINA PECTORIS: ICD-10-CM

## 2021-08-29 DIAGNOSIS — J12.82 PNEUMONIA DUE TO COVID-19 VIRUS: ICD-10-CM

## 2021-08-29 DIAGNOSIS — D89.831 CYTOKINE RELEASE SYNDROME, GRADE 1: ICD-10-CM

## 2021-08-29 DIAGNOSIS — I25.5 ISCHEMIC CARDIOMYOPATHY: ICD-10-CM

## 2021-08-29 DIAGNOSIS — E13.10 DIABETIC KETOACIDOSIS WITHOUT COMA ASSOCIATED WITH OTHER SPECIFIED DIABETES MELLITUS (HCC): ICD-10-CM

## 2021-08-29 DIAGNOSIS — I95.9 HYPOTENSION, UNSPECIFIED HYPOTENSION TYPE: ICD-10-CM

## 2021-08-29 DIAGNOSIS — E08.10 DIABETIC KETOACIDOSIS WITHOUT COMA ASSOCIATED WITH DIABETES MELLITUS DUE TO UNDERLYING CONDITION (HCC): Primary | ICD-10-CM

## 2021-08-29 DIAGNOSIS — E78.2 MIXED HYPERLIPIDEMIA: ICD-10-CM

## 2021-08-29 DIAGNOSIS — I50.22 CHRONIC SYSTOLIC CHF (CONGESTIVE HEART FAILURE), NYHA CLASS 2 (HCC): ICD-10-CM

## 2021-08-29 DIAGNOSIS — I10 ESSENTIAL HYPERTENSION: ICD-10-CM

## 2021-08-29 DIAGNOSIS — U07.1 COVID-19: ICD-10-CM

## 2021-08-29 DIAGNOSIS — I21.9 MYOCARDIAL INFARCTION, UNSPECIFIED MI TYPE, UNSPECIFIED ARTERY (HCC): ICD-10-CM

## 2021-08-29 DIAGNOSIS — N17.9 ACUTE KIDNEY INJURY (HCC): ICD-10-CM

## 2021-08-29 DIAGNOSIS — U07.1 PNEUMONIA DUE TO COVID-19 VIRUS: ICD-10-CM

## 2021-08-29 DIAGNOSIS — E66.01 MORBIDLY OBESE (HCC): ICD-10-CM

## 2021-08-29 DIAGNOSIS — I20.0 UNSTABLE ANGINA (HCC): ICD-10-CM

## 2021-08-29 DIAGNOSIS — R11.2 NAUSEA AND VOMITING, INTRACTABILITY OF VOMITING NOT SPECIFIED, UNSPECIFIED VOMITING TYPE: ICD-10-CM

## 2021-08-29 DIAGNOSIS — K82.4 GALLBLADDER POLYP: ICD-10-CM

## 2021-08-29 DIAGNOSIS — Z95.810 S/P IMPLANTATION OF AUTOMATIC CARDIOVERTER/DEFIBRILLATOR (AICD): ICD-10-CM

## 2021-08-29 DIAGNOSIS — Z79.4 TYPE 2 DIABETES MELLITUS WITH OTHER CIRCULATORY COMPLICATION, WITH LONG-TERM CURRENT USE OF INSULIN (HCC): ICD-10-CM

## 2021-08-29 DIAGNOSIS — T82.898S CAROTID STENT OCCLUSION, SEQUELA: ICD-10-CM

## 2021-08-29 DIAGNOSIS — E11.59 TYPE 2 DIABETES MELLITUS WITH OTHER CIRCULATORY COMPLICATION, WITH LONG-TERM CURRENT USE OF INSULIN (HCC): ICD-10-CM

## 2021-08-29 DIAGNOSIS — E86.0 DEHYDRATION: ICD-10-CM

## 2021-08-29 DIAGNOSIS — R10.11 RUQ PAIN: ICD-10-CM

## 2021-08-29 DIAGNOSIS — E87.5 HYPERKALEMIA: ICD-10-CM

## 2021-08-29 PROBLEM — E11.10 DKA (DIABETIC KETOACIDOSES): Status: ACTIVE | Noted: 2021-08-29

## 2021-08-29 LAB
ACETONE BLD QL: ABNORMAL
ALBUMIN SERPL-MCNC: 3.7 G/DL (ref 3.5–5.2)
ALBUMIN/GLOB SERPL: 1.1 G/DL
ALP SERPL-CCNC: 88 U/L (ref 39–117)
ALT SERPL W P-5'-P-CCNC: 24 U/L (ref 1–41)
ANION GAP SERPL CALCULATED.3IONS-SCNC: 26 MMOL/L (ref 5–15)
ARTERIAL PATENCY WRIST A: ABNORMAL
AST SERPL-CCNC: 32 U/L (ref 1–40)
ATMOSPHERIC PRESS: 751 MMHG
BASE EXCESS BLDA CALC-SCNC: -10.7 MMOL/L (ref 0–2)
BASOPHILS # BLD AUTO: 0.01 10*3/MM3 (ref 0–0.2)
BASOPHILS NFR BLD AUTO: 0.2 % (ref 0–1.5)
BDY SITE: ABNORMAL
BILIRUB SERPL-MCNC: 0.7 MG/DL (ref 0–1.2)
BODY TEMPERATURE: 37 C
BUN SERPL-MCNC: 42 MG/DL (ref 6–20)
BUN/CREAT SERPL: 26.6 (ref 7–25)
CALCIUM SPEC-SCNC: 9.2 MG/DL (ref 8.6–10.5)
CHLORIDE SERPL-SCNC: 92 MMOL/L (ref 98–107)
CO2 SERPL-SCNC: 13 MMOL/L (ref 22–29)
CREAT SERPL-MCNC: 1.58 MG/DL (ref 0.76–1.27)
D DIMER PPP FEU-MCNC: 2.01 MG/L (FEU) (ref 0–0.5)
D-LACTATE SERPL-SCNC: 2.2 MMOL/L (ref 0.5–2)
D-LACTATE SERPL-SCNC: 3.1 MMOL/L (ref 0.5–2)
DEPRECATED RDW RBC AUTO: 45.5 FL (ref 37–54)
EOSINOPHIL # BLD AUTO: 0 10*3/MM3 (ref 0–0.4)
EOSINOPHIL NFR BLD AUTO: 0 % (ref 0.3–6.2)
ERYTHROCYTE [DISTWIDTH] IN BLOOD BY AUTOMATED COUNT: 14 % (ref 12.3–15.4)
GFR SERPL CREATININE-BSD FRML MDRD: 46 ML/MIN/1.73
GLOBULIN UR ELPH-MCNC: 3.5 GM/DL
GLUCOSE SERPL-MCNC: 497 MG/DL (ref 65–99)
HCO3 BLDA-SCNC: 12.9 MMOL/L (ref 20–26)
HCT VFR BLD AUTO: 42.5 % (ref 37.5–51)
HGB BLD-MCNC: 14.2 G/DL (ref 13–17.7)
HOLD SPECIMEN: NORMAL
HOLD SPECIMEN: NORMAL
IMM GRANULOCYTES # BLD AUTO: 0.04 10*3/MM3 (ref 0–0.05)
IMM GRANULOCYTES NFR BLD AUTO: 0.7 % (ref 0–0.5)
LYMPHOCYTES # BLD AUTO: 0.54 10*3/MM3 (ref 0.7–3.1)
LYMPHOCYTES NFR BLD AUTO: 9.5 % (ref 19.6–45.3)
Lab: ABNORMAL
MCH RBC QN AUTO: 29.7 PG (ref 26.6–33)
MCHC RBC AUTO-ENTMCNC: 33.4 G/DL (ref 31.5–35.7)
MCV RBC AUTO: 88.9 FL (ref 79–97)
MODALITY: ABNORMAL
MONOCYTES # BLD AUTO: 0.34 10*3/MM3 (ref 0.1–0.9)
MONOCYTES NFR BLD AUTO: 6 % (ref 5–12)
NEUTROPHILS NFR BLD AUTO: 4.76 10*3/MM3 (ref 1.7–7)
NEUTROPHILS NFR BLD AUTO: 83.6 % (ref 42.7–76)
NRBC BLD AUTO-RTO: 0 /100 WBC (ref 0–0.2)
NT-PROBNP SERPL-MCNC: 1737 PG/ML (ref 0–900)
PCO2 BLDA: 23.5 MM HG (ref 35–45)
PCO2 TEMP ADJ BLD: 23.5 MM HG (ref 35–45)
PH BLDA: 7.35 PH UNITS (ref 7.35–7.45)
PH, TEMP CORRECTED: 7.35 PH UNITS (ref 7.35–7.45)
PLATELET # BLD AUTO: 257 10*3/MM3 (ref 140–450)
PMV BLD AUTO: 9.4 FL (ref 6–12)
PO2 BLDA: 71.7 MM HG (ref 83–108)
PO2 TEMP ADJ BLD: 71.7 MM HG (ref 83–108)
POTASSIUM SERPL-SCNC: 6.2 MMOL/L (ref 3.5–5.2)
PROCALCITONIN SERPL-MCNC: 0.9 NG/ML (ref 0–0.25)
PROT SERPL-MCNC: 7.2 G/DL (ref 6–8.5)
RBC # BLD AUTO: 4.78 10*6/MM3 (ref 4.14–5.8)
SAO2 % BLDCOA: 92.8 % (ref 94–99)
SODIUM SERPL-SCNC: 131 MMOL/L (ref 136–145)
TROPONIN T SERPL-MCNC: 0.01 NG/ML (ref 0–0.03)
TROPONIN T SERPL-MCNC: 0.02 NG/ML (ref 0–0.03)
VENTILATOR MODE: ABNORMAL
WBC # BLD AUTO: 5.69 10*3/MM3 (ref 3.4–10.8)
WHOLE BLOOD HOLD SPECIMEN: NORMAL
WHOLE BLOOD HOLD SPECIMEN: NORMAL

## 2021-08-29 PROCEDURE — 82803 BLOOD GASES ANY COMBINATION: CPT

## 2021-08-29 PROCEDURE — 93005 ELECTROCARDIOGRAM TRACING: CPT | Performed by: INTERNAL MEDICINE

## 2021-08-29 PROCEDURE — 80053 COMPREHEN METABOLIC PANEL: CPT | Performed by: INTERNAL MEDICINE

## 2021-08-29 PROCEDURE — 93005 ELECTROCARDIOGRAM TRACING: CPT

## 2021-08-29 PROCEDURE — 84145 PROCALCITONIN (PCT): CPT | Performed by: INTERNAL MEDICINE

## 2021-08-29 PROCEDURE — 83735 ASSAY OF MAGNESIUM: CPT | Performed by: INTERNAL MEDICINE

## 2021-08-29 PROCEDURE — 83880 ASSAY OF NATRIURETIC PEPTIDE: CPT | Performed by: INTERNAL MEDICINE

## 2021-08-29 PROCEDURE — 83036 HEMOGLOBIN GLYCOSYLATED A1C: CPT | Performed by: INTERNAL MEDICINE

## 2021-08-29 PROCEDURE — 84100 ASSAY OF PHOSPHORUS: CPT | Performed by: INTERNAL MEDICINE

## 2021-08-29 PROCEDURE — 36600 WITHDRAWAL OF ARTERIAL BLOOD: CPT

## 2021-08-29 PROCEDURE — 87899 AGENT NOS ASSAY W/OPTIC: CPT | Performed by: INTERNAL MEDICINE

## 2021-08-29 PROCEDURE — 82962 GLUCOSE BLOOD TEST: CPT

## 2021-08-29 PROCEDURE — 85025 COMPLETE CBC W/AUTO DIFF WBC: CPT | Performed by: INTERNAL MEDICINE

## 2021-08-29 PROCEDURE — 83930 ASSAY OF BLOOD OSMOLALITY: CPT | Performed by: INTERNAL MEDICINE

## 2021-08-29 PROCEDURE — 99285 EMERGENCY DEPT VISIT HI MDM: CPT

## 2021-08-29 PROCEDURE — 84484 ASSAY OF TROPONIN QUANT: CPT | Performed by: INTERNAL MEDICINE

## 2021-08-29 PROCEDURE — 83605 ASSAY OF LACTIC ACID: CPT | Performed by: INTERNAL MEDICINE

## 2021-08-29 PROCEDURE — 82009 KETONE BODYS QUAL: CPT | Performed by: INTERNAL MEDICINE

## 2021-08-29 PROCEDURE — 93010 ELECTROCARDIOGRAM REPORT: CPT | Performed by: INTERNAL MEDICINE

## 2021-08-29 PROCEDURE — 81001 URINALYSIS AUTO W/SCOPE: CPT | Performed by: INTERNAL MEDICINE

## 2021-08-29 PROCEDURE — 63710000001 INSULIN REGULAR HUMAN PER 5 UNITS: Performed by: INTERNAL MEDICINE

## 2021-08-29 PROCEDURE — 71045 X-RAY EXAM CHEST 1 VIEW: CPT

## 2021-08-29 PROCEDURE — 85379 FIBRIN DEGRADATION QUANT: CPT | Performed by: INTERNAL MEDICINE

## 2021-08-29 RX ORDER — SODIUM POLYSTYRENE SULFONATE 15 G/60ML
15 SUSPENSION ORAL; RECTAL ONCE
Status: COMPLETED | OUTPATIENT
Start: 2021-08-29 | End: 2021-08-29

## 2021-08-29 RX ORDER — SODIUM CHLORIDE 0.9 % (FLUSH) 0.9 %
10 SYRINGE (ML) INJECTION AS NEEDED
Status: DISCONTINUED | OUTPATIENT
Start: 2021-08-29 | End: 2021-09-02 | Stop reason: HOSPADM

## 2021-08-29 RX ADMIN — SODIUM POLYSTYRENE SULFONATE 15 G: 15 SUSPENSION ORAL; RECTAL at 22:53

## 2021-08-29 RX ADMIN — INSULIN HUMAN 10 UNITS: 100 INJECTION, SOLUTION PARENTERAL at 22:52

## 2021-08-30 ENCOUNTER — APPOINTMENT (OUTPATIENT)
Dept: CARDIOLOGY | Facility: HOSPITAL | Age: 56
End: 2021-08-30

## 2021-08-30 PROBLEM — U07.1 PNEUMONIA DUE TO COVID-19 VIRUS: Status: ACTIVE | Noted: 2021-08-30

## 2021-08-30 PROBLEM — Z91.199 NONCOMPLIANCE: Status: ACTIVE | Noted: 2021-08-30

## 2021-08-30 PROBLEM — J12.82 PNEUMONIA DUE TO COVID-19 VIRUS: Status: ACTIVE | Noted: 2021-08-30

## 2021-08-30 LAB
ANION GAP SERPL CALCULATED.3IONS-SCNC: 11 MMOL/L (ref 5–15)
ANION GAP SERPL CALCULATED.3IONS-SCNC: 12 MMOL/L (ref 5–15)
ANION GAP SERPL CALCULATED.3IONS-SCNC: 27 MMOL/L (ref 5–15)
B PARAPERT DNA SPEC QL NAA+PROBE: NOT DETECTED
B PERT DNA SPEC QL NAA+PROBE: NOT DETECTED
BACTERIA UR QL AUTO: ABNORMAL /HPF
BH CV ECHO MEAS - AO MAX PG (FULL): 4.2 MMHG
BH CV ECHO MEAS - AO MAX PG: 7.3 MMHG
BH CV ECHO MEAS - AO MEAN PG (FULL): 2 MMHG
BH CV ECHO MEAS - AO MEAN PG: 4 MMHG
BH CV ECHO MEAS - AO ROOT AREA: 6.2 CM^2
BH CV ECHO MEAS - AO ROOT DIAM: 2.8 CM
BH CV ECHO MEAS - AO V2 MAX: 135 CM/SEC
BH CV ECHO MEAS - AO V2 MEAN: 94.9 CM/SEC
BH CV ECHO MEAS - AO V2 VTI: 29.9 CM
BH CV ECHO MEAS - AVA(I,A): 2.3 CM^2
BH CV ECHO MEAS - AVA(I,D): 2.3 CM^2
BH CV ECHO MEAS - AVA(V,A): 2.3 CM^2
BH CV ECHO MEAS - AVA(V,D): 2.3 CM^2
BH CV ECHO MEAS - BZI_METRIC_WEIGHT: 0.45 KG
BH CV ECHO MEAS - EDV(CUBED): 147.2 ML
BH CV ECHO MEAS - EDV(MOD-SP4): 111 ML
BH CV ECHO MEAS - EDV(TEICH): 134.2 ML
BH CV ECHO MEAS - EF(CUBED): 47.1 %
BH CV ECHO MEAS - EF(MOD-SP4): 34.1 %
BH CV ECHO MEAS - EF(TEICH): 39.1 %
BH CV ECHO MEAS - ESV(CUBED): 77.9 ML
BH CV ECHO MEAS - ESV(MOD-SP4): 73.1 ML
BH CV ECHO MEAS - ESV(TEICH): 81.7 ML
BH CV ECHO MEAS - FS: 19.1 %
BH CV ECHO MEAS - IVS/LVPW: 0.92
BH CV ECHO MEAS - IVSD: 1.1 CM
BH CV ECHO MEAS - LA DIMENSION: 3.7 CM
BH CV ECHO MEAS - LA/AO: 1.3
BH CV ECHO MEAS - LV MASS(C)D: 236.2 GRAMS
BH CV ECHO MEAS - LV MAX PG: 3.1 MMHG
BH CV ECHO MEAS - LV MEAN PG: 2 MMHG
BH CV ECHO MEAS - LV V1 MAX: 87.9 CM/SEC
BH CV ECHO MEAS - LV V1 MEAN: 64.1 CM/SEC
BH CV ECHO MEAS - LV V1 VTI: 20.2 CM
BH CV ECHO MEAS - LVIDD: 5.3 CM
BH CV ECHO MEAS - LVIDS: 4.3 CM
BH CV ECHO MEAS - LVLD AP4: 7.9 CM
BH CV ECHO MEAS - LVLS AP4: 8.6 CM
BH CV ECHO MEAS - LVOT AREA (M): 3.5 CM^2
BH CV ECHO MEAS - LVOT AREA: 3.5 CM^2
BH CV ECHO MEAS - LVOT DIAM: 2.1 CM
BH CV ECHO MEAS - LVPWD: 1.2 CM
BH CV ECHO MEAS - MV A MAX VEL: 72.3 CM/SEC
BH CV ECHO MEAS - MV DEC SLOPE: 282 CM/SEC^2
BH CV ECHO MEAS - MV DEC TIME: 0.22 SEC
BH CV ECHO MEAS - MV E MAX VEL: 62.6 CM/SEC
BH CV ECHO MEAS - MV E/A: 0.87
BH CV ECHO MEAS - SV(AO): 184.1 ML
BH CV ECHO MEAS - SV(CUBED): 69.3 ML
BH CV ECHO MEAS - SV(LVOT): 70 ML
BH CV ECHO MEAS - SV(MOD-SP4): 37.9 ML
BH CV ECHO MEAS - SV(TEICH): 52.5 ML
BILIRUB UR QL STRIP: NEGATIVE
BUN SERPL-MCNC: 36 MG/DL (ref 6–20)
BUN SERPL-MCNC: 37 MG/DL (ref 6–20)
BUN SERPL-MCNC: 44 MG/DL (ref 6–20)
BUN/CREAT SERPL: 24.2 (ref 7–25)
BUN/CREAT SERPL: 25 (ref 7–25)
BUN/CREAT SERPL: 25.5 (ref 7–25)
C PNEUM DNA NPH QL NAA+NON-PROBE: NOT DETECTED
CALCIUM SPEC-SCNC: 8.3 MG/DL (ref 8.6–10.5)
CALCIUM SPEC-SCNC: 8.4 MG/DL (ref 8.6–10.5)
CALCIUM SPEC-SCNC: 9.1 MG/DL (ref 8.6–10.5)
CHLORIDE SERPL-SCNC: 104 MMOL/L (ref 98–107)
CHLORIDE SERPL-SCNC: 105 MMOL/L (ref 98–107)
CHLORIDE SERPL-SCNC: 93 MMOL/L (ref 98–107)
CLARITY UR: CLEAR
CO2 SERPL-SCNC: 14 MMOL/L (ref 22–29)
CO2 SERPL-SCNC: 23 MMOL/L (ref 22–29)
CO2 SERPL-SCNC: 24 MMOL/L (ref 22–29)
COLOR UR: YELLOW
CREAT SERPL-MCNC: 1.41 MG/DL (ref 0.76–1.27)
CREAT SERPL-MCNC: 1.48 MG/DL (ref 0.76–1.27)
CREAT SERPL-MCNC: 1.82 MG/DL (ref 0.76–1.27)
CRP SERPL-MCNC: 11.67 MG/DL (ref 0–0.5)
FERRITIN SERPL-MCNC: 773.5 NG/ML (ref 30–400)
FLUAV SUBTYP SPEC NAA+PROBE: NOT DETECTED
FLUBV RNA ISLT QL NAA+PROBE: NOT DETECTED
GFR SERPL CREATININE-BSD FRML MDRD: 39 ML/MIN/1.73
GFR SERPL CREATININE-BSD FRML MDRD: 50 ML/MIN/1.73
GFR SERPL CREATININE-BSD FRML MDRD: 52 ML/MIN/1.73
GLUCOSE BLDC GLUCOMTR-MCNC: 120 MG/DL (ref 70–130)
GLUCOSE BLDC GLUCOMTR-MCNC: 145 MG/DL (ref 70–130)
GLUCOSE BLDC GLUCOMTR-MCNC: 156 MG/DL (ref 70–130)
GLUCOSE BLDC GLUCOMTR-MCNC: 163 MG/DL (ref 70–130)
GLUCOSE BLDC GLUCOMTR-MCNC: 172 MG/DL (ref 70–130)
GLUCOSE BLDC GLUCOMTR-MCNC: 175 MG/DL (ref 70–130)
GLUCOSE BLDC GLUCOMTR-MCNC: 217 MG/DL (ref 70–130)
GLUCOSE BLDC GLUCOMTR-MCNC: 253 MG/DL (ref 70–130)
GLUCOSE BLDC GLUCOMTR-MCNC: 288 MG/DL (ref 70–130)
GLUCOSE BLDC GLUCOMTR-MCNC: 293 MG/DL (ref 70–130)
GLUCOSE BLDC GLUCOMTR-MCNC: 375 MG/DL (ref 70–130)
GLUCOSE BLDC GLUCOMTR-MCNC: 403 MG/DL (ref 70–130)
GLUCOSE BLDC GLUCOMTR-MCNC: 424 MG/DL (ref 70–130)
GLUCOSE BLDC GLUCOMTR-MCNC: 507 MG/DL (ref 70–130)
GLUCOSE SERPL-MCNC: 157 MG/DL (ref 65–99)
GLUCOSE SERPL-MCNC: 158 MG/DL (ref 65–99)
GLUCOSE SERPL-MCNC: 498 MG/DL (ref 65–99)
GLUCOSE UR STRIP-MCNC: ABNORMAL MG/DL
HADV DNA SPEC NAA+PROBE: NOT DETECTED
HBA1C MFR BLD: 9 % (ref 4.8–5.6)
HCOV 229E RNA SPEC QL NAA+PROBE: NOT DETECTED
HCOV HKU1 RNA SPEC QL NAA+PROBE: NOT DETECTED
HCOV NL63 RNA SPEC QL NAA+PROBE: NOT DETECTED
HCOV OC43 RNA SPEC QL NAA+PROBE: NOT DETECTED
HGB UR QL STRIP.AUTO: ABNORMAL
HMPV RNA NPH QL NAA+NON-PROBE: NOT DETECTED
HOLD SPECIMEN: NORMAL
HPIV1 RNA SPEC QL NAA+PROBE: NOT DETECTED
HPIV2 RNA SPEC QL NAA+PROBE: NOT DETECTED
HPIV3 RNA NPH QL NAA+PROBE: NOT DETECTED
HPIV4 P GENE NPH QL NAA+PROBE: NOT DETECTED
HYALINE CASTS UR QL AUTO: ABNORMAL /LPF
KETONES UR QL STRIP: ABNORMAL
L PNEUMO1 AG UR QL IA: NEGATIVE
LDH SERPL-CCNC: 504 U/L (ref 135–225)
LEUKOCYTE ESTERASE UR QL STRIP.AUTO: NEGATIVE
M PNEUMO IGG SER IA-ACNC: NOT DETECTED
MAGNESIUM SERPL-MCNC: 2.3 MG/DL (ref 1.6–2.6)
MAGNESIUM SERPL-MCNC: 2.3 MG/DL (ref 1.6–2.6)
MAGNESIUM SERPL-MCNC: 2.4 MG/DL (ref 1.6–2.6)
MAGNESIUM SERPL-MCNC: 2.6 MG/DL (ref 1.6–2.6)
NITRITE UR QL STRIP: NEGATIVE
OSMOLALITY SERPL: 322 MOSM/KG (ref 275–295)
PH UR STRIP.AUTO: <=5 [PH] (ref 5–8)
PHOSPHATE SERPL-MCNC: 2.5 MG/DL (ref 2.5–4.5)
PHOSPHATE SERPL-MCNC: 2.6 MG/DL (ref 2.5–4.5)
PHOSPHATE SERPL-MCNC: 4.8 MG/DL (ref 2.5–4.5)
PHOSPHATE SERPL-MCNC: 5.5 MG/DL (ref 2.5–4.5)
POTASSIUM SERPL-SCNC: 4.3 MMOL/L (ref 3.5–5.2)
POTASSIUM SERPL-SCNC: 4.4 MMOL/L (ref 3.5–5.2)
POTASSIUM SERPL-SCNC: 5.4 MMOL/L (ref 3.5–5.2)
PROT UR QL STRIP: NEGATIVE
QT INTERVAL: 372 MS
QT INTERVAL: 382 MS
QTC INTERVAL: 434 MS
QTC INTERVAL: 464 MS
RBC # UR: ABNORMAL /HPF
REF LAB TEST METHOD: ABNORMAL
RHINOVIRUS RNA SPEC NAA+PROBE: NOT DETECTED
RSV RNA NPH QL NAA+NON-PROBE: NOT DETECTED
S PNEUM AG SPEC QL LA: NEGATIVE
SARS-COV-2 RNA PNL SPEC NAA+PROBE: DETECTED
SODIUM SERPL-SCNC: 134 MMOL/L (ref 136–145)
SODIUM SERPL-SCNC: 139 MMOL/L (ref 136–145)
SODIUM SERPL-SCNC: 140 MMOL/L (ref 136–145)
SP GR UR STRIP: 1.03 (ref 1–1.03)
SQUAMOUS #/AREA URNS HPF: ABNORMAL /HPF
UROBILINOGEN UR QL STRIP: ABNORMAL
WBC UR QL AUTO: ABNORMAL /HPF

## 2021-08-30 PROCEDURE — 94799 UNLISTED PULMONARY SVC/PX: CPT

## 2021-08-30 PROCEDURE — 83735 ASSAY OF MAGNESIUM: CPT | Performed by: INTERNAL MEDICINE

## 2021-08-30 PROCEDURE — 25010000002 ONDANSETRON PER 1 MG: Performed by: INTERNAL MEDICINE

## 2021-08-30 PROCEDURE — 82962 GLUCOSE BLOOD TEST: CPT

## 2021-08-30 PROCEDURE — 83615 LACTATE (LD) (LDH) ENZYME: CPT | Performed by: INTERNAL MEDICINE

## 2021-08-30 PROCEDURE — 83520 IMMUNOASSAY QUANT NOS NONAB: CPT | Performed by: INTERNAL MEDICINE

## 2021-08-30 PROCEDURE — 25010000002 ENOXAPARIN PER 10 MG: Performed by: INTERNAL MEDICINE

## 2021-08-30 PROCEDURE — 93010 ELECTROCARDIOGRAM REPORT: CPT | Performed by: INTERNAL MEDICINE

## 2021-08-30 PROCEDURE — 86140 C-REACTIVE PROTEIN: CPT | Performed by: INTERNAL MEDICINE

## 2021-08-30 PROCEDURE — 36415 COLL VENOUS BLD VENIPUNCTURE: CPT | Performed by: INTERNAL MEDICINE

## 2021-08-30 PROCEDURE — 87635 SARS-COV-2 COVID-19 AMP PRB: CPT | Performed by: INTERNAL MEDICINE

## 2021-08-30 PROCEDURE — 25010000002 PERFLUTREN 6.52 MG/ML SUSPENSION: Performed by: FAMILY MEDICINE

## 2021-08-30 PROCEDURE — 63710000001 INSULIN DETEMIR PER 5 UNITS: Performed by: FAMILY MEDICINE

## 2021-08-30 PROCEDURE — 93005 ELECTROCARDIOGRAM TRACING: CPT | Performed by: INTERNAL MEDICINE

## 2021-08-30 PROCEDURE — 25010000003 INSULIN REGULAR HUMAN PER 5 UNITS: Performed by: INTERNAL MEDICINE

## 2021-08-30 PROCEDURE — 93306 TTE W/DOPPLER COMPLETE: CPT

## 2021-08-30 PROCEDURE — 63710000001 INSULIN LISPRO (HUMAN) PER 5 UNITS: Performed by: FAMILY MEDICINE

## 2021-08-30 PROCEDURE — 84100 ASSAY OF PHOSPHORUS: CPT | Performed by: INTERNAL MEDICINE

## 2021-08-30 PROCEDURE — 87040 BLOOD CULTURE FOR BACTERIA: CPT | Performed by: FAMILY MEDICINE

## 2021-08-30 PROCEDURE — 93306 TTE W/DOPPLER COMPLETE: CPT | Performed by: INTERNAL MEDICINE

## 2021-08-30 PROCEDURE — 82728 ASSAY OF FERRITIN: CPT | Performed by: INTERNAL MEDICINE

## 2021-08-30 PROCEDURE — 80048 BASIC METABOLIC PNL TOTAL CA: CPT | Performed by: INTERNAL MEDICINE

## 2021-08-30 PROCEDURE — 25010000003 POTASSIUM CHLORIDE PER 2 MEQ: Performed by: INTERNAL MEDICINE

## 2021-08-30 PROCEDURE — 25010000002 FUROSEMIDE PER 20 MG: Performed by: FAMILY MEDICINE

## 2021-08-30 PROCEDURE — 87633 RESP VIRUS 12-25 TARGETS: CPT | Performed by: INTERNAL MEDICINE

## 2021-08-30 PROCEDURE — 94640 AIRWAY INHALATION TREATMENT: CPT

## 2021-08-30 RX ORDER — BENZONATATE 100 MG/1
200 CAPSULE ORAL EVERY 4 HOURS PRN
Status: DISCONTINUED | OUTPATIENT
Start: 2021-08-30 | End: 2021-09-02 | Stop reason: HOSPADM

## 2021-08-30 RX ORDER — SODIUM CHLORIDE 0.9 % (FLUSH) 0.9 %
10 SYRINGE (ML) INJECTION AS NEEDED
Status: DISCONTINUED | OUTPATIENT
Start: 2021-08-29 | End: 2021-08-30

## 2021-08-30 RX ORDER — ASPIRIN 81 MG/1
81 TABLET ORAL DAILY
Status: DISCONTINUED | OUTPATIENT
Start: 2021-08-30 | End: 2021-09-02 | Stop reason: HOSPADM

## 2021-08-30 RX ORDER — MELATONIN
2000 DAILY
Status: DISCONTINUED | OUTPATIENT
Start: 2021-08-30 | End: 2021-09-02 | Stop reason: HOSPADM

## 2021-08-30 RX ORDER — ACETAMINOPHEN 650 MG/1
650 SUPPOSITORY RECTAL EVERY 4 HOURS PRN
Status: DISCONTINUED | OUTPATIENT
Start: 2021-08-30 | End: 2021-09-02 | Stop reason: HOSPADM

## 2021-08-30 RX ORDER — SODIUM CHLORIDE 0.9 % (FLUSH) 0.9 %
10 SYRINGE (ML) INJECTION ONCE AS NEEDED
Status: DISCONTINUED | OUTPATIENT
Start: 2021-08-29 | End: 2021-08-30

## 2021-08-30 RX ORDER — ATORVASTATIN CALCIUM 40 MG/1
80 TABLET, FILM COATED ORAL DAILY
Status: DISCONTINUED | OUTPATIENT
Start: 2021-08-30 | End: 2021-09-02 | Stop reason: HOSPADM

## 2021-08-30 RX ORDER — SODIUM CHLORIDE 0.9 % (FLUSH) 0.9 %
10 SYRINGE (ML) INJECTION AS NEEDED
Status: DISCONTINUED | OUTPATIENT
Start: 2021-08-30 | End: 2021-09-02 | Stop reason: HOSPADM

## 2021-08-30 RX ORDER — ONDANSETRON 2 MG/ML
4 INJECTION INTRAMUSCULAR; INTRAVENOUS EVERY 6 HOURS PRN
Status: DISCONTINUED | OUTPATIENT
Start: 2021-08-30 | End: 2021-09-02 | Stop reason: HOSPADM

## 2021-08-30 RX ORDER — BRIMONIDINE TARTRATE 2 MG/ML
1 SOLUTION/ DROPS OPHTHALMIC 2 TIMES DAILY
COMMUNITY

## 2021-08-30 RX ORDER — NICOTINE POLACRILEX 4 MG
15 LOZENGE BUCCAL
Status: DISCONTINUED | OUTPATIENT
Start: 2021-08-30 | End: 2021-09-02 | Stop reason: HOSPADM

## 2021-08-30 RX ORDER — DEXTROSE, SODIUM CHLORIDE, AND POTASSIUM CHLORIDE 5; .45; .15 G/100ML; G/100ML; G/100ML
150 INJECTION INTRAVENOUS CONTINUOUS PRN
Status: DISCONTINUED | OUTPATIENT
Start: 2021-08-29 | End: 2021-08-30

## 2021-08-30 RX ORDER — ACETAMINOPHEN 325 MG/1
650 TABLET ORAL EVERY 4 HOURS PRN
Status: DISCONTINUED | OUTPATIENT
Start: 2021-08-30 | End: 2021-09-02 | Stop reason: HOSPADM

## 2021-08-30 RX ORDER — DEXTROSE AND SODIUM CHLORIDE 5; .9 G/100ML; G/100ML
150 INJECTION, SOLUTION INTRAVENOUS CONTINUOUS PRN
Status: DISCONTINUED | OUTPATIENT
Start: 2021-08-29 | End: 2021-08-30

## 2021-08-30 RX ORDER — ALBUTEROL SULFATE 90 UG/1
2 AEROSOL, METERED RESPIRATORY (INHALATION)
Status: DISCONTINUED | OUTPATIENT
Start: 2021-08-30 | End: 2021-08-31

## 2021-08-30 RX ORDER — DEXTROSE, SODIUM CHLORIDE, AND POTASSIUM CHLORIDE 5; .45; .3 G/100ML; G/100ML; G/100ML
150 INJECTION INTRAVENOUS CONTINUOUS PRN
Status: DISCONTINUED | OUTPATIENT
Start: 2021-08-29 | End: 2021-08-30

## 2021-08-30 RX ORDER — TIMOLOL MALEATE 5 MG/ML
1 SOLUTION/ DROPS OPHTHALMIC DAILY
Status: DISCONTINUED | OUTPATIENT
Start: 2021-08-30 | End: 2021-09-02 | Stop reason: HOSPADM

## 2021-08-30 RX ORDER — SODIUM CHLORIDE 9 MG/ML
250 INJECTION, SOLUTION INTRAVENOUS CONTINUOUS PRN
Status: DISCONTINUED | OUTPATIENT
Start: 2021-08-29 | End: 2021-08-30

## 2021-08-30 RX ORDER — DEXTROSE, SODIUM CHLORIDE, AND POTASSIUM CHLORIDE 5; .9; .15 G/100ML; G/100ML; G/100ML
150 INJECTION INTRAVENOUS CONTINUOUS PRN
Status: DISCONTINUED | OUTPATIENT
Start: 2021-08-29 | End: 2021-08-30

## 2021-08-30 RX ORDER — DEXTROSE AND SODIUM CHLORIDE 5; .45 G/100ML; G/100ML
150 INJECTION, SOLUTION INTRAVENOUS CONTINUOUS PRN
Status: DISCONTINUED | OUTPATIENT
Start: 2021-08-29 | End: 2021-08-30

## 2021-08-30 RX ORDER — DEXTROSE MONOHYDRATE 25 G/50ML
12.5 INJECTION, SOLUTION INTRAVENOUS AS NEEDED
Status: DISCONTINUED | OUTPATIENT
Start: 2021-08-29 | End: 2021-08-30

## 2021-08-30 RX ORDER — ASCORBIC ACID 500 MG
500 TABLET ORAL DAILY
Status: DISCONTINUED | OUTPATIENT
Start: 2021-08-30 | End: 2021-09-02 | Stop reason: HOSPADM

## 2021-08-30 RX ORDER — SODIUM CHLORIDE 9 MG/ML
10 INJECTION, SOLUTION INTRAVENOUS CONTINUOUS PRN
Status: DISCONTINUED | OUTPATIENT
Start: 2021-08-29 | End: 2021-08-30

## 2021-08-30 RX ORDER — NITROGLYCERIN 0.4 MG/1
0.4 TABLET SUBLINGUAL
Status: DISCONTINUED | OUTPATIENT
Start: 2021-08-30 | End: 2021-09-02 | Stop reason: HOSPADM

## 2021-08-30 RX ORDER — DULOXETIN HYDROCHLORIDE 20 MG/1
20 CAPSULE, DELAYED RELEASE ORAL DAILY
Status: DISCONTINUED | OUTPATIENT
Start: 2021-08-30 | End: 2021-09-02 | Stop reason: HOSPADM

## 2021-08-30 RX ORDER — SODIUM CHLORIDE 0.9 % (FLUSH) 0.9 %
10 SYRINGE (ML) INJECTION EVERY 12 HOURS SCHEDULED
Status: DISCONTINUED | OUTPATIENT
Start: 2021-08-30 | End: 2021-09-02 | Stop reason: HOSPADM

## 2021-08-30 RX ORDER — ZINC SULFATE 50(220)MG
220 CAPSULE ORAL DAILY
Status: DISCONTINUED | OUTPATIENT
Start: 2021-08-30 | End: 2021-09-02 | Stop reason: HOSPADM

## 2021-08-30 RX ORDER — SODIUM CHLORIDE 450 MG/100ML
250 INJECTION, SOLUTION INTRAVENOUS CONTINUOUS PRN
Status: DISCONTINUED | OUTPATIENT
Start: 2021-08-29 | End: 2021-08-30

## 2021-08-30 RX ORDER — FUROSEMIDE 10 MG/ML
40 INJECTION INTRAMUSCULAR; INTRAVENOUS ONCE
Status: COMPLETED | OUTPATIENT
Start: 2021-08-30 | End: 2021-08-30

## 2021-08-30 RX ORDER — LANOLIN ALCOHOL/MO/W.PET/CERES
3 CREAM (GRAM) TOPICAL NIGHTLY
Status: DISCONTINUED | OUTPATIENT
Start: 2021-08-30 | End: 2021-09-02 | Stop reason: HOSPADM

## 2021-08-30 RX ORDER — DEXAMETHASONE 2 MG/1
2 TABLET ORAL
COMMUNITY
Start: 2021-08-27 | End: 2021-09-02 | Stop reason: HOSPADM

## 2021-08-30 RX ORDER — SODIUM CHLORIDE AND POTASSIUM CHLORIDE 150; 450 MG/100ML; MG/100ML
250 INJECTION, SOLUTION INTRAVENOUS CONTINUOUS PRN
Status: DISCONTINUED | OUTPATIENT
Start: 2021-08-29 | End: 2021-08-30

## 2021-08-30 RX ORDER — POTASSIUM CHLORIDE, DEXTROSE MONOHYDRATE AND SODIUM CHLORIDE 300; 5; 900 MG/100ML; G/100ML; MG/100ML
150 INJECTION, SOLUTION INTRAVENOUS CONTINUOUS PRN
Status: DISCONTINUED | OUTPATIENT
Start: 2021-08-29 | End: 2021-08-30

## 2021-08-30 RX ORDER — CEFDINIR 300 MG/1
300 CAPSULE ORAL 2 TIMES DAILY
COMMUNITY
Start: 2021-08-27 | End: 2021-09-02 | Stop reason: HOSPADM

## 2021-08-30 RX ORDER — DEXTROMETHORPHAN POLISTIREX 30 MG/5ML
60 SUSPENSION ORAL EVERY 12 HOURS PRN
Status: DISCONTINUED | OUTPATIENT
Start: 2021-08-30 | End: 2021-09-02 | Stop reason: HOSPADM

## 2021-08-30 RX ORDER — DEXTROSE MONOHYDRATE 25 G/50ML
25 INJECTION, SOLUTION INTRAVENOUS
Status: DISCONTINUED | OUTPATIENT
Start: 2021-08-30 | End: 2021-09-02 | Stop reason: HOSPADM

## 2021-08-30 RX ORDER — FAMOTIDINE 10 MG/ML
20 INJECTION, SOLUTION INTRAVENOUS DAILY
Status: DISCONTINUED | OUTPATIENT
Start: 2021-08-30 | End: 2021-09-01

## 2021-08-30 RX ORDER — SODIUM CHLORIDE 0.9 % (FLUSH) 0.9 %
3 SYRINGE (ML) INJECTION EVERY 12 HOURS SCHEDULED
Status: DISCONTINUED | OUTPATIENT
Start: 2021-08-30 | End: 2021-08-30

## 2021-08-30 RX ORDER — METHYLPREDNISOLONE 4 MG/1
TABLET ORAL TAKE AS DIRECTED
Status: ON HOLD | COMMUNITY
Start: 2021-08-25 | End: 2021-08-30

## 2021-08-30 RX ORDER — SODIUM CHLORIDE AND POTASSIUM CHLORIDE 300; 900 MG/100ML; MG/100ML
250 INJECTION, SOLUTION INTRAVENOUS CONTINUOUS PRN
Status: DISCONTINUED | OUTPATIENT
Start: 2021-08-29 | End: 2021-08-30

## 2021-08-30 RX ORDER — SODIUM CHLORIDE AND POTASSIUM CHLORIDE 150; 900 MG/100ML; MG/100ML
250 INJECTION, SOLUTION INTRAVENOUS CONTINUOUS PRN
Status: DISCONTINUED | OUTPATIENT
Start: 2021-08-29 | End: 2021-08-30

## 2021-08-30 RX ADMIN — METOPROLOL SUCCINATE 75 MG: 25 TABLET, EXTENDED RELEASE ORAL at 08:14

## 2021-08-30 RX ADMIN — ASPIRIN 81 MG: 81 TABLET, COATED ORAL at 08:14

## 2021-08-30 RX ADMIN — TIMOLOL MALEATE 1 DROP: 5 SOLUTION/ DROPS OPHTHALMIC at 08:15

## 2021-08-30 RX ADMIN — ATORVASTATIN CALCIUM 80 MG: 40 TABLET, FILM COATED ORAL at 08:14

## 2021-08-30 RX ADMIN — ACETAMINOPHEN 650 MG: 325 TABLET, FILM COATED ORAL at 18:56

## 2021-08-30 RX ADMIN — FAMOTIDINE 20 MG: 10 INJECTION INTRAVENOUS at 10:33

## 2021-08-30 RX ADMIN — OXYCODONE HYDROCHLORIDE AND ACETAMINOPHEN 500 MG: 500 TABLET ORAL at 12:51

## 2021-08-30 RX ADMIN — TICAGRELOR 60 MG: 60 TABLET ORAL at 08:14

## 2021-08-30 RX ADMIN — TICAGRELOR 60 MG: 60 TABLET ORAL at 21:15

## 2021-08-30 RX ADMIN — ALBUTEROL SULFATE 2 PUFF: 90 AEROSOL, METERED RESPIRATORY (INHALATION) at 18:26

## 2021-08-30 RX ADMIN — ALBUTEROL SULFATE 2 PUFF: 90 AEROSOL, METERED RESPIRATORY (INHALATION) at 11:02

## 2021-08-30 RX ADMIN — ALBUTEROL SULFATE 2 PUFF: 90 AEROSOL, METERED RESPIRATORY (INHALATION) at 07:00

## 2021-08-30 RX ADMIN — ZINC SULFATE 220 MG (50 MG) CAPSULE 220 MG: CAPSULE at 12:52

## 2021-08-30 RX ADMIN — FUROSEMIDE 40 MG: 10 INJECTION INTRAMUSCULAR; INTRAVENOUS at 11:45

## 2021-08-30 RX ADMIN — SODIUM CHLORIDE, PRESERVATIVE FREE 3 ML: 5 INJECTION INTRAVENOUS at 08:15

## 2021-08-30 RX ADMIN — SODIUM CHLORIDE 2000 ML: 9 INJECTION, SOLUTION INTRAVENOUS at 00:10

## 2021-08-30 RX ADMIN — SODIUM CHLORIDE, PRESERVATIVE FREE 3 ML: 5 INJECTION INTRAVENOUS at 01:17

## 2021-08-30 RX ADMIN — ONDANSETRON 4 MG: 2 INJECTION INTRAMUSCULAR; INTRAVENOUS at 00:59

## 2021-08-30 RX ADMIN — INSULIN DETEMIR 20 UNITS: 100 INJECTION, SOLUTION SUBCUTANEOUS at 10:33

## 2021-08-30 RX ADMIN — POTASSIUM CHLORIDE AND SODIUM CHLORIDE 250 ML/HR: 450; 150 INJECTION, SOLUTION INTRAVENOUS at 02:56

## 2021-08-30 RX ADMIN — PERFLUTREN 8.48 MG: 6.52 INJECTION, SUSPENSION INTRAVENOUS at 12:04

## 2021-08-30 RX ADMIN — INSULIN LISPRO 8 UNITS: 100 INJECTION, SOLUTION INTRAVENOUS; SUBCUTANEOUS at 21:21

## 2021-08-30 RX ADMIN — THIAMINE HCL TAB 100 MG 100 MG: 100 TAB at 12:52

## 2021-08-30 RX ADMIN — SODIUM CHLORIDE, PRESERVATIVE FREE 10 ML: 5 INJECTION INTRAVENOUS at 08:15

## 2021-08-30 RX ADMIN — ONDANSETRON 4 MG: 2 INJECTION INTRAMUSCULAR; INTRAVENOUS at 13:59

## 2021-08-30 RX ADMIN — POTASSIUM CHLORIDE, DEXTROSE MONOHYDRATE AND SODIUM CHLORIDE 150 ML/HR: 150; 5; 900 INJECTION, SOLUTION INTRAVENOUS at 05:59

## 2021-08-30 RX ADMIN — SODIUM CHLORIDE 10 UNITS/HR: 9 INJECTION, SOLUTION INTRAVENOUS at 01:00

## 2021-08-30 RX ADMIN — Medication 3 MG: at 21:15

## 2021-08-30 RX ADMIN — Medication 2000 UNITS: at 12:51

## 2021-08-30 RX ADMIN — ENOXAPARIN SODIUM 40 MG: 40 INJECTION SUBCUTANEOUS at 08:14

## 2021-08-30 RX ADMIN — INSULIN DETEMIR 20 UNITS: 100 INJECTION, SOLUTION SUBCUTANEOUS at 21:18

## 2021-08-30 RX ADMIN — ALBUTEROL SULFATE 2 PUFF: 90 AEROSOL, METERED RESPIRATORY (INHALATION) at 14:40

## 2021-08-30 RX ADMIN — SODIUM CHLORIDE, PRESERVATIVE FREE 10 ML: 5 INJECTION INTRAVENOUS at 21:22

## 2021-08-30 RX ADMIN — DULOXETINE HYDROCHLORIDE 20 MG: 20 CAPSULE, DELAYED RELEASE ORAL at 08:14

## 2021-08-30 RX ADMIN — INSULIN LISPRO 8 UNITS: 100 INJECTION, SOLUTION INTRAVENOUS; SUBCUTANEOUS at 18:56

## 2021-08-31 ENCOUNTER — APPOINTMENT (OUTPATIENT)
Dept: NUCLEAR MEDICINE | Facility: HOSPITAL | Age: 56
End: 2021-08-31

## 2021-08-31 ENCOUNTER — APPOINTMENT (OUTPATIENT)
Dept: GENERAL RADIOLOGY | Facility: HOSPITAL | Age: 56
End: 2021-08-31

## 2021-08-31 LAB
ALBUMIN SERPL-MCNC: 3.3 G/DL (ref 3.5–5.2)
ALBUMIN/GLOB SERPL: 1.1 G/DL
ALP SERPL-CCNC: 74 U/L (ref 39–117)
ALT SERPL W P-5'-P-CCNC: 20 U/L (ref 1–41)
ANION GAP SERPL CALCULATED.3IONS-SCNC: 11 MMOL/L (ref 5–15)
APTT PPP: 24.1 SECONDS (ref 24.1–35)
AST SERPL-CCNC: 29 U/L (ref 1–40)
BASOPHILS # BLD AUTO: 0.01 10*3/MM3 (ref 0–0.2)
BASOPHILS NFR BLD AUTO: 0.2 % (ref 0–1.5)
BILIRUB SERPL-MCNC: 0.7 MG/DL (ref 0–1.2)
BUN SERPL-MCNC: 37 MG/DL (ref 6–20)
BUN/CREAT SERPL: 20.9 (ref 7–25)
CALCIUM SPEC-SCNC: 8.6 MG/DL (ref 8.6–10.5)
CHLORIDE SERPL-SCNC: 103 MMOL/L (ref 98–107)
CHOLEST SERPL-MCNC: 147 MG/DL (ref 0–200)
CO2 SERPL-SCNC: 25 MMOL/L (ref 22–29)
CREAT SERPL-MCNC: 1.77 MG/DL (ref 0.76–1.27)
CRP SERPL-MCNC: 4.01 MG/DL (ref 0–0.5)
D DIMER PPP FEU-MCNC: 1 MG/L (FEU) (ref 0–0.5)
DEPRECATED RDW RBC AUTO: 46.7 FL (ref 37–54)
EOSINOPHIL # BLD AUTO: 0.01 10*3/MM3 (ref 0–0.4)
EOSINOPHIL NFR BLD AUTO: 0.2 % (ref 0.3–6.2)
ERYTHROCYTE [DISTWIDTH] IN BLOOD BY AUTOMATED COUNT: 14.2 % (ref 12.3–15.4)
FERRITIN SERPL-MCNC: 561.7 NG/ML (ref 30–400)
FIBRINOGEN PPP-MCNC: 668 MG/DL (ref 240–460)
GFR SERPL CREATININE-BSD FRML MDRD: 40 ML/MIN/1.73
GLOBULIN UR ELPH-MCNC: 3 GM/DL
GLUCOSE BLDC GLUCOMTR-MCNC: 117 MG/DL (ref 70–130)
GLUCOSE BLDC GLUCOMTR-MCNC: 222 MG/DL (ref 70–130)
GLUCOSE BLDC GLUCOMTR-MCNC: 229 MG/DL (ref 70–130)
GLUCOSE BLDC GLUCOMTR-MCNC: 230 MG/DL (ref 70–130)
GLUCOSE SERPL-MCNC: 162 MG/DL (ref 65–99)
HCT VFR BLD AUTO: 37.9 % (ref 37.5–51)
HDLC SERPL-MCNC: 23 MG/DL (ref 40–60)
HGB BLD-MCNC: 12 G/DL (ref 13–17.7)
IL6 SERPL-MCNC: 8.6 PG/ML (ref 0–13)
IMM GRANULOCYTES # BLD AUTO: 0.02 10*3/MM3 (ref 0–0.05)
IMM GRANULOCYTES NFR BLD AUTO: 0.4 % (ref 0–0.5)
INR PPP: 1 (ref 0.91–1.09)
LDH SERPL-CCNC: 419 U/L (ref 135–225)
LDLC SERPL CALC-MCNC: 82 MG/DL (ref 0–100)
LDLC/HDLC SERPL: 3.18 {RATIO}
LYMPHOCYTES # BLD AUTO: 1.12 10*3/MM3 (ref 0.7–3.1)
LYMPHOCYTES NFR BLD AUTO: 20.4 % (ref 19.6–45.3)
MCH RBC QN AUTO: 28.4 PG (ref 26.6–33)
MCHC RBC AUTO-ENTMCNC: 31.7 G/DL (ref 31.5–35.7)
MCV RBC AUTO: 89.6 FL (ref 79–97)
MONOCYTES # BLD AUTO: 0.57 10*3/MM3 (ref 0.1–0.9)
MONOCYTES NFR BLD AUTO: 10.4 % (ref 5–12)
NEUTROPHILS NFR BLD AUTO: 3.75 10*3/MM3 (ref 1.7–7)
NEUTROPHILS NFR BLD AUTO: 68.4 % (ref 42.7–76)
NRBC BLD AUTO-RTO: 0 /100 WBC (ref 0–0.2)
PLATELET # BLD AUTO: 293 10*3/MM3 (ref 140–450)
PMV BLD AUTO: 9.3 FL (ref 6–12)
POTASSIUM SERPL-SCNC: 3.9 MMOL/L (ref 3.5–5.2)
PROCALCITONIN SERPL-MCNC: 0.53 NG/ML (ref 0–0.25)
PROT SERPL-MCNC: 6.3 G/DL (ref 6–8.5)
PROTHROMBIN TIME: 12.4 SECONDS (ref 11.5–13.4)
RBC # BLD AUTO: 4.23 10*6/MM3 (ref 4.14–5.8)
SODIUM SERPL-SCNC: 139 MMOL/L (ref 136–145)
T4 FREE SERPL-MCNC: 1.08 NG/DL (ref 0.93–1.7)
TRIGL SERPL-MCNC: 254 MG/DL (ref 0–150)
TSH SERPL DL<=0.05 MIU/L-ACNC: 4.91 UIU/ML (ref 0.27–4.2)
VLDLC SERPL-MCNC: 42 MG/DL (ref 5–40)
WBC # BLD AUTO: 5.48 10*3/MM3 (ref 3.4–10.8)

## 2021-08-31 PROCEDURE — 86140 C-REACTIVE PROTEIN: CPT | Performed by: INTERNAL MEDICINE

## 2021-08-31 PROCEDURE — 25010000002 ENOXAPARIN PER 10 MG: Performed by: INTERNAL MEDICINE

## 2021-08-31 PROCEDURE — 71045 X-RAY EXAM CHEST 1 VIEW: CPT

## 2021-08-31 PROCEDURE — 63710000001 INSULIN DETEMIR PER 5 UNITS: Performed by: FAMILY MEDICINE

## 2021-08-31 PROCEDURE — 85610 PROTHROMBIN TIME: CPT | Performed by: INTERNAL MEDICINE

## 2021-08-31 PROCEDURE — 83615 LACTATE (LD) (LDH) ENZYME: CPT | Performed by: INTERNAL MEDICINE

## 2021-08-31 PROCEDURE — 84439 ASSAY OF FREE THYROXINE: CPT | Performed by: FAMILY MEDICINE

## 2021-08-31 PROCEDURE — 84443 ASSAY THYROID STIM HORMONE: CPT | Performed by: FAMILY MEDICINE

## 2021-08-31 PROCEDURE — 63710000001 INSULIN LISPRO (HUMAN) PER 5 UNITS: Performed by: FAMILY MEDICINE

## 2021-08-31 PROCEDURE — 85384 FIBRINOGEN ACTIVITY: CPT | Performed by: INTERNAL MEDICINE

## 2021-08-31 PROCEDURE — 84145 PROCALCITONIN (PCT): CPT | Performed by: INTERNAL MEDICINE

## 2021-08-31 PROCEDURE — 80053 COMPREHEN METABOLIC PANEL: CPT | Performed by: INTERNAL MEDICINE

## 2021-08-31 PROCEDURE — 82728 ASSAY OF FERRITIN: CPT | Performed by: INTERNAL MEDICINE

## 2021-08-31 PROCEDURE — 85025 COMPLETE CBC W/AUTO DIFF WBC: CPT | Performed by: INTERNAL MEDICINE

## 2021-08-31 PROCEDURE — 25010000002 ONDANSETRON PER 1 MG: Performed by: INTERNAL MEDICINE

## 2021-08-31 PROCEDURE — 80061 LIPID PANEL: CPT | Performed by: FAMILY MEDICINE

## 2021-08-31 PROCEDURE — 82962 GLUCOSE BLOOD TEST: CPT

## 2021-08-31 PROCEDURE — 85730 THROMBOPLASTIN TIME PARTIAL: CPT | Performed by: INTERNAL MEDICINE

## 2021-08-31 PROCEDURE — 85379 FIBRIN DEGRADATION QUANT: CPT | Performed by: INTERNAL MEDICINE

## 2021-08-31 RX ORDER — ALBUTEROL SULFATE 90 UG/1
2 AEROSOL, METERED RESPIRATORY (INHALATION) EVERY 4 HOURS PRN
Status: DISCONTINUED | OUTPATIENT
Start: 2021-08-31 | End: 2021-09-02 | Stop reason: HOSPADM

## 2021-08-31 RX ADMIN — OXYCODONE HYDROCHLORIDE AND ACETAMINOPHEN 500 MG: 500 TABLET ORAL at 08:42

## 2021-08-31 RX ADMIN — Medication 2000 UNITS: at 08:42

## 2021-08-31 RX ADMIN — SODIUM CHLORIDE, PRESERVATIVE FREE 10 ML: 5 INJECTION INTRAVENOUS at 08:43

## 2021-08-31 RX ADMIN — METOPROLOL SUCCINATE 75 MG: 25 TABLET, EXTENDED RELEASE ORAL at 08:42

## 2021-08-31 RX ADMIN — INSULIN LISPRO 5 UNITS: 100 INJECTION, SOLUTION INTRAVENOUS; SUBCUTANEOUS at 22:23

## 2021-08-31 RX ADMIN — TICAGRELOR 60 MG: 60 TABLET ORAL at 22:23

## 2021-08-31 RX ADMIN — ENOXAPARIN SODIUM 40 MG: 40 INJECTION SUBCUTANEOUS at 08:42

## 2021-08-31 RX ADMIN — INSULIN LISPRO 5 UNITS: 100 INJECTION, SOLUTION INTRAVENOUS; SUBCUTANEOUS at 18:31

## 2021-08-31 RX ADMIN — ATORVASTATIN CALCIUM 80 MG: 40 TABLET, FILM COATED ORAL at 08:43

## 2021-08-31 RX ADMIN — ASPIRIN 81 MG: 81 TABLET, COATED ORAL at 08:42

## 2021-08-31 RX ADMIN — Medication 3 MG: at 22:22

## 2021-08-31 RX ADMIN — TICAGRELOR 60 MG: 60 TABLET ORAL at 08:42

## 2021-08-31 RX ADMIN — ONDANSETRON 4 MG: 2 INJECTION INTRAMUSCULAR; INTRAVENOUS at 11:41

## 2021-08-31 RX ADMIN — DULOXETINE HYDROCHLORIDE 20 MG: 20 CAPSULE, DELAYED RELEASE ORAL at 08:43

## 2021-08-31 RX ADMIN — SODIUM CHLORIDE, PRESERVATIVE FREE 10 ML: 5 INJECTION INTRAVENOUS at 22:24

## 2021-08-31 RX ADMIN — ZINC SULFATE 220 MG (50 MG) CAPSULE 220 MG: CAPSULE at 08:43

## 2021-08-31 RX ADMIN — FAMOTIDINE 20 MG: 10 INJECTION INTRAVENOUS at 09:01

## 2021-08-31 RX ADMIN — INSULIN DETEMIR 20 UNITS: 100 INJECTION, SOLUTION SUBCUTANEOUS at 22:24

## 2021-08-31 RX ADMIN — INSULIN DETEMIR 20 UNITS: 100 INJECTION, SOLUTION SUBCUTANEOUS at 08:43

## 2021-08-31 RX ADMIN — THIAMINE HCL TAB 100 MG 100 MG: 100 TAB at 08:42

## 2021-09-01 ENCOUNTER — APPOINTMENT (OUTPATIENT)
Dept: CT IMAGING | Facility: HOSPITAL | Age: 56
End: 2021-09-01

## 2021-09-01 LAB
ALBUMIN SERPL-MCNC: 3.3 G/DL (ref 3.5–5.2)
ALBUMIN/GLOB SERPL: 1.1 G/DL
ALP SERPL-CCNC: 81 U/L (ref 39–117)
ALT SERPL W P-5'-P-CCNC: 16 U/L (ref 1–41)
ANION GAP SERPL CALCULATED.3IONS-SCNC: 20 MMOL/L (ref 5–15)
AST SERPL-CCNC: 26 U/L (ref 1–40)
BASOPHILS # BLD AUTO: 0.02 10*3/MM3 (ref 0–0.2)
BASOPHILS NFR BLD AUTO: 0.3 % (ref 0–1.5)
BILIRUB SERPL-MCNC: 0.7 MG/DL (ref 0–1.2)
BUN SERPL-MCNC: 25 MG/DL (ref 6–20)
BUN/CREAT SERPL: 21.2 (ref 7–25)
CALCIUM SPEC-SCNC: 8.9 MG/DL (ref 8.6–10.5)
CHLORIDE SERPL-SCNC: 98 MMOL/L (ref 98–107)
CO2 SERPL-SCNC: 27 MMOL/L (ref 22–29)
CREAT SERPL-MCNC: 1.18 MG/DL (ref 0.76–1.27)
CRP SERPL-MCNC: 2.53 MG/DL (ref 0–0.5)
DEPRECATED RDW RBC AUTO: 44.5 FL (ref 37–54)
EOSINOPHIL # BLD AUTO: 0.08 10*3/MM3 (ref 0–0.4)
EOSINOPHIL NFR BLD AUTO: 1.3 % (ref 0.3–6.2)
ERYTHROCYTE [DISTWIDTH] IN BLOOD BY AUTOMATED COUNT: 13.8 % (ref 12.3–15.4)
FERRITIN SERPL-MCNC: 537.3 NG/ML (ref 30–400)
GFR SERPL CREATININE-BSD FRML MDRD: 64 ML/MIN/1.73
GLOBULIN UR ELPH-MCNC: 3.1 GM/DL
GLUCOSE BLDC GLUCOMTR-MCNC: 111 MG/DL (ref 70–130)
GLUCOSE BLDC GLUCOMTR-MCNC: 138 MG/DL (ref 70–130)
GLUCOSE BLDC GLUCOMTR-MCNC: 150 MG/DL (ref 70–130)
GLUCOSE BLDC GLUCOMTR-MCNC: 176 MG/DL (ref 70–130)
GLUCOSE SERPL-MCNC: 79 MG/DL (ref 65–99)
HCT VFR BLD AUTO: 37.8 % (ref 37.5–51)
HGB BLD-MCNC: 12.7 G/DL (ref 13–17.7)
IMM GRANULOCYTES # BLD AUTO: 0.05 10*3/MM3 (ref 0–0.05)
IMM GRANULOCYTES NFR BLD AUTO: 0.8 % (ref 0–0.5)
LYMPHOCYTES # BLD AUTO: 1.36 10*3/MM3 (ref 0.7–3.1)
LYMPHOCYTES NFR BLD AUTO: 22.4 % (ref 19.6–45.3)
MCH RBC QN AUTO: 29.7 PG (ref 26.6–33)
MCHC RBC AUTO-ENTMCNC: 33.6 G/DL (ref 31.5–35.7)
MCV RBC AUTO: 88.5 FL (ref 79–97)
MONOCYTES # BLD AUTO: 0.64 10*3/MM3 (ref 0.1–0.9)
MONOCYTES NFR BLD AUTO: 10.5 % (ref 5–12)
NEUTROPHILS NFR BLD AUTO: 3.92 10*3/MM3 (ref 1.7–7)
NEUTROPHILS NFR BLD AUTO: 64.7 % (ref 42.7–76)
NRBC BLD AUTO-RTO: 0 /100 WBC (ref 0–0.2)
NT-PROBNP SERPL-MCNC: 860.1 PG/ML (ref 0–900)
PLATELET # BLD AUTO: 310 10*3/MM3 (ref 140–450)
PMV BLD AUTO: 9.2 FL (ref 6–12)
POTASSIUM SERPL-SCNC: 4.2 MMOL/L (ref 3.5–5.2)
PROT SERPL-MCNC: 6.4 G/DL (ref 6–8.5)
RBC # BLD AUTO: 4.27 10*6/MM3 (ref 4.14–5.8)
SODIUM SERPL-SCNC: 145 MMOL/L (ref 136–145)
WBC # BLD AUTO: 6.07 10*3/MM3 (ref 3.4–10.8)

## 2021-09-01 PROCEDURE — 80053 COMPREHEN METABOLIC PANEL: CPT | Performed by: INTERNAL MEDICINE

## 2021-09-01 PROCEDURE — 25010000002 ENOXAPARIN PER 10 MG: Performed by: INTERNAL MEDICINE

## 2021-09-01 PROCEDURE — 82728 ASSAY OF FERRITIN: CPT | Performed by: INTERNAL MEDICINE

## 2021-09-01 PROCEDURE — 85025 COMPLETE CBC W/AUTO DIFF WBC: CPT | Performed by: INTERNAL MEDICINE

## 2021-09-01 PROCEDURE — 83880 ASSAY OF NATRIURETIC PEPTIDE: CPT | Performed by: FAMILY MEDICINE

## 2021-09-01 PROCEDURE — XW033E5 INTRODUCTION OF REMDESIVIR ANTI-INFECTIVE INTO PERIPHERAL VEIN, PERCUTANEOUS APPROACH, NEW TECHNOLOGY GROUP 5: ICD-10-PCS | Performed by: FAMILY MEDICINE

## 2021-09-01 PROCEDURE — 63710000001 DEXAMETHASONE PER 0.25 MG: Performed by: FAMILY MEDICINE

## 2021-09-01 PROCEDURE — 82962 GLUCOSE BLOOD TEST: CPT

## 2021-09-01 PROCEDURE — 25010000002 ENOXAPARIN PER 10 MG: Performed by: FAMILY MEDICINE

## 2021-09-01 PROCEDURE — 86140 C-REACTIVE PROTEIN: CPT | Performed by: INTERNAL MEDICINE

## 2021-09-01 PROCEDURE — 94799 UNLISTED PULMONARY SVC/PX: CPT

## 2021-09-01 PROCEDURE — 63710000001 INSULIN LISPRO (HUMAN) PER 5 UNITS: Performed by: FAMILY MEDICINE

## 2021-09-01 PROCEDURE — 63710000001 INSULIN DETEMIR PER 5 UNITS: Performed by: FAMILY MEDICINE

## 2021-09-01 RX ORDER — FAMOTIDINE 20 MG/1
20 TABLET, FILM COATED ORAL 2 TIMES DAILY
Status: DISCONTINUED | OUTPATIENT
Start: 2021-09-01 | End: 2021-09-02 | Stop reason: HOSPADM

## 2021-09-01 RX ORDER — DEXAMETHASONE SODIUM PHOSPHATE 4 MG/ML
6 INJECTION, SOLUTION INTRA-ARTICULAR; INTRALESIONAL; INTRAMUSCULAR; INTRAVENOUS; SOFT TISSUE DAILY
Status: DISCONTINUED | OUTPATIENT
Start: 2021-09-01 | End: 2021-09-02 | Stop reason: HOSPADM

## 2021-09-01 RX ADMIN — INSULIN LISPRO 3 UNITS: 100 INJECTION, SOLUTION INTRAVENOUS; SUBCUTANEOUS at 12:51

## 2021-09-01 RX ADMIN — SODIUM CHLORIDE, PRESERVATIVE FREE 10 ML: 5 INJECTION INTRAVENOUS at 09:42

## 2021-09-01 RX ADMIN — TIMOLOL MALEATE 1 DROP: 5 SOLUTION/ DROPS OPHTHALMIC at 12:51

## 2021-09-01 RX ADMIN — FAMOTIDINE 20 MG: 10 INJECTION INTRAVENOUS at 09:51

## 2021-09-01 RX ADMIN — SODIUM CHLORIDE, PRESERVATIVE FREE 10 ML: 5 INJECTION INTRAVENOUS at 20:20

## 2021-09-01 RX ADMIN — INSULIN LISPRO 3 UNITS: 100 INJECTION, SOLUTION INTRAVENOUS; SUBCUTANEOUS at 18:51

## 2021-09-01 RX ADMIN — ENOXAPARIN SODIUM 40 MG: 40 INJECTION SUBCUTANEOUS at 20:21

## 2021-09-01 RX ADMIN — TICAGRELOR 60 MG: 60 TABLET ORAL at 09:41

## 2021-09-01 RX ADMIN — ATORVASTATIN CALCIUM 80 MG: 40 TABLET, FILM COATED ORAL at 09:41

## 2021-09-01 RX ADMIN — ENOXAPARIN SODIUM 40 MG: 40 INJECTION SUBCUTANEOUS at 09:40

## 2021-09-01 RX ADMIN — ZINC SULFATE 220 MG (50 MG) CAPSULE 220 MG: CAPSULE at 09:40

## 2021-09-01 RX ADMIN — Medication 2000 UNITS: at 09:41

## 2021-09-01 RX ADMIN — TICAGRELOR 60 MG: 60 TABLET ORAL at 20:21

## 2021-09-01 RX ADMIN — INSULIN DETEMIR 20 UNITS: 100 INJECTION, SOLUTION SUBCUTANEOUS at 20:23

## 2021-09-01 RX ADMIN — DEXAMETHASONE 6 MG: 4 TABLET ORAL at 17:33

## 2021-09-01 RX ADMIN — METOPROLOL SUCCINATE 75 MG: 25 TABLET, EXTENDED RELEASE ORAL at 09:43

## 2021-09-01 RX ADMIN — REMDESIVIR 200 MG: 100 INJECTION, POWDER, LYOPHILIZED, FOR SOLUTION INTRAVENOUS at 18:51

## 2021-09-01 RX ADMIN — OXYCODONE HYDROCHLORIDE AND ACETAMINOPHEN 500 MG: 500 TABLET ORAL at 09:41

## 2021-09-01 RX ADMIN — INSULIN DETEMIR 20 UNITS: 100 INJECTION, SOLUTION SUBCUTANEOUS at 09:43

## 2021-09-01 RX ADMIN — DULOXETINE HYDROCHLORIDE 20 MG: 20 CAPSULE, DELAYED RELEASE ORAL at 09:40

## 2021-09-01 RX ADMIN — Medication 3 MG: at 20:21

## 2021-09-01 RX ADMIN — ASPIRIN 81 MG: 81 TABLET, COATED ORAL at 09:41

## 2021-09-01 RX ADMIN — THIAMINE HCL TAB 100 MG 100 MG: 100 TAB at 09:41

## 2021-09-01 NOTE — PROGRESS NOTES
[facility] Medicine Services  INPATIENT PROGRESS NOTE    Length of Stay: 3  Date of Admission: 8/29/2021  Primary Care Physician: Rene Hogue DO    Subjective   Chief Complaint: DKA/acute renal failure/CHF/COVID-19 positive.    HPI   Patient was still requiring 3 L of oxygen, Decadron and remdesivir has started.  Patient currently off oxygen.  Plan for walking O2 sats.    Review of Systems   Constitutional: Positive for activity change, appetite change and fatigue. Negative for chills and fever.   HENT: Negative for hearing loss, nosebleeds, tinnitus and trouble swallowing.    Eyes: Negative for visual disturbance.   Respiratory: Negative for cough, chest tightness, shortness of breath and wheezing.    Cardiovascular: Negative for chest pain, palpitations and leg swelling.   Gastrointestinal: Negative for abdominal distention, abdominal pain, blood in stool, constipation, diarrhea, nausea and vomiting.   Endocrine: Negative for cold intolerance, heat intolerance, polydipsia, polyphagia and polyuria.   Genitourinary: Negative for decreased urine volume, difficulty urinating, dysuria, flank pain, frequency and hematuria.   Musculoskeletal: Negative for arthralgias, joint swelling and myalgias.   Skin: Negative for rash.   Allergic/Immunologic: Negative for immunocompromised state.   Neurological: Positive for weakness. Negative for dizziness, syncope, light-headedness and headaches.   Hematological: Negative for adenopathy. Does not bruise/bleed easily.   Psychiatric/Behavioral: Negative for confusion and sleep disturbance. The patient is not nervous/anxious.      All pertinent negatives and positives are as above. All other systems have been reviewed and are negative unless otherwise stated.     Objective    Temp:  [97.9 °F (36.6 °C)-98.8 °F (37.1 °C)] 98.6 °F (37 °C)  Heart Rate:  [66-77] 77  Resp:  [18-20] 20  BP: (118-144)/(56-82) 144/75    Intake/Output Summary  (Last 24 hours) at 9/1/2021 1631  Last data filed at 9/1/2021 1500  Gross per 24 hour   Intake 720 ml   Output 1050 ml   Net -330 ml     Physical Exam  Vitals and nursing note reviewed.   Constitutional:       Appearance: He is well-developed.   HENT:      Head: Normocephalic and atraumatic.   Eyes:      Conjunctiva/sclera: Conjunctivae normal.      Pupils: Pupils are equal, round, and reactive to light.   Neck:      Vascular: No JVD.   Cardiovascular:      Rate and Rhythm: Normal rate and regular rhythm.      Heart sounds: Normal heart sounds. No murmur heard.   No friction rub. No gallop.    Pulmonary:      Effort: Pulmonary effort is normal. No respiratory distress.      Breath sounds: Normal breath sounds. No wheezing or rales.      Comments: Patient is currently off oxygen.  Chest:      Chest wall: No tenderness.   Abdominal:      General: Bowel sounds are normal. There is no distension.      Palpations: Abdomen is soft.      Tenderness: There is no abdominal tenderness. There is no guarding or rebound.      Comments: Obesity.  BMI is 32.   Musculoskeletal:         General: No tenderness or deformity. Normal range of motion.      Cervical back: Neck supple.   Skin:     General: Skin is warm and dry.      Findings: No rash.   Neurological:      Mental Status: He is alert and oriented to person, place, and time.      Cranial Nerves: No cranial nerve deficit.      Motor: No abnormal muscle tone.      Deep Tendon Reflexes: Reflexes normal.   Psychiatric:         Behavior: Behavior normal.         Thought Content: Thought content normal.         Judgment: Judgment normal.      Results Review:  Lab Results (last 24 hours)     Procedure Component Value Units Date/Time    POC Glucose Once [[id number]]  (Abnormal) Collected: 09/01/21 1128    Specimen: Blood Updated: 09/01/21 1159     Glucose 176 mg/dL      Comment: : [id number] Pro LenkaMeter ID: [id number]       Blood Culture - Blood, Arm, Right [[id number]]  Collected: 08/30/21 1103    Specimen: Blood from Arm, Right Updated: 09/01/21 1130     Blood Culture No growth at 2 days    Blood Culture - Blood, Arm, Left [[id number]] Collected: 08/30/21 1104    Specimen: Blood from Arm, Left Updated: 09/01/21 1130     Blood Culture No growth at 2 days    POC Glucose Once [[id number]]  (Normal) Collected: 09/01/21 0810    Specimen: Blood Updated: 09/01/21 0839     Glucose 111 mg/dL      Comment: : [id number] Ruba GreggMeter ID: [id number]       Comprehensive Metabolic Panel [[id number]]  (Abnormal) Collected: 09/01/21 0506    Specimen: Blood Updated: 09/01/21 0621     Glucose 79 mg/dL      BUN 25 mg/dL      Creatinine 1.18 mg/dL      Sodium 145 mmol/L      Potassium 4.2 mmol/L      Chloride 98 mmol/L      CO2 27.0 mmol/L      Calcium 8.9 mg/dL      Total Protein 6.4 g/dL      Albumin 3.30 g/dL      ALT (SGPT) 16 U/L      AST (SGOT) 26 U/L      Alkaline Phosphatase 81 U/L      Total Bilirubin 0.7 mg/dL      eGFR Non African Amer 64 mL/min/1.73      Globulin 3.1 gm/dL      A/G Ratio 1.1 g/dL      BUN/Creatinine Ratio 21.2     Anion Gap 20.0 mmol/L     Narrative:      GFR Normal >60  Chronic Kidney Disease <60  Kidney Failure <15      C-reactive Protein [[id number]]  (Abnormal) Collected: 09/01/21 0506    Specimen: Blood Updated: 09/01/21 0621     C-Reactive Protein 2.53 mg/dL     Ferritin [[id number]]  (Abnormal) Collected: 09/01/21 0506    Specimen: Blood Updated: 09/01/21 0621     Ferritin 537.30 ng/mL     Narrative:      Results may be falsely decreased if patient taking Biotin.      CBC & Differential [[id number]]  (Abnormal) Collected: 09/01/21 0506    Specimen: Blood Updated: 09/01/21 0546    Narrative:      The following orders were created for panel order CBC & Differential.  Procedure                               Abnormality         Status                     ---------                               -----------         ------                     CBC Auto  Differential[[id number]]        Abnormal            Final result                 Please view results for these tests on the individual orders.    CBC Auto Differential [[id number]]  (Abnormal) Collected: 09/01/21 0506    Specimen: Blood Updated: 09/01/21 0546     WBC 6.07 10*3/mm3      RBC 4.27 10*6/mm3      Hemoglobin 12.7 g/dL      Hematocrit 37.8 %      MCV 88.5 fL      MCH 29.7 pg      MCHC 33.6 g/dL      RDW 13.8 %      RDW-SD 44.5 fl      MPV 9.2 fL      Platelets 310 10*3/mm3      Neutrophil % 64.7 %      Lymphocyte % 22.4 %      Monocyte % 10.5 %      Eosinophil % 1.3 %      Basophil % 0.3 %      Immature Grans % 0.8 %      Neutrophils, Absolute 3.92 10*3/mm3      Lymphocytes, Absolute 1.36 10*3/mm3      Monocytes, Absolute 0.64 10*3/mm3      Eosinophils, Absolute 0.08 10*3/mm3      Basophils, Absolute 0.02 10*3/mm3      Immature Grans, Absolute 0.05 10*3/mm3      nRBC 0.0 /100 WBC            Cultures:  Blood Culture   Date Value Ref Range Status   08/30/2021 No growth at 2 days  Preliminary   08/30/2021 No growth at 2 days  Preliminary       Radiology Data:    Imaging Results (Last 24 Hours)     ** No results found for the last 24 hours. **          No Known Allergies    Scheduled meds:   ascorbic acid, 500 mg, Oral, Daily  aspirin, 81 mg, Oral, Daily  atorvastatin, 80 mg, Oral, Daily  cholecalciferol, 2,000 Units, Oral, Daily  DULoxetine, 20 mg, Oral, Daily  enoxaparin, 40 mg, Subcutaneous, Q24H  famotidine, 20 mg, Intravenous, Daily  insulin detemir, 20 Units, Subcutaneous, Q12H  insulin lispro, 0-14 Units, Subcutaneous, 4x Daily With Meals & Nightly  melatonin, 3 mg, Oral, Nightly  metoprolol succinate XL, 75 mg, Oral, Daily  sodium chloride, 10 mL, Intravenous, Q12H  thiamine, 100 mg, Oral, Daily  ticagrelor, 60 mg, Oral, BID  timolol, 1 drop, Both Eyes, Daily  zinc sulfate, 220 mg, Oral, Daily        PRN meds:  •  acetaminophen **OR** acetaminophen  •  albuterol sulfate HFA  •  benzonatate  •   dextromethorphan polistirex ER  •  dextrose  •  dextrose  •  glucagon (human recombinant)  •  nitroglycerin  •  ondansetron  •  sodium chloride  •  sodium chloride    Assessment/Plan       DKA (diabetic ketoacidoses) (CMS/HCC)    CAD (coronary artery disease)    Dehydration    Acute kidney injury (CMS/HCC)    Chronic systolic CHF (congestive heart failure), NYHA class 2 (CMS/HCC)    Pneumonia due to COVID-19 virus    Noncompliance      Plan:  DKA/diabetes.  DKA resolved.  DC DKA protocol 8/30/2021.   Hemoglobin A1 C 9.0.  Anion gap has closed, history of CHF, DC IV fluids.  Put patient on sliding scale and Levemir for now.     Pneumonia due to Covid/positive for Covid.  Status post monoclonal antibody on Friday, 8/27/2021.  Patient has not been vaccinated for Covid.  Albuterol inhaler as needed.  Lovenox prophylaxis.  Tessalon Perle as needed..  Delsym as needed.  Vitamin C.  Vitamin D.  Zinc.  Thiamine.  Melatonin at night.  Incentive spirometer.  Flutter valve.  Decadron.  Remdesivir.    Chest x-ray-Atypical pneumonia with bibasilar patchy infiltrates- patchy infiltrates appearing slightly increased from the earlier radiograph. Lungs are well expanded, No pneumothorax, No pulmonary edema.  This patient was on 3 L earlier today, patient is currently off oxygen.     Hyperkalemia.  Resolved.     Nausea/vomiting.  Zofran as needed.  Pepcid.     CAD/hypertension/hyperlipidemia/chronic systolic heart failure-NYHA class II.  Aspirin.  Lipitor.  Toprol XL.  Brilinta . nitro as needed.  BNP.  Echocardiogram-ejection fraction 36 to 40%, diastolic dysfunction grade 1, no significant valvular dysfunction.     Acute renal failure/dehydration.  Slightly worsening creatinine today.     Elevated D-dimer.   Lovenox prophylaxis for now, patient is already on Brilinta and aspirin.   DC'd CT scan of the chest, patient does not want it.  Patient denies of any chest pain.  Currently patient is off oxygen.     Depression/anxiety.   Cymbalta.     Elevated TSH.  Free T4-normal.     Obesity.  BMI is 32.     Nutrition.  Cardiac/consistent carb diet.     Covid-19-positive.  Strep pneumo-negative.  Respiratory PCR-negative.  Legionella antigen-negative.  Blood pending.     Discharge Planning: 3 to 6 days.    Electronically signed by Aston Webb MD, 09/01/21, 4:31 PM CDT.

## 2021-09-01 NOTE — PLAN OF CARE
Goal Outcome Evaluation:  Plan of Care Reviewed With: patient         Patient alert and oriented. VSS. RA. PPP. Denies SOA while resting. Infrequent dry cough. SOA upon activity. O2 at 2 L/M while walking to toilet. Eating fair. Denies chest pain. Blood sugar monitoring. Insulin given per scheduled and SS. REDDY. Continue to monitor

## 2021-09-01 NOTE — PLAN OF CARE
Goal Outcome Evaluation:  Plan of Care Reviewed With: patient        Progress: no change  Outcome Summary: A/OX4. Denies having pain. VSS. Pt remains on RA. Blood sugars monitored. Insulin coverage provided. Pt has nonproductive cough. Sinus Rhythm on tele. Urinal within reach. Safety maintained.

## 2021-09-02 ENCOUNTER — READMISSION MANAGEMENT (OUTPATIENT)
Dept: CALL CENTER | Facility: HOSPITAL | Age: 56
End: 2021-09-02

## 2021-09-02 ENCOUNTER — HOME HEALTH ADMISSION (OUTPATIENT)
Dept: HOME HEALTH SERVICES | Facility: HOME HEALTHCARE | Age: 56
End: 2021-09-02

## 2021-09-02 VITALS
HEART RATE: 79 BPM | WEIGHT: 217.15 LBS | DIASTOLIC BLOOD PRESSURE: 64 MMHG | BODY MASS INDEX: 32.16 KG/M2 | SYSTOLIC BLOOD PRESSURE: 138 MMHG | TEMPERATURE: 98.4 F | HEIGHT: 69 IN | RESPIRATION RATE: 20 BRPM | OXYGEN SATURATION: 98 %

## 2021-09-02 PROBLEM — D89.831 CYTOKINE RELEASE SYNDROME, GRADE 1: Status: ACTIVE | Noted: 2021-09-02

## 2021-09-02 LAB
ALBUMIN SERPL-MCNC: 3.3 G/DL (ref 3.5–5.2)
ALBUMIN/GLOB SERPL: 1 G/DL
ALP SERPL-CCNC: 82 U/L (ref 39–117)
ALT SERPL W P-5'-P-CCNC: 15 U/L (ref 1–41)
ANION GAP SERPL CALCULATED.3IONS-SCNC: 12 MMOL/L (ref 5–15)
APTT PPP: 26.1 SECONDS (ref 24.1–35)
AST SERPL-CCNC: 24 U/L (ref 1–40)
BASOPHILS # BLD AUTO: 0.01 10*3/MM3 (ref 0–0.2)
BASOPHILS NFR BLD AUTO: 0.2 % (ref 0–1.5)
BILIRUB SERPL-MCNC: 0.6 MG/DL (ref 0–1.2)
BUN SERPL-MCNC: 24 MG/DL (ref 6–20)
BUN/CREAT SERPL: 21.8 (ref 7–25)
CALCIUM SPEC-SCNC: 8.8 MG/DL (ref 8.6–10.5)
CHLORIDE SERPL-SCNC: 100 MMOL/L (ref 98–107)
CO2 SERPL-SCNC: 22 MMOL/L (ref 22–29)
CREAT SERPL-MCNC: 1.1 MG/DL (ref 0.76–1.27)
CRP SERPL-MCNC: 3.96 MG/DL (ref 0–0.5)
D DIMER PPP FEU-MCNC: 1.07 MG/L (FEU) (ref 0–0.5)
DEPRECATED RDW RBC AUTO: 42.1 FL (ref 37–54)
EOSINOPHIL # BLD AUTO: 0 10*3/MM3 (ref 0–0.4)
EOSINOPHIL NFR BLD AUTO: 0 % (ref 0.3–6.2)
ERYTHROCYTE [DISTWIDTH] IN BLOOD BY AUTOMATED COUNT: 13.2 % (ref 12.3–15.4)
FIBRINOGEN PPP-MCNC: 729 MG/DL (ref 240–460)
GFR SERPL CREATININE-BSD FRML MDRD: 70 ML/MIN/1.73
GLOBULIN UR ELPH-MCNC: 3.3 GM/DL
GLUCOSE BLDC GLUCOMTR-MCNC: 200 MG/DL (ref 70–130)
GLUCOSE BLDC GLUCOMTR-MCNC: 331 MG/DL (ref 70–130)
GLUCOSE SERPL-MCNC: 223 MG/DL (ref 65–99)
HCT VFR BLD AUTO: 38.3 % (ref 37.5–51)
HGB BLD-MCNC: 12.8 G/DL (ref 13–17.7)
IMM GRANULOCYTES # BLD AUTO: 0.05 10*3/MM3 (ref 0–0.05)
IMM GRANULOCYTES NFR BLD AUTO: 0.8 % (ref 0–0.5)
LDH SERPL-CCNC: 373 U/L (ref 135–225)
LYMPHOCYTES # BLD AUTO: 0.56 10*3/MM3 (ref 0.7–3.1)
LYMPHOCYTES NFR BLD AUTO: 9.4 % (ref 19.6–45.3)
MCH RBC QN AUTO: 29 PG (ref 26.6–33)
MCHC RBC AUTO-ENTMCNC: 33.4 G/DL (ref 31.5–35.7)
MCV RBC AUTO: 86.8 FL (ref 79–97)
MONOCYTES # BLD AUTO: 0.25 10*3/MM3 (ref 0.1–0.9)
MONOCYTES NFR BLD AUTO: 4.2 % (ref 5–12)
NEUTROPHILS NFR BLD AUTO: 5.06 10*3/MM3 (ref 1.7–7)
NEUTROPHILS NFR BLD AUTO: 85.4 % (ref 42.7–76)
NRBC BLD AUTO-RTO: 0 /100 WBC (ref 0–0.2)
PLATELET # BLD AUTO: 352 10*3/MM3 (ref 140–450)
PMV BLD AUTO: 9.4 FL (ref 6–12)
POTASSIUM SERPL-SCNC: 4.9 MMOL/L (ref 3.5–5.2)
PROCALCITONIN SERPL-MCNC: 0.12 NG/ML (ref 0–0.25)
PROT SERPL-MCNC: 6.6 G/DL (ref 6–8.5)
RBC # BLD AUTO: 4.41 10*6/MM3 (ref 4.14–5.8)
SODIUM SERPL-SCNC: 134 MMOL/L (ref 136–145)
WBC # BLD AUTO: 5.93 10*3/MM3 (ref 3.4–10.8)

## 2021-09-02 PROCEDURE — 85379 FIBRIN DEGRADATION QUANT: CPT | Performed by: INTERNAL MEDICINE

## 2021-09-02 PROCEDURE — 85384 FIBRINOGEN ACTIVITY: CPT | Performed by: INTERNAL MEDICINE

## 2021-09-02 PROCEDURE — 85025 COMPLETE CBC W/AUTO DIFF WBC: CPT | Performed by: INTERNAL MEDICINE

## 2021-09-02 PROCEDURE — 84145 PROCALCITONIN (PCT): CPT | Performed by: INTERNAL MEDICINE

## 2021-09-02 PROCEDURE — 83615 LACTATE (LD) (LDH) ENZYME: CPT | Performed by: INTERNAL MEDICINE

## 2021-09-02 PROCEDURE — 63710000001 INSULIN DETEMIR PER 5 UNITS: Performed by: FAMILY MEDICINE

## 2021-09-02 PROCEDURE — 80053 COMPREHEN METABOLIC PANEL: CPT | Performed by: INTERNAL MEDICINE

## 2021-09-02 PROCEDURE — 94618 PULMONARY STRESS TESTING: CPT

## 2021-09-02 PROCEDURE — 63710000001 DEXAMETHASONE PER 0.25 MG: Performed by: FAMILY MEDICINE

## 2021-09-02 PROCEDURE — 86140 C-REACTIVE PROTEIN: CPT | Performed by: INTERNAL MEDICINE

## 2021-09-02 PROCEDURE — 63710000001 INSULIN LISPRO (HUMAN) PER 5 UNITS: Performed by: FAMILY MEDICINE

## 2021-09-02 PROCEDURE — 85730 THROMBOPLASTIN TIME PARTIAL: CPT | Performed by: INTERNAL MEDICINE

## 2021-09-02 PROCEDURE — 82962 GLUCOSE BLOOD TEST: CPT

## 2021-09-02 PROCEDURE — 25010000002 ENOXAPARIN PER 10 MG: Performed by: FAMILY MEDICINE

## 2021-09-02 RX ORDER — DEXTROMETHORPHAN POLISTIREX 30 MG/5ML
60 SUSPENSION ORAL EVERY 12 HOURS PRN
Qty: 280 ML | Refills: 1 | Status: ON HOLD | OUTPATIENT
Start: 2021-09-02 | End: 2022-12-23

## 2021-09-02 RX ORDER — MELATONIN
2000 DAILY
Qty: 30 TABLET | Refills: 0 | Status: SHIPPED | OUTPATIENT
Start: 2021-09-03 | End: 2021-10-04

## 2021-09-02 RX ORDER — UREA 10 %
220 LOTION (ML) TOPICAL DAILY
Qty: 30 EACH | Refills: 0 | Status: SHIPPED | OUTPATIENT
Start: 2021-09-03 | End: 2022-12-20

## 2021-09-02 RX ORDER — ASCORBIC ACID 500 MG
500 TABLET ORAL DAILY
Qty: 30 TABLET | Refills: 0 | Status: SHIPPED | OUTPATIENT
Start: 2021-09-03 | End: 2021-10-04

## 2021-09-02 RX ORDER — DEXAMETHASONE 6 MG/1
6 TABLET ORAL DAILY
Qty: 8 TABLET | Refills: 0 | Status: SHIPPED | OUTPATIENT
Start: 2021-09-03 | End: 2021-09-11

## 2021-09-02 RX ORDER — ALBUTEROL SULFATE 90 UG/1
2 AEROSOL, METERED RESPIRATORY (INHALATION) EVERY 4 HOURS PRN
Qty: 8.5 G | Refills: 5 | Status: SHIPPED | OUTPATIENT
Start: 2021-09-02

## 2021-09-02 RX ORDER — FAMOTIDINE 20 MG/1
20 TABLET, FILM COATED ORAL 2 TIMES DAILY
Qty: 60 TABLET | Refills: 0 | Status: SHIPPED | OUTPATIENT
Start: 2021-09-02 | End: 2022-12-20

## 2021-09-02 RX ADMIN — INSULIN LISPRO 10 UNITS: 100 INJECTION, SOLUTION INTRAVENOUS; SUBCUTANEOUS at 12:04

## 2021-09-02 RX ADMIN — OXYCODONE HYDROCHLORIDE AND ACETAMINOPHEN 500 MG: 500 TABLET ORAL at 09:54

## 2021-09-02 RX ADMIN — SODIUM CHLORIDE, PRESERVATIVE FREE 10 ML: 5 INJECTION INTRAVENOUS at 09:55

## 2021-09-02 RX ADMIN — ZINC SULFATE 220 MG (50 MG) CAPSULE 220 MG: CAPSULE at 09:54

## 2021-09-02 RX ADMIN — ENOXAPARIN SODIUM 40 MG: 40 INJECTION SUBCUTANEOUS at 09:53

## 2021-09-02 RX ADMIN — DULOXETINE HYDROCHLORIDE 20 MG: 20 CAPSULE, DELAYED RELEASE ORAL at 09:54

## 2021-09-02 RX ADMIN — INSULIN LISPRO 5 UNITS: 100 INJECTION, SOLUTION INTRAVENOUS; SUBCUTANEOUS at 09:53

## 2021-09-02 RX ADMIN — THIAMINE HCL TAB 100 MG 100 MG: 100 TAB at 09:55

## 2021-09-02 RX ADMIN — INSULIN DETEMIR 20 UNITS: 100 INJECTION, SOLUTION SUBCUTANEOUS at 09:51

## 2021-09-02 RX ADMIN — TIMOLOL MALEATE 1 DROP: 5 SOLUTION/ DROPS OPHTHALMIC at 09:55

## 2021-09-02 RX ADMIN — Medication 2000 UNITS: at 09:54

## 2021-09-02 RX ADMIN — ASPIRIN 81 MG: 81 TABLET, COATED ORAL at 09:54

## 2021-09-02 RX ADMIN — ATORVASTATIN CALCIUM 80 MG: 40 TABLET, FILM COATED ORAL at 09:54

## 2021-09-02 RX ADMIN — METOPROLOL SUCCINATE 75 MG: 25 TABLET, EXTENDED RELEASE ORAL at 09:54

## 2021-09-02 RX ADMIN — DEXAMETHASONE 6 MG: 4 TABLET ORAL at 09:54

## 2021-09-02 RX ADMIN — BENZONATATE 200 MG: 100 CAPSULE ORAL at 02:42

## 2021-09-02 RX ADMIN — TICAGRELOR 60 MG: 60 TABLET ORAL at 09:54

## 2021-09-02 RX ADMIN — FAMOTIDINE 20 MG: 20 TABLET, FILM COATED ORAL at 09:54

## 2021-09-02 NOTE — PROGRESS NOTES
Exercise Oximetry    Patient Name:Luis Echeverria   MRN: [id number]   Date: 09/02/21             ROOM AIR BASELINE   SpO2% 95   Heart Rate 73   Blood Pressure      EXERCISE ON ROOM AIR SpO2% EXERCISE ON O2 @ 2 LPM SpO2%   1 MINUTE       88   1 MINUTE      91   2 MINUTES       2 MINUTES      90   3 MINUTES       3 MINUTES         4 MINUTES  4 MINUTES         5 MINUTES  5 MINUTES    6 MINUTES  6 MINUTES               Distance Walked   Distance Walked   Dyspnea (Mallorie Scale)   Dyspnea (Mallorie Scale)   Fatigue (Mallorie Scale)   Fatigue (Mallorie Scale)   SpO2% Post Exercise   SpO2% Post Exercise   HR Post Exercise   HR Post Exercise   Time to Recovery   Time to Recovery     Comments: Pt walked on room air and sat dropped to 88%.  Placed pt on oxygen at 2 lpm nc.  Pt walked on oxygen and sat did not drop below 90% to 91%.  ANEL Rm notified.

## 2021-09-02 NOTE — PROGRESS NOTES
[facility] Medicine Services  DISCHARGE SUMMARY       Date of Admission: 8/29/2021  Date of Discharge:  9/2/2021  Primary Care Physician: Rene Hogue DO    Presenting Problem/History of Present Illness:  DKA (diabetic ketoacidoses) (CMS/[facility]) [E11.10]     Final Discharge Diagnoses:  Active Hospital Problems    Diagnosis    • **DKA (diabetic ketoacidoses) (CMS/[facility])    • Cytokine release syndrome, grade 1    • Pneumonia due to COVID-19 virus    • Noncompliance    • Chronic systolic CHF (congestive heart failure), NYHA class 2 (CMS/[facility])    • Acute kidney injury (CMS/[facility])    • Dehydration    • CAD (coronary artery disease)        Pertinent Test Results:   Results for orders placed during the hospital encounter of 08/29/21    Adult Transthoracic Echo Complete W/ Cont if Necessary Per Protocol    Interpretation Summary  · Left ventricular ejection fraction appears to be 36 - 40%. Left ventricular systolic function is moderately decreased.  · Left ventricular diastolic function is consistent with (grade I) impaired relaxation.  · Normal right ventricular cavity size and systolic function noted.  · There is no significant (greater than mild) valvular dysfunction.      Chief Complaint on Day of Discharge: None.    History of Present Illness on Day of Discharge:   Patient is a 56-year-old white male past medical history of type 2 diabetes.  He started getting sick Saturday approximately 10 days ago.  He is tested positive for Covid last week at [place].  He received monoclonal antibodies on Friday.  After that he began to feel very poorly and during that time he stopped all of his insulin.  He does not check his sugars he is continue to be nauseated he has no fevers or chills he has not been vaccinated for Covid he tested positive on Monday of last week.  I discovered the results on Wednesday.  He believes he got it from 2 people at work.  His fevers have disappeared over the  weekend now.  He presents today at the insistence of his wife they said his sat was 85% on room air at home but it is in the mid 90s here consistently.  His labs show an elevated glucose of 497 potassium 6.2 a CO2 of 13 with an anion gap of 26.  He has small acetone he has ketones in his urine consistent with diabetic ketoacidosis.  He is also still screening positive for Covid.  Chest x-ray shows bibasilar atelectasis otherwise negative for any inflammatory changes.    Hospital Course:  The patient is a 56 y.o. male who presented to [facility] with DKA/COVID-19 pneumonia/hypoxic upon ambulating.      DKA/diabetes.  DKA resolved.  DC DKA protocol 8/30/2021.   Hemoglobin A1 C 9.0.  Anion gap has closed, history of CHF, DC IV fluids.  Put patient on sliding scale and Levemir for now.     Pneumonia due to Covid/positive for Covid.  Status post monoclonal antibody on Friday, 8/27/2021.  Patient has not been vaccinated for Covid.  Albuterol inhaler as needed.  Lovenox prophylaxis.  Tessalon Perle as needed..  Delsym as needed.  Vitamin C.  Vitamin D.  Zinc.  Thiamine.  Melatonin at night.  Incentive spirometer.  Flutter valve.  Decadron.  Remdesivir.    Chest x-ray-Atypical pneumonia with bibasilar patchy infiltrates- patchy infiltrates appearing slightly increased from the earlier radiograph. Lungs are well expanded, No pneumothorax, No pulmonary edema.  Patient only requires oxygen upon ambulating.     Hyperkalemia.  Resolved.     Nausea/vomiting.  Zofran as needed.  Pepcid.     CAD/hypertension/hyperlipidemia/chronic systolic heart failure-NYHA class II.  Aspirin.  Lipitor.  Toprol XL.  Brilinta . nitro as needed.  BNP.  Echocardiogram-ejection fraction 36 to 40%, diastolic dysfunction grade 1, no significant valvular dysfunction.     Acute renal failure/dehydration.  Slightly worsening creatinine today.     Elevated D-dimer.   Lovenox prophylaxis for now, patient is already on Brilinta and aspirin.   DC'd  "CT scan of the chest, patient does not want it.  Patient denies of any chest pain.  Currently patient is off oxygen.  Patient go home with Eliquis prophylaxis dose for now.     Depression/anxiety.  Cymbalta.     Elevated TSH.  Free T4-normal.     Obesity.  BMI is 32.     Nutrition.  Cardiac/consistent carb diet.     Covid-19-positive.  Strep pneumo-negative.  Respiratory PCR-negative.  Legionella antigen-negative.  Blood pending.     Vital signs stable, afebrile.  Plan to discharge patient home today.  Follow-up PCP 1 week.  Discharge home with home health and home oxygen.     Condition on Discharge: Stable.    Physical Exam on Discharge:  /64 (BP Location: Right arm, Patient Position: Lying)   Pulse 79   Temp 98.4 °F (36.9 °C) (Oral)   Resp 20   Ht 175.3 cm (69.02\")   Wt 98.5 kg (217 lb 2.5 oz)   SpO2 98%   BMI 32.05 kg/m²   Physical Exam  Vitals and nursing note reviewed.   Constitutional:       Appearance: He is well-developed.   HENT:      Head: Normocephalic and atraumatic.   Eyes:      Conjunctiva/sclera: Conjunctivae normal.      Pupils: Pupils are equal, round, and reactive to light.   Neck:      Vascular: No JVD.   Cardiovascular:      Rate and Rhythm: Normal rate and regular rhythm.      Heart sounds: Normal heart sounds. No murmur heard.   No friction rub. No gallop.    Pulmonary:      Effort: Pulmonary effort is normal. No respiratory distress.      Breath sounds: Normal breath sounds. No wheezing or rales.      Comments: Patient is currently off oxygen.  Chest:      Chest wall: No tenderness.   Abdominal:      General: Bowel sounds are normal. There is no distension.      Palpations: Abdomen is soft.      Tenderness: There is no abdominal tenderness. There is no guarding or rebound.      Comments: Obesity.  BMI is 32.   Musculoskeletal:         General: No tenderness or deformity. Normal range of motion.      Cervical back: Neck supple.   Skin:     General: Skin is warm and dry. "      Findings: No rash.   Neurological:      Mental Status: He is alert and oriented to person, place, and time.      Cranial Nerves: No cranial nerve deficit.      Motor: No abnormal muscle tone.      Deep Tendon Reflexes: Reflexes normal.   Psychiatric:         Behavior: Behavior normal.         Thought Content: Thought content normal.         Judgment: Judgment normal.        Discharge Disposition: Discharged home with home health and home oxygen.  Home or Self Care    Discharge Medications:     Discharge Medications      New Medications      Instructions Start Date   albuterol sulfate  (90 Base) MCG/ACT inhaler  Commonly known as: PROVENTIL HFA;VENTOLIN HFA;PROAIR HFA   2 puffs, Inhalation, Every 4 Hours PRN      apixaban 2.5 MG tablet tablet  Commonly known as: ELIQUIS   2.5 mg, Oral, Every 12 Hours Scheduled      ascorbic acid 500 MG tablet  Commonly known as: VITAMIN C   500 mg, Oral, Daily   Start Date: September 3, 2021     cholecalciferol 25 MCG (1000 UT) tablet  Commonly known as: VITAMIN D3   2,000 Units, Oral, Daily   Start Date: September 3, 2021     dextromethorphan polistirex ER 30 MG/5ML Suspension Extended Release oral suspension  Commonly known as: DELSYM   60 mg, Oral, Every 12 Hours PRN      famotidine 20 MG tablet  Commonly known as: PEPCID   20 mg, Oral, 2 Times Daily      zinc sulfate 220 (50 Zn) MG capsule  Commonly known as: ZINCATE   220 mg, Oral, Daily   Start Date: September 3, 2021        Changes to Medications      Instructions Start Date   atorvastatin 80 MG tablet  Commonly known as: LIPITOR  What changed: when to take this   TAKE 1 TABLET BY MOUTH EVERY DAY AT NIGHT      Brilinta 60 MG tablet tablet  Generic drug: ticagrelor  What changed: how much to take   TAKE 1 TABLET BY MOUTH TWICE A DAY      dexamethasone 6 MG tablet  Commonly known as: DECADRON  What changed:   · medication strength  · how much to take  · when to take this   6 mg, Oral, Daily   Start Date: September 3,  2021        Continue These Medications      Instructions Start Date   aspirin 81 MG EC tablet   81 mg, Oral, Daily      brimonidine 0.2 % ophthalmic solution  Commonly known as: ALPHAGAN   1 drop, Both Eyes, 2 Times Daily      DULoxetine 20 MG capsule  Commonly known as: CYMBALTA   20 mg, Oral, Daily      empagliflozin 10 MG tablet tablet  Commonly known as: JARDIANCE   10 mg, Oral, Daily      insulin aspart 100 UNIT/ML solution pen-injector sc pen  Commonly known as: novoLOG FLEXPEN   39 Units, Subcutaneous, 4 Times Daily Before Meals & Nightly      lisinopril 2.5 MG tablet  Commonly known as: PRINIVIL,ZESTRIL   TAKE 1 TABLET BY MOUTH EVERY DAY      metoprolol succinate XL 50 MG 24 hr tablet  Commonly known as: TOPROL-XL   TAKE 1 AND 1/2 TABLETS BY MOUTH EVERY DAY      nitroglycerin 0.4 MG SL tablet  Commonly known as: NITROSTAT   0.4 mg, Sublingual, Every 5 Minutes PRN, Take no more than 3 doses in 15 minutes.       spironolactone 25 MG tablet  Commonly known as: ALDACTONE   25 mg, Oral, Daily      Tresiba FlexTouch 100 UNIT/ML solution pen-injector injection  Generic drug: insulin degludec   55 Units, Subcutaneous, Every Morning         Stop These Medications    cefdinir 300 MG capsule  Commonly known as: OMNICEF            Discharge Diet:   Diet Instructions     Advance Diet As Tolerated            Activity at Discharge:   Activity Instructions     Activity as Tolerated            Discharge Care Plan/Instructions: Discharge home with home health and oxygen.    Follow-up Appointments:   Future Appointments   Date Time Provider Department Center   9/14/2021  8:00 AM Tru Rai MD [facility] CD PAD PAD   6/28/2022 12:30 PM [facility] CD [facility]   Follow with PCP 1 week.    Electronically signed by Aston Webb MD, 09/02/21, 12:59 CDT.    Time: Greater than 30 minutes.

## 2021-09-02 NOTE — PLAN OF CARE
Goal Outcome Evaluation:  Plan of Care Reviewed With: patient  Alert and oriented. Denies pain and discomfort. Going home with O2. Needs oxygen with activity otherwise in room air in rest. Discharge instruction provided. Education provided on medication, infection prevention, seeking medical attention while symptoms worsens. Verbalized understanding. IV removed and patient will be sent home with oxygen and meds to bed.

## 2021-09-02 NOTE — PLAN OF CARE
Goal Outcome Evaluation:  Plan of Care Reviewed With: patient        Progress: no change  Outcome Summary: A&Ox4, tele in place, no calls from monitor room. Maintains spo2 >90 at rest on room air. Tessalon given x1 for c/o cough with effective results per patient. Plans for DC with home o2 possibly today.

## 2021-09-02 NOTE — DISCHARGE PLACEMENT REQUEST
"[facility] [phone]  Rc Echeverria (56 y.o. Male)     Date of Birth Social Security Number Address Home Phone MRN    1965  [address] [place] [address] [phone] [id number]    Congregational Marital Status          Pres[employer]        Admission Date Admission Type Admitting Provider Attending Provider Department, Room/Bed    8/29/21 Emergency Aston Webb MD Truong, Khai C, MD [facility] 3A, 336/1    Discharge Date Discharge Disposition Discharge Destination         Home or Self Care              Attending Provider: Aston Webb MD    Allergies: No Known Allergies    Isolation: Enh Drop/Con   Infection: COVID (confirmed) (08/30/21)   Code Status: CPR    Ht: 175.3 cm (69.02\")   Wt: 98.5 kg (217 lb 2.5 oz)    Admission Cmt: None   Principal Problem: DKA (diabetic ketoacidoses) (CMS/[facility]) [E11.10]                 Active Insurance as of 8/29/2021     Primary Coverage     Payor Plan Insurance Group Employer/Plan Group    [employer] PPO [id number]     Payor Plan Address Payor Plan Phone Number Payor Plan Fax Number Effective Dates    [address] [phone]  1/1/2018 - None Entered    [place] [address]       Subscriber Name Subscriber Birth Date Member ID       RC ECHEVERRIA 1965 [id number]                 Emergency Contacts      (Rel.) Home Phone Work Phone Mobile Phone    Anali Echeverria (Spouse) [phone] -- --        Oxygen Therapy [EQ60] (Order [id number])  Order  Date: 9/2/2021 Department: [address] Ordering/Authorizing: Aston Webb MD   Order History  Outpatient  Date/Time Action Taken User Additional Information   09/02/21 1214 Sign Margaret Dove RN Ordering Mode: Verbal with readback   09/02/21 1232 Verbal Cosign Aston Webb MD    Order Details    Frequency Duration Priority Order Class   None None Routine External   Start Date/Time    Start Date   09/02/21   Order " Information    Order Date Service Start Date Start Time   09/02/21 Medicine 09/02/21    Reference Links    Associated Reports   View Encounter   Order Questions    Question Answer Comment   Delivery Modality Nasal Cannula    Liters Per Minute: 2    Duration: With Exertion    Equipment  Oxygen Concentrator &  &  Portable Gaseous Oxygen System & Portable Oxygen Contents Gaseous &  Conserving Regulator    The face to face evaluation was performed on 9/2/2021    Length of Need (99 Months = Lifetime) 12 Months    Note:  Custom values to enter length of need for DME          Verbal Order Info    Action Created on Order Mode Entered by Responsible Provider Signed by Signed on   Ordering 09/02/21 1214 Verbal with readback Margaret Dove RN Truong, Khai C, MD Truong, Khai C, MD 09/02/21 1232   Source Order Set / Preference List    Preference List   AMB SUPPLIES [[id number]]   Clinical Indications     ICD-10-CM ICD-9-CM   COVID-19     U07.1 079.89   Reprint Order Requisition    Oxygen Therapy (Order #[id number]) on 9/2/21   Encounter    View Encounter          Order Provider Info        Office phone Pager E-mail   Ordering User Margaret Dove RN -- -- --   Authorizing Provider Aston Webb [phone] -- --   Attending Provider Aston Webb [phone] -- --   Entered By Margaret Dove RN -- -- --   Ordering Provider Aston Webb [phone] -- --   Signed By (on 09/02/2021 1232) Aston Webb [phone] -- --   Linked Charges    No charges linked to this procedure   Tracking Reports  Cosign Tracking Order Transmittal Tracking   Authorized by:  Aston Webb MD  (NPI: [id number])       Lab Component SmartPhrase Guide    Oxygen Therapy (Order #[id number]) on 9/2/21       Dora Miguel, RRT   Respiratory Tech   Respiratory Therapy   Progress Notes      Signed   Date of Service:  09/02/21 0751   Creation Time:  09/02/21 0751            Signed             Show:Clear  all  [x]Manual[x]Template[]Copied    Added by:  [x]Dora Miguel, RRT    []Marco for details  Exercise Oximetry     Patient Name:Luis Echeverria   MRN: [id number]   Date: 09/02/21                                                                                                     ROOM AIR BASELINE   SpO2% 95   Heart Rate 73   Blood Pressure       EXERCISE ON ROOM AIR SpO2% EXERCISE ON O2 @ 2 LPM SpO2%   1 MINUTE       88   1 MINUTE      91   2 MINUTES       2 MINUTES      90   3 MINUTES       3 MINUTES         4 MINUTES   4 MINUTES         5 MINUTES   5 MINUTES     6 MINUTES   6 MINUTES                                                                                                                   Distance Walked   Distance Walked   Dyspnea (Mallorie Scale)   Dyspnea (Mallorie Scale)   Fatigue (Mallorie Scale)   Fatigue (Mallorie Scale)   SpO2% Post Exercise   SpO2% Post Exercise   HR Post Exercise   HR Post Exercise   Time to Recovery   Time to Recovery      Comments: Pt walked on room air and sat dropped to 88%.  Placed pt on oxygen at 2 lpm nc.  Pt walked on oxygen and sat did not drop below 90% to 91%.  RN Mela Rm notified.                   History & Physical      Salvador Cueva MD at 08/29/21 2343                [facility] Medicine Services  HISTORY AND PHYSICAL    Date of Admission: 8/29/2021  Primary Care Physician: Rene Hogue DO    Subjective     Chief Complaint: Shortness of breath nausea    History of Present Illness  Patient is a 56-year-old white male past medical history of type 2 diabetes.  He started getting sick Saturday approximately 10 days ago.  He is tested positive for Covid last week at [facility].  He received monoclonal antibodies on Friday.  After that he began to feel very poorly and during that time he stopped all of his insulin.  He does not check his sugars he is continue to be nauseated he has no fevers or chills he has not been vaccinated for Covid  he tested positive on Monday of last week.  I discovered the results on Wednesday.  He believes he got it from 2 people at work.  His fevers have disappeared over the weekend now.  He presents today at the insistence of his wife they said his sat was 85% on room air at home but it is in the mid 90s here consistently.  His labs show an elevated glucose of 497 potassium 6.2 a CO2 of 13 with an anion gap of 26.  He has small acetone he has ketones in his urine consistent with diabetic ketoacidosis.  He is also still screening positive for Covid.  Chest x-ray shows bibasilar atelectasis otherwise negative for any inflammatory changes.      Review of Systems   14 point review of systems negative except for as per HPI    Otherwise complete ROS reviewed and negative except as mentioned in the HPI.    Past Medical History:   Past Medical History:   Diagnosis Date   • CAD (coronary artery disease)    • CAD in native artery     2V STEMI 7/13 STENT TO CX AND STENTX2 TO MID AND DISTAL LAD   • Chronic systolic CHF (congestive heart failure), NYHA class 2 (CMS/HCC) 2/4/2020   • Diabetes mellitus (CMS/HCC)    • History of coronary artery stent placement      x 3 - for ACUTE MI 7/2013   • Hyperlipidemia    • Hyperlipidemia, mild    • Myocardial infarct (CMS/HCC)    • Myocardial infarction (CMS/HCC)    • Stented coronary artery      Past Surgical History:  Past Surgical History:   Procedure Laterality Date   • CARDIAC CATHETERIZATION N/A 11/6/2018    Procedure: Left Heart Cath;  Surgeon: Tru Rai MD;  Location:  [facility] CATH INVASIVE LOCATION;  Service: Cardiology   • CARDIAC ELECTROPHYSIOLOGY PROCEDURE N/A 5/1/2019    Procedure: ICD new;  Surgeon: Tru Rai MD;  Location:  [facility] CATH INVASIVE LOCATION;  Service: Cardiology   • CAROTID STENT       Social History:  reports that he has never smoked. He has never used smokeless tobacco. He reports that he does not drink alcohol and does not use  drugs.    Family History: family history includes Heart disease in his maternal grandmother; No Known Problems in his father and mother.       Allergies:  No Known Allergies    Medications:  Prior to Admission medications    Medication Sig Start Date End Date Taking? Authorizing Provider   aspirin 81 MG EC tablet Take 1 tablet by mouth Daily. 2/4/20  Yes Laura Gomez APRN   atorvastatin (LIPITOR) 80 MG tablet TAKE 1 TABLET BY MOUTH EVERY DAY AT NIGHT 2/24/21  Yes Tru Rai MD   Brilinta 60 MG tablet tablet TAKE 1 TABLET BY MOUTH TWICE A DAY 12/20/20  Yes Tru Rai MD   DULoxetine (CYMBALTA) 20 MG capsule Take 20 mg by mouth Daily.   Yes Neli Flores MD   insulin aspart (novoLOG FLEXPEN) 100 UNIT/ML solution pen-injector sc pen Inject 35 Units under the skin into the appropriate area as directed 3 (Three) Times a Day With Meals.   Yes Neli Flores MD   insulin degludec (TRESIBA FLEXTOUCH) 100 UNIT/ML solution pen-injector injection Inject 55 Units under the skin into the appropriate area as directed Every Morning.   Yes Neli Flores MD   lisinopril (PRINIVIL,ZESTRIL) 2.5 MG tablet TAKE 1 TABLET BY MOUTH EVERY DAY 2/1/21  Yes Tru Rai MD   metoprolol succinate XL (TOPROL-XL) 50 MG 24 hr tablet TAKE 1 AND 1/2 TABLETS BY MOUTH EVERY DAY 2/1/21  Yes Tru Rai MD   spironolactone (ALDACTONE) 25 MG tablet Take 1 tablet by mouth Daily. 2/24/21  Yes Tru Rai MD   timolol (TIMOPTIC) 0.5 % ophthalmic solution Administer 1 drop to both eyes Daily.   Yes Neli Flores MD   levocetirizine (XYZAL) 5 MG tablet Take 5 mg by mouth As Needed.    Neli Flores MD   nitroglycerin (NITROSTAT) 0.4 MG SL tablet Place 1 tablet under the tongue Every 5 (Five) Minutes As Needed for Chest Pain. Take no more than 3 doses in 15 minutes. 8/5/20   Garry Persaud APRN   atorvastatin (LIPITOR) 80 MG tablet Take 1 tablet by mouth  "Every Night. 2/24/21 8/30/21  Tru Rai MD   Empagliflozin 10 MG tablet Take 10 mg by mouth Daily. 2/24/21 8/30/21  Tru Rai MD     I have utilized all available immediate resources to obtain, update, and review the patient's current medications.    Objective     Vital Signs: /59   Pulse 88   Temp 98.4 °F (36.9 °C) (Axillary)   Resp 20   Ht 175.3 cm (69\")   Wt 98.5 kg (217 lb 2.5 oz)   SpO2 93%   BMI 32.07 kg/m²   Physical Exam   Gen.:  Well-nourished well-developed white male in no acute distress  HEENT: Atraumatic, normocephalic.  Pupils equal, round, and reactive to light. Extraocular movements intact.  Sclerae anicteric.  External ears negative.  Mucous membranes dry.  Neck is supple without lymphadenopathy.  No JVD is noted.  No carotid bruits are auscultated.  Oropharynx is without erythema or exudate.   Chest: Clear to auscultation and percussion.  CV: Tachycardic rate and regular rhythm.  Normal S1-S2.  No gallops, murmurs. or rubs.  Abdomen: Soft, nontender, nondistended.  Active bowel sounds,  No hepatosplenomegaly.  No masses.  No hernias.  Extremities: No clubbing, edema, or cyanosis.  Capillary refill is normal.  Pulses are 2+ and symmetric at radial and dorsalis pedis.  Posterior tibial pulses are intact and 2+ palpable.  Neuro: Patient is awake, alert, and oriented ×3.  Cranial nerves II through XII are grossly intact.  Motor is 5 out of 5 bilaterally.  DTRs are 2+ and symmetric bilaterally. Sensory exam is nonfocal  Skin: Warm, dry, and intact.  No evidence of breakdown.  Sensorium: Normal thought and affect    Nursing notes and vital signs reviewed        Results Reviewed:  Lab Results (last 24 hours)     Procedure Component Value Units Date/Time    Phosphorus [[id number]]  (Normal) Collected: 08/30/21 0626    Specimen: Blood Updated: 08/30/21 0732     Phosphorus 2.6 mg/dL     Basic Metabolic Panel [[id number]]  (Abnormal) Collected: 08/30/21 0626    " Specimen: Blood Updated: 08/30/21 0732     Glucose 158 mg/dL      BUN 37 mg/dL      Creatinine 1.48 mg/dL      Sodium 140 mmol/L      Potassium 4.3 mmol/L      Chloride 104 mmol/L      CO2 24.0 mmol/L      Calcium 8.4 mg/dL      eGFR Non African Amer 50 mL/min/1.73      BUN/Creatinine Ratio 25.0     Anion Gap 12.0 mmol/L     Narrative:      GFR Normal >60  Chronic Kidney Disease <60  Kidney Failure <15      Magnesium [[id number]]  (Normal) Collected: 08/30/21 0626    Specimen: Blood Updated: 08/30/21 0728     Magnesium 2.3 mg/dL     Respiratory Panel, PCR (WITHOUT COVID) - Swab, Nasopharynx [[id number]]  (Normal) Collected: 08/30/21 0557    Specimen: Swab from Nasopharynx Updated: 08/30/21 0713     ADENOVIRUS, PCR Not Detected     Coronavirus 229E Not Detected     Coronavirus HKU1 Not Detected     Coronavirus NL63 Not Detected     Coronavirus OC43 Not Detected     Human Metapneumovirus Not Detected     Human Rhinovirus/Enterovirus Not Detected     Influenza B PCR Not Detected     Parainfluenza Virus 1 Not Detected     Parainfluenza Virus 2 Not Detected     Parainfluenza Virus 3 Not Detected     Parainfluenza Virus 4 Not Detected     Bordetella pertussis pcr Not Detected     Chlamydophila pneumoniae PCR Not Detected     Mycoplasma pneumo by PCR Not Detected     Influenza A PCR Not Detected     RSV, PCR Not Detected     Bordetella parapertussis PCR Not Detected    Narrative:      The coronavirus on the RVP is NOT COVID-19 and is NOT indicative of infection with COVID-19.    In the setting of a positive respiratory panel with a viral infection PLUS a negative procalcitonin without other underlying concern for bacterial infection, consider observing off antibiotics or discontinuation of antibiotics and continue supportive care. If the respiratory panel is positive for atypical bacterial infection (Bordetella pertussis, Chlamydophila pneumoniae, or Mycoplasma pneumoniae), consider antibiotic de-escalation to target  atypical bacterial infection.    COVID PRE-OP / PRE-PROCEDURE SCREENING ORDER (NO ISOLATION) - Swab, Nasal Cavity [[id number]]  (Abnormal) Collected: 08/30/21 0557    Specimen: Swab from Nasal Cavity Updated: 08/30/21 0700    Narrative:      The following orders were created for panel order COVID PRE-OP / PRE-PROCEDURE SCREENING ORDER (NO ISOLATION) - Swab, Nasal Cavity.  Procedure                               Abnormality         Status                     ---------                               -----------         ------                     COVID-19,[facility] IN-HUGO...[[id number]]  Abnormal            Final result                 Please view results for these tests on the individual orders.    COVID-19,[facility] IN-HOUSE,Nasal Swab No Transport Media 3-4 HR TAT - Swab, Nasal Cavity [[id number]]  (Abnormal) Collected: 08/30/21 0557    Specimen: Swab from Nasal Cavity Updated: 08/30/21 0700     COVID19 Detected    Narrative:      Fact sheet for providers: https://www.fda.gov/media/797875/download     Fact sheet for patients: https://www.fda.gov/media/337649/download    Test performed by PCR.    Consider negative results in combination with clinical observations, patient history, and epidemiological information.    POC Glucose Once [[id number]]  (Abnormal) Collected: 08/30/21 0648    Specimen: Blood Updated: 08/30/21 0659     Glucose 156 mg/dL      Comment: : GASTON Albarado ID: [id number]       Interleukin-6 [[id number]] Collected: 08/30/21 0626    Specimen: Blood Updated: 08/30/21 0654    Legionella Antigen, Urine - Urine, Urine, Clean Catch [[id number]]  (Normal) Collected: 08/29/21 2342    Specimen: Urine, Clean Catch Updated: 08/30/21 0624     LEGIONELLA ANTIGEN, URINE Negative    S. Pneumo Ag Urine or CSF - Urine, Urine, Clean Catch [[id number]]  (Normal) Collected: 08/29/21 2342    Specimen: Urine, Clean Catch Updated: 08/30/21 0624     Strep Pneumo Ag Negative    POC Glucose Once [[id number]]   (Abnormal) Collected: 08/30/21 0555    Specimen: Blood Updated: 08/30/21 0607     Glucose 163 mg/dL      Comment: : GASTON Albraado ID: [id number]       C-reactive Protein [[id number]]  (Abnormal) Collected: 08/30/21 0055    Specimen: Blood Updated: 08/30/21 0539     C-Reactive Protein 11.67 mg/dL     Lactate Dehydrogenase [[id number]]  (Abnormal) Collected: 08/30/21 0055    Specimen: Blood Updated: 08/30/21 0539      U/L     Ferritin [[id number]]  (Abnormal) Collected: 08/30/21 0055    Specimen: Blood Updated: 08/30/21 0539     Ferritin 773.50 ng/mL     Narrative:      Results may be falsely decreased if patient taking Biotin.      POC Glucose Once [[id number]]  (Abnormal) Collected: 08/30/21 0500    Specimen: Blood Updated: 08/30/21 0512     Glucose 217 mg/dL      Comment: : GASTON Albarado ID: [id number]       POC Glucose Once [[id number]]  (Abnormal) Collected: 08/30/21 0353    Specimen: Blood Updated: 08/30/21 0403     Glucose 253 mg/dL      Comment: : GASTON Albarado ID: [id number]       POC Glucose Once [[id number]]  (Abnormal) Collected: 08/30/21 0300    Specimen: Blood Updated: 08/30/21 0311     Glucose 375 mg/dL      Comment: : [id number] Krissy EitanmaryErna ID: [id number]       POC Glucose Once [[id number]]  (Abnormal) Collected: 08/30/21 0214    Specimen: Blood Updated: 08/30/21 0225     Glucose 403 mg/dL      Comment: : GASTON Albarado ID: [id number]       Basic Metabolic Panel [[id number]]  (Abnormal) Collected: 08/30/21 0055    Specimen: Blood Updated: 08/30/21 0149     Glucose 498 mg/dL      BUN 44 mg/dL      Creatinine 1.82 mg/dL      Sodium 134 mmol/L      Potassium 5.4 mmol/L      Chloride 93 mmol/L      CO2 14.0 mmol/L      Calcium 9.1 mg/dL      eGFR Non African Amer 39 mL/min/1.73      BUN/Creatinine Ratio 24.2     Anion Gap 27.0 mmol/L     Narrative:      GFR Normal >60  Chronic Kidney Disease <60  Kidney  Failure <15      Phosphorus [[id number]]  (Abnormal) Collected: 08/30/21 0055    Specimen: Blood Updated: 08/30/21 0131     Phosphorus 5.5 mg/dL     Magnesium [[id number]]  (Normal) Collected: 08/30/21 0055    Specimen: Blood Updated: 08/30/21 0131     Magnesium 2.6 mg/dL     [place] Draw [[id number]] Collected: 08/29/21 2115    Specimen: Blood Updated: 08/30/21 0130    Narrative:      The following orders were created for panel order [place] Draw.  Procedure                               Abnormality         Status                     ---------                               -----------         ------                     Green Top (Gel)[[id number]]                                  Final result               Lavender Top[[id number]]                                     Final result               Red Top[[id number]]                                          Final result               Laguerre Top[[id number]]                                         Final result               Light Blue Top[[id number]]                                   Final result                 Please view results for these tests on the individual orders.    Laguerre Top [[id number]] Collected: 08/29/21 2115    Specimen: Blood Updated: 08/30/21 0130     Extra Tube Hold for add-ons.     Comment: Auto resulted.       POC Glucose Once [[id number]]  (Abnormal) Collected: 08/30/21 0052    Specimen: Blood Updated: 08/30/21 0104     Glucose 507 mg/dL      Comment: : [id number] Kvng GenevaMeter ID: [id number]       Hemoglobin A1c [[id number]]  (Abnormal) Collected: 08/29/21 2115    Specimen: Blood Updated: 08/30/21 0036     Hemoglobin A1C 9.00 %     Narrative:      Hemoglobin A1C Ranges:    Increased Risk for Diabetes  5.7% to 6.4%  Diabetes                     >= 6.5%  Diabetic Goal                < 7.0%    Osmolality, Serum [[id number]]  (Abnormal) Collected: 08/29/21 2115    Specimen: Blood Updated: 08/30/21 0029     Osmolality 322 mOsm/kg     Phosphorus [[id number]]   (Abnormal) Collected: 08/29/21 2254    Specimen: Blood Updated: 08/30/21 0014     Phosphorus 4.8 mg/dL     Magnesium [[id number]]  (Normal) Collected: 08/29/21 2254    Specimen: Blood Updated: 08/30/21 0014     Magnesium 2.4 mg/dL     Urinalysis With Microscopic If Indicated (No Culture) - Urine, Clean Catch [[id number]]  (Abnormal) Collected: 08/29/21 2342    Specimen: Urine, Clean Catch Updated: 08/30/21 0009     Color, UA Yellow     Appearance, UA Clear     pH, UA <=5.0     Specific Gravity, UA 1.026     Glucose, UA >=1000 mg/dL (3+)     Ketones, UA 80 mg/dL (3+)     Bilirubin, UA Negative     Blood, UA Trace     Protein, UA Negative     Leuk Esterase, UA Negative     Nitrite, UA Negative     Urobilinogen, UA 1.0 E.U./dL    Urinalysis, Microscopic Only - Urine, Clean Catch [[id number]]  (Abnormal) Collected: 08/29/21 2342    Specimen: Urine, Clean Catch Updated: 08/30/21 0009     RBC, UA None Seen /HPF      WBC, UA 0-2 /HPF      Bacteria, UA Trace /HPF      Squamous Epithelial Cells, UA None Seen /HPF      Hyaline Casts, UA None Seen /LPF      Methodology Automated Microscopy    POC Glucose Once [[id number]]  (Abnormal) Collected: 08/29/21 2358    Specimen: Blood Updated: 08/30/21 0009     Glucose 424 mg/dL      Comment: : FAYE Farooq JoshMeter ID: [id number]       Troponin [[id number]]  (Normal) Collected: 08/29/21 2254    Specimen: Blood Updated: 08/29/21 2321     Troponin T 0.015 ng/mL     Narrative:      Troponin T Reference Range:  <= 0.03 ng/mL-   Negative for AMI  >0.03 ng/mL-     Abnormal for myocardial necrosis.  Clinicians would have to utilize clinical acumen, EKG, Troponin and serial changes to determine if it is an Acute Myocardial Infarction or myocardial injury due to an underlying chronic condition.       Results may be falsely decreased if patient taking Biotin.      STAT Lactic Acid, Reflex [[id number]]  (Abnormal) Collected: 08/29/21 2254    Specimen: Blood Updated: 08/29/21 2321      Lactate 3.1 mmol/L     Acetone [[id number]]  (Abnormal) Collected: 08/29/21 2115    Specimen: Blood Updated: 08/29/21 2318     Acetone Small    Comprehensive Metabolic Panel [[id number]]  (Abnormal) Collected: 08/29/21 2115    Specimen: Blood Updated: 08/29/21 2239     Glucose 497 mg/dL      BUN 42 mg/dL      Creatinine 1.58 mg/dL      Sodium 131 mmol/L      Potassium 6.2 mmol/L      Comment: Slight hemolysis detected by analyzer. Results may be affected.        Chloride 92 mmol/L      CO2 13.0 mmol/L      Calcium 9.2 mg/dL      Total Protein 7.2 g/dL      Albumin 3.70 g/dL      ALT (SGPT) 24 U/L      AST (SGOT) 32 U/L      Alkaline Phosphatase 88 U/L      Total Bilirubin 0.7 mg/dL      eGFR Non African Amer 46 mL/min/1.73      Globulin 3.5 gm/dL      A/G Ratio 1.1 g/dL      BUN/Creatinine Ratio 26.6     Anion Gap 26.0 mmol/L     Narrative:      GFR Normal >60  Chronic Kidney Disease <60  Kidney Failure <15      Red Top [[id number]] Collected: 08/29/21 2115    Specimen: Blood Updated: 08/29/21 2230     Extra Tube Hold for add-ons.     Comment: Auto resulted.       Green Top (Gel) [[id number]] Collected: 08/29/21 2115    Specimen: Blood Updated: 08/29/21 2230     Extra Tube Hold for add-ons.     Comment: Auto resulted.       Lavender Top [[id number]] Collected: 08/29/21 2115    Specimen: Blood Updated: 08/29/21 2230     Extra Tube hold for add-on     Comment: Auto resulted       Light Blue Top [[id number]] Collected: 08/29/21 2115    Specimen: Blood Updated: 08/29/21 2230     Extra Tube hold for add-on     Comment: Auto resulted       Troponin [[id number]]  (Normal) Collected: 08/29/21 2115    Specimen: Blood Updated: 08/29/21 2213     Troponin T 0.017 ng/mL     Narrative:      Troponin T Reference Range:  <= 0.03 ng/mL-   Negative for AMI  >0.03 ng/mL-     Abnormal for myocardial necrosis.  Clinicians would have to utilize clinical acumen, EKG, Troponin and serial changes to determine if it is an Acute Myocardial  "Infarction or myocardial injury due to an underlying chronic condition.       Results may be falsely decreased if patient taking Biotin.      Procalcitonin [[id number]]  (Abnormal) Collected: 08/29/21 2115    Specimen: Blood Updated: 08/29/21 2204     Procalcitonin 0.90 ng/mL     Narrative:      As a Marker for Sepsis (Non-Neonates):     1. <0.5 ng/mL represents a low risk of severe sepsis and/or septic shock.  2. >2 ng/mL represents a high risk of severe sepsis and/or septic shock.    As a Marker for Lower Respiratory Tract Infections that require antibiotic therapy:  PCT on Admission     Antibiotic Therapy             6-12 Hrs later  >0.5                          Strongly Recommended            >0.25 - <0.5             Recommended  0.1 - 0.25                  Discouraged                       Remeasure/reassess PCT  <0.1                         Strongly Discouraged         Remeasure/reassess PCT      As 28 day mortality risk marker: \"Change in Procalcitonin Result\" (>80% or <=80%) if Day 0 (or Day 1) and Day 4 values are available. Refer to http://www.[employer]2Bre[employer]-pct-calculator.com/    Change in PCT <=80 %   A decrease of PCT levels below or equal to 80% defines a positive change in PCT test result representing a higher risk for 28-day all-cause mortality of patients diagnosed with severe sepsis or septic shock.    Change in PCT >80 %   A decrease of PCT levels of more than 80% defines a negative change in PCT result representing a lower risk for 28-day all-cause mortality of patients diagnosed with severe sepsis or septic shock.                Lactic Acid, Plasma [[id number]]  (Abnormal) Collected: 08/29/21 2115    Specimen: Blood Updated: 08/29/21 2159     Lactate 2.2 mmol/L     BNP [[id number]]  (Abnormal) Collected: 08/29/21 2115    Specimen: Blood Updated: 08/29/21 2154     proBNP 1,737.0 pg/mL     Narrative:      Among patients with dyspnea, NT-proBNP is highly sensitive for the detection of acute congestive heart " failure. In addition NT-proBNP of <300 pg/ml effectively rules out acute congestive heart failure with 99% negative predictive value.    Results may be falsely decreased if patient taking Biotin.      D-dimer, Quantitative [[id number]]  (Abnormal) Collected: 08/29/21 2115    Specimen: Blood Updated: 08/29/21 2145     D-Dimer, Quantitative 2.01 mg/L (FEU)     Narrative:      Reference Range is 0-0.50 mg/L FEU. However, results <0.50 mg/L FEU tends to rule out DVT or PE. Results >0.50 mg/L FEU are not useful in predicting absence or presence of DVT or PE.      Blood Gas, Arterial - [[id number]]  (Abnormal) Collected: 08/29/21 2133    Specimen: Arterial Blood Updated: 08/29/21 2139     Site Right Brachial     Walter's Test N/A     pH, Arterial 7.347 pH units      Comment: 84 Value below reference range        pCO2, Arterial 23.5 mm Hg      Comment: 84 Value below reference range        pO2, Arterial 71.7 mm Hg      Comment: 84 Value below reference range        HCO3, Arterial 12.9 mmol/L      Comment: 84 Value below reference range        Base Excess, Arterial -10.7 mmol/L      Comment: 84 Value below reference range        O2 Saturation, Arterial 92.8 %      Comment: 84 Value below reference range        Temperature 37.0 C      Barometric Pressure for Blood Gas 751 mmHg      Modality Room Air     Ventilator Mode NA     Collected by [id number]     Comment: Meter: [id number]     :  [id number]        pCO2, Temperature Corrected 23.5 mm Hg      pH, Temp Corrected 7.347 pH Units      pO2, Temperature Corrected 71.7 mm Hg     CBC & Differential [[id number]]  (Abnormal) Collected: 08/29/21 2115    Specimen: Blood Updated: 08/29/21 2134    Narrative:      The following orders were created for panel order CBC & Differential.  Procedure                               Abnormality         Status                     ---------                               -----------         ------                     CBC Auto  Differential[[id number]]        Abnormal            Final result                 Please view results for these tests on the individual orders.    CBC Auto Differential [[id number]]  (Abnormal) Collected: 08/29/21 2115    Specimen: Blood Updated: 08/29/21 2134     WBC 5.69 10*3/mm3      RBC 4.78 10*6/mm3      Hemoglobin 14.2 g/dL      Hematocrit 42.5 %      MCV 88.9 fL      MCH 29.7 pg      MCHC 33.4 g/dL      RDW 14.0 %      RDW-SD 45.5 fl      MPV 9.4 fL      Platelets 257 10*3/mm3      Neutrophil % 83.6 %      Lymphocyte % 9.5 %      Monocyte % 6.0 %      Eosinophil % 0.0 %      Basophil % 0.2 %      Immature Grans % 0.7 %      Neutrophils, Absolute 4.76 10*3/mm3      Lymphocytes, Absolute 0.54 10*3/mm3      Monocytes, Absolute 0.34 10*3/mm3      Eosinophils, Absolute 0.00 10*3/mm3      Basophils, Absolute 0.01 10*3/mm3      Immature Grans, Absolute 0.04 10*3/mm3      nRBC 0.0 /100 WBC         Imaging Results (Last 24 Hours)     Procedure Component Value Units Date/Time    XR Chest 1 View [[id number]] Collected: 08/29/21 2143     Updated: 08/29/21 2147    Narrative:      EXAMINATION: Chest 1 view 8/29/2021     HISTORY: Dyspnea     FINDINGS: Today's exam is compared to previous study of 5/1/2019. Mild  bibasilar atelectasis is present. Lungs are otherwise clear. There is no  effusion or free air present. A transvenous pacer remains unchanged in  appearance from the previous exam.       Impression:      . Expiratory chest with bibasilar atelectasis.  This report was finalized on 08/29/2021 21:44 by Dr. Car Fonseca MD.        I have personally reviewed and interpreted the radiology studies and ECG obtained at time of admission.     Assessment / Plan     Assessment:   Active Hospital Problems    Diagnosis    • **DKA (diabetic ketoacidoses) (CMS/[facility])    • Pneumonia due to COVID-19 virus    • Noncompliance    • Chronic systolic CHF (congestive heart failure), NYHA class 2 (CMS/[facility])    • Acute kidney injury (CMS/[facility])     • Dehydration    • CAD (coronary artery disease)    1.  DKA plans to admit patient initiate DKA protocol with insulin drip and fluid boluses.  Serial Accu-Cheks and labs.  Continue insulin till gap is closed.  Consult diabetes educator.  2.  COVID- patient's chest x-ray is negative no CT was done against due to elevated creatinine.  He does have ability D-dimers so believe he will need a CTA at some point we will put him on full dose Lovenox until we can get that her VQ scan.  We will also check respiratory pathogens panel urine Legionella urine strep pneumo cultures of blood and sputum.  He is currently oxygenating well enough that I do not believe he needs treatment however this may change depending on how he does the next day or so and what his CTA shows.  3.  Acute kidney injury hydrate aggressively with IV fluids recheck labs.  4.  Type 2 diabetes with noncompliance encourage compliance with medications discussed with him how unwise this was today.  5.  Chronic systolic CHF currently hypovolemic monitor though for volume overload especially given large fluid intake with the protocol.  6.  Medications reviewed and resumed as appropriate.    Patient seen prior to midnight       Code Status/Advanced Care Plan: Full    The patient's surrogate decision maker is patient's wife.     I discussed my findings and recommendations with the patient.    Estimated length of stay is to be determined.     The patient was seen and examined by me on August 29, 2021 at 11:43 PM.    Electronically signed by Salvador Cueva MD, 08/30/21, 08:02 CDT.                Electronically signed by Salvador Cueva MD at 08/30/21 0812       Discharge Summary    No notes of this type exist for this encounter.

## 2021-09-02 NOTE — CASE MANAGEMENT/SOCIAL WORK
Continued Stay Note   [place]     Patient Name: Luis Echeverria  MRN: [id number]  Today's Date: 9/2/2021    Admit Date: 8/29/2021    Discharge Plan     Row Name 09/02/21 1353       Plan    Plan  [facility]    Provided Post Acute Provider List?  Yes    Post Acute Provider List  Home Health    Provided Post Acute Provider Quality & Resource List?  Yes    Post Acute Provider Quality and Resource List  Home Health    Delivered To  Patient    Method of Delivery  Telephone    Patient/Family in Agreement with Plan  yes    Final Discharge Disposition Code  06 - home with home health care    Final Note  Pt is being discharged home today.  Pt has HH care ordered and home O2.  Pt prefers [facility] so sent referral via inbasket with [facility].  Pt prefers [facility] so called Evelyn there to inform of d/c status and need for portable O2 tank brought to room before he leaves [facility].        Discharge Codes    No documentation.       Expected Discharge Date and Time     Expected Discharge Date Expected Discharge Time    Sep 2, 2021             OMI Aldana

## 2021-09-03 ENCOUNTER — READMISSION MANAGEMENT (OUTPATIENT)
Dept: CALL CENTER | Facility: HOSPITAL | Age: 56
End: 2021-09-03

## 2021-09-03 NOTE — PAYOR COMM NOTE
"REF:[id number]    [facility]  [place],  [phone]  OR  FAX  [phone]       Rc Echeverria (56 y.o. Male)     Date of Birth Social Security Number Address [address] Phone MRN    1965  [address] [facility] [address] [phone] [id number]    Jain Marital Status          [facility]        Admission Date Admission Type Admitting Provider Attending Provider Department, Room/Bed    8/29/21 Emergency Aston Webb MD  [facility] 3A, 336/1    Discharge Date Discharge Disposition Discharge Destination        9/2/2021 Home or Self Care              Attending Provider: (none)   Allergies: No Known Allergies    Isolation: None   Infection: COVID (confirmed) (08/30/21)   Code Status: Prior    Ht: 175.3 cm (69.02\")   Wt: 98.5 kg (217 lb 2.5 oz)    Admission Cmt: None   Principal Problem: DKA (diabetic ketoacidoses) (CMS/[facility]) [E11.10]                 Active Insurance as of 8/29/2021     Primary Coverage     Payor Plan Insurance Group Employer/Plan Group    [employer] CROSS BLUE [employer] PPO [id number]     Payor Plan Address Payor Plan Phone Number Payor Plan Fax Number Effective Dates    [address] [phone]  1/1/2018 - None Entered    [place] [address]       Subscriber Name Subscriber Birth Date Member ID       RC ECHEVERRIA 1965 [id number]                 Emergency Contacts      (Rel.) Home Phone Work Phone Mobile Phone    Anali Echeverria (Spouse) [phone] -- --               Discharge Summary      Aston Webb MD at 09/02/21 1655        Refer to progress note 9/2/2021 for discharge.KT .    Electronically signed by Aston Webb MD at 09/02/21 6239       Discharge Order (From admission, onward)     Start     Ordered    09/02/21 1252  Discharge patient  Once     Expected Discharge Date: 09/02/21    Discharge Disposition: Home or Self Care    Physician of Record for Attribution - Please select from Treatment Team: " ISAURA TATUM [[id number]]    Review needed by CMO to determine Physician of Record: No       Question Answer Comment   Physician of Record for Attribution - Please select from Treatment Team ISAURA TATUM    Review needed by CMO to determine Physician of Record No        09/02/21 2454

## 2021-09-03 NOTE — OUTREACH NOTE
Prep Survey      Responses   [facility]?  [place]   Is LACE score < 7 ?  No   Emergency Room discharge w/ pulse ox?  No   Eligibility  Readm Mgmt   Discharge diagnosis  Covid 19/DKA   Does the patient have one of the following disease processes/diagnoses(primary or secondary)?  COVID-19   Does the patient have Home health ordered?  Yes   What is the Home health agency?   [facility]   Is there a DME ordered?  Yes   What DME was ordered?  [facility] for oxygen   Prep survey completed?  Yes          Gina Watson RN

## 2021-09-03 NOTE — OUTREACH NOTE
COVID-19 Week 1 Survey      Responses   [facility] patient discharged from?  [place]   Does the patient have one of the following disease processes/diagnoses(primary or secondary)?  COVID-19   COVID-19 underlying condition?  None   Call Number  Call 1   Week 1 Call successful?  Yes   Call start time  1038   Call end time  1042   Discharge diagnosis  Covid 19/DKA   Meds reviewed with patient/caregiver?  Yes   Is the patient having any side effects they believe may be caused by any medication additions or changes?  No   Does the patient have all medications ordered at discharge?  Yes   Is the patient taking all medications as directed (includes completed medication regime)?  Yes   Comments regarding appointments  Hospital f/u appt. with PCP 9/10/21 @ 10am.   Does the patient have a primary care provider?   Yes   Does the patient have an appointment with their PCP or specialist within 7 days of discharge?  Greater than 7 days   Nursing Interventions  Verified appointment date/time/provider   Has the patient kept scheduled appointments due by today?  N/A   What is the Home health agency?   [facility]   What DME was ordered?  [facility] for oxygen 3L   Has all DME been delivered?  Yes   Psychosocial issues?  No   Did the patient receive a copy of their discharge instructions?  Yes   Did the patient receive a copy of COVID-19 specific instructions?  Yes   Nursing interventions  Reviewed instructions with patient   What is the patient's perception of their health status since discharge?  Improving   Does the patient have any of the following symptoms?  Shortness of breath, Loss of taste/smell   Nursing Interventions  Nurse provided patient education   Pulse Ox monitoring  Intermittent   Pulse Ox device source  Patient   O2 Sat comments  90-95%    O2 Sat: education provided  Sat levels, Monitoring frequency, When to seek care   O2 Sat education comments  Advised if unable to maintain O2 level 92% or greater to go to ED.   Is the  patient/caregiver able to teach back steps to recovery at home?  Eat a well-balance diet, Rest and rebuild strength, gradually increase activity   If the patient is a current smoker, are they able to teach back resources for cessation?  Not a smoker   Is the patient/caregiver able to teach back the hierarchy of who to call/visit for symptoms/problems? PCP, Specialist, Home health nurse, Urgent Care, ED, 911  Yes   COVID-19 call completed?  Yes   Wrap up additional comments  Pt. states he is doing better. Reports using oxygen very little, 90% at times on RA. Recommended pt. apply O2 if sats 90%. No questions at this time.            Ruthie Myles, RN

## 2021-09-04 ENCOUNTER — HOME CARE VISIT (OUTPATIENT)
Dept: HOME HEALTH SERVICES | Facility: CLINIC | Age: 56
End: 2021-09-04

## 2021-09-04 LAB
BACTERIA SPEC AEROBE CULT: NORMAL
BACTERIA SPEC AEROBE CULT: NORMAL

## 2021-09-04 PROCEDURE — G0299 HHS/HOSPICE OF RN EA 15 MIN: HCPCS

## 2021-09-05 VITALS
DIASTOLIC BLOOD PRESSURE: 60 MMHG | HEART RATE: 66 BPM | TEMPERATURE: 97.4 F | SYSTOLIC BLOOD PRESSURE: 115 MMHG | OXYGEN SATURATION: 94 % | RESPIRATION RATE: 20 BRPM

## 2021-09-06 ENCOUNTER — READMISSION MANAGEMENT (OUTPATIENT)
Dept: CALL CENTER | Facility: HOSPITAL | Age: 56
End: 2021-09-06

## 2021-09-06 NOTE — OUTREACH NOTE
COVID-19 Week 1 Survey      Responses   [facility] [place] patient discharged from?  [place]   Does the patient have one of the following disease processes/diagnoses(primary or secondary)?  COVID-19   COVID-19 underlying condition?  None   Call Number  Call 2   Week 1 Call successful?  Yes   Call start time  1447   Call end time  1454   Discharge diagnosis  Covid 19/DKA   Meds reviewed with patient/caregiver?  Yes   Is the patient having any side effects they believe may be caused by any medication additions or changes?  No   Does the patient have all medications ordered at discharge?  Yes   Is the patient taking all medications as directed (includes completed medication regime)?  Yes   Does the patient have a primary care provider?   Yes   Has the patient kept scheduled appointments due by today?  N/A   What is the Home health agency?   [facility]   Has home health visited the patient within 72 hours of discharge?  N/A   Psychosocial issues?  No   Comments  states progressing well, did feel some dizziness this am , encouraged pt to continue with adequate hydration, water, etc   Did the patient receive a copy of their discharge instructions?  Yes   Did the patient receive a copy of COVID-19 specific instructions?  Yes   Nursing interventions  Reviewed instructions with patient   What is the patient's perception of their health status since discharge?  Improving   Does the patient have any of the following symptoms?  Shortness of breath, Loss of taste/smell   Nursing Interventions  Nurse provided patient education   Pulse Ox monitoring  Intermittent   Pulse Ox device source  Patient   O2 Sat comments  mid 90's on RA, uses O2 occasionally   O2 Sat: education provided  Sat levels, Monitoring frequency, When to seek care   Is the patient/caregiver able to teach back steps to recovery at home?  Set small, achievable goals for return to baseline health, Rest and rebuild strength, gradually increase activity, Eat a well-balance  diet   Is the patient/caregiver able to teach back the hierarchy of who to call/visit for symptoms/problems? PCP, Specialist, Home health nurse, Urgent Care, ED, 911  Yes   COVID-19 call completed?  Yes          Kat Ferrara RN

## 2021-09-07 ENCOUNTER — HOME CARE VISIT (OUTPATIENT)
Dept: HOME HEALTH SERVICES | Facility: CLINIC | Age: 56
End: 2021-09-07

## 2021-09-07 ENCOUNTER — READMISSION MANAGEMENT (OUTPATIENT)
Dept: CALL CENTER | Facility: HOSPITAL | Age: 56
End: 2021-09-07

## 2021-09-07 NOTE — OUTREACH NOTE
COVID-19 Week 1 Survey      Responses   [facility] patient discharged from?  [place]   Does the patient have one of the following disease processes/diagnoses(primary or secondary)?  COVID-19   COVID-19 underlying condition?  None   Call Number  Call 3   Week 1 Call successful?  Yes   Call start time  1419   Call end time  1422   Discharge diagnosis  Covid 19/DKA   Meds reviewed with patient/caregiver?  Yes   Is the patient having any side effects they believe may be caused by any medication additions or changes?  No   Does the patient have all medications ordered at discharge?  Yes   Is the patient taking all medications as directed (includes completed medication regime)?  Yes   Does the patient have a primary care provider?   Yes   Has the patient kept scheduled appointments due by today?  N/A   What is the Home health agency?   [facility]   Has home health visited the patient within 72 hours of discharge?  Yes   Psychosocial issues?  No   Did the patient receive a copy of their discharge instructions?  Yes   Did the patient receive a copy of COVID-19 specific instructions?  Yes   Nursing interventions  Reviewed instructions with patient   What is the patient's perception of their health status since discharge?  Improving   Does the patient have any of the following symptoms?  Shortness of breath   Nursing Interventions  Nurse provided patient education   Pulse Ox monitoring  Intermittent   Pulse Ox device source  Patient   O2 Sat comments  94-95% on RA, uses )2 after exertion   O2 Sat: education provided  Sat levels, Monitoring frequency, When to seek care   Is the patient/caregiver able to teach back steps to recovery at home?  Set small, achievable goals for return to baseline health, Rest and rebuild strength, gradually increase activity   Is the patient/caregiver able to teach back the hierarchy of who to call/visit for symptoms/problems? PCP, Specialist, Home health nurse, Urgent Care, ED, 911  Yes   COVID-19  call completed?  Yes          Kat Ferrara RN

## 2021-09-08 ENCOUNTER — HOME CARE VISIT (OUTPATIENT)
Dept: HOME HEALTH SERVICES | Facility: CLINIC | Age: 56
End: 2021-09-08

## 2021-09-08 VITALS
HEART RATE: 99 BPM | SYSTOLIC BLOOD PRESSURE: 122 MMHG | OXYGEN SATURATION: 91 % | TEMPERATURE: 97.3 F | RESPIRATION RATE: 20 BRPM | DIASTOLIC BLOOD PRESSURE: 52 MMHG

## 2021-09-08 PROCEDURE — G0299 HHS/HOSPICE OF RN EA 15 MIN: HCPCS

## 2021-09-09 ENCOUNTER — READMISSION MANAGEMENT (OUTPATIENT)
Dept: CALL CENTER | Facility: HOSPITAL | Age: 56
End: 2021-09-09

## 2021-09-09 NOTE — OUTREACH NOTE
COVID-19 Week 2 Survey      Responses   [facility] patient discharged from?  [place]   Does the patient have one of the following disease processes/diagnoses(primary or secondary)?  COVID-19   COVID-19 underlying condition?  None   Call Number  Call 1   COVID-19 Week 2: Call 1 attempt successful?  Yes   Call start time  1544   Call end time  1547   Discharge diagnosis  Covid 19/DKA   Does the patient have any of the following symptoms?  None   Pulse Ox monitoring  Intermittent   Pulse Ox device source  Patient   O2 Sat comments  95% RA.  No O2 for 2 days.   COVID-19 call completed?  Yes   Wrap up additional comments  Improving.  Denies problem or questions at this time.          Jennifer Bianchi, RN

## 2021-09-10 ENCOUNTER — HOME CARE VISIT (OUTPATIENT)
Dept: HOME HEALTH SERVICES | Facility: CLINIC | Age: 56
End: 2021-09-10

## 2021-09-13 ENCOUNTER — HOME CARE VISIT (OUTPATIENT)
Dept: HOME HEALTH SERVICES | Facility: CLINIC | Age: 56
End: 2021-09-13

## 2021-09-13 VITALS
RESPIRATION RATE: 18 BRPM | TEMPERATURE: 97.3 F | OXYGEN SATURATION: 97 % | SYSTOLIC BLOOD PRESSURE: 104 MMHG | DIASTOLIC BLOOD PRESSURE: 66 MMHG | HEART RATE: 75 BPM

## 2021-09-13 PROCEDURE — G0299 HHS/HOSPICE OF RN EA 15 MIN: HCPCS

## 2021-09-17 ENCOUNTER — READMISSION MANAGEMENT (OUTPATIENT)
Dept: CALL CENTER | Facility: HOSPITAL | Age: 56
End: 2021-09-17

## 2021-09-17 NOTE — OUTREACH NOTE
COVID-19 Week 3 Survey      Responses   [facility] patient discharged from?  [place]   Does the patient have one of the following disease processes/diagnoses(primary or secondary)?  COVID-19   COVID-19 underlying condition?  None   Call Number  Call 1   COVID-19 Week 3: Call 1 attempt successful?  No   Discharge diagnosis  Covid 19/DKA          Radha Micahel RN

## 2021-09-24 ENCOUNTER — READMISSION MANAGEMENT (OUTPATIENT)
Dept: CALL CENTER | Facility: HOSPITAL | Age: 56
End: 2021-09-24

## 2021-09-24 NOTE — OUTREACH NOTE
COVID-19 Week 4 Survey      Responses   [facility] patient discharged from?  [place]   Does the patient have one of the following disease processes/diagnoses(primary or secondary)?  COVID-19   COVID-19 underlying condition?  None   Call Number  Call 1   COVID-19 Week 4: Call 1 attempt successful?  Yes   Call start time  1054   Call end time  1056   Discharge diagnosis  Covid 19/DKA   Is the patient taking all medications as directed (includes completed medication regime)?  Yes   Has the patient kept scheduled appointments due by today?  Yes   What is the patient's perception of their health status since discharge?  Improving   Does the patient have any of the following symptoms?  None   Pulse Ox monitoring  Intermittent   Pulse Ox device source  Hospital   O2 Sat comments  97 % room air after weed eating    Is the patient interested in additional calls from an ambulatory ?  NOTE:  applies to high risk patients requiring additional follow-up.  No   Did the patient feel the follow up calls were helpful during their recovery period?  Yes   Was the number of calls appropriate?  Yes   Wrap up additional comments  Patient states he is 100% better.            Jen Mehta RN

## 2021-10-04 ENCOUNTER — OFFICE VISIT (OUTPATIENT)
Dept: CARDIOLOGY | Facility: CLINIC | Age: 56
End: 2021-10-04

## 2021-10-04 VITALS
BODY MASS INDEX: 32.93 KG/M2 | HEIGHT: 70 IN | WEIGHT: 230 LBS | DIASTOLIC BLOOD PRESSURE: 70 MMHG | SYSTOLIC BLOOD PRESSURE: 119 MMHG | HEART RATE: 68 BPM

## 2021-10-04 DIAGNOSIS — E78.2 MIXED HYPERLIPIDEMIA: ICD-10-CM

## 2021-10-04 DIAGNOSIS — I10 PRIMARY HYPERTENSION: ICD-10-CM

## 2021-10-04 DIAGNOSIS — I25.10 CORONARY ARTERY DISEASE INVOLVING NATIVE CORONARY ARTERY OF NATIVE HEART WITHOUT ANGINA PECTORIS: ICD-10-CM

## 2021-10-04 DIAGNOSIS — Z95.810 S/P IMPLANTATION OF AUTOMATIC CARDIOVERTER/DEFIBRILLATOR (AICD): ICD-10-CM

## 2021-10-04 DIAGNOSIS — I50.22 CHRONIC SYSTOLIC CHF (CONGESTIVE HEART FAILURE), NYHA CLASS 2 (HCC): Primary | ICD-10-CM

## 2021-10-04 DIAGNOSIS — I25.5 ISCHEMIC CARDIOMYOPATHY: ICD-10-CM

## 2021-10-04 PROCEDURE — 93000 ELECTROCARDIOGRAM COMPLETE: CPT | Performed by: INTERNAL MEDICINE

## 2021-10-04 PROCEDURE — 99214 OFFICE O/P EST MOD 30 MIN: CPT | Performed by: INTERNAL MEDICINE

## 2021-10-04 NOTE — PROGRESS NOTES
"Subjective    Luis Echeverria is a 56 y.o. male. Fu of chf, ihd and risks    History of Present Illness     CHRONIC HFrEF:  Since LOV, contracted COVID and was hospitalized at [facility] from 8/29/21-9/2/21. He had severe hyperglycemia with mild ketoacidosis and hyperkalemia. Initial pBNP was elevated but had normalized at DC. Was started on Insulin gtt and heart meds continued. Has now regained strength but stamina is mildly down 2/2 pagan. Still has a mild cough when first laying down at night. Is adherent to meds as listed. EKG today is nsc. No edema or pnd. No palpitations. LVEF was 36-40% on recent ECHO, showing improvement. He has returned to work at [employer]. Wt has gone down since COVID and appetite and energy are ok.    IHD:  Has no cp and Trop was neg at recent admit. Is usually active with good stamina. He has known severe diabetic type cad.    HTN:  Home checks are \"like today's - 120/70\".    T2DM:  Is fu closely with pcp. Previous A1c=9 and Insulin is being adjusted. \"today glu =85 before lunch and 152 after lunch\".     DDD PACER / AICD:  No pain at the site. Infrequent ap and very rare  and no tachy tx needed.    HLD:  On potent statin his recent LDL=82 and HDL is very low.    The following portions of the patient's history were reviewed and updated as appropriate: allergies, current medications, past family history, past medical history, past social history, past surgical history and problem list.    Patient Active Problem List   Diagnosis   • CAD (coronary artery disease)   • Hypertension   • Hyperlipidemia   • MI (myocardial infarction) (HCC)   • Diabetes mellitus (HCC)   • Coronary stent occlusion (HCC)   • Unstable angina (HCC)   • Ischemic cardiomyopathy   • RUQ pain   • Dehydration   • Nausea and vomiting   • Acute kidney injury (HCC)   • Hypotension   • Gallbladder polyp   • BMI 31.0-31.9,adult   • S/P implantation of automatic cardioverter/defibrillator (AICD)   • Chronic systolic CHF (congestive heart " failure), NYHA class 2 (HCC)   • Morbidly obese (HCC)   • DKA (diabetic ketoacidoses)   • Pneumonia due to COVID-19 virus   • Noncompliance   • Cytokine release syndrome, grade 1       No Known Allergies    Family History   Problem Relation Age of Onset   • Heart disease Maternal Grandmother    • No Known Problems Mother    • No Known Problems Father        Social History     Socioeconomic History   • Marital status:      Spouse name: Not on file   • Number of children: Not on file   • Years of education: Not on file   • Highest education level: Not on file   Tobacco Use   • Smoking status: Never Smoker   • Smokeless tobacco: Never Used   Substance and Sexual Activity   • Alcohol use: No   • Drug use: No   • Sexual activity: Defer         Current Outpatient Medications:   •  albuterol sulfate  (90 Base) MCG/ACT inhaler, Inhale 2 puffs Every 4 (Four) Hours As Needed for Wheezing or Shortness of Air., Disp: 8.5 g, Rfl: 5  •  aspirin 81 MG EC tablet, Take 1 tablet by mouth Daily., Disp: , Rfl:   •  atorvastatin (LIPITOR) 80 MG tablet, TAKE 1 TABLET BY MOUTH EVERY DAY AT NIGHT (Patient taking differently: Take 80 mg by mouth Every Night.), Disp: 90 tablet, Rfl: 3  •  Brilinta 60 MG tablet tablet, TAKE 1 TABLET BY MOUTH TWICE A DAY (Patient taking differently: Take 60 mg by mouth 2 (Two) Times a Day.), Disp: 180 tablet, Rfl: 3  •  brimonidine (ALPHAGAN) 0.2 % ophthalmic solution, Administer 1 drop to both eyes 2 (Two) Times a Day., Disp: , Rfl:   •  dextromethorphan polistirex ER (DELSYM) 30 MG/5ML Suspension Extended Release oral suspension, Take 10 mL by mouth Every 12 (Twelve) Hours As Needed (Cough)., Disp: 280 mL, Rfl: 1  •  DULoxetine (CYMBALTA) 20 MG capsule, Take 20 mg by mouth Daily., Disp: , Rfl:   •  empagliflozin (JARDIANCE) 10 MG tablet tablet, Take 10 mg by mouth Daily., Disp: , Rfl:   •  famotidine (PEPCID) 20 MG tablet, Take 1 tablet by mouth 2 (Two) Times a Day., Disp: 60 tablet, Rfl: 0  •   insulin aspart (novoLOG FLEXPEN) 100 UNIT/ML solution pen-injector sc pen, Inject 39 Units under the skin into the appropriate area as directed 4 (Four) Times a Day Before Meals & at Bedtime., Disp: , Rfl:   •  insulin degludec (TRESIBA FLEXTOUCH) 100 UNIT/ML solution pen-injector injection, Inject 55 Units under the skin into the appropriate area as directed Every Morning., Disp: , Rfl:   •  lisinopril (PRINIVIL,ZESTRIL) 2.5 MG tablet, TAKE 1 TABLET BY MOUTH EVERY DAY (Patient taking differently: Take 2.5 mg by mouth Daily.), Disp: 90 tablet, Rfl: 3  •  metoprolol succinate XL (TOPROL-XL) 50 MG 24 hr tablet, TAKE 1 AND 1/2 TABLETS BY MOUTH EVERY DAY (Patient taking differently: Take 75 mg by mouth Daily.), Disp: 135 tablet, Rfl: 4  •  nitroglycerin (NITROSTAT) 0.4 MG SL tablet, Place 1 tablet under the tongue Every 5 (Five) Minutes As Needed for Chest Pain. Take no more than 3 doses in 15 minutes., Disp: 25 tablet, Rfl: 1  •  O2 (OXYGEN), Inhale 2 L/min Continuous As Needed (exertion)., Disp: , Rfl:   •  spironolactone (ALDACTONE) 25 MG tablet, Take 1 tablet by mouth Daily., Disp: 90 tablet, Rfl: 4  •  apixaban (ELIQUIS) 2.5 MG tablet tablet, Take 1 tablet by mouth Every 12 (Twelve) Hours., Disp: 60 tablet, Rfl: 0  •  Zinc Sulfate 220 (50 Zn) MG tablet, Take 1 tablet by mouth daily, Disp: 30 each, Rfl: 0    Past Surgical History:   Procedure Laterality Date   • CARDIAC CATHETERIZATION N/A 11/6/2018    Procedure: Left Heart Cath;  Surgeon: Tru Rai MD;  Location:  [facility];  Service: Cardiology   • CARDIAC ELECTROPHYSIOLOGY PROCEDURE N/A 5/1/2019    Procedure: ICD new;  Surgeon: Tru Rai MD;  Location:  [facility];  Service: Cardiology   • CAROTID STENT         Review of Systems   Constitutional: Negative for activity change, appetite change, fatigue and unexpected weight change.   Respiratory: Positive for cough and shortness of breath.   "  Cardiovascular: Negative for chest pain, palpitations and leg swelling.   Gastrointestinal: Negative for abdominal pain.   Genitourinary: Negative for difficulty urinating.       /70   Pulse 68   Ht 177.8 cm (70\")   Wt 104 kg (230 lb)   BMI 33.00 kg/m²   Procedures    Objective   Physical Exam  Constitutional:       Appearance: He is obese. He is not ill-appearing.   Cardiovascular:      Rate and Rhythm: Normal rate and regular rhythm.      Pulses: Normal pulses.      Heart sounds: Normal heart sounds. No murmur heard.   No friction rub. No gallop.    Pulmonary:      Effort: Pulmonary effort is normal.      Breath sounds: Normal breath sounds. No wheezing or rales.   Abdominal:      General: Bowel sounds are normal. There is distension.      Tenderness: There is no abdominal tenderness.   Musculoskeletal:      Right lower leg: No edema.      Left lower leg: No edema.   Skin:     General: Skin is warm and dry.   Neurological:      General: No focal deficit present.      Mental Status: He is oriented to person, place, and time.   Psychiatric:         Mood and Affect: Mood normal.         Behavior: Behavior normal.         Assessment/Plan   Diagnoses and all orders for this visit:    1. Chronic systolic CHF (congestive heart failure), NYHA class 2 (HCC) (Primary)  Comments:  compensated - class 2c  Orders:  -     ECG 12 Lead    2. Ischemic cardiomyopathy  Comments:  improved LVEF    3. S/P implantation of automatic cardioverter/defibrillator (AICD)  Comments:  good function    4. Mixed hyperlipidemia  Comments:  not to goal - consider adding Zetia in the future - encourage adherence to Mediterranean eating    5. Primary hypertension  Comments:  good control    6. Coronary artery disease involving native coronary artery of native heart without angina pectoris  Comments:  severe diabetic type - no angina now      MDM=mod - recent hospitalization since lov with review of labs and findings  - complex " co-morbidities to balance. Review of AICD/pacer interrogation           Return in about 3 months (around 1/4/2022) for Next scheduled follow up with aprn.  Orders Placed This Encounter   Procedures   • ECG 12 Lead     Order Specific Question:   Reason for Exam:     Answer:   [email or web address]     Order Specific Question:   Release to patient     Answer:   Immediate

## 2021-11-29 RX ORDER — LISINOPRIL 2.5 MG/1
TABLET ORAL
Qty: 90 TABLET | Refills: 4 | Status: SHIPPED | OUTPATIENT
Start: 2021-11-29 | End: 2022-12-29

## 2021-12-13 RX ORDER — EMPAGLIFLOZIN 10 MG/1
TABLET, FILM COATED ORAL
Qty: 90 TABLET | Refills: 4 | Status: SHIPPED | OUTPATIENT
Start: 2021-12-13 | End: 2022-11-07 | Stop reason: DRUGHIGH

## 2022-03-01 RX ORDER — ATORVASTATIN CALCIUM 80 MG/1
80 TABLET, FILM COATED ORAL NIGHTLY
Qty: 90 TABLET | Refills: 3 | Status: SHIPPED | OUTPATIENT
Start: 2022-03-01 | End: 2023-02-22

## 2022-03-16 DIAGNOSIS — I50.22 CHRONIC SYSTOLIC CHF (CONGESTIVE HEART FAILURE), NYHA CLASS 2: ICD-10-CM

## 2022-03-17 RX ORDER — SPIRONOLACTONE 25 MG/1
TABLET ORAL
Qty: 90 TABLET | Refills: 1 | Status: SHIPPED | OUTPATIENT
Start: 2022-03-17 | End: 2022-09-19

## 2022-04-04 ENCOUNTER — OFFICE VISIT (OUTPATIENT)
Dept: CARDIOLOGY | Facility: CLINIC | Age: 57
End: 2022-04-04

## 2022-04-04 VITALS
SYSTOLIC BLOOD PRESSURE: 103 MMHG | DIASTOLIC BLOOD PRESSURE: 62 MMHG | BODY MASS INDEX: 35.5 KG/M2 | HEIGHT: 70 IN | WEIGHT: 248 LBS | HEART RATE: 71 BPM

## 2022-04-04 DIAGNOSIS — I25.10 CORONARY ARTERY DISEASE INVOLVING NATIVE CORONARY ARTERY OF NATIVE HEART WITHOUT ANGINA PECTORIS: Primary | ICD-10-CM

## 2022-04-04 DIAGNOSIS — I25.5 ISCHEMIC CARDIOMYOPATHY: ICD-10-CM

## 2022-04-04 DIAGNOSIS — I10 PRIMARY HYPERTENSION: ICD-10-CM

## 2022-04-04 DIAGNOSIS — Z95.810 S/P IMPLANTATION OF AUTOMATIC CARDIOVERTER/DEFIBRILLATOR (AICD): ICD-10-CM

## 2022-04-04 DIAGNOSIS — E78.2 MIXED HYPERLIPIDEMIA: ICD-10-CM

## 2022-04-04 DIAGNOSIS — I50.22 CHRONIC SYSTOLIC CHF (CONGESTIVE HEART FAILURE), NYHA CLASS 2: ICD-10-CM

## 2022-04-04 PROCEDURE — 93000 ELECTROCARDIOGRAM COMPLETE: CPT | Performed by: INTERNAL MEDICINE

## 2022-04-04 PROCEDURE — 99213 OFFICE O/P EST LOW 20 MIN: CPT | Performed by: INTERNAL MEDICINE

## 2022-04-04 RX ORDER — METOPROLOL SUCCINATE 50 MG/1
75 TABLET, EXTENDED RELEASE ORAL DAILY
Qty: 135 TABLET | Refills: 11 | Status: SHIPPED | OUTPATIENT
Start: 2022-04-04

## 2022-04-04 NOTE — PROGRESS NOTES
"Subjective    Luis Echeverria is a 56 y.o. male. FU chf, ihd and risks    History of Present Illness     ISCH CMO - HFrEF:  He is active and working at [employer] and also walking 1mi 2-3x/week with improving stamina. He has no unusual soa and no cp. meds are stable. Wt is up \"like every winter but it'll come back down. EKG is nsc. He has good energy and appetite    PACER - AICD:  Recent interrogation is good with ap/=6/1 and no tachy- treatment needed.    IHD:  Has diffuse 'diabetic type' dz. Has improving stamina and no unusual soa. Is getting his risk factors under better control. He is on vasc dose Xarelto + asa + brilinta and having no bleeding problems noted.    HTN:  Home readings are \"110-120/60-70\"    HLD:  Is on a potent statin and lipids are checked by his pcp with a check due soon.    T2DM:  Is on Insulin + Jardiance and is fu closely with his pcp. Average home glu=160 and an A1c is due soon        The following portions of the patient's history were reviewed and updated as appropriate: allergies, current medications, past family history, past medical history, past social history, past surgical history and problem list.    Patient Active Problem List   Diagnosis   • CAD (coronary artery disease)   • Hypertension   • Hyperlipidemia   • MI (myocardial infarction) (HCC)   • Diabetes mellitus (HCC)   • Carotid stent occlusion (HCC)   • Unstable angina (HCC)   • Ischemic cardiomyopathy   • RUQ pain   • Dehydration   • Nausea and vomiting   • Acute kidney injury (HCC)   • Hypotension   • Gallbladder polyp   • BMI 31.0-31.9,adult   • S/P implantation of automatic cardioverter/defibrillator (AICD)   • Chronic systolic CHF (congestive heart failure), NYHA class 2 (HCC)   • Morbidly obese (HCC)   • DKA (diabetic ketoacidoses)   • Pneumonia due to COVID-19 virus   • Noncompliance   • Cytokine release syndrome, grade 1       No Known Allergies    Family History   Problem Relation Age of Onset   • Heart disease Maternal " Grandmother    • No Known Problems Mother    • No Known Problems Father        Social History     Socioeconomic History   • Marital status:    Tobacco Use   • Smoking status: Never Smoker   • Smokeless tobacco: Never Used   Substance and Sexual Activity   • Alcohol use: No   • Drug use: No   • Sexual activity: Defer         Current Outpatient Medications:   •  albuterol sulfate  (90 Base) MCG/ACT inhaler, Inhale 2 puffs Every 4 (Four) Hours As Needed for Wheezing or Shortness of Air., Disp: 8.5 g, Rfl: 5  •  apixaban (ELIQUIS) 2.5 MG tablet tablet, Take 1 tablet by mouth Every 12 (Twelve) Hours., Disp: 60 tablet, Rfl: 0  •  aspirin 81 MG EC tablet, Take 1 tablet by mouth Daily., Disp: , Rfl:   •  atorvastatin (LIPITOR) 80 MG tablet, Take 1 tablet by mouth Every Night., Disp: 90 tablet, Rfl: 3  •  brimonidine (ALPHAGAN) 0.2 % ophthalmic solution, Administer 1 drop to both eyes 2 (Two) Times a Day., Disp: , Rfl:   •  dextromethorphan polistirex ER (DELSYM) 30 MG/5ML Suspension Extended Release oral suspension, Take 10 mL by mouth Every 12 (Twelve) Hours As Needed (Cough)., Disp: 280 mL, Rfl: 1  •  DULoxetine (CYMBALTA) 20 MG capsule, Take 20 mg by mouth Daily., Disp: , Rfl:   •  famotidine (PEPCID) 20 MG tablet, Take 1 tablet by mouth 2 (Two) Times a Day., Disp: 60 tablet, Rfl: 0  •  insulin aspart (novoLOG FLEXPEN) 100 UNIT/ML solution pen-injector sc pen, Inject 39 Units under the skin into the appropriate area as directed 4 (Four) Times a Day Before Meals & at Bedtime., Disp: , Rfl:   •  insulin degludec (TRESIBA FLEXTOUCH) 100 UNIT/ML solution pen-injector injection, Inject 55 Units under the skin into the appropriate area as directed Every Morning., Disp: , Rfl:   •  Jardiance 10 MG tablet tablet, TAKE 1 TABLET BY MOUTH EVERY DAY, Disp: 90 tablet, Rfl: 4  •  lisinopril (PRINIVIL,ZESTRIL) 2.5 MG tablet, TAKE 1 TABLET BY MOUTH EVERY DAY, Disp: 90 tablet, Rfl: 4  •  metoprolol succinate XL (TOPROL-XL)  "50 MG 24 hr tablet, Take 1.5 tablets by mouth Daily., Disp: 135 tablet, Rfl: 11  •  nitroglycerin (NITROSTAT) 0.4 MG SL tablet, Place 1 tablet under the tongue Every 5 (Five) Minutes As Needed for Chest Pain. Take no more than 3 doses in 15 minutes., Disp: 25 tablet, Rfl: 1  •  O2 (OXYGEN), Inhale 2 L/min Continuous As Needed (exertion)., Disp: , Rfl:   •  spironolactone (ALDACTONE) 25 MG tablet, TAKE 1 TABLET BY MOUTH EVERY DAY, Disp: 90 tablet, Rfl: 1  •  Zinc Sulfate 220 (50 Zn) MG tablet, Take 1 tablet by mouth daily, Disp: 30 each, Rfl: 0    Past Surgical History:   Procedure Laterality Date   • CARDIAC CATHETERIZATION N/A 11/6/2018    Procedure: Left Heart Cath;  Surgeon: Tru Rai MD;  Location:  [facility];  Service: Cardiology   • CARDIAC ELECTROPHYSIOLOGY PROCEDURE N/A 5/1/2019    Procedure: ICD new;  Surgeon: Tru Rai MD;  Location:  [facility];  Service: Cardiology   • CAROTID STENT         Review of Systems   Constitutional: Negative for activity change, appetite change, fatigue and unexpected weight change.   Respiratory: Negative for shortness of breath and wheezing.    Cardiovascular: Negative for chest pain, palpitations and leg swelling.   Gastrointestinal: Negative for abdominal pain and blood in stool.   Genitourinary: Negative for difficulty urinating and hematuria.       /62   Pulse 71   Ht 177.8 cm (70\")   Wt 112 kg (248 lb)   BMI 35.58 kg/m²   Procedures    Objective   Physical Exam  Constitutional:       Appearance: He is obese.   Cardiovascular:      Rate and Rhythm: Normal rate and regular rhythm.      Pulses: Decreased pulses.      Heart sounds: Heart sounds are distant. No murmur heard.    No gallop.   Pulmonary:      Effort: Pulmonary effort is normal.      Breath sounds: Normal breath sounds. No wheezing or rales.   Abdominal:      General: Bowel sounds are normal.      Tenderness: There is no abdominal " tenderness.   Musculoskeletal:      Right lower leg: No edema.      Left lower leg: No edema.   Skin:     General: Skin is warm and dry.   Neurological:      General: No focal deficit present.   Psychiatric:         Mood and Affect: Mood normal.         Assessment/Plan   Diagnoses and all orders for this visit:    1. Coronary artery disease involving native coronary artery of native heart without angina pectoris (Primary)  Comments:  no angina - stop Brilinta but cont DPI tx  Orders:  -     ECG 12 Lead  -     metoprolol succinate XL (TOPROL-XL) 50 MG 24 hr tablet; Take 1.5 tablets by mouth Daily.  Dispense: 135 tablet; Refill: 11    2. Primary hypertension  Comments:  controlled    3. Mixed hyperlipidemia  Comments:  on potent statin - LDL goal is <70    4. Ischemic cardiomyopathy  Comments:  compensated    5. S/P implantation of automatic cardioverter/defibrillator (AICD)  Comments:  good function    6. Chronic systolic CHF (congestive heart failure), NYHA class 2 ([facility])  Comments:  compensated - Class 2-c                 Return in about 6 months (around 10/4/2022) for Next scheduled follow up.  Orders Placed This Encounter   Procedures   • ECG 12 Lead     Order Specific Question:   Reason for Exam:     Answer:   [email or web address]     Order Specific Question:   Release to patient     Answer:   Immediate

## 2022-06-28 ENCOUNTER — CLINICAL SUPPORT NO REQUIREMENTS (OUTPATIENT)
Dept: CARDIOLOGY | Facility: CLINIC | Age: 57
End: 2022-06-28

## 2022-06-28 DIAGNOSIS — I25.5 ISCHEMIC CARDIOMYOPATHY: ICD-10-CM

## 2022-06-28 PROCEDURE — 93289 INTERROG DEVICE EVAL HEART: CPT | Performed by: INTERNAL MEDICINE

## 2022-06-29 NOTE — PROGRESS NOTES
Dual Chamber AICD Evaluation Report  Clinic Interrogation    June 29, 2022    Primary Cardiologist: Cecelia  : [facility] Model: Assura  Implant date: 5/1/19    Reason for evaluation: routine  Indication for AICD: ischemic cardiomyopathy    Measurements  Atrial sensing - P wave: 3.9 mV  Atrial threshold: 0.75 V @ 0.5 ms  Atrial lead impedance: 430 ohms  Ventricular sensing - R wave: >12 mV  Ventricular threshold: 0.75 V @ 0.5 ms  Ventricular lead impedance:  450 ohms  Shock impedance:  RV- 75 ohms         Diagnostic Data  Atrial paced: 3.8 %  Ventricular paced: <1 %  Other: No new episodes noted.  Battery status: satisfactory  70% remaining      Final Parameters  Mode: DDD  Lower rate: 60 bpm Upper rate: 110 bpm  AV Delay: Paced- 250 ms    Sensed- 250 ms     Atrial - Amplitude: 2.0 V Pulse width: 0.5 ms Sensitivity: auto   Ventricular - Amplitude: 2.0 V Pulse width: 0.5 ms Sensitivity: auto  Changes made: none  Conclusions: normal AICD function, stable pacing and sensing thresholds and adequate battery reserve    Follow up: every 3 months via Merlin, annually in office

## 2022-09-19 DIAGNOSIS — I50.22 CHRONIC SYSTOLIC CHF (CONGESTIVE HEART FAILURE), NYHA CLASS 2: ICD-10-CM

## 2022-09-19 RX ORDER — SPIRONOLACTONE 25 MG/1
TABLET ORAL
Qty: 90 TABLET | Refills: 3 | Status: SHIPPED | OUTPATIENT
Start: 2022-09-19

## 2022-11-07 ENCOUNTER — OFFICE VISIT (OUTPATIENT)
Dept: CARDIOLOGY | Facility: CLINIC | Age: 57
End: 2022-11-07

## 2022-11-07 VITALS
WEIGHT: 250 LBS | BODY MASS INDEX: 35.79 KG/M2 | SYSTOLIC BLOOD PRESSURE: 100 MMHG | HEART RATE: 71 BPM | HEIGHT: 70 IN | DIASTOLIC BLOOD PRESSURE: 62 MMHG

## 2022-11-07 DIAGNOSIS — Z79.4 TYPE 2 DIABETES MELLITUS WITH OTHER CIRCULATORY COMPLICATION, WITH LONG-TERM CURRENT USE OF INSULIN: ICD-10-CM

## 2022-11-07 DIAGNOSIS — I25.10 CORONARY ARTERY DISEASE INVOLVING NATIVE CORONARY ARTERY OF NATIVE HEART WITHOUT ANGINA PECTORIS: Primary | ICD-10-CM

## 2022-11-07 DIAGNOSIS — E78.2 MIXED HYPERLIPIDEMIA: ICD-10-CM

## 2022-11-07 DIAGNOSIS — I10 PRIMARY HYPERTENSION: ICD-10-CM

## 2022-11-07 DIAGNOSIS — L97.511 SKIN ULCER OF TOE OF RIGHT FOOT, LIMITED TO BREAKDOWN OF SKIN: ICD-10-CM

## 2022-11-07 DIAGNOSIS — E11.59 TYPE 2 DIABETES MELLITUS WITH OTHER CIRCULATORY COMPLICATION, WITH LONG-TERM CURRENT USE OF INSULIN: ICD-10-CM

## 2022-11-07 PROCEDURE — 93000 ELECTROCARDIOGRAM COMPLETE: CPT | Performed by: INTERNAL MEDICINE

## 2022-11-07 PROCEDURE — 99214 OFFICE O/P EST MOD 30 MIN: CPT | Performed by: INTERNAL MEDICINE

## 2022-11-07 RX ORDER — EZETIMIBE 10 MG/1
10 TABLET ORAL DAILY
Qty: 90 TABLET | Refills: 3 | Status: SHIPPED | OUTPATIENT
Start: 2022-11-07

## 2022-11-07 NOTE — PROGRESS NOTES
"Subjective    Luis Echeverria is a 57 y.o. male. Fu of chf, ihd and risks    History of Present Illness     HFrEF:  He is active and walks 2 mi 3x/week. He walked \"40 miles over 1 week in [place] recently\" and felt well. He is still working full-time at [employer]. meds are stable. EKG today is nsc.     IHD:  He has no cp or unusual soa. Has severe diabetic type dz, however. He is not on an anti-coagulant at this time tho Eliquis is still showing up on his med-list.    HTN:  Has low office checks but no light-headedness.    HLD:  Is on a potent statin but recent LDL=84    T2DM:  Is on Insulin + jardiance and most recent A1c=8.1      The following portions of the patient's history were reviewed and updated as appropriate: allergies, current medications, past family history, past medical history, past social history, past surgical history and problem list.    Patient Active Problem List   Diagnosis   • CAD (coronary artery disease)   • Hypertension   • Hyperlipidemia   • MI (myocardial infarction) (HCC)   • Diabetes mellitus (HCC)   • Carotid stent occlusion (HCC)   • Unstable angina (HCC)   • Ischemic cardiomyopathy   • RUQ pain   • Dehydration   • Nausea and vomiting   • Acute kidney injury (HCC)   • Hypotension   • Gallbladder polyp   • BMI 31.0-31.9,adult   • S/P implantation of automatic cardioverter/defibrillator (AICD)   • Chronic systolic CHF (congestive heart failure), NYHA class 2 (HCC)   • Morbidly obese (HCC)   • DKA (diabetic ketoacidoses)   • Pneumonia due to COVID-19 virus   • Noncompliance   • Cytokine release syndrome, grade 1   • Skin ulcer of toe of right foot, limited to breakdown of skin (HCC)       No Known Allergies    Family History   Problem Relation Age of Onset   • Heart disease Maternal Grandmother    • No Known Problems Mother    • No Known Problems Father        Social History     Socioeconomic History   • Marital status:    Tobacco Use   • Smoking status: Never   • Smokeless tobacco: " Never   Substance and Sexual Activity   • Alcohol use: No   • Drug use: No   • Sexual activity: Defer         Current Outpatient Medications:   •  albuterol sulfate  (90 Base) MCG/ACT inhaler, Inhale 2 puffs Every 4 (Four) Hours As Needed for Wheezing or Shortness of Air., Disp: 8.5 g, Rfl: 5  •  aspirin 81 MG EC tablet, Take 1 tablet by mouth Daily., Disp: , Rfl:   •  atorvastatin (LIPITOR) 80 MG tablet, Take 1 tablet by mouth Every Night., Disp: 90 tablet, Rfl: 3  •  brimonidine (ALPHAGAN) 0.2 % ophthalmic solution, Administer 1 drop to both eyes 2 (Two) Times a Day., Disp: , Rfl:   •  dextromethorphan polistirex ER (DELSYM) 30 MG/5ML Suspension Extended Release oral suspension, Take 10 mL by mouth Every 12 (Twelve) Hours As Needed (Cough)., Disp: 280 mL, Rfl: 1  •  DULoxetine (CYMBALTA) 20 MG capsule, Take 30 mg by mouth Daily., Disp: , Rfl:   •  famotidine (PEPCID) 20 MG tablet, Take 1 tablet by mouth 2 (Two) Times a Day., Disp: 60 tablet, Rfl: 0  •  insulin aspart (novoLOG FLEXPEN) 100 UNIT/ML solution pen-injector sc pen, Inject 39 Units under the skin into the appropriate area as directed 4 (Four) Times a Day Before Meals & at Bedtime., Disp: , Rfl:   •  insulin degludec (TRESIBA FLEXTOUCH) 100 UNIT/ML solution pen-injector injection, Inject 55 Units under the skin into the appropriate area as directed Every Morning., Disp: , Rfl:   •  lisinopril (PRINIVIL,ZESTRIL) 2.5 MG tablet, TAKE 1 TABLET BY MOUTH EVERY DAY, Disp: 90 tablet, Rfl: 4  •  metoprolol succinate XL (TOPROL-XL) 50 MG 24 hr tablet, Take 1.5 tablets by mouth Daily., Disp: 135 tablet, Rfl: 11  •  nitroglycerin (NITROSTAT) 0.4 MG SL tablet, Place 1 tablet under the tongue Every 5 (Five) Minutes As Needed for Chest Pain. Take no more than 3 doses in 15 minutes., Disp: 25 tablet, Rfl: 1  •  O2 (OXYGEN), Inhale 2 L/min Continuous As Needed (exertion)., Disp: , Rfl:   •  spironolactone (ALDACTONE) 25 MG tablet, TAKE 1 TABLET BY MOUTH EVERY DAY,  "Disp: 90 tablet, Rfl: 3  •  Zinc Sulfate 220 (50 Zn) MG tablet, Take 1 tablet by mouth daily, Disp: 30 each, Rfl: 0  •  empagliflozin (Jardiance) 25 MG tablet tablet, Take 1 tablet by mouth Daily., Disp: 90 tablet, Rfl: 11  •  ezetimibe (ZETIA) 10 MG tablet, Take 1 tablet by mouth Daily., Disp: 90 tablet, Rfl: 3  •  Rivaroxaban (Xarelto) 2.5 MG tablet, Take 1 tablet by mouth 2 (Two) Times a Day., Disp: 180 tablet, Rfl: 3    Past Surgical History:   Procedure Laterality Date   • CARDIAC CATHETERIZATION N/A 11/06/2018    Procedure: Left Heart Cath;  Surgeon: Tru Rai MD;  Location:  [facility];  Service: Cardiology   • CARDIAC ELECTROPHYSIOLOGY PROCEDURE N/A 05/01/2019    Procedure: ICD new;  Surgeon: Tru Rai MD;  Location:  [facility];  Service: Cardiology   • CAROTID STENT         Review of Systems   Constitutional: Negative for activity change, appetite change, fatigue and unexpected weight change.   Respiratory: Negative for shortness of breath.    Cardiovascular: Negative for chest pain, palpitations and leg swelling.   Gastrointestinal: Negative for abdominal pain and blood in stool.   Genitourinary: Negative for difficulty urinating and hematuria.       /62   Pulse 71   Ht 177.8 cm (70\")   Wt 113 kg (250 lb)   BMI 35.87 kg/m²   Procedures    Objective   Physical Exam  Constitutional:       Appearance: He is obese.   Cardiovascular:      Rate and Rhythm: Normal rate and regular rhythm.      Pulses: Decreased pulses.      Heart sounds: No murmur heard.    No friction rub. No gallop.   Pulmonary:      Effort: Pulmonary effort is normal.      Breath sounds: Normal breath sounds. No wheezing or rales.   Abdominal:      General: Bowel sounds are normal.   Musculoskeletal:      Right lower leg: No edema.      Left lower leg: No edema.   Skin:     General: Skin is warm.      Comments: Small skin ulcer on right great toe for 1 month with " surrounding erythema   Neurological:      General: No focal deficit present.      Mental Status: He is oriented to person, place, and time.   Psychiatric:         Mood and Affect: Mood normal.         Assessment & Plan   Diagnoses and all orders for this visit:    1. Coronary artery disease involving native coronary artery of native heart without angina pectoris (Primary)  Comments:  no angina but diffuse diabetic dz - escalate apt to dual-pathway inhibition  Orders:  -     ECG 12 Lead  -     Rivaroxaban (Xarelto) 2.5 MG tablet; Take 1 tablet by mouth 2 (Two) Times a Day.  Dispense: 180 tablet; Refill: 3    2. Primary hypertension  Comments:  tight control    3. Mixed hyperlipidemia  Comments:  LDL not to goal - add Zetia  Orders:  -     ezetimibe (ZETIA) 10 MG tablet; Take 1 tablet by mouth Daily.  Dispense: 90 tablet; Refill: 3    4. Skin ulcer of toe of right foot, limited to breakdown of skin (HCC)  Comments:  referral to Podiatry  Orders:  -     Ambulatory Referral to Podiatry    5. Type 2 diabetes mellitus with other circulatory complication, with long-term current use of insulin (HCC)  Comments:  not to A1c goal - increase jardiance dose  Orders:  -     empagliflozin (Jardiance) 25 MG tablet tablet; Take 1 tablet by mouth Daily.  Dispense: 90 tablet; Refill: 11    mdm=mod - adjusting meds for better risk factor control and referral for diabetic foot complications             Return in about 6 months (around 5/7/2023) for Next scheduled follow up.  Orders Placed This Encounter   Procedures   • Ambulatory Referral to Podiatry     Referral Priority:   Routine     Referral Type:   Consultation     Referral Reason:   Specialty Services Required     Requested Specialty:   Podiatry     Number of Visits Requested:   1   • ECG 12 Lead     Order Specific Question:   Reason for Exam:     Answer:   [email or web address]     Order Specific Question:   Release to patient     Answer:   Routine Release

## 2022-11-14 ENCOUNTER — LAB REQUISITION (OUTPATIENT)
Dept: LAB | Facility: HOSPITAL | Age: 57
End: 2022-11-14

## 2022-11-14 ENCOUNTER — OFFICE VISIT (OUTPATIENT)
Dept: WOUND CARE | Facility: HOSPITAL | Age: 57
End: 2022-11-14

## 2022-11-14 DIAGNOSIS — L97.512 NON-PRESSURE CHRONIC ULCER OF OTHER PART OF RIGHT FOOT WITH FAT LAYER EXPOSED: ICD-10-CM

## 2022-11-14 DIAGNOSIS — L03.115 CELLULITIS OF RIGHT LOWER LIMB: ICD-10-CM

## 2022-11-14 DIAGNOSIS — L97.509 TYPE 2 DIABETES MELLITUS WITH FOOT ULCER, UNSPECIFIED WHETHER LONG TERM INSULIN USE: ICD-10-CM

## 2022-11-14 DIAGNOSIS — E11.621 TYPE 2 DIABETES MELLITUS WITH FOOT ULCER, UNSPECIFIED WHETHER LONG TERM INSULIN USE: ICD-10-CM

## 2022-11-14 DIAGNOSIS — Z00.00 ENCOUNTER FOR GENERAL ADULT MEDICAL EXAMINATION WITHOUT ABNORMAL FINDINGS: ICD-10-CM

## 2022-11-14 PROCEDURE — 87186 SC STD MICRODIL/AGAR DIL: CPT | Performed by: PODIATRIST

## 2022-11-14 PROCEDURE — 87077 CULTURE AEROBIC IDENTIFY: CPT | Performed by: PODIATRIST

## 2022-11-14 PROCEDURE — 87070 CULTURE OTHR SPECIMN AEROBIC: CPT | Performed by: PODIATRIST

## 2022-11-14 PROCEDURE — 87075 CULTR BACTERIA EXCEPT BLOOD: CPT | Performed by: PODIATRIST

## 2022-11-14 PROCEDURE — 11042 DBRDMT SUBQ TIS 1ST 20SQCM/<: CPT | Performed by: PODIATRIST

## 2022-11-14 PROCEDURE — 87205 SMEAR GRAM STAIN: CPT | Performed by: PODIATRIST

## 2022-11-14 PROCEDURE — 99203 OFFICE O/P NEW LOW 30 MIN: CPT | Performed by: PODIATRIST

## 2022-11-14 PROCEDURE — 87176 TISSUE HOMOGENIZATION CULTR: CPT | Performed by: PODIATRIST

## 2022-11-18 LAB
BACTERIA SPEC AEROBE CULT: ABNORMAL
GRAM STN SPEC: ABNORMAL

## 2022-11-20 LAB — BACTERIA SPEC ANAEROBE CULT: NORMAL

## 2022-11-21 ENCOUNTER — OFFICE VISIT (OUTPATIENT)
Dept: WOUND CARE | Facility: HOSPITAL | Age: 57
End: 2022-11-21

## 2022-11-21 DIAGNOSIS — L03.115 CELLULITIS OF RIGHT LOWER LIMB: ICD-10-CM

## 2022-11-21 DIAGNOSIS — L97.512 NON-PRESSURE CHRONIC ULCER OF OTHER PART OF RIGHT FOOT WITH FAT LAYER EXPOSED: ICD-10-CM

## 2022-11-21 DIAGNOSIS — L97.509 TYPE 2 DIABETES MELLITUS WITH FOOT ULCER, UNSPECIFIED WHETHER LONG TERM INSULIN USE: ICD-10-CM

## 2022-11-21 DIAGNOSIS — E11.621 TYPE 2 DIABETES MELLITUS WITH FOOT ULCER, UNSPECIFIED WHETHER LONG TERM INSULIN USE: ICD-10-CM

## 2022-11-21 PROCEDURE — 11042 DBRDMT SUBQ TIS 1ST 20SQCM/<: CPT | Performed by: PODIATRIST

## 2022-11-28 ENCOUNTER — OFFICE VISIT (OUTPATIENT)
Dept: WOUND CARE | Facility: HOSPITAL | Age: 57
End: 2022-11-28

## 2022-11-28 DIAGNOSIS — L97.512 NON-PRESSURE CHRONIC ULCER OF OTHER PART OF RIGHT FOOT WITH FAT LAYER EXPOSED: ICD-10-CM

## 2022-11-28 DIAGNOSIS — L97.509 TYPE 2 DIABETES MELLITUS WITH FOOT ULCER, UNSPECIFIED WHETHER LONG TERM INSULIN USE: ICD-10-CM

## 2022-11-28 DIAGNOSIS — E11.621 TYPE 2 DIABETES MELLITUS WITH FOOT ULCER, UNSPECIFIED WHETHER LONG TERM INSULIN USE: ICD-10-CM

## 2022-11-28 DIAGNOSIS — L03.115 CELLULITIS OF RIGHT LOWER LIMB: ICD-10-CM

## 2022-11-28 PROCEDURE — G0463 HOSPITAL OUTPT CLINIC VISIT: HCPCS

## 2022-11-28 PROCEDURE — 99213 OFFICE O/P EST LOW 20 MIN: CPT | Performed by: PODIATRIST

## 2022-11-29 ENCOUNTER — TRANSCRIBE ORDERS (OUTPATIENT)
Dept: ADMINISTRATIVE | Facility: HOSPITAL | Age: 57
End: 2022-11-29

## 2022-11-29 DIAGNOSIS — I73.9 PERIPHERAL VASCULAR DISEASE, UNSPECIFIED: Primary | ICD-10-CM

## 2022-11-30 ENCOUNTER — HOSPITAL ENCOUNTER (OUTPATIENT)
Dept: ULTRASOUND IMAGING | Facility: HOSPITAL | Age: 57
Discharge: HOME | End: 2022-11-30
Admitting: PODIATRIST

## 2022-11-30 DIAGNOSIS — I73.9 PERIPHERAL VASCULAR DISEASE, UNSPECIFIED: ICD-10-CM

## 2022-11-30 PROCEDURE — 93923 UPR/LXTR ART STDY 3+ LVLS: CPT | Performed by: SURGERY

## 2022-11-30 PROCEDURE — 93923 UPR/LXTR ART STDY 3+ LVLS: CPT

## 2022-12-05 ENCOUNTER — LAB REQUISITION (OUTPATIENT)
Dept: LAB | Facility: HOSPITAL | Age: 57
End: 2022-12-05

## 2022-12-05 ENCOUNTER — OFFICE VISIT (OUTPATIENT)
Dept: WOUND CARE | Facility: HOSPITAL | Age: 57
End: 2022-12-05

## 2022-12-05 DIAGNOSIS — L97.512 NON-PRESSURE CHRONIC ULCER OF OTHER PART OF RIGHT FOOT WITH FAT LAYER EXPOSED: ICD-10-CM

## 2022-12-05 DIAGNOSIS — L03.115 CELLULITIS OF RIGHT LOWER LIMB: ICD-10-CM

## 2022-12-05 DIAGNOSIS — Z00.00 ENCOUNTER FOR GENERAL ADULT MEDICAL EXAMINATION WITHOUT ABNORMAL FINDINGS: ICD-10-CM

## 2022-12-05 DIAGNOSIS — E11.621 TYPE 2 DIABETES MELLITUS WITH FOOT ULCER, UNSPECIFIED WHETHER LONG TERM INSULIN USE: ICD-10-CM

## 2022-12-05 DIAGNOSIS — L97.509 TYPE 2 DIABETES MELLITUS WITH FOOT ULCER, UNSPECIFIED WHETHER LONG TERM INSULIN USE: ICD-10-CM

## 2022-12-05 PROCEDURE — 11042 DBRDMT SUBQ TIS 1ST 20SQCM/<: CPT | Performed by: PODIATRIST

## 2022-12-05 PROCEDURE — 87075 CULTR BACTERIA EXCEPT BLOOD: CPT | Performed by: PODIATRIST

## 2022-12-05 PROCEDURE — 99213 OFFICE O/P EST LOW 20 MIN: CPT | Performed by: PODIATRIST

## 2022-12-05 PROCEDURE — 87176 TISSUE HOMOGENIZATION CULTR: CPT | Performed by: PODIATRIST

## 2022-12-05 PROCEDURE — 87205 SMEAR GRAM STAIN: CPT | Performed by: PODIATRIST

## 2022-12-05 PROCEDURE — 87070 CULTURE OTHR SPECIMN AEROBIC: CPT | Performed by: PODIATRIST

## 2022-12-07 ENCOUNTER — TRANSCRIBE ORDERS (OUTPATIENT)
Dept: ADMINISTRATIVE | Facility: HOSPITAL | Age: 57
End: 2022-12-07

## 2022-12-07 ENCOUNTER — HOSPITAL ENCOUNTER (OUTPATIENT)
Dept: GENERAL RADIOLOGY | Facility: HOSPITAL | Age: 57
Discharge: HOME | End: 2022-12-07
Admitting: PODIATRIST

## 2022-12-07 DIAGNOSIS — E11.69 DIABETIC FOOT ULCER WITH OSTEOMYELITIS: ICD-10-CM

## 2022-12-07 DIAGNOSIS — L97.519 DIABETIC ULCER OF RIGHT GREAT TOE: ICD-10-CM

## 2022-12-07 DIAGNOSIS — E11.621 DIABETIC FOOT ULCER WITH OSTEOMYELITIS: ICD-10-CM

## 2022-12-07 DIAGNOSIS — M86.9 OSTEOMYELITIS OF RIGHT FOOT, UNSPECIFIED TYPE: Primary | ICD-10-CM

## 2022-12-07 DIAGNOSIS — M86.9 DIABETIC FOOT ULCER WITH OSTEOMYELITIS: ICD-10-CM

## 2022-12-07 DIAGNOSIS — E11.621 DIABETIC ULCER OF RIGHT GREAT TOE: Primary | ICD-10-CM

## 2022-12-07 DIAGNOSIS — E11.621 DIABETIC ULCER OF RIGHT GREAT TOE: ICD-10-CM

## 2022-12-07 DIAGNOSIS — L97.509 DIABETIC FOOT ULCER WITH OSTEOMYELITIS: ICD-10-CM

## 2022-12-07 DIAGNOSIS — L97.519 DIABETIC ULCER OF RIGHT GREAT TOE: Primary | ICD-10-CM

## 2022-12-07 LAB
BACTERIA SPEC AEROBE CULT: ABNORMAL
GRAM STN SPEC: ABNORMAL

## 2022-12-07 PROCEDURE — 73630 X-RAY EXAM OF FOOT: CPT

## 2022-12-11 LAB — BACTERIA SPEC ANAEROBE CULT: NORMAL

## 2022-12-12 ENCOUNTER — OFFICE VISIT (OUTPATIENT)
Dept: WOUND CARE | Facility: HOSPITAL | Age: 57
End: 2022-12-12

## 2022-12-12 ENCOUNTER — TELEPHONE (OUTPATIENT)
Dept: CARDIOLOGY | Facility: CLINIC | Age: 57
End: 2022-12-12

## 2022-12-12 DIAGNOSIS — L97.509 TYPE 2 DIABETES MELLITUS WITH FOOT ULCER, UNSPECIFIED WHETHER LONG TERM INSULIN USE: ICD-10-CM

## 2022-12-12 DIAGNOSIS — L97.512 NON-PRESSURE CHRONIC ULCER OF OTHER PART OF RIGHT FOOT WITH FAT LAYER EXPOSED: ICD-10-CM

## 2022-12-12 DIAGNOSIS — E11.621 TYPE 2 DIABETES MELLITUS WITH FOOT ULCER, UNSPECIFIED WHETHER LONG TERM INSULIN USE: ICD-10-CM

## 2022-12-12 DIAGNOSIS — I73.9 PERIPHERAL VASCULAR DISEASE, UNSPECIFIED: ICD-10-CM

## 2022-12-12 PROCEDURE — G0463 HOSPITAL OUTPT CLINIC VISIT: HCPCS

## 2022-12-12 PROCEDURE — 99213 OFFICE O/P EST LOW 20 MIN: CPT | Performed by: PODIATRIST

## 2022-12-12 NOTE — TELEPHONE ENCOUNTER
Patient is in need of MRI at [facility].  Orders obtained and faxed to radiology.  Scheduling is aware.

## 2022-12-13 ENCOUNTER — PREP FOR SURGERY (OUTPATIENT)
Dept: OTHER | Facility: HOSPITAL | Age: 57
End: 2022-12-13

## 2022-12-13 DIAGNOSIS — I70.25 ATHEROSCLEROSIS OF NATIVE ARTERIES OF THE EXTREMITIES WITH ULCERATION: Primary | ICD-10-CM

## 2022-12-13 RX ORDER — BUPIVACAINE HCL/0.9 % NACL/PF 0.1 %
2 PLASTIC BAG, INJECTION (ML) EPIDURAL ONCE
Status: CANCELLED | OUTPATIENT
Start: 2022-12-13 | End: 2022-12-13

## 2022-12-13 NOTE — H&P (VIEW-ONLY)
Luis Echeverria  [id number]  [id number]  Room/bed info not found  No att. providers found  (Not on file)    CC: Right first toe wound    HPI: 57-year-old gentleman with past history of diabetes, hypertension, hyperlipidemia, and MI with previous coronary stenting in 2020.  He had developed a wound to the right first toe and has been following with our wound care center here at [facility].  Wound has been slow to heal.  Cultures have not shown any significant growth.  Dr. Nieves was concerned that there could be a vascular component to the wound and ABIs were performed.  I reviewed them.  Right lower extremity TEENA showed noncompressible vessels and so TEENA could not be calculated but there was some dampening of the waveforms.  Value on the left was 1.44 with more normal-appearing waveforms.  On questioning patient does not report any significant claudication but does get some cramping in the legs at night.  He has no rest pain.  He otherwise has no significant acute complaints.    Past Medical History:   Diagnosis Date   • CAD (coronary artery disease)    • CAD in native artery     2V STEMI 7/13 STENT TO CX AND STENTX2 TO MID AND DISTAL LAD   • Chronic systolic CHF (congestive heart failure), NYHA class 2 (HCC) 2/4/2020   • Diabetes mellitus (HCC)    • History of coronary artery stent placement      x 3 - for ACUTE MI 7/2013   • Hyperlipidemia    • Hyperlipidemia, mild    • Myocardial infarct (HCC)    • Myocardial infarction (HCC)    • Stented coronary artery        Past Surgical History:   Procedure Laterality Date   • CARDIAC CATHETERIZATION N/A 11/06/2018    Procedure: Left Heart Cath;  Surgeon: Tru Rai MD;  Location:  [facility];  Service: Cardiology   • CARDIAC ELECTROPHYSIOLOGY PROCEDURE N/A 05/01/2019    Procedure: ICD new;  Surgeon: Tru Rai MD;  Location:  [facility];  Service: Cardiology   • CAROTID STENT         Family History   Problem  Relation Age of Onset   • Heart disease Maternal Grandmother    • No Known Problems Mother    • No Known Problems Father        Social History     Socioeconomic History   • Marital status:    Tobacco Use   • Smoking status: Never   • Smokeless tobacco: Never   Substance and Sexual Activity   • Alcohol use: No   • Drug use: No   • Sexual activity: Defer       No Known Allergies    (Not in a hospital admission)      Review of Systems   Constitutional: Negative.  Negative for activity change, appetite change, chills, diaphoresis, fatigue and fever.   HENT: Negative.  Negative for congestion, sneezing, sore throat and trouble swallowing.    Eyes: Negative.  Negative for visual disturbance.   Respiratory: Negative.  Negative for chest tightness and shortness of breath.    Cardiovascular: Positive for leg swelling. Negative for chest pain and palpitations.   Gastrointestinal: Negative.  Negative for abdominal distention, abdominal pain, nausea and vomiting.   Endocrine: Negative.    Genitourinary: Negative.    Musculoskeletal: Negative.    Skin: Positive for wound.   Allergic/Immunologic: Negative.    Neurological: Negative.    Hematological: Negative.    Psychiatric/Behavioral: Negative.        There were no vitals taken for this visit.  Physical Exam  Vitals reviewed.   Constitutional:       Appearance: Normal appearance.   HENT:      Head: Normocephalic and atraumatic.      Nose: Nose normal.      Mouth/Throat:      Mouth: Mucous membranes are moist.   Eyes:      Extraocular Movements: Extraocular movements intact.      Pupils: Pupils are equal, round, and reactive to light.   Cardiovascular:      Rate and Rhythm: Normal rate and regular rhythm.      Pulses:           Carotid pulses are 2+ on the right side and 2+ on the left side.       Radial pulses are 2+ on the right side and 2+ on the left side.        Femoral pulses are 2+ on the right side and 2+ on the left side.       Dorsalis pedis pulses are detected  w/ Doppler on the right side and 1+ on the left side.        Posterior tibial pulses are detected w/ Doppler on the right side and detected w/ Doppler on the left side.      Comments: He has palpable bilateral femoral pulses.  In the right lower extremity I am unable to palpate pedal pulses but he does have Doppler signals the DP and PT.  Foot is warm.  He does have some rubor to the distal forefoot and toes on the right.  He does have a wound to the right first toe with some slough at the base.  On the left he does have a palpable dorsalis pedis pulse and a Doppler signal at the PT.  No skin changes.  No wounds.  Foot is warm.  Pulmonary:      Effort: Pulmonary effort is normal. No respiratory distress.   Abdominal:      General: There is no distension.      Palpations: Abdomen is soft. There is no mass.      Tenderness: There is no abdominal tenderness.   Musculoskeletal:         General: Normal range of motion.      Cervical back: Normal range of motion and neck supple.   Skin:     General: Skin is warm and dry.      Capillary Refill: Capillary refill takes 2 to 3 seconds.   Neurological:      General: No focal deficit present.      Mental Status: He is alert and oriented to person, place, and time.   Psychiatric:         Mood and Affect: Mood normal.         Behavior: Behavior normal.         Thought Content: Thought content normal.         Judgment: Judgment normal.         Lab Results (last 7 days)     ** No results found for the last 168 hours. **          Imaging Results (Last 7 Days)     ** No results found for the last 168 hours. **          Plan: 57-year-old gentleman with diabetes, hypertension, and hyperlipidemia who has a right first toe wound.  Noninvasive arterial imaging in the form of TEENA/PVR which was done which I reviewed shows noncompressible vessels in the right lower extremity with an unobtainable TEENA value.  However waveforms are somewhat dampened and you likely does have some degree of  perfusion deficit as well as likely some distal forefoot small vessel disease.  Given that the wound has not been showing good healing despite aggressive local care at the wound care center I think he would benefit from angiogram with possible intervention to assess perfusion and potentially intervene to improve perfusion. Risks of radiofrequency ablation include, but are not limited to, bleeding, infection, vessel damage, nerve damage, DVT, phlebitis, and pulmonary embolus.  The patient understands these risks and wishes to proceed with procedure.  He will need to hold his Xarelto for 48 hours prior to the procedure.  Otherwise continue with local wound care at the wound care center.    Sammy iWld MD

## 2022-12-13 NOTE — H&P
Luis Echeverria  [id number]  [id number]  Room/bed info not found  No att. providers found  (Not on file)    CC: Right first toe wound    HPI: 57-year-old gentleman with past history of diabetes, hypertension, hyperlipidemia, and MI with previous coronary stenting in 2020.  He had developed a wound to the right first toe and has been following with our wound care center here at [facility].  Wound has been slow to heal.  Cultures have not shown any significant growth.  Dr. Nieves was concerned that there could be a vascular component to the wound and ABIs were performed.  I reviewed them.  Right lower extremity TEENA showed noncompressible vessels and so TEENA could not be calculated but there was some dampening of the waveforms.  Value on the left was 1.44 with more normal-appearing waveforms.  On questioning patient does not report any significant claudication but does get some cramping in the legs at night.  He has no rest pain.  He otherwise has no significant acute complaints.    Past Medical History:   Diagnosis Date   • CAD (coronary artery disease)    • CAD in native artery     2V STEMI 7/13 STENT TO CX AND STENTX2 TO MID AND DISTAL LAD   • Chronic systolic CHF (congestive heart failure), NYHA class 2 (HCC) 2/4/2020   • Diabetes mellitus (HCC)    • History of coronary artery stent placement      x 3 - for ACUTE MI 7/2013   • Hyperlipidemia    • Hyperlipidemia, mild    • Myocardial infarct (HCC)    • Myocardial infarction (HCC)    • Stented coronary artery        Past Surgical History:   Procedure Laterality Date   • CARDIAC CATHETERIZATION N/A 11/06/2018    Procedure: Left Heart Cath;  Surgeon: Tru Rai MD;  Location:  [facility];  Service: Cardiology   • CARDIAC ELECTROPHYSIOLOGY PROCEDURE N/A 05/01/2019    Procedure: ICD new;  Surgeon: Tru Rai MD;  Location:  [facility];  Service: Cardiology   • CAROTID STENT         Family History   Problem  Relation Age of Onset   • Heart disease Maternal Grandmother    • No Known Problems Mother    • No Known Problems Father        Social History     Socioeconomic History   • Marital status:    Tobacco Use   • Smoking status: Never   • Smokeless tobacco: Never   Substance and Sexual Activity   • Alcohol use: No   • Drug use: No   • Sexual activity: Defer       No Known Allergies    (Not in a hospital admission)      Review of Systems   Constitutional: Negative.  Negative for activity change, appetite change, chills, diaphoresis, fatigue and fever.   HENT: Negative.  Negative for congestion, sneezing, sore throat and trouble swallowing.    Eyes: Negative.  Negative for visual disturbance.   Respiratory: Negative.  Negative for chest tightness and shortness of breath.    Cardiovascular: Positive for leg swelling. Negative for chest pain and palpitations.   Gastrointestinal: Negative.  Negative for abdominal distention, abdominal pain, nausea and vomiting.   Endocrine: Negative.    Genitourinary: Negative.    Musculoskeletal: Negative.    Skin: Positive for wound.   Allergic/Immunologic: Negative.    Neurological: Negative.    Hematological: Negative.    Psychiatric/Behavioral: Negative.        There were no vitals taken for this visit.  Physical Exam  Vitals reviewed.   Constitutional:       Appearance: Normal appearance.   HENT:      Head: Normocephalic and atraumatic.      Nose: Nose normal.      Mouth/Throat:      Mouth: Mucous membranes are moist.   Eyes:      Extraocular Movements: Extraocular movements intact.      Pupils: Pupils are equal, round, and reactive to light.   Cardiovascular:      Rate and Rhythm: Normal rate and regular rhythm.      Pulses:           Carotid pulses are 2+ on the right side and 2+ on the left side.       Radial pulses are 2+ on the right side and 2+ on the left side.        Femoral pulses are 2+ on the right side and 2+ on the left side.       Dorsalis pedis pulses are detected  w/ Doppler on the right side and 1+ on the left side.        Posterior tibial pulses are detected w/ Doppler on the right side and detected w/ Doppler on the left side.      Comments: He has palpable bilateral femoral pulses.  In the right lower extremity I am unable to palpate pedal pulses but he does have Doppler signals the DP and PT.  Foot is warm.  He does have some rubor to the distal forefoot and toes on the right.  He does have a wound to the right first toe with some slough at the base.  On the left he does have a palpable dorsalis pedis pulse and a Doppler signal at the PT.  No skin changes.  No wounds.  Foot is warm.  Pulmonary:      Effort: Pulmonary effort is normal. No respiratory distress.   Abdominal:      General: There is no distension.      Palpations: Abdomen is soft. There is no mass.      Tenderness: There is no abdominal tenderness.   Musculoskeletal:         General: Normal range of motion.      Cervical back: Normal range of motion and neck supple.   Skin:     General: Skin is warm and dry.      Capillary Refill: Capillary refill takes 2 to 3 seconds.   Neurological:      General: No focal deficit present.      Mental Status: He is alert and oriented to person, place, and time.   Psychiatric:         Mood and Affect: Mood normal.         Behavior: Behavior normal.         Thought Content: Thought content normal.         Judgment: Judgment normal.         Lab Results (last 7 days)     ** No results found for the last 168 hours. **          Imaging Results (Last 7 Days)     ** No results found for the last 168 hours. **          Plan: 57-year-old gentleman with diabetes, hypertension, and hyperlipidemia who has a right first toe wound.  Noninvasive arterial imaging in the form of TEENA/PVR which was done which I reviewed shows noncompressible vessels in the right lower extremity with an unobtainable TEENA value.  However waveforms are somewhat dampened and you likely does have some degree of  perfusion deficit as well as likely some distal forefoot small vessel disease.  Given that the wound has not been showing good healing despite aggressive local care at the wound care center I think he would benefit from angiogram with possible intervention to assess perfusion and potentially intervene to improve perfusion. Risks of radiofrequency ablation include, but are not limited to, bleeding, infection, vessel damage, nerve damage, DVT, phlebitis, and pulmonary embolus.  The patient understands these risks and wishes to proceed with procedure.  He will need to hold his Xarelto for 48 hours prior to the procedure.  Otherwise continue with local wound care at the wound care center.    Sammy Wild MD

## 2022-12-14 ENCOUNTER — TRANSCRIBE ORDERS (OUTPATIENT)
Dept: ADMINISTRATIVE | Facility: HOSPITAL | Age: 57
End: 2022-12-14

## 2022-12-15 ENCOUNTER — APPOINTMENT (OUTPATIENT)
Dept: MRI IMAGING | Facility: HOSPITAL | Age: 57
End: 2022-12-15

## 2022-12-15 ENCOUNTER — TELEPHONE (OUTPATIENT)
Dept: VASCULAR SURGERY | Facility: CLINIC | Age: 57
End: 2022-12-15

## 2022-12-16 ENCOUNTER — TELEPHONE (OUTPATIENT)
Dept: VASCULAR SURGERY | Facility: CLINIC | Age: 57
End: 2022-12-16

## 2022-12-16 PROBLEM — I70.25 ATHEROSCLEROSIS OF NATIVE ARTERIES OF THE EXTREMITIES WITH ULCERATION: Status: ACTIVE | Noted: 2022-12-16

## 2022-12-16 NOTE — TELEPHONE ENCOUNTER
SPOKE WITH PATIENT REGARDING SURGERY. PT WAS INFORMED OF PRE WORK ON 12/20 AT 1315. PT WAS ALSO INFORMED OF SURGERY ON 12/23 AT 0500. PT WAS INFORMED OF COVID TEST AND VISITATION. PT STATED UNDERSTANDING OF INSTRUCTIONS AND ALL INFORMATION.

## 2022-12-19 RX ORDER — EMPAGLIFLOZIN 10 MG/1
TABLET, FILM COATED ORAL
Qty: 90 TABLET | Refills: 4 | OUTPATIENT
Start: 2022-12-19

## 2022-12-20 ENCOUNTER — PRE-ADMISSION TESTING (OUTPATIENT)
Dept: PREADMISSION TESTING | Facility: HOSPITAL | Age: 57
End: 2022-12-20
Payer: COMMERCIAL

## 2022-12-20 ENCOUNTER — OFFICE VISIT (OUTPATIENT)
Dept: WOUND CARE | Facility: HOSPITAL | Age: 57
End: 2022-12-20
Payer: COMMERCIAL

## 2022-12-20 VITALS
SYSTOLIC BLOOD PRESSURE: 133 MMHG | OXYGEN SATURATION: 96 % | WEIGHT: 257.5 LBS | DIASTOLIC BLOOD PRESSURE: 69 MMHG | HEART RATE: 93 BPM | HEIGHT: 70 IN | BODY MASS INDEX: 36.86 KG/M2 | RESPIRATION RATE: 20 BRPM

## 2022-12-20 DIAGNOSIS — L97.509 TYPE 2 DIABETES MELLITUS WITH FOOT ULCER, UNSPECIFIED WHETHER LONG TERM INSULIN USE: ICD-10-CM

## 2022-12-20 DIAGNOSIS — L97.512 NON-PRESSURE CHRONIC ULCER OF OTHER PART OF RIGHT FOOT WITH FAT LAYER EXPOSED: ICD-10-CM

## 2022-12-20 DIAGNOSIS — I73.9 PERIPHERAL VASCULAR DISEASE, UNSPECIFIED: ICD-10-CM

## 2022-12-20 DIAGNOSIS — I70.25 ATHEROSCLEROSIS OF NATIVE ARTERIES OF THE EXTREMITIES WITH ULCERATION: ICD-10-CM

## 2022-12-20 DIAGNOSIS — L03.115 CELLULITIS OF RIGHT LOWER LIMB: ICD-10-CM

## 2022-12-20 DIAGNOSIS — E11.621 TYPE 2 DIABETES MELLITUS WITH FOOT ULCER, UNSPECIFIED WHETHER LONG TERM INSULIN USE: ICD-10-CM

## 2022-12-20 LAB
ANION GAP SERPL CALCULATED.3IONS-SCNC: 9 MMOL/L (ref 5–15)
BASOPHILS # BLD AUTO: 0.02 10*3/MM3 (ref 0–0.2)
BASOPHILS NFR BLD AUTO: 0.3 % (ref 0–1.5)
BUN SERPL-MCNC: 31 MG/DL (ref 6–20)
BUN/CREAT SERPL: 20.8 (ref 7–25)
CALCIUM SPEC-SCNC: 8.8 MG/DL (ref 8.6–10.5)
CHLORIDE SERPL-SCNC: 101 MMOL/L (ref 98–107)
CO2 SERPL-SCNC: 27 MMOL/L (ref 22–29)
CREAT SERPL-MCNC: 1.49 MG/DL (ref 0.76–1.27)
DEPRECATED RDW RBC AUTO: 47.5 FL (ref 37–54)
EGFRCR SERPLBLD CKD-EPI 2021: 55.1 ML/MIN/1.73
EOSINOPHIL # BLD AUTO: 0.39 10*3/MM3 (ref 0–0.4)
EOSINOPHIL NFR BLD AUTO: 5.7 % (ref 0.3–6.2)
ERYTHROCYTE [DISTWIDTH] IN BLOOD BY AUTOMATED COUNT: 14.1 % (ref 12.3–15.4)
GLUCOSE SERPL-MCNC: 187 MG/DL (ref 65–99)
HCT VFR BLD AUTO: 39.8 % (ref 37.5–51)
HGB BLD-MCNC: 12.6 G/DL (ref 13–17.7)
IMM GRANULOCYTES # BLD AUTO: 0.02 10*3/MM3 (ref 0–0.05)
IMM GRANULOCYTES NFR BLD AUTO: 0.3 % (ref 0–0.5)
LYMPHOCYTES # BLD AUTO: 1.69 10*3/MM3 (ref 0.7–3.1)
LYMPHOCYTES NFR BLD AUTO: 24.8 % (ref 19.6–45.3)
MCH RBC QN AUTO: 28.9 PG (ref 26.6–33)
MCHC RBC AUTO-ENTMCNC: 31.7 G/DL (ref 31.5–35.7)
MCV RBC AUTO: 91.3 FL (ref 79–97)
MONOCYTES # BLD AUTO: 0.59 10*3/MM3 (ref 0.1–0.9)
MONOCYTES NFR BLD AUTO: 8.7 % (ref 5–12)
NEUTROPHILS NFR BLD AUTO: 4.1 10*3/MM3 (ref 1.7–7)
NEUTROPHILS NFR BLD AUTO: 60.2 % (ref 42.7–76)
NRBC BLD AUTO-RTO: 0 /100 WBC (ref 0–0.2)
PLATELET # BLD AUTO: 267 10*3/MM3 (ref 140–450)
PMV BLD AUTO: 9.5 FL (ref 6–12)
POTASSIUM SERPL-SCNC: 4.4 MMOL/L (ref 3.5–5.2)
RBC # BLD AUTO: 4.36 10*6/MM3 (ref 4.14–5.8)
SODIUM SERPL-SCNC: 137 MMOL/L (ref 136–145)
WBC NRBC COR # BLD: 6.81 10*3/MM3 (ref 3.4–10.8)

## 2022-12-20 PROCEDURE — 93005 ELECTROCARDIOGRAM TRACING: CPT

## 2022-12-20 PROCEDURE — 80048 BASIC METABOLIC PNL TOTAL CA: CPT

## 2022-12-20 PROCEDURE — 85025 COMPLETE CBC W/AUTO DIFF WBC: CPT

## 2022-12-20 PROCEDURE — 97597 DBRDMT OPN WND 1ST 20 CM/<: CPT | Performed by: SURGERY

## 2022-12-20 PROCEDURE — 93010 ELECTROCARDIOGRAM REPORT: CPT | Performed by: HOSPITALIST

## 2022-12-20 PROCEDURE — 36415 COLL VENOUS BLD VENIPUNCTURE: CPT

## 2022-12-20 NOTE — DISCHARGE INSTRUCTIONS
Before you come to the hospital        Arrival time: AS DIRECTED BY OFFICE     YOU MAY TAKE THE FOLLOWING MEDICATION(S) THE MORNING OF SURGERY WITH A SIP OF WATER: METOPROLOL           DO NOT TAKE YOUR LISINOPRIL 24 HOURS PRIOR TO SURGERY          ALL OTHER HOME MEDICATION CHECK WITH YOUR PHYSICIAN (especially if   you are taking diabetes medicines or blood thinners)    Do not take any Erectile Dysfunction medications (EX: CIALIS, VIAGRA) 24 hours prior to surgery.      If you were given and instructed to use a germ- killing soap, use as directed the night before surgery and again the morning of surgery or as directed by your surgeon. (Use one-half of the bottle with each shower.)   See attached information for How to Use Chlorhexidine for Bathing if applicable.            Eating and drinking restrictions prior to scheduled arrival time    2 Hours before arrival time STOP   Drinking Clear liquids (water, apple juice-no pulp)     6 Hours before arrival time STOP   Milk or drinks that contain milk, full liquids    6 Hours before arrival time STOP   Light meals or foods, such as toast or cereal    8 Hours before arrival time STOP   Heavy foods, such as meat, fried foods, or fatty foods    (It is extremely important that you follow these guidelines to prevent delay or cancelation of your procedure)     Clear Liquids  Water and flavored water                                                                      Clear Fruit juices, such as cranberry juice and apple juice.  Black coffee (NO cream of any kind, including powdered).  Plain tea  Clear bouillon or broth.  Flavored gelatin.  Soda.  Gatorade or Powerade.  Full liquid examples  Juices that have pulp.  Frozen ice pops that contain fruit pieces.  Coffee with creamer  Milk.  Yogurt.                MANAGING PAIN AFTER SURGERY    We know you are probably wondering what your pain will be like after surgery.  Following surgery it is unrealistic to expect you will not have  pain.   Pain is how our bodies let us know that something is wrong or cautions us to be careful.  That said, our goal is to make your pain tolerable.    Methods we may use to treat your pain include (oral or IV medications, PCAs, epidurals, nerve blocks, etc.)   While some procedures require IV pain medications for a short time after surgery, transitioning to pain medications by mouth allows for better management of pain.   Your nurse will encourage you to take oral pain medications whenever possible.  IV medications work almost immediately, but only last a short while.  Taking medications by mouth allows for a more constant level of medication in your blood stream for a longer period of time.      Once your pain is out of control it is harder to get back under control.  It is important you are aware when your next dose of pain medication is due.  If you are admitted, your nurse may write the time of your next dose on the white board in your room to help you remember.      We are interested in your pain and encourage you to inform us about aggravating factors during your visit.   Many times a simple repositioning every few hours can make a big difference.    If your physician says it is okay, do not let your pain prevent you from getting out of bed. Be sure to call your nurse for assistance prior to getting up so you do not fall.      Before surgery, please decide your tolerable pain goal.  These faces help describe the pain ratings we use on a 0-10 scale.   Be prepared to tell us your goal and whether or not you take pain or anxiety medications at home.          Preparing for Surgery  Preparing for surgery is an important part of your care. It can make things go more smoothly and help you avoid complications. The steps leading up to surgery may vary among hospitals. Follow all instructions given to you by your health care providers. Ask questions if you do not understand something. Talk about any concerns that you  have.  Here are some questions to consider asking before your surgery:  If my surgery is not an emergency (is elective), when would be the best time to have the surgery?  What arrangements do I need to make for work, home, or school?  What will my recovery be like? How long will it be before I can return to normal activities?  Will I need to prepare my home? Will I need to arrange care for me or my children?  Should I expect to have pain after surgery? What are my pain management options? Are there nonmedical options that I can try for pain?  Tell a health care provider about:  Any allergies you have.  All medicines you are taking, including vitamins, herbs, eye drops, creams, and over-the-counter medicines.  Any problems you or family members have had with anesthetic medicines.  Any blood disorders you have.  Any surgeries you have had.  Any medical conditions you have.  Whether you are pregnant or may be pregnant.  What are the risks?  The risks and complications of surgery depend on the specific procedure that you have. Discuss all the risks with your health care providers before your surgery. Ask about common surgical complications, which may include:  Infection.  Bleeding or a need for blood replacement (transfusion).  Allergic reactions to medicines.  Damage to surrounding nerves, tissues, or structures.  A blood clot.  Scarring.  Failure of the surgery to correct the problem.  Follow these instructions before the procedure:  Several days or weeks before your procedure  You may have a physical exam by your primary health care provider to make sure it is safe for you to have surgery.  You may have testing. This may include a chest X-ray, blood and urine tests, electrocardiogram (ECG), or other testing.  Ask your health care provider about:  Changing or stopping your regular medicines. This is especially important if you are taking diabetes medicines or blood thinners.  Taking medicines such as aspirin and  ibuprofen. These medicines can thin your blood. Do not take these medicines unless your health care provider tells you to take them.  Taking over-the-counter medicines, vitamins, herbs, and supplements.  Do not use any products that contain nicotine or tobacco, such as cigarettes and e-cigarettes. If you need help quitting, ask your health care provider.  Avoid alcohol.  Ask your health care provider if there are exercises you can do to prepare for surgery.  Eat a healthy diet.   Plan to have someone take you home from the hospital or clinic.  Plan to have a responsible adult care for you for at least 24 hours after you leave the hospital or clinic. This is important.  The day before your procedure  You may be given antibiotic medicine to take by mouth to help prevent infection. Take it as told by your health care provider.  You may be asked to shower with a germ-killing soap.  Follow instructions from your health care provider about eating and drinking restrictions. This includes gum, mints and hard candy.  Pack comfortable clothes according to your procedure.   The day of your procedure  You may need to take another shower with a germ-killing soap before you leave home in the morning.  With a small sip of water, take only the medicines that you are told to take.  Remove all jewelry including rings.   Leave anything you consider valuable at home except hearing aids if needed.  You do not need to bring your home medications into the hospital.   Do not wear any makeup, nail polish, powder, deodorant, lotion, hair accessories, or anything on your skin or body except your clothes.  If you will be staying in the hospital, bring a case to hold your glasses, contacts, or dentures. You may also want to bring your robe and non-skid footwear.       (Do not use denture adhesives since you will be asked to remove them during  surgery).   If you wear oxygen at home, bring it with you the day of surgery.  If instructed by your  health care provider, bring your sleep apnea device with you on the day of your surgery (if this applies to you).  You may want to leave your suitcase and sleep apnea device in the car until after surgery.   Arrive at the hospital as scheduled.  Bring a friend or family member with you who can help to answer questions and be present while you meet with your health care provider.  At the hospital  When you arrive at the hospital:  Go to registration located at the main entrance of the hospital. You will be registered and given a beeper and a sticker sheet. Take the stickers to the Outpatient nurses desk and place in the black tray. This is to notify staff that you have arrived. Then return to the lobby to wait.   When your beeper lights up and vibrates proceed through the double doors, under the stairs, and a member of the Outpatient Surgery staff will escort you to your preoperative room.  You may have to wear compression sleeves. These help to prevent blood clots and reduce swelling in your legs.  An IV may be inserted into one of your veins.              In the operating room, you may be given one or more of the following:        A medicine to help you relax (sedative).        A medicine to numb the area (local anesthetic).        A medicine to make you fall asleep (general anesthetic).        A medicine that is injected into an area of your body to numb everything below the                      injection site (regional anesthetic).  You may be given an antibiotic through your IV to help prevent infection.  Your surgical site will be marked or identified.    Contact a health care provider if you:  Develop a fever of more than 100.4°F (38°C) or other feelings of illness during the 48 hours before your surgery.  Have symptoms that get worse.  Have questions or concerns about your surgery.  Summary  Preparing for surgery can make the procedure go more smoothly and lower your risk of complications.  Before surgery,  make a list of questions and concerns to discuss with your surgeon. Ask about the risks and possible complications.  In the days or weeks before your surgery, follow all instructions from your health care provider. You may need to stop smoking, avoid alcohol, follow eating restrictions, and change or stop your regular medicines.  Contact your surgeon if you develop a fever or other signs of illness during the few days before your surgery.  This information is not intended to replace advice given to you by your health care provider. Make sure you discuss any questions you have with your health care provider.  Document Revised: 12/21/2018 Document Reviewed: 10/23/2018  Elsevier Patient Education © 2021 [employer]

## 2022-12-21 ENCOUNTER — APPOINTMENT (OUTPATIENT)
Dept: MRI IMAGING | Facility: HOSPITAL | Age: 57
End: 2022-12-21

## 2022-12-21 LAB
QT INTERVAL: 386 MS
QTC INTERVAL: 459 MS

## 2022-12-22 ENCOUNTER — TELEPHONE (OUTPATIENT)
Dept: VASCULAR SURGERY | Facility: CLINIC | Age: 57
End: 2022-12-22

## 2022-12-23 ENCOUNTER — ANESTHESIA (OUTPATIENT)
Dept: PERIOP | Facility: HOSPITAL | Age: 57
End: 2022-12-23
Payer: COMMERCIAL

## 2022-12-23 ENCOUNTER — HOSPITAL ENCOUNTER (OUTPATIENT)
Facility: HOSPITAL | Age: 57
Setting detail: HOSPITAL OUTPATIENT SURGERY
Discharge: HOME | End: 2022-12-23
Attending: SURGERY | Admitting: SURGERY
Payer: COMMERCIAL

## 2022-12-23 ENCOUNTER — APPOINTMENT (OUTPATIENT)
Dept: INTERVENTIONAL RADIOLOGY/VASCULAR | Facility: HOSPITAL | Age: 57
End: 2022-12-23
Payer: COMMERCIAL

## 2022-12-23 ENCOUNTER — ANESTHESIA EVENT (OUTPATIENT)
Dept: PERIOP | Facility: HOSPITAL | Age: 57
End: 2022-12-23
Payer: COMMERCIAL

## 2022-12-23 VITALS
SYSTOLIC BLOOD PRESSURE: 122 MMHG | OXYGEN SATURATION: 98 % | DIASTOLIC BLOOD PRESSURE: 63 MMHG | HEART RATE: 86 BPM | RESPIRATION RATE: 16 BRPM | TEMPERATURE: 98 F

## 2022-12-23 DIAGNOSIS — I70.25 ATHEROSCLEROSIS OF NATIVE ARTERIES OF THE EXTREMITIES WITH ULCERATION: ICD-10-CM

## 2022-12-23 LAB
ABO GROUP BLD: NORMAL
BLD GP AB SCN SERPL QL: NEGATIVE
GLUCOSE BLDC GLUCOMTR-MCNC: 220 MG/DL (ref 70–130)
GLUCOSE BLDC GLUCOMTR-MCNC: 251 MG/DL (ref 70–130)
RH BLD: POSITIVE
T&S EXPIRATION DATE: NORMAL

## 2022-12-23 PROCEDURE — 86900 BLOOD TYPING SEROLOGIC ABO: CPT | Performed by: SURGERY

## 2022-12-23 PROCEDURE — 76000 FLUOROSCOPY <1 HR PHYS/QHP: CPT

## 2022-12-23 PROCEDURE — C1894 INTRO/SHEATH, NON-LASER: HCPCS | Performed by: SURGERY

## 2022-12-23 PROCEDURE — C1769 GUIDE WIRE: HCPCS | Performed by: SURGERY

## 2022-12-23 PROCEDURE — C1887 CATHETER, GUIDING: HCPCS | Performed by: SURGERY

## 2022-12-23 PROCEDURE — 25010000002 IOPAMIDOL 61 % SOLUTION: Performed by: SURGERY

## 2022-12-23 PROCEDURE — 25010000002 HEPARIN (PORCINE) PER 1000 UNITS: Performed by: SURGERY

## 2022-12-23 PROCEDURE — C1725 CATH, TRANSLUMIN NON-LASER: HCPCS | Performed by: SURGERY

## 2022-12-23 PROCEDURE — 75710 ARTERY X-RAYS ARM/LEG: CPT | Performed by: SURGERY

## 2022-12-23 PROCEDURE — 75625 CONTRAST EXAM ABDOMINL AORTA: CPT

## 2022-12-23 PROCEDURE — 75625 CONTRAST EXAM ABDOMINL AORTA: CPT | Performed by: SURGERY

## 2022-12-23 PROCEDURE — 86850 RBC ANTIBODY SCREEN: CPT | Performed by: SURGERY

## 2022-12-23 PROCEDURE — C1760 CLOSURE DEV, VASC: HCPCS | Performed by: SURGERY

## 2022-12-23 PROCEDURE — 0 LIDOCAINE 1 % SOLUTION: Performed by: SURGERY

## 2022-12-23 PROCEDURE — 82962 GLUCOSE BLOOD TEST: CPT

## 2022-12-23 PROCEDURE — 63710000001 INSULIN REGULAR HUMAN PER 5 UNITS: Performed by: ANESTHESIOLOGY

## 2022-12-23 PROCEDURE — 37232 PR REVSC OPN/PRQ TIB/PERO W/ANGIOPLASTY UNI EA VSL: CPT | Performed by: SURGERY

## 2022-12-23 PROCEDURE — 86901 BLOOD TYPING SEROLOGIC RH(D): CPT | Performed by: SURGERY

## 2022-12-23 PROCEDURE — 25010000002 HEPARIN (PORCINE) PER 1000 UNITS: Performed by: NURSE ANESTHETIST, CERTIFIED REGISTERED

## 2022-12-23 PROCEDURE — 25010000002 CEFAZOLIN PER 500 MG: Performed by: SURGERY

## 2022-12-23 PROCEDURE — 25010000002 PROPOFOL 1000 MG/100ML EMULSION: Performed by: NURSE ANESTHETIST, CERTIFIED REGISTERED

## 2022-12-23 PROCEDURE — 37228 PR REVSC OPN/PRQ TIB/PERO W/ANGIOPLASTY UNI: CPT | Performed by: SURGERY

## 2022-12-23 PROCEDURE — 75710 ARTERY X-RAYS ARM/LEG: CPT

## 2022-12-23 RX ORDER — HEPARIN SODIUM 1000 [USP'U]/ML
INJECTION, SOLUTION INTRAVENOUS; SUBCUTANEOUS AS NEEDED
Status: DISCONTINUED | OUTPATIENT
Start: 2022-12-23 | End: 2022-12-23 | Stop reason: SURG

## 2022-12-23 RX ORDER — MIDAZOLAM HYDROCHLORIDE 1 MG/ML
1 INJECTION INTRAMUSCULAR; INTRAVENOUS
Status: DISCONTINUED | OUTPATIENT
Start: 2022-12-23 | End: 2022-12-23 | Stop reason: HOSPADM

## 2022-12-23 RX ORDER — BUPIVACAINE HCL/0.9 % NACL/PF 0.1 %
2 PLASTIC BAG, INJECTION (ML) EPIDURAL ONCE
Status: COMPLETED | OUTPATIENT
Start: 2022-12-23 | End: 2022-12-23

## 2022-12-23 RX ORDER — DROPERIDOL 2.5 MG/ML
0.62 INJECTION, SOLUTION INTRAMUSCULAR; INTRAVENOUS ONCE AS NEEDED
Status: DISCONTINUED | OUTPATIENT
Start: 2022-12-23 | End: 2022-12-23 | Stop reason: HOSPADM

## 2022-12-23 RX ORDER — SODIUM CHLORIDE 0.9 % (FLUSH) 0.9 %
3-10 SYRINGE (ML) INJECTION AS NEEDED
Status: DISCONTINUED | OUTPATIENT
Start: 2022-12-23 | End: 2022-12-23 | Stop reason: HOSPADM

## 2022-12-23 RX ORDER — OXYCODONE AND ACETAMINOPHEN 7.5; 325 MG/1; MG/1
2 TABLET ORAL EVERY 4 HOURS PRN
Status: DISCONTINUED | OUTPATIENT
Start: 2022-12-23 | End: 2022-12-23 | Stop reason: HOSPADM

## 2022-12-23 RX ORDER — KETAMINE HCL IN NACL, ISO-OSM 100MG/10ML
SYRINGE (ML) INJECTION AS NEEDED
Status: DISCONTINUED | OUTPATIENT
Start: 2022-12-23 | End: 2022-12-23 | Stop reason: SURG

## 2022-12-23 RX ORDER — OXYCODONE AND ACETAMINOPHEN 10; 325 MG/1; MG/1
1 TABLET ORAL ONCE AS NEEDED
Status: DISCONTINUED | OUTPATIENT
Start: 2022-12-23 | End: 2022-12-23 | Stop reason: HOSPADM

## 2022-12-23 RX ORDER — ONDANSETRON 2 MG/ML
4 INJECTION INTRAMUSCULAR; INTRAVENOUS ONCE AS NEEDED
Status: DISCONTINUED | OUTPATIENT
Start: 2022-12-23 | End: 2022-12-23 | Stop reason: HOSPADM

## 2022-12-23 RX ORDER — FLUMAZENIL 0.1 MG/ML
0.2 INJECTION INTRAVENOUS AS NEEDED
Status: DISCONTINUED | OUTPATIENT
Start: 2022-12-23 | End: 2022-12-23 | Stop reason: HOSPADM

## 2022-12-23 RX ORDER — IBUPROFEN 600 MG/1
600 TABLET ORAL ONCE AS NEEDED
Status: DISCONTINUED | OUTPATIENT
Start: 2022-12-23 | End: 2022-12-23 | Stop reason: HOSPADM

## 2022-12-23 RX ORDER — NALOXONE HCL 0.4 MG/ML
0.4 VIAL (ML) INJECTION AS NEEDED
Status: DISCONTINUED | OUTPATIENT
Start: 2022-12-23 | End: 2022-12-23 | Stop reason: HOSPADM

## 2022-12-23 RX ORDER — LIDOCAINE HYDROCHLORIDE 10 MG/ML
0.5 INJECTION, SOLUTION EPIDURAL; INFILTRATION; INTRACAUDAL; PERINEURAL ONCE AS NEEDED
Status: DISCONTINUED | OUTPATIENT
Start: 2022-12-23 | End: 2022-12-23 | Stop reason: HOSPADM

## 2022-12-23 RX ORDER — ASPIRIN 81 MG/1
81 TABLET ORAL ONCE
Status: COMPLETED | OUTPATIENT
Start: 2022-12-23 | End: 2022-12-23

## 2022-12-23 RX ORDER — LABETALOL HYDROCHLORIDE 5 MG/ML
5 INJECTION, SOLUTION INTRAVENOUS
Status: DISCONTINUED | OUTPATIENT
Start: 2022-12-23 | End: 2022-12-23 | Stop reason: HOSPADM

## 2022-12-23 RX ORDER — SODIUM CHLORIDE, SODIUM LACTATE, POTASSIUM CHLORIDE, CALCIUM CHLORIDE 600; 310; 30; 20 MG/100ML; MG/100ML; MG/100ML; MG/100ML
1000 INJECTION, SOLUTION INTRAVENOUS CONTINUOUS
Status: DISCONTINUED | OUTPATIENT
Start: 2022-12-23 | End: 2022-12-23 | Stop reason: HOSPADM

## 2022-12-23 RX ORDER — SODIUM CHLORIDE, SODIUM LACTATE, POTASSIUM CHLORIDE, CALCIUM CHLORIDE 600; 310; 30; 20 MG/100ML; MG/100ML; MG/100ML; MG/100ML
100 INJECTION, SOLUTION INTRAVENOUS CONTINUOUS
Status: DISCONTINUED | OUTPATIENT
Start: 2022-12-23 | End: 2022-12-23 | Stop reason: HOSPADM

## 2022-12-23 RX ORDER — SODIUM CHLORIDE 9 MG/ML
40 INJECTION, SOLUTION INTRAVENOUS AS NEEDED
Status: DISCONTINUED | OUTPATIENT
Start: 2022-12-23 | End: 2022-12-23 | Stop reason: HOSPADM

## 2022-12-23 RX ORDER — LIDOCAINE HYDROCHLORIDE 10 MG/ML
INJECTION, SOLUTION INFILTRATION; PERINEURAL AS NEEDED
Status: DISCONTINUED | OUTPATIENT
Start: 2022-12-23 | End: 2022-12-23 | Stop reason: HOSPADM

## 2022-12-23 RX ORDER — PROPOFOL 10 MG/ML
INJECTION, EMULSION INTRAVENOUS AS NEEDED
Status: DISCONTINUED | OUTPATIENT
Start: 2022-12-23 | End: 2022-12-23 | Stop reason: SURG

## 2022-12-23 RX ORDER — SODIUM CHLORIDE 0.9 % (FLUSH) 0.9 %
3 SYRINGE (ML) INJECTION AS NEEDED
Status: DISCONTINUED | OUTPATIENT
Start: 2022-12-23 | End: 2022-12-23 | Stop reason: HOSPADM

## 2022-12-23 RX ORDER — FENTANYL CITRATE 50 UG/ML
25 INJECTION, SOLUTION INTRAMUSCULAR; INTRAVENOUS
Status: DISCONTINUED | OUTPATIENT
Start: 2022-12-23 | End: 2022-12-23 | Stop reason: HOSPADM

## 2022-12-23 RX ORDER — OXYCODONE HYDROCHLORIDE AND ACETAMINOPHEN 5; 325 MG/1; MG/1
1 TABLET ORAL ONCE AS NEEDED
Status: DISCONTINUED | OUTPATIENT
Start: 2022-12-23 | End: 2022-12-23 | Stop reason: HOSPADM

## 2022-12-23 RX ORDER — SODIUM CHLORIDE 0.9 % (FLUSH) 0.9 %
3 SYRINGE (ML) INJECTION EVERY 12 HOURS SCHEDULED
Status: DISCONTINUED | OUTPATIENT
Start: 2022-12-23 | End: 2022-12-23 | Stop reason: HOSPADM

## 2022-12-23 RX ADMIN — Medication 40 MG: at 07:47

## 2022-12-23 RX ADMIN — HEPARIN SODIUM 6000 UNITS: 1000 INJECTION, SOLUTION INTRAVENOUS; SUBCUTANEOUS at 08:05

## 2022-12-23 RX ADMIN — ASPIRIN 81 MG: 81 TABLET, COATED ORAL at 09:31

## 2022-12-23 RX ADMIN — INSULIN HUMAN 5 UNITS: 100 INJECTION, SOLUTION PARENTERAL at 07:16

## 2022-12-23 RX ADMIN — RIVAROXABAN 2.5 MG: 2.5 TABLET, FILM COATED ORAL at 09:32

## 2022-12-23 RX ADMIN — Medication 10 MG: at 08:00

## 2022-12-23 RX ADMIN — SODIUM CHLORIDE, POTASSIUM CHLORIDE, SODIUM LACTATE AND CALCIUM CHLORIDE 1000 ML: 600; 310; 30; 20 INJECTION, SOLUTION INTRAVENOUS at 05:53

## 2022-12-23 RX ADMIN — PROPOFOL 175 MCG/KG/MIN: 10 INJECTION, EMULSION INTRAVENOUS at 07:48

## 2022-12-23 RX ADMIN — Medication 2 G: at 07:52

## 2022-12-23 RX ADMIN — PROPOFOL 40 MG: 10 INJECTION, EMULSION INTRAVENOUS at 07:47

## 2022-12-23 NOTE — INTERVAL H&P NOTE
H&P reviewed. The patient was examined and there are no changes to the H&P.  For aortogram and RLE angiogram. Risks of angiogram were discussed.  These include, but are not limited to, bleeding, infection, vessel damage, nerve damage, embolus, and loss of limb.  The patient understands these risks and wishes to proceed with procedure.

## 2022-12-23 NOTE — ANESTHESIA PREPROCEDURE EVALUATION
Anesthesia Evaluation     Nursing notes reviewed   no history of anesthetic complications:  NPO Solid Status: > 8 hours  NPO Liquid Status: > 8 hours           Airway   Mallampati: I  TM distance: >3 FB  No difficulty expected  Dental      Pulmonary    Cardiovascular   Exercise tolerance: good (4-7 METS)    (+) pacemaker ICD, pacemaker, hypertension, past MI , CAD, cardiac stents (5 stents most recent 11/2018) more than 12 months ago CHF Systolic <55%, PVD, hyperlipidemia,  carotid artery disease    ROS comment: 11/2021  ? Left ventricular ejection fraction appears to be 36 - 40%. Left ventricular systolic function is moderately decreased.  ? Left ventricular diastolic function is consistent with (grade I) impaired relaxation.  ? Normal right ventricular cavity size and systolic function noted.  ? There is no significant (greater than mild) valvular dysfunction.      Abbot/[employer]iod[employer] ICD interrogation reviewed- <4% paced      Neuro/Psych  GI/Hepatic/Renal/Endo    (+) obesity,   diabetes mellitus type 2 using insulin,     Musculoskeletal     Abdominal    Substance History      OB/GYN          Other                        Anesthesia Plan    ASA 3     MAC     (Place magnet to inhibit defibrillator, interrogate device in recovery)  intravenous induction     Anesthetic plan, risks, benefits, and alternatives have been provided, discussed and informed consent has been obtained with: patient.        CODE STATUS:

## 2022-12-23 NOTE — ANESTHESIA POSTPROCEDURE EVALUATION
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)      (Atherosclerosis of native arteries of the extremities with ulceration (HCC) [I70.25])    Surgeons: Sammy Wild MD Provider: Luis Ivey CRNA    Anesthesia Type: MAC ASA Status: 3          Anesthesia Type: MAC    Vitals  Vitals Value Taken Time   /81 12/23/22 0940   Temp 98 °F (36.7 °C) 12/23/22 0940   Pulse 82 12/23/22 0940   Resp 14 12/23/22 0940   SpO2 96 % 12/23/22 0940           Post Anesthesia Care and Evaluation    Patient location during evaluation: PACU  Patient participation: complete - patient participated  Level of consciousness: awake and alert  Pain management: adequate    Airway patency: patent  Anesthetic complications: No anesthetic complications  PONV Status: none  Cardiovascular status: acceptable and hemodynamically stable  Respiratory status: acceptable  Hydration status: acceptable    Comments: Blood pressure 128/85, pulse 73, temperature 98 °F (36.7 °C), temperature source Temporal, resp. rate 16, SpO2 98 %.    Patient discharged from PACU based upon Robert score. Please see RN notes for further details

## 2022-12-23 NOTE — DISCHARGE INSTRUCTIONS
ANGIOGRAM DISCHARGE INSTRUCTIONS    You underwent an angiogram procedure.    Should you experience any increased pain, drainage/redness, fever greater than 101 degrees, or increased swelling in your groin or arm incisions, please call [phone] to discuss with a physician on call.    If you leave with any bandages, remove those 48 hours following surgery.    Do not drive a car until you are walking normally and pain free (usually 5 to 7 days) and have stopped taking narcotic pain medicine.      You may take a shower and wash over the incision with soap and water.  Do not soak the incision (i.e. take a bath) until the incision is completely healed.    Any invasive procedure, such as dental work, endoscopy, colonoscopy, cystoscopy, etc. should be avoided in the first 3 months following surgery. If an urgent procedure is required, you should receive prophylactic antibiotics to prevent arterial graft infection. The American [employer] Association endocarditis prophylaxis regimen may be used for this purpose.    Please follow up with Dr. Wild in the office in 2 weeks. If you do not have an appointment time at the time of discharge, please call [phone]  on the next business day to make a follow-up appointment.    Our administrative office number is [phone], and office address is [address] [place] [address], [address], [place] [address].

## 2022-12-23 NOTE — OP NOTE
Luis Echeverria  12/23/2022     PREOPERATIVE DIAGNOSIS: Atherosclerosis of native arteries of the extremities with ulceration (HCC) [I70.25]     POSTOPERATIVE DIAGNOSIS: Post-Op Diagnosis Codes:     * Atherosclerosis of native arteries of the extremities with ulceration (HCC) [I70.25]     PROCEDURE PERFORMED:  1.  Ultrasound-guided left common femoral artery access  2.  Placement of catheter in abdominal aorta  3.  Aortoiliac angiogram  4.  Crossing of the aortic bifurcation and placement of catheter in the right common femoral artery  5.  Right lower extremity angiogram  6.  Placement of sheath into the distal SFA  7.  Selective cannulation of the anterior tibial artery and proximal dorsalis pedis  8.  Angioplasty of the mid and distal AT and the proximal dorsalis pedis using a tapered 1.5 to 2 x 210mm Birdie cross balloon, and a 1.5 x 40 mm Birdie cross balloon  9.  Selective cannulation of the posterior tibial artery and medial plantar artery  10.  Angioplasty of the medial plantar artery as well as the mid and distal posterior tibial artery using the same tapered 1.5 to 2 x 210 mm Birdie cross balloon  11.  Infusion of 200 mcg of nitroglycerin intra-arterially below the knee  12.  Completion angiogram  13.  Deployment of Mynx closure device in the left common femoral artery  14.  Radiographic supervision and interpretation.     SURGEON: Sammy Wild MD     ANESTHESIA: General.    PREPARATION: Routine.    STAFF: Circulator: Roseann Mason RN  Scrub Person: Ramya Sandhu  Assistant: Roseann Mason RN  Vascular Radiology Technician: Deanna Ingram    Estimated Blood Loss: minimal    SPECIMENS: None    COMPLICATIONS: None apparent    INDICATIONS: Luis Echeverria is a 57 y.o. male with history of diabetes who has been following at the wound care center for a wound to the right distal first toe.  Noninvasive arterial imaging had shown a perfusion deficit and so he is brought for angiogram with possible  intervention to improve perfusion to assist in wound healing. The indications, risks, and possible complications of the procedure were explained to the patient, who voiced understanding and wished to proceed with surgery.     PROCEDURE IN DETAIL: The patient was taken to the operating room and placed on the operating table in a supine position. After MAC esthesia was obtained, the bilateral groins were prepped and draped in a sterile manner.  The left common femoral artery was then accessed under direct ultrasound guidance using a micropuncture technique and micro sheath placed.  Glidewire advantage was then introduced into the abdominal aorta.  The micro sheath was exchanged over the wire for a short 6 Mohawk sheath. Omni Flush catheter was advanced over the wire into the abdominal aorta and the wire removed.  Aortoiliac angiogram from here showed wide patency of the abdominal aorta and bilateral iliac systems.  Minimal atherosclerotic disease.  The bilateral renal arteries were also widely patent.  The Glidewire advantage was then reintroduced and using the wire and Omni Flush catheter the aortic bifurcation was traversed and the catheter placed down to the level of the right common femoral artery.  Right lower extremity angiogram was then performed showing patent right common femoral and profunda.  The SFA was patent along its length as well as the popliteal.  There is a patent trifurcation.  He had atherosclerotic disease and fairly small caliber distal anterior tibial and posterior tibial vessels with a stenosis in the distal PT by the ankle.  There was also extensive small vessel disease within the foot consistent with his known diabetes.  This point decision was made to try and treat the distal AT and PT to ensure maximal flow into the foot.  As such Glidewire advantage was reintroduced into the SFA and the short 6 Mohawk sheath was exchanged for a 6 Mohawk by 65 cm destination sheath which was advanced down  into the mid SFA.  6000 units of IV heparin were then given.  Now using a Parekh cross catheter and the Glidewire advantage I was able to initially selectively cannulate the anterior tibial artery and passed the wire all the way down to the level of the ankle.  Given the small vessel disease I was able to advance out into the proximal dorsalis pedis but not any further distally onto the foot.  Now over this wire initially a tapered 1.5 to 2 x 210 mm Birdie cross balloon was advanced and used to angioplasty the mid and distal anterior tibial.  Following this a 1.5 x 40 mm Birdie cross balloon was advanced and used to get into the proximal dorsalis pedis and angioplasty guide as well.  Wound was then removed and then the wire was pulled back and using the Parekh cross catheter I was able to now select to the posterior tibial and passed the wire all the way down into the distal PT and out into the medial plantar arch towards the distal foot.  Now over this wire the same 1.5 to 2 x 210 mm Birdie cross balloon was advanced all the way out into the midfoot.  This was used to angioplasty the medial plantar arch as well as the mid and distal posterior tibial artery.  Balloon was then removed and the Parekh cross catheter was reintroduced and placed in the distal popliteal artery.  200 mcg of nitroglycerin was then infused directly intra-arterially to help relieve any spasm.  Angiogram now through the catheter showed good patency of the posterior tibial with no residual stenosis and runoff out along the medial plantar arch toward the great toe.  The anterior tibial was patent along its course but there was still significant small vessel disease in the midfoot and distal forefoot that was previously present.  However overall perfusion of the foot was improved.  As such at this point no further intervention was deemed necessary.  Wires and catheters were removed.  The Glidewire advantage was then reintroduced through the sheath and the  sheath pulled back over the aortic bifurcation and the wire advanced up into the abdominal aorta.  The long 6 Chinese sheath was exchanged for a short 6 Chinese sheath and then the Mynx closure device was deployed in the left common femoral artery.  Manual pressure was held for additional 10 to 15 minutes to ensure hemostasis. Sterile dressings were applied. The patient tolerated the procedure well. Sponge and needle counts were correct. The patient was then awakened and extubated in the operating room and taken to the recovery room in good condition.    Sammy Wild MD  Date: 12/23/2022 Time: 08:57 CST

## 2022-12-28 ENCOUNTER — HOSPITAL ENCOUNTER (OUTPATIENT)
Dept: MRI IMAGING | Facility: HOSPITAL | Age: 57
Discharge: HOME | End: 2022-12-28
Admitting: PODIATRIST

## 2022-12-28 DIAGNOSIS — M86.9 DIABETIC FOOT ULCER WITH OSTEOMYELITIS: ICD-10-CM

## 2022-12-28 DIAGNOSIS — E11.621 DIABETIC FOOT ULCER WITH OSTEOMYELITIS: ICD-10-CM

## 2022-12-28 DIAGNOSIS — L97.509 DIABETIC FOOT ULCER WITH OSTEOMYELITIS: ICD-10-CM

## 2022-12-28 DIAGNOSIS — M86.9 OSTEOMYELITIS OF RIGHT FOOT, UNSPECIFIED TYPE: ICD-10-CM

## 2022-12-28 DIAGNOSIS — E11.69 DIABETIC FOOT ULCER WITH OSTEOMYELITIS: ICD-10-CM

## 2022-12-28 PROCEDURE — 73718 MRI LOWER EXTREMITY W/O DYE: CPT

## 2022-12-29 ENCOUNTER — OFFICE VISIT (OUTPATIENT)
Dept: WOUND CARE | Facility: HOSPITAL | Age: 57
End: 2022-12-29

## 2022-12-29 DIAGNOSIS — E11.621 TYPE 2 DIABETES MELLITUS WITH FOOT ULCER, UNSPECIFIED WHETHER LONG TERM INSULIN USE: ICD-10-CM

## 2022-12-29 DIAGNOSIS — L97.512 NON-PRESSURE CHRONIC ULCER OF OTHER PART OF RIGHT FOOT WITH FAT LAYER EXPOSED: ICD-10-CM

## 2022-12-29 DIAGNOSIS — I73.9 PERIPHERAL VASCULAR DISEASE, UNSPECIFIED: ICD-10-CM

## 2022-12-29 DIAGNOSIS — L97.509 TYPE 2 DIABETES MELLITUS WITH FOOT ULCER, UNSPECIFIED WHETHER LONG TERM INSULIN USE: ICD-10-CM

## 2022-12-29 PROCEDURE — G0463 HOSPITAL OUTPT CLINIC VISIT: HCPCS

## 2022-12-29 PROCEDURE — 99213 OFFICE O/P EST LOW 20 MIN: CPT | Performed by: PODIATRIST

## 2022-12-30 RX ORDER — LISINOPRIL 2.5 MG/1
TABLET ORAL
Qty: 90 TABLET | Refills: 3 | Status: SHIPPED | OUTPATIENT
Start: 2022-12-30

## 2023-01-05 ENCOUNTER — OFFICE VISIT (OUTPATIENT)
Dept: WOUND CARE | Facility: HOSPITAL | Age: 58
End: 2023-01-05
Payer: COMMERCIAL

## 2023-01-05 ENCOUNTER — TELEPHONE (OUTPATIENT)
Dept: VASCULAR SURGERY | Facility: CLINIC | Age: 58
End: 2023-01-05
Payer: COMMERCIAL

## 2023-01-05 DIAGNOSIS — E11.621 TYPE 2 DIABETES MELLITUS WITH FOOT ULCER, UNSPECIFIED WHETHER LONG TERM INSULIN USE: ICD-10-CM

## 2023-01-05 DIAGNOSIS — L97.512 NON-PRESSURE CHRONIC ULCER OF OTHER PART OF RIGHT FOOT WITH FAT LAYER EXPOSED: ICD-10-CM

## 2023-01-05 DIAGNOSIS — I73.9 PERIPHERAL VASCULAR DISEASE, UNSPECIFIED: ICD-10-CM

## 2023-01-05 DIAGNOSIS — L97.509 TYPE 2 DIABETES MELLITUS WITH FOOT ULCER, UNSPECIFIED WHETHER LONG TERM INSULIN USE: ICD-10-CM

## 2023-01-05 DIAGNOSIS — L03.115 CELLULITIS OF RIGHT LOWER LIMB: ICD-10-CM

## 2023-01-05 PROCEDURE — 97597 DBRDMT OPN WND 1ST 20 CM/<: CPT | Performed by: PODIATRIST

## 2023-01-06 ENCOUNTER — OFFICE VISIT (OUTPATIENT)
Dept: VASCULAR SURGERY | Facility: CLINIC | Age: 58
End: 2023-01-06
Payer: COMMERCIAL

## 2023-01-06 VITALS
BODY MASS INDEX: 37.03 KG/M2 | DIASTOLIC BLOOD PRESSURE: 78 MMHG | HEIGHT: 69 IN | SYSTOLIC BLOOD PRESSURE: 130 MMHG | OXYGEN SATURATION: 97 % | HEART RATE: 77 BPM | WEIGHT: 250 LBS

## 2023-01-06 DIAGNOSIS — E78.2 MIXED HYPERLIPIDEMIA: ICD-10-CM

## 2023-01-06 DIAGNOSIS — Z98.890 PERIPHERAL ARTERIAL DISEASE WITH HISTORY OF REVASCULARIZATION: Primary | ICD-10-CM

## 2023-01-06 DIAGNOSIS — I10 ESSENTIAL HYPERTENSION: ICD-10-CM

## 2023-01-06 DIAGNOSIS — Z79.4 TYPE 2 DIABETES MELLITUS WITH HYPERGLYCEMIA, WITH LONG-TERM CURRENT USE OF INSULIN: ICD-10-CM

## 2023-01-06 DIAGNOSIS — E11.65 TYPE 2 DIABETES MELLITUS WITH HYPERGLYCEMIA, WITH LONG-TERM CURRENT USE OF INSULIN: ICD-10-CM

## 2023-01-06 DIAGNOSIS — R09.89 BILATERAL CAROTID BRUITS: ICD-10-CM

## 2023-01-06 DIAGNOSIS — I73.9 PERIPHERAL ARTERIAL DISEASE WITH HISTORY OF REVASCULARIZATION: Primary | ICD-10-CM

## 2023-01-06 PROCEDURE — 99214 OFFICE O/P EST MOD 30 MIN: CPT | Performed by: SURGERY

## 2023-01-06 NOTE — LETTER
January 6, 2023     Rene Hogue DO  [address] [facility] [address]    Patient: Luis Echeverria   YOB: 1965   Date of Visit: 1/6/2023       Dear Dr. Mykel DO:    Thank you for referring Luis Echeverria to me for evaluation. Below are the relevant portions of my assessment and plan of care.    If you have questions, please do not hesitate to call me. I look forward to following Luis along with you.         Sincerely,        Sammy Wild MD        CC: No Recipients  Sammy Wild MD  01/06/23 1549  Signed  01/06/2023      Rene Hogue DO  [address] [facility] [address]        Luis Echeverria  1965    Chief Complaint   Patient presents with   • Post-op     2 week post op. Right angio done 12/23/22. Complains of swelling since procedure. Worst at night.        Dear Rene Hogue DO:    HPI     I had the pleasure of seeing your patient in the office today for follow up.  As you recall, the patient is a 57 y.o. male who we are currently following for peripheral arterial disease and a wound to the right first toe.  He had been following with the wound care center and noninvasive arterial imaging had shown a perfusion deficit in the right lower extremity.  As such she was taken for aortogram and right lower extremity angiogram with intervention on the anterior tibial and posterior tibial vessels.  I was able to improve the flow into the foot.  He has done well since the procedure.  He reports some mild edema of that right foot after revascularization.  He continues following with the wound care center and reports that they feel there is improvement to the wound since the angiogram.  He otherwise continues to deny any claudication or rest pain.  He has no other acute complaints today.  He is maintained on 81 mg aspirin daily as well as 2.5 mg of Xarelto twice daily.  He has a family member present with him today.    Review of Systems   Constitutional:  Negative.  Negative for activity change, appetite change, chills, diaphoresis, fatigue and fever.   HENT: Negative.  Negative for congestion, sneezing, sore throat and trouble swallowing.    Eyes: Negative.  Negative for visual disturbance.   Respiratory: Negative.  Negative for chest tightness and shortness of breath.    Cardiovascular: Positive for leg swelling. Negative for chest pain and palpitations.   Gastrointestinal: Negative.  Negative for abdominal distention, abdominal pain, nausea and vomiting.   Endocrine: Negative.    Genitourinary: Negative.    Musculoskeletal: Negative.    Skin: Positive for wound.   Allergic/Immunologic: Negative.    Neurological: Negative.    Hematological: Negative.    Psychiatric/Behavioral: Negative.        /78   Pulse 77   Ht 175.3 cm (69\")   Wt 113 kg (250 lb)   SpO2 97%   BMI 36.92 kg/m²   Physical Exam  Vitals reviewed.   Constitutional:       Appearance: Normal appearance. He is obese.   HENT:      Head: Normocephalic and atraumatic.      Nose: Nose normal.      Mouth/Throat:      Mouth: Mucous membranes are moist.   Eyes:      Extraocular Movements: Extraocular movements intact.      Pupils: Pupils are equal, round, and reactive to light.   Neck:      Vascular: Carotid bruit present.   Cardiovascular:      Rate and Rhythm: Normal rate and regular rhythm.      Pulses:           Carotid pulses are 2+ on the right side and 2+ on the left side.       Radial pulses are 2+ on the right side and 2+ on the left side.        Femoral pulses are 2+ on the right side and 2+ on the left side.       Popliteal pulses are 2+ on the right side and 2+ on the left side.        Dorsalis pedis pulses are detected w/ Doppler on the right side and detected w/ Doppler on the left side.        Posterior tibial pulses are detected w/ Doppler on the right side and detected w/ Doppler on the left side.      Comments: He has palpable femoral and popliteal pulses bilaterally.  In the right  foot he has a strong, multiphasic Doppler signal at the PT.  He also has a Doppler signal at the DP and Doppler signal all the way up to the webspace between the first and second toes.  The foot is warm.  There is some mild edema of the right foot after recent revascularization.  On the left he has Doppler signals at the DP and PT and foot is warm.  He does have a wound to the right first toe which has a dressing in place and is clean and intact.    Left groin access site from recent angiogram is healing well with no evidence of hematoma or infection.  Pulmonary:      Effort: Pulmonary effort is normal. No respiratory distress.   Abdominal:      General: There is no distension.      Palpations: Abdomen is soft. There is no mass.      Tenderness: There is no abdominal tenderness.   Musculoskeletal:         General: Normal range of motion.      Cervical back: Normal range of motion and neck supple.      Right lower leg: Edema present.      Left lower leg: Edema present.   Skin:     General: Skin is warm and dry.      Capillary Refill: Capillary refill takes less than 2 seconds.   Neurological:      General: No focal deficit present.      Mental Status: He is alert and oriented to person, place, and time.   Psychiatric:         Mood and Affect: Mood normal.         Behavior: Behavior normal.         Thought Content: Thought content normal.         Judgment: Judgment normal.         DIAGNOSTIC DATA:    XR Foot 3+ View Right    Result Date: 12/7/2022  Narrative: RIGHT FOOT 3 views 12/7/2022 4:15 PM CST  HISTORY: ulcer on toe; E11.621-Type 2 diabetes mellitus with foot ulcer; L97.519-Non-pressure chronic ulcer of other part of right foot with unspecified severity  COMPARISON: None  FINDINGS: Frontal, lateral and oblique radiographs of the right foot were provided for review.  Mild hallux valgus. Bipartite medial first metatarsal sesamoid. Soft tissue swelling about the first MTP joint and the proximal aspect of the first  phalanx. I don't see any definite destructive bony changes at this time to suggest acute osteomyelitis.. Vascular calcifications are noted throughout the foot and ankle.       Impression:  1.  Soft tissue swelling about the first digit. There is lucency extending between the proximal and distal aspects of the medial first metatarsal sesamoid. I suspect this is a bipartite sesamoid.  2.  No definite acute fracture and no definite destructive bony changes. If there is high clinical concern for osteomyelitis, MRI should be considered.  3.  Atherosclerotic vascular disease in the foot and ankle. This report was finalized on 12/07/2022 16:39 by Dr. Kamaljit Gan MD.    IR Angiogram Extremity, FL C Arm During Surgery    Result Date: 12/24/2022  Narrative: Performed by Dr. Wild. Please see procedure note. This report was finalized on 12/24/2022 11:31 by Dr. Sammy Wild MD.    MRI Foot Right Without Contrast    Result Date: 12/28/2022  Narrative: HISTORY: Osteomyelitis right foot, diabetic foot ulcer  MRI right foot: The planar imaging of the right mid and forefoot performed without IV contrast  COMPARISON: Plain films right foot 12/07/2022  FINDINGS: There is diffuse ventral soft tissue edema without loculated abscess collection. There is trace fluid of the arthritic first MTP joint. There is incomplete fat saturation of the distal great toe on the inversion recovery sequences. No convincing abnormal signal to support a diagnosis of osteomyelitis. No bony fracture or suspicious focal bony mass.      Impression: 1. Diffuse soft tissue edema, greatest along the ventral surface of the right midfoot. No loculated abscess collection. No abnormal signal changes to support osteomyelitis. Minimal fluid of the arthritic MTP joint. This report was finalized on 12/28/2022 12:49 by Dr. Navya Gonzalez MD.      Patient Active Problem List   Diagnosis   • CAD (coronary artery disease)   • Hypertension   • Hyperlipidemia   • MI  (myocardial infarction) ([facility])   • Diabetes mellitus ([facility])   • Carotid stent occlusion ([facility])   • Unstable angina ([facility])   • Ischemic cardiomyopathy   • RUQ pain   • Dehydration   • Nausea and vomiting   • Acute kidney injury ([facility])   • Hypotension   • Gallbladder polyp   • BMI 31.0-31.9,adult   • S/P implantation of automatic cardioverter/defibrillator (AICD)   • Chronic systolic CHF (congestive heart failure), NYHA class 2 ([facility])   • Morbidly obese ([facility])   • DKA (diabetic ketoacidoses)   • Pneumonia due to COVID-19 virus   • Noncompliance   • Cytokine release syndrome, grade 1   • Skin ulcer of toe of right foot, limited to breakdown of skin ([facility])   • Atherosclerosis of native arteries of the extremities with ulceration ([facility])         ICD-10-CM ICD-9-CM   1. Peripheral arterial disease with history of revascularization ([facility])  I73.9 443.9    Z98.890 V45.89   2. Bilateral carotid bruits  R09.89 785.9   3. Essential hypertension  I10 401.9   4. Mixed hyperlipidemia  E78.2 272.2   5. Type 2 diabetes mellitus with hyperglycemia, with long-term current use of insulin ([facility])  E11.65 250.00    Z79.4 790.29     V58.67       Lab Frequency Next Occurrence   Follow Anesthesia Guidelines / Protocol Once 12/18/2022   Obtain Informed Consent Once 12/18/2022   Provide NPO Instructions to Patient Once 12/18/2022   Chlorhexidine Skin Prep Once 12/18/2022   US Ankle / Brachial Indices Extremity Complete Once 04/06/2023   US Carotid Bilateral Once 04/06/2023       PLAN: After thoroughly evaluating Luis Echeverria, I believe the best course of action is to remain conservative from a vascular standpoint.  Overall he has done well after recent aortogram and right lower extremity angiogram with intervention on the anterior tibial and posterior tibial vessels.  On exam today his foot is warm and he has a strong, multiphasic posterior tibial signal with signals that go all the way out to the webspace between the first and second toe.  He notes  his wound to the first toe seems to be improving as per wound care.  At this point plan will be continued local care of the right first toe wound to the wound care center.  He should continue on his aspirin and Xarelto as previous.  I will plan to see him back here in the office in 3 months with a repeat TEENA/PVR for continued surveillance.  Also given his known peripheral arterial disease as well as cardiac disease and evidence of carotid bruits him going to have a carotid duplex done when he returns for evaluation of carotid stenosis.  The patient is to continue taking their medications as previously discussed.   I did discuss vascular risk factors as they pertain to the progression of vascular disease including controlling diabetes, hypertension, and hyperlipidemia. These factors are well controlled on his current regimen. Class 2 Severe Obesity (BMI >=35 and <=39.9). Obesity-related health conditions include the following: hypertension, diabetes mellitus, dyslipidemias and peripheral vascular disease. Obesity is unchanged. BMI is is above average; BMI management plan is completed. We discussed portion control and increasing exercise.   This was all discussed in full with complete understanding.  Thank you for allowing me to participate in the care of your patient.  Please do not hesitate to call with any questions or concerns.  We will keep you aware of any further encounters with Luis Echeverria.      Sincerely Yours,      Sammy Wild MD

## 2023-01-06 NOTE — PROGRESS NOTES
01/06/2023      Rene Hogue,   [address] [facility] [place] [address]        Luis Echeverria  1965    Chief Complaint   Patient presents with   • Post-op     2 week post op. Right angio done 12/23/22. Complains of swelling since procedure. Worst at night.        Dear Rene Hogue, DO:    HPI     I had the pleasure of seeing your patient in the office today for follow up.  As you recall, the patient is a 57 y.o. male who we are currently following for peripheral arterial disease and a wound to the right first toe.  He had been following with the wound care center and noninvasive arterial imaging had shown a perfusion deficit in the right lower extremity.  As such she was taken for aortogram and right lower extremity angiogram with intervention on the anterior tibial and posterior tibial vessels.  I was able to improve the flow into the foot.  He has done well since the procedure.  He reports some mild edema of that right foot after revascularization.  He continues following with the wound care center and reports that they feel there is improvement to the wound since the angiogram.  He otherwise continues to deny any claudication or rest pain.  He has no other acute complaints today.  He is maintained on 81 mg aspirin daily as well as 2.5 mg of Xarelto twice daily.  He has a family member present with him today.    Review of Systems   Constitutional: Negative.  Negative for activity change, appetite change, chills, diaphoresis, fatigue and fever.   HENT: Negative.  Negative for congestion, sneezing, sore throat and trouble swallowing.    Eyes: Negative.  Negative for visual disturbance.   Respiratory: Negative.  Negative for chest tightness and shortness of breath.    Cardiovascular: Positive for leg swelling. Negative for chest pain and palpitations.   Gastrointestinal: Negative.  Negative for abdominal distention, abdominal pain, nausea and vomiting.   Endocrine: Negative.    Genitourinary:  Negative.    Musculoskeletal: Negative.    Skin: Positive for wound.   Allergic/Immunologic: Negative.    Neurological: Negative.    Hematological: Negative.    Psychiatric/Behavioral: Negative.        /78   Pulse 77   Ht 175.3 cm (69\")   Wt 113 kg (250 lb)   SpO2 97%   BMI 36.92 kg/m²   Physical Exam  Vitals reviewed.   Constitutional:       Appearance: Normal appearance. He is obese.   HENT:      Head: Normocephalic and atraumatic.      Nose: Nose normal.      Mouth/Throat:      Mouth: Mucous membranes are moist.   Eyes:      Extraocular Movements: Extraocular movements intact.      Pupils: Pupils are equal, round, and reactive to light.   Neck:      Vascular: Carotid bruit present.   Cardiovascular:      Rate and Rhythm: Normal rate and regular rhythm.      Pulses:           Carotid pulses are 2+ on the right side and 2+ on the left side.       Radial pulses are 2+ on the right side and 2+ on the left side.        Femoral pulses are 2+ on the right side and 2+ on the left side.       Popliteal pulses are 2+ on the right side and 2+ on the left side.        Dorsalis pedis pulses are detected w/ Doppler on the right side and detected w/ Doppler on the left side.        Posterior tibial pulses are detected w/ Doppler on the right side and detected w/ Doppler on the left side.      Comments: He has palpable femoral and popliteal pulses bilaterally.  In the right foot he has a strong, multiphasic Doppler signal at the PT.  He also has a Doppler signal at the DP and Doppler signal all the way up to the webspace between the first and second toes.  The foot is warm.  There is some mild edema of the right foot after recent revascularization.  On the left he has Doppler signals at the DP and PT and foot is warm.  He does have a wound to the right first toe which has a dressing in place and is clean and intact.    Left groin access site from recent angiogram is healing well with no evidence of hematoma or  infection.  Pulmonary:      Effort: Pulmonary effort is normal. No respiratory distress.   Abdominal:      General: There is no distension.      Palpations: Abdomen is soft. There is no mass.      Tenderness: There is no abdominal tenderness.   Musculoskeletal:         General: Normal range of motion.      Cervical back: Normal range of motion and neck supple.      Right lower leg: Edema present.      Left lower leg: Edema present.   Skin:     General: Skin is warm and dry.      Capillary Refill: Capillary refill takes less than 2 seconds.   Neurological:      General: No focal deficit present.      Mental Status: He is alert and oriented to person, place, and time.   Psychiatric:         Mood and Affect: Mood normal.         Behavior: Behavior normal.         Thought Content: Thought content normal.         Judgment: Judgment normal.         DIAGNOSTIC DATA:    XR Foot 3+ View Right    Result Date: 12/7/2022  Narrative: RIGHT FOOT 3 views 12/7/2022 4:15 PM CST  HISTORY: ulcer on toe; E11.621-Type 2 diabetes mellitus with foot ulcer; L97.519-Non-pressure chronic ulcer of other part of right foot with unspecified severity  COMPARISON: None  FINDINGS: Frontal, lateral and oblique radiographs of the right foot were provided for review.  Mild hallux valgus. Bipartite medial first metatarsal sesamoid. Soft tissue swelling about the first MTP joint and the proximal aspect of the first phalanx. I don't see any definite destructive bony changes at this time to suggest acute osteomyelitis.. Vascular calcifications are noted throughout the foot and ankle.       Impression:  1.  Soft tissue swelling about the first digit. There is lucency extending between the proximal and distal aspects of the medial first metatarsal sesamoid. I suspect this is a bipartite sesamoid.  2.  No definite acute fracture and no definite destructive bony changes. If there is high clinical concern for osteomyelitis, MRI should be considered.  3.   Atherosclerotic vascular disease in the foot and ankle. This report was finalized on 12/07/2022 16:39 by Dr. Kamaljit Gan MD.    IR Angiogram Extremity, FL C Arm During Surgery    Result Date: 12/24/2022  Narrative: Performed by Dr. Wild. Please see procedure note. This report was finalized on 12/24/2022 11:31 by Dr. Sammy Wild MD.    MRI Foot Right Without Contrast    Result Date: 12/28/2022  Narrative: HISTORY: Osteomyelitis right foot, diabetic foot ulcer  MRI right foot: The planar imaging of the right mid and forefoot performed without IV contrast  COMPARISON: Plain films right foot 12/07/2022  FINDINGS: There is diffuse ventral soft tissue edema without loculated abscess collection. There is trace fluid of the arthritic first MTP joint. There is incomplete fat saturation of the distal great toe on the inversion recovery sequences. No convincing abnormal signal to support a diagnosis of osteomyelitis. No bony fracture or suspicious focal bony mass.      Impression: 1. Diffuse soft tissue edema, greatest along the ventral surface of the right midfoot. No loculated abscess collection. No abnormal signal changes to support osteomyelitis. Minimal fluid of the arthritic MTP joint. This report was finalized on 12/28/2022 12:49 by Dr. Navya Gonzalez MD.      Patient Active Problem List   Diagnosis   • CAD (coronary artery disease)   • Hypertension   • Hyperlipidemia   • MI (myocardial infarction) ([facility])   • Diabetes mellitus ([facility])   • Carotid stent occlusion ([facility])   • Unstable angina ([facility])   • Ischemic cardiomyopathy   • RUQ pain   • Dehydration   • Nausea and vomiting   • Acute kidney injury ([facility])   • Hypotension   • Gallbladder polyp   • BMI 31.0-31.9,adult   • S/P implantation of automatic cardioverter/defibrillator (AICD)   • Chronic systolic CHF (congestive heart failure), NYHA class 2 ([facility])   • Morbidly obese ([facility])   • DKA (diabetic ketoacidoses)   • Pneumonia due to COVID-19 virus   • Noncompliance    • Cytokine release syndrome, grade 1   • Skin ulcer of toe of right foot, limited to breakdown of skin ([facility])   • Atherosclerosis of native arteries of the extremities with ulceration ([facility])         ICD-10-CM ICD-9-CM   1. Peripheral arterial disease with history of revascularization ([facility])  I73.9 443.9    Z98.890 V45.89   2. Bilateral carotid bruits  R09.89 785.9   3. Essential hypertension  I10 401.9   4. Mixed hyperlipidemia  E78.2 272.2   5. Type 2 diabetes mellitus with hyperglycemia, with long-term current use of insulin ([facility])  E11.65 250.00    Z79.4 790.29     V58.67       Lab Frequency Next Occurrence   Follow Anesthesia Guidelines / Protocol Once 12/18/2022   Obtain Informed Consent Once 12/18/2022   Provide NPO Instructions to Patient Once 12/18/2022   Chlorhexidine Skin Prep Once 12/18/2022   US Ankle / Brachial Indices Extremity Complete Once 04/06/2023   US Carotid Bilateral Once 04/06/2023       PLAN: After thoroughly evaluating Luis Echeverria, I believe the best course of action is to remain conservative from a vascular standpoint.  Overall he has done well after recent aortogram and right lower extremity angiogram with intervention on the anterior tibial and posterior tibial vessels.  On exam today his foot is warm and he has a strong, multiphasic posterior tibial signal with signals that go all the way out to the webspace between the first and second toe.  He notes his wound to the first toe seems to be improving as per wound care.  At this point plan will be continued local care of the right first toe wound to the wound care center.  He should continue on his aspirin and Xarelto as previous.  I will plan to see him back here in the office in 3 months with a repeat TEENA/PVR for continued surveillance.  Also given his known peripheral arterial disease as well as cardiac disease and evidence of carotid bruits him going to have a carotid duplex done when he returns for evaluation of carotid stenosis.   The patient is to continue taking their medications as previously discussed.   I did discuss vascular risk factors as they pertain to the progression of vascular disease including controlling diabetes, hypertension, and hyperlipidemia. These factors are well controlled on his current regimen. Class 2 Severe Obesity (BMI >=35 and <=39.9). Obesity-related health conditions include the following: hypertension, diabetes mellitus, dyslipidemias and peripheral vascular disease. Obesity is unchanged. BMI is is above average; BMI management plan is completed. We discussed portion control and increasing exercise.   This was all discussed in full with complete understanding.  Thank you for allowing me to participate in the care of your patient.  Please do not hesitate to call with any questions or concerns.  We will keep you aware of any further encounters with Luis Echeverria.      Sincerely Yours,      Sammy Wild MD

## 2023-01-12 ENCOUNTER — OFFICE VISIT (OUTPATIENT)
Dept: WOUND CARE | Facility: HOSPITAL | Age: 58
End: 2023-01-12
Payer: COMMERCIAL

## 2023-01-12 DIAGNOSIS — L97.512 NON-PRESSURE CHRONIC ULCER OF OTHER PART OF RIGHT FOOT WITH FAT LAYER EXPOSED: ICD-10-CM

## 2023-01-12 DIAGNOSIS — I73.9 PERIPHERAL VASCULAR DISEASE, UNSPECIFIED: ICD-10-CM

## 2023-01-12 DIAGNOSIS — L03.115 CELLULITIS OF RIGHT LOWER LIMB: ICD-10-CM

## 2023-01-12 DIAGNOSIS — E11.621 TYPE 2 DIABETES MELLITUS WITH FOOT ULCER, UNSPECIFIED WHETHER LONG TERM INSULIN USE: ICD-10-CM

## 2023-01-12 DIAGNOSIS — L97.509 TYPE 2 DIABETES MELLITUS WITH FOOT ULCER, UNSPECIFIED WHETHER LONG TERM INSULIN USE: ICD-10-CM

## 2023-01-12 PROCEDURE — 97597 DBRDMT OPN WND 1ST 20 CM/<: CPT | Performed by: NURSE PRACTITIONER

## 2023-01-19 ENCOUNTER — OFFICE VISIT (OUTPATIENT)
Dept: WOUND CARE | Facility: HOSPITAL | Age: 58
End: 2023-01-19
Payer: COMMERCIAL

## 2023-01-19 DIAGNOSIS — I73.9 PERIPHERAL VASCULAR DISEASE, UNSPECIFIED: ICD-10-CM

## 2023-01-19 DIAGNOSIS — E11.621 TYPE 2 DIABETES MELLITUS WITH FOOT ULCER, UNSPECIFIED WHETHER LONG TERM INSULIN USE: ICD-10-CM

## 2023-01-19 DIAGNOSIS — L97.509 TYPE 2 DIABETES MELLITUS WITH FOOT ULCER, UNSPECIFIED WHETHER LONG TERM INSULIN USE: ICD-10-CM

## 2023-01-19 DIAGNOSIS — L03.115 CELLULITIS OF RIGHT LOWER LIMB: ICD-10-CM

## 2023-01-19 DIAGNOSIS — L97.512 NON-PRESSURE CHRONIC ULCER OF OTHER PART OF RIGHT FOOT WITH FAT LAYER EXPOSED: ICD-10-CM

## 2023-01-19 PROCEDURE — 97597 DBRDMT OPN WND 1ST 20 CM/<: CPT | Performed by: NURSE PRACTITIONER

## 2023-01-24 ENCOUNTER — OFFICE VISIT (OUTPATIENT)
Dept: WOUND CARE | Facility: HOSPITAL | Age: 58
End: 2023-01-24
Payer: COMMERCIAL

## 2023-01-24 DIAGNOSIS — E11.621 TYPE 2 DIABETES MELLITUS WITH FOOT ULCER, UNSPECIFIED WHETHER LONG TERM INSULIN USE: ICD-10-CM

## 2023-01-24 DIAGNOSIS — L97.512 NON-PRESSURE CHRONIC ULCER OF OTHER PART OF RIGHT FOOT WITH FAT LAYER EXPOSED: ICD-10-CM

## 2023-01-24 DIAGNOSIS — I73.9 PERIPHERAL VASCULAR DISEASE, UNSPECIFIED: ICD-10-CM

## 2023-01-24 DIAGNOSIS — L03.115 CELLULITIS OF RIGHT LOWER LIMB: ICD-10-CM

## 2023-01-24 DIAGNOSIS — L97.509 TYPE 2 DIABETES MELLITUS WITH FOOT ULCER, UNSPECIFIED WHETHER LONG TERM INSULIN USE: ICD-10-CM

## 2023-01-24 PROCEDURE — 99213 OFFICE O/P EST LOW 20 MIN: CPT | Performed by: NURSE PRACTITIONER

## 2023-01-24 PROCEDURE — 11042 DBRDMT SUBQ TIS 1ST 20SQCM/<: CPT | Performed by: NURSE PRACTITIONER

## 2023-02-02 ENCOUNTER — OFFICE VISIT (OUTPATIENT)
Dept: WOUND CARE | Facility: HOSPITAL | Age: 58
End: 2023-02-02
Payer: COMMERCIAL

## 2023-02-02 DIAGNOSIS — L97.509 TYPE 2 DIABETES MELLITUS WITH FOOT ULCER, UNSPECIFIED WHETHER LONG TERM INSULIN USE: ICD-10-CM

## 2023-02-02 DIAGNOSIS — L97.512 NON-PRESSURE CHRONIC ULCER OF OTHER PART OF RIGHT FOOT WITH FAT LAYER EXPOSED: ICD-10-CM

## 2023-02-02 DIAGNOSIS — E11.621 TYPE 2 DIABETES MELLITUS WITH FOOT ULCER, UNSPECIFIED WHETHER LONG TERM INSULIN USE: ICD-10-CM

## 2023-02-02 DIAGNOSIS — I73.9 PERIPHERAL VASCULAR DISEASE, UNSPECIFIED: ICD-10-CM

## 2023-02-02 PROCEDURE — 99213 OFFICE O/P EST LOW 20 MIN: CPT | Performed by: PODIATRIST

## 2023-02-02 PROCEDURE — G0463 HOSPITAL OUTPT CLINIC VISIT: HCPCS

## 2023-02-09 ENCOUNTER — OFFICE VISIT (OUTPATIENT)
Dept: WOUND CARE | Facility: HOSPITAL | Age: 58
End: 2023-02-09
Payer: COMMERCIAL

## 2023-02-09 DIAGNOSIS — L03.115 CELLULITIS OF RIGHT LOWER LIMB: ICD-10-CM

## 2023-02-09 DIAGNOSIS — L97.509 TYPE 2 DIABETES MELLITUS WITH FOOT ULCER, UNSPECIFIED WHETHER LONG TERM INSULIN USE: ICD-10-CM

## 2023-02-09 DIAGNOSIS — I73.9 PERIPHERAL VASCULAR DISEASE, UNSPECIFIED: ICD-10-CM

## 2023-02-09 DIAGNOSIS — L97.512 NON-PRESSURE CHRONIC ULCER OF OTHER PART OF RIGHT FOOT WITH FAT LAYER EXPOSED: ICD-10-CM

## 2023-02-09 DIAGNOSIS — E11.621 TYPE 2 DIABETES MELLITUS WITH FOOT ULCER, UNSPECIFIED WHETHER LONG TERM INSULIN USE: ICD-10-CM

## 2023-02-09 PROCEDURE — 97597 DBRDMT OPN WND 1ST 20 CM/<: CPT | Performed by: NURSE PRACTITIONER

## 2023-02-16 ENCOUNTER — OFFICE VISIT (OUTPATIENT)
Dept: WOUND CARE | Facility: HOSPITAL | Age: 58
End: 2023-02-16
Payer: COMMERCIAL

## 2023-02-16 DIAGNOSIS — L97.512 NON-PRESSURE CHRONIC ULCER OF OTHER PART OF RIGHT FOOT WITH FAT LAYER EXPOSED: ICD-10-CM

## 2023-02-16 DIAGNOSIS — E11.621 TYPE 2 DIABETES MELLITUS WITH FOOT ULCER, UNSPECIFIED WHETHER LONG TERM INSULIN USE: ICD-10-CM

## 2023-02-16 DIAGNOSIS — E11.52 TYPE 2 DIABETES MELLITUS WITH DIABETIC PERIPHERAL ANGIOPATHY WITH GANGRENE, UNSPECIFIED WHETHER LONG TERM INSULIN USE: ICD-10-CM

## 2023-02-16 DIAGNOSIS — I73.9 PERIPHERAL VASCULAR DISEASE, UNSPECIFIED: ICD-10-CM

## 2023-02-16 DIAGNOSIS — L97.509 TYPE 2 DIABETES MELLITUS WITH FOOT ULCER, UNSPECIFIED WHETHER LONG TERM INSULIN USE: ICD-10-CM

## 2023-02-16 PROCEDURE — 11042 DBRDMT SUBQ TIS 1ST 20SQCM/<: CPT | Performed by: PODIATRIST

## 2023-02-22 RX ORDER — ATORVASTATIN CALCIUM 80 MG/1
TABLET, FILM COATED ORAL
Qty: 90 TABLET | Refills: 3 | Status: SHIPPED | OUTPATIENT
Start: 2023-02-22

## 2023-02-23 ENCOUNTER — OFFICE VISIT (OUTPATIENT)
Dept: WOUND CARE | Facility: HOSPITAL | Age: 58
End: 2023-02-23
Payer: COMMERCIAL

## 2023-02-23 DIAGNOSIS — E11.52 TYPE 2 DIABETES MELLITUS WITH DIABETIC PERIPHERAL ANGIOPATHY WITH GANGRENE, UNSPECIFIED WHETHER LONG TERM INSULIN USE: ICD-10-CM

## 2023-02-23 DIAGNOSIS — L97.512 NON-PRESSURE CHRONIC ULCER OF OTHER PART OF RIGHT FOOT WITH FAT LAYER EXPOSED: ICD-10-CM

## 2023-02-23 DIAGNOSIS — I73.9 PERIPHERAL VASCULAR DISEASE, UNSPECIFIED: ICD-10-CM

## 2023-02-23 DIAGNOSIS — E11.621 TYPE 2 DIABETES MELLITUS WITH FOOT ULCER, UNSPECIFIED WHETHER LONG TERM INSULIN USE: ICD-10-CM

## 2023-02-23 DIAGNOSIS — L97.509 TYPE 2 DIABETES MELLITUS WITH FOOT ULCER, UNSPECIFIED WHETHER LONG TERM INSULIN USE: ICD-10-CM

## 2023-02-23 PROCEDURE — 11042 DBRDMT SUBQ TIS 1ST 20SQCM/<: CPT | Performed by: NURSE PRACTITIONER

## 2023-02-27 ENCOUNTER — OFFICE VISIT (OUTPATIENT)
Dept: ENDOCRINOLOGY | Facility: CLINIC | Age: 58
End: 2023-02-27
Payer: COMMERCIAL

## 2023-02-27 ENCOUNTER — LAB (OUTPATIENT)
Dept: LAB | Facility: HOSPITAL | Age: 58
End: 2023-02-27
Payer: COMMERCIAL

## 2023-02-27 VITALS
WEIGHT: 249.8 LBS | HEIGHT: 69 IN | SYSTOLIC BLOOD PRESSURE: 150 MMHG | HEART RATE: 71 BPM | OXYGEN SATURATION: 98 % | BODY MASS INDEX: 37 KG/M2 | DIASTOLIC BLOOD PRESSURE: 70 MMHG

## 2023-02-27 DIAGNOSIS — E11.65 TYPE 2 DIABETES MELLITUS WITH HYPERGLYCEMIA, WITH LONG-TERM CURRENT USE OF INSULIN: Primary | ICD-10-CM

## 2023-02-27 DIAGNOSIS — Z79.4 TYPE 2 DIABETES MELLITUS WITH HYPERGLYCEMIA, WITH LONG-TERM CURRENT USE OF INSULIN: Primary | ICD-10-CM

## 2023-02-27 DIAGNOSIS — I10 PRIMARY HYPERTENSION: ICD-10-CM

## 2023-02-27 DIAGNOSIS — E11.42 DIABETIC POLYNEUROPATHY ASSOCIATED WITH TYPE 2 DIABETES MELLITUS: ICD-10-CM

## 2023-02-27 DIAGNOSIS — E78.2 MIXED HYPERLIPIDEMIA: ICD-10-CM

## 2023-02-27 LAB
ALBUMIN SERPL-MCNC: 4 G/DL (ref 3.5–5.2)
ALBUMIN/GLOB SERPL: 1.2 G/DL
ALP SERPL-CCNC: 94 U/L (ref 39–117)
ALT SERPL W P-5'-P-CCNC: 27 U/L (ref 1–41)
ANION GAP SERPL CALCULATED.3IONS-SCNC: 10 MMOL/L (ref 5–15)
AST SERPL-CCNC: 28 U/L (ref 1–40)
BASOPHILS # BLD AUTO: 0.06 10*3/MM3 (ref 0–0.2)
BASOPHILS NFR BLD AUTO: 0.8 % (ref 0–1.5)
BILIRUB SERPL-MCNC: 0.2 MG/DL (ref 0–1.2)
BUN SERPL-MCNC: 24 MG/DL (ref 6–20)
BUN/CREAT SERPL: 18.5 (ref 7–25)
CALCIUM SPEC-SCNC: 9.7 MG/DL (ref 8.6–10.5)
CHLORIDE SERPL-SCNC: 102 MMOL/L (ref 98–107)
CO2 SERPL-SCNC: 26 MMOL/L (ref 22–29)
CREAT SERPL-MCNC: 1.3 MG/DL (ref 0.76–1.27)
DEPRECATED RDW RBC AUTO: 47.8 FL (ref 37–54)
EGFRCR SERPLBLD CKD-EPI 2021: 64.9 ML/MIN/1.73
EOSINOPHIL # BLD AUTO: 0.35 10*3/MM3 (ref 0–0.4)
EOSINOPHIL NFR BLD AUTO: 4.6 % (ref 0.3–6.2)
ERYTHROCYTE [DISTWIDTH] IN BLOOD BY AUTOMATED COUNT: 14.8 % (ref 12.3–15.4)
GLOBULIN UR ELPH-MCNC: 3.4 GM/DL
GLUCOSE SERPL-MCNC: 70 MG/DL (ref 65–99)
HBA1C MFR BLD: 8.7 % (ref 4.8–5.6)
HCT VFR BLD AUTO: 41.5 % (ref 37.5–51)
HGB BLD-MCNC: 13.5 G/DL (ref 13–17.7)
IMM GRANULOCYTES # BLD AUTO: 0.02 10*3/MM3 (ref 0–0.05)
IMM GRANULOCYTES NFR BLD AUTO: 0.3 % (ref 0–0.5)
LYMPHOCYTES # BLD AUTO: 2.4 10*3/MM3 (ref 0.7–3.1)
LYMPHOCYTES NFR BLD AUTO: 31.3 % (ref 19.6–45.3)
MCH RBC QN AUTO: 28.9 PG (ref 26.6–33)
MCHC RBC AUTO-ENTMCNC: 32.5 G/DL (ref 31.5–35.7)
MCV RBC AUTO: 88.9 FL (ref 79–97)
MONOCYTES # BLD AUTO: 0.63 10*3/MM3 (ref 0.1–0.9)
MONOCYTES NFR BLD AUTO: 8.2 % (ref 5–12)
NEUTROPHILS NFR BLD AUTO: 4.21 10*3/MM3 (ref 1.7–7)
NEUTROPHILS NFR BLD AUTO: 54.8 % (ref 42.7–76)
NRBC BLD AUTO-RTO: 0 /100 WBC (ref 0–0.2)
PLATELET # BLD AUTO: 308 10*3/MM3 (ref 140–450)
PMV BLD AUTO: 9.2 FL (ref 6–12)
POTASSIUM SERPL-SCNC: 4.3 MMOL/L (ref 3.5–5.2)
PROT SERPL-MCNC: 7.4 G/DL (ref 6–8.5)
RBC # BLD AUTO: 4.67 10*6/MM3 (ref 4.14–5.8)
SODIUM SERPL-SCNC: 138 MMOL/L (ref 136–145)
TSH SERPL DL<=0.05 MIU/L-ACNC: 11.42 UIU/ML (ref 0.27–4.2)
WBC NRBC COR # BLD: 7.67 10*3/MM3 (ref 3.4–10.8)

## 2023-02-27 PROCEDURE — 86341 ISLET CELL ANTIBODY: CPT | Performed by: NURSE PRACTITIONER

## 2023-02-27 PROCEDURE — 84681 ASSAY OF C-PEPTIDE: CPT | Performed by: NURSE PRACTITIONER

## 2023-02-27 PROCEDURE — 99214 OFFICE O/P EST MOD 30 MIN: CPT | Performed by: NURSE PRACTITIONER

## 2023-02-27 PROCEDURE — 36415 COLL VENOUS BLD VENIPUNCTURE: CPT | Performed by: NURSE PRACTITIONER

## 2023-02-27 PROCEDURE — 83036 HEMOGLOBIN GLYCOSYLATED A1C: CPT | Performed by: NURSE PRACTITIONER

## 2023-02-27 PROCEDURE — 80050 GENERAL HEALTH PANEL: CPT | Performed by: NURSE PRACTITIONER

## 2023-02-27 RX ORDER — PROCHLORPERAZINE 25 MG/1
1 SUPPOSITORY RECTAL AS NEEDED
Qty: 9 EACH | Refills: 3 | Status: SHIPPED | OUTPATIENT
Start: 2023-02-27

## 2023-02-27 RX ORDER — GLUCAGON 3 MG/1
1 POWDER NASAL AS NEEDED
Qty: 2 EACH | Refills: 11 | Status: SHIPPED | OUTPATIENT
Start: 2023-02-27

## 2023-02-27 RX ORDER — PROCHLORPERAZINE 25 MG/1
1 SUPPOSITORY RECTAL
Qty: 1 EACH | Refills: 3 | Status: SHIPPED | OUTPATIENT
Start: 2023-02-27

## 2023-02-27 RX ORDER — PROCHLORPERAZINE 25 MG/1
1 SUPPOSITORY RECTAL ONCE
Qty: 1 EACH | Refills: 1 | Status: SHIPPED | OUTPATIENT
Start: 2023-02-27 | End: 2023-02-27

## 2023-02-27 NOTE — PROGRESS NOTES
"Chief Complaint  Diabetes    Subjective          Luis Echeverria presents to [facility]  History of Present Illness       In office visit     Referring provider Rene Hogue DO       Chief Complaint   Patient presents with   • Diabetes       HPI    57 year old male presents for consultation         Reason -- diabetes mellitus  Type 2     Duration--diagnosed at age 23     Context -- routine     Timing -- constant     Quality  Not controlled     Severity moderate     Macrovascular complications--- CAD --- MI in the past     Stents times 5     Microvascular complications + neuropathy, + DR     Diabetic ulcer on the right great toe       Current diabetes regimen       Insulin by injection     Oral medications     Current glucose monitoring     Fingerstick's     Check 6 times daily     Am readings 80 up to 200    He does have lows in the middle of the night     Last night was 56     Has had low of 14         Daytime 70 up to 400    Averaging 180 up to 190     90 day average 187     Current diet    Regular       Review of Systems - General ROS: negative        Objective   Vital Signs:   /70   Pulse 71   Ht 175.3 cm (69\")   Wt 113 kg (249 lb 12.8 oz)   SpO2 98%   BMI 36.89 kg/m²     Physical Exam  Constitutional:       Appearance: Normal appearance.   Cardiovascular:      Rate and Rhythm: Regular rhythm.      Heart sounds: Normal heart sounds.   Pulmonary:      Breath sounds: Normal breath sounds.   Musculoskeletal:      Cervical back: Normal range of motion.   Neurological:      Mental Status: He is alert.        Result Review :   The following data was reviewed by: ANDRES Lanaz on 02/27/2023:  Common labs    Common Labs 12/20/22 12/20/22 2/27/23 2/27/23    1339 1339 1409 1409   Glucose  187 (A)  70   BUN  31 (A)  24 (A)   Creatinine  1.49 (A)  1.30 (A)   Sodium  137  138   Potassium  4.4  4.3   Chloride  101  102   Calcium  8.8  9.7   Albumin    4.0 "   Total Bilirubin    0.2   Alkaline Phosphatase    94   AST (SGOT)    28   ALT (SGPT)    27   WBC 6.81  7.67    Hemoglobin 12.6 (A)  13.5    Hematocrit 39.8  41.5    Platelets 267  308    (A) Abnormal value                      Assessment and Plan    Diagnoses and all orders for this visit:    1. Type 2 diabetes mellitus with hyperglycemia, with long-term current use of insulin (HCC) (Primary)  -     CBC & Differential  -     Comprehensive Metabolic Panel  -     C-Peptide  -     Glutamic Acid Decarboxylase  -     ZNT8 Antibodies  -     TSH  -     Hemoglobin A1c    2. Mixed hyperlipidemia    3. Primary hypertension    4. Diabetic polyneuropathy associated with type 2 diabetes mellitus (HCC)    Other orders  -     Glucagon (Baqsimi One Pack) 3 MG/DOSE powder; 1 each into the nostril(s) as directed by provider As Needed (Hypoglycemia). Apply intranasal if hypoglycemia  Dispense: 2 each; Refill: 11  -     Continuous Blood Gluc Transmit (Dexcom G6 Transmitter) misc; 1 each Every 3 (Three) Months.  Dispense: 1 each; Refill: 3  -     Continuous Blood Gluc Sensor (Dexcom G6 Sensor); 1 each As Needed (glucose control). Every 10 days  Dispense: 9 each; Refill: 3  -     Continuous Blood Gluc  (Dexcom G6 ) device; 1 each 1 (One) Time for 1 dose.  Dispense: 1 each; Refill: 1                 Glycemic Management:      Diabetes mellitus type 2       Last HgbA1c 8.2% from Nov. 2022     Taking jardiance 25 mg daily     Taking Tresiba 55 units once daily       Taking Novolog 35 units each meal     + sliding scale             Goals for sugar    Fasting and before meals 80 to 130    2 hours after meals 180 or less      Aim for 60 grams of carbohydrate per meal    Aim for 15 grams of carbohydrate per snack       Send for dexcom G6       The patient has diabetes mellitus, insulin-dependent.     Our Diabetes Department has evaluated the patient in the last six months and will continue counseling on insulin adjustment.       The patient performs blood glucose testing at least four times daily with proven glucose variability from 50 to 300 mg per dl.     The patient is administering basal insulin and prandial insulin four times per day for more than six months.     The patient uses a home blood glucose monitor to assess blood glucose at least four times daily for more than six months.     The patient requires frequent adjustment of insulin treatment regimen based on blood glucose readings.     The patient has frequent variability in blood glucose readings due to activity and variability in meal content and time.      The patient has completed a diabetes education program with us.     The patient has demonstrated the ability to self-monitor her glucose.      The patient is motivated in improving diabetes control      The patient has hypoglycemia unawareness       Microvascular Complications Monitoring           Last eye exam==DR, sees eye doctor every 3 months , gets injections     Neuropathy --yes     Taking Cymbalta       No renal disease     Ulcer on big toe right foot , following with wound care       Lipid Management:       Taking lipitor 80 mg at night     Blood Pressure Management:    Taking lisinopril 2.5 mg daily     Taking metoprolol xl 50 mg daily     Taking aldactone 25 mg one daily         Thyroid Health    Not on thyroid medication          Preventive Care:       Non smoker       Records from Dr. Hogue received and reviewed from 2022   Thank you for this consultation             Follow Up   Return in about 6 weeks (around 4/10/2023) for Recheck.  Patient was given instructions and counseling regarding his condition or for health maintenance advice. Please see specific information pulled into the AVS if appropriate.         This document has been electronically signed by ANDRES Lanza on February 27, 2023 16:38 CST.

## 2023-03-01 LAB — C PEPTIDE SERPL-MCNC: <0.1 NG/ML (ref 1.1–4.4)

## 2023-03-02 ENCOUNTER — OFFICE VISIT (OUTPATIENT)
Dept: WOUND CARE | Facility: HOSPITAL | Age: 58
End: 2023-03-02
Payer: COMMERCIAL

## 2023-03-02 DIAGNOSIS — I73.9 PERIPHERAL VASCULAR DISEASE, UNSPECIFIED: ICD-10-CM

## 2023-03-02 DIAGNOSIS — L97.512 NON-PRESSURE CHRONIC ULCER OF OTHER PART OF RIGHT FOOT WITH FAT LAYER EXPOSED: ICD-10-CM

## 2023-03-02 DIAGNOSIS — E11.52 TYPE 2 DIABETES MELLITUS WITH DIABETIC PERIPHERAL ANGIOPATHY WITH GANGRENE, UNSPECIFIED WHETHER LONG TERM INSULIN USE: ICD-10-CM

## 2023-03-02 DIAGNOSIS — E11.621 TYPE 2 DIABETES MELLITUS WITH FOOT ULCER, UNSPECIFIED WHETHER LONG TERM INSULIN USE: ICD-10-CM

## 2023-03-02 DIAGNOSIS — L97.509 TYPE 2 DIABETES MELLITUS WITH FOOT ULCER, UNSPECIFIED WHETHER LONG TERM INSULIN USE: ICD-10-CM

## 2023-03-02 PROCEDURE — 11042 DBRDMT SUBQ TIS 1ST 20SQCM/<: CPT | Performed by: PODIATRIST

## 2023-03-06 LAB — GAD65 AB SER IA-ACNC: <5 U/ML (ref 0–5)

## 2023-03-08 LAB — ZNT8 AB SERPL IA-ACNC: <15 U/ML

## 2023-03-09 ENCOUNTER — OFFICE VISIT (OUTPATIENT)
Dept: WOUND CARE | Facility: HOSPITAL | Age: 58
End: 2023-03-09
Payer: COMMERCIAL

## 2023-03-09 DIAGNOSIS — L97.512 NON-PRESSURE CHRONIC ULCER OF OTHER PART OF RIGHT FOOT WITH FAT LAYER EXPOSED: ICD-10-CM

## 2023-03-09 DIAGNOSIS — I73.9 PERIPHERAL VASCULAR DISEASE, UNSPECIFIED: ICD-10-CM

## 2023-03-09 DIAGNOSIS — E11.52 TYPE 2 DIABETES MELLITUS WITH DIABETIC PERIPHERAL ANGIOPATHY WITH GANGRENE, UNSPECIFIED WHETHER LONG TERM INSULIN USE: ICD-10-CM

## 2023-03-09 DIAGNOSIS — L97.509 TYPE 2 DIABETES MELLITUS WITH FOOT ULCER, UNSPECIFIED WHETHER LONG TERM INSULIN USE: ICD-10-CM

## 2023-03-09 DIAGNOSIS — E11.621 TYPE 2 DIABETES MELLITUS WITH FOOT ULCER, UNSPECIFIED WHETHER LONG TERM INSULIN USE: ICD-10-CM

## 2023-03-09 PROCEDURE — 11042 DBRDMT SUBQ TIS 1ST 20SQCM/<: CPT | Performed by: PODIATRIST

## 2023-03-09 RX ORDER — LEVOTHYROXINE SODIUM 0.03 MG/1
25 TABLET ORAL
Qty: 30 TABLET | Refills: 11 | Status: SHIPPED | OUTPATIENT
Start: 2023-03-09

## 2023-03-16 ENCOUNTER — DOCUMENTATION (OUTPATIENT)
Dept: ENDOCRINOLOGY | Facility: CLINIC | Age: 58
End: 2023-03-16
Payer: COMMERCIAL

## 2023-03-16 ENCOUNTER — OFFICE VISIT (OUTPATIENT)
Dept: WOUND CARE | Facility: HOSPITAL | Age: 58
End: 2023-03-16
Payer: COMMERCIAL

## 2023-03-16 DIAGNOSIS — L97.512 NON-PRESSURE CHRONIC ULCER OF OTHER PART OF RIGHT FOOT WITH FAT LAYER EXPOSED: ICD-10-CM

## 2023-03-16 DIAGNOSIS — E11.52 TYPE 2 DIABETES MELLITUS WITH DIABETIC PERIPHERAL ANGIOPATHY WITH GANGRENE, UNSPECIFIED WHETHER LONG TERM INSULIN USE: ICD-10-CM

## 2023-03-16 DIAGNOSIS — L97.509 TYPE 2 DIABETES MELLITUS WITH FOOT ULCER, UNSPECIFIED WHETHER LONG TERM INSULIN USE: ICD-10-CM

## 2023-03-16 DIAGNOSIS — I73.9 PERIPHERAL VASCULAR DISEASE, UNSPECIFIED: ICD-10-CM

## 2023-03-16 DIAGNOSIS — E11.621 TYPE 2 DIABETES MELLITUS WITH FOOT ULCER, UNSPECIFIED WHETHER LONG TERM INSULIN USE: ICD-10-CM

## 2023-03-23 ENCOUNTER — OFFICE VISIT (OUTPATIENT)
Dept: WOUND CARE | Facility: HOSPITAL | Age: 58
End: 2023-03-23
Payer: COMMERCIAL

## 2023-03-23 DIAGNOSIS — E11.621 TYPE 2 DIABETES MELLITUS WITH FOOT ULCER, UNSPECIFIED WHETHER LONG TERM INSULIN USE: ICD-10-CM

## 2023-03-23 DIAGNOSIS — E11.52 TYPE 2 DIABETES MELLITUS WITH DIABETIC PERIPHERAL ANGIOPATHY WITH GANGRENE, UNSPECIFIED WHETHER LONG TERM INSULIN USE: ICD-10-CM

## 2023-03-23 DIAGNOSIS — I73.9 PERIPHERAL VASCULAR DISEASE, UNSPECIFIED: ICD-10-CM

## 2023-03-23 DIAGNOSIS — L97.512 NON-PRESSURE CHRONIC ULCER OF OTHER PART OF RIGHT FOOT WITH FAT LAYER EXPOSED: ICD-10-CM

## 2023-03-23 DIAGNOSIS — L97.509 TYPE 2 DIABETES MELLITUS WITH FOOT ULCER, UNSPECIFIED WHETHER LONG TERM INSULIN USE: ICD-10-CM

## 2023-03-23 PROCEDURE — G0463 HOSPITAL OUTPT CLINIC VISIT: HCPCS

## 2023-03-30 ENCOUNTER — OFFICE VISIT (OUTPATIENT)
Dept: WOUND CARE | Facility: HOSPITAL | Age: 58
End: 2023-03-30
Payer: COMMERCIAL

## 2023-03-30 ENCOUNTER — LAB REQUISITION (OUTPATIENT)
Dept: LAB | Facility: HOSPITAL | Age: 58
End: 2023-03-30
Payer: COMMERCIAL

## 2023-03-30 DIAGNOSIS — Z00.00 ENCOUNTER FOR GENERAL ADULT MEDICAL EXAMINATION WITHOUT ABNORMAL FINDINGS: ICD-10-CM

## 2023-03-30 DIAGNOSIS — L97.509 TYPE 2 DIABETES MELLITUS WITH FOOT ULCER, UNSPECIFIED WHETHER LONG TERM INSULIN USE: ICD-10-CM

## 2023-03-30 DIAGNOSIS — I73.9 PERIPHERAL VASCULAR DISEASE, UNSPECIFIED: ICD-10-CM

## 2023-03-30 DIAGNOSIS — L97.516 NON-PRESSURE CHRONIC ULCER OF OTHER PART OF RIGHT FOOT WITH BONE INVOLVEMENT WITHOUT EVIDENCE OF NECROSIS: ICD-10-CM

## 2023-03-30 DIAGNOSIS — E11.621 TYPE 2 DIABETES MELLITUS WITH FOOT ULCER, UNSPECIFIED WHETHER LONG TERM INSULIN USE: ICD-10-CM

## 2023-03-30 DIAGNOSIS — E11.52 TYPE 2 DIABETES MELLITUS WITH DIABETIC PERIPHERAL ANGIOPATHY WITH GANGRENE, UNSPECIFIED WHETHER LONG TERM INSULIN USE: ICD-10-CM

## 2023-03-30 PROCEDURE — 87205 SMEAR GRAM STAIN: CPT | Performed by: PODIATRIST

## 2023-03-30 PROCEDURE — 87176 TISSUE HOMOGENIZATION CULTR: CPT | Performed by: PODIATRIST

## 2023-03-30 PROCEDURE — 87186 SC STD MICRODIL/AGAR DIL: CPT | Performed by: PODIATRIST

## 2023-03-30 PROCEDURE — 87076 CULTURE ANAEROBE IDENT EACH: CPT | Performed by: PODIATRIST

## 2023-03-30 PROCEDURE — 87075 CULTR BACTERIA EXCEPT BLOOD: CPT | Performed by: PODIATRIST

## 2023-03-30 PROCEDURE — G0463 HOSPITAL OUTPT CLINIC VISIT: HCPCS

## 2023-03-30 PROCEDURE — 87070 CULTURE OTHR SPECIMN AEROBIC: CPT | Performed by: PODIATRIST

## 2023-04-04 ENCOUNTER — TRANSCRIBE ORDERS (OUTPATIENT)
Dept: ADMINISTRATIVE | Facility: HOSPITAL | Age: 58
End: 2023-04-04
Payer: COMMERCIAL

## 2023-04-04 DIAGNOSIS — M86.9 OSTEOMYELITIS OF RIGHT FOOT, UNSPECIFIED TYPE: Primary | ICD-10-CM

## 2023-04-04 LAB
BACTERIA SPEC AEROBE CULT: ABNORMAL
GRAM STN SPEC: ABNORMAL

## 2023-04-05 LAB — BACTERIA SPEC ANAEROBE CULT: NORMAL

## 2023-04-06 ENCOUNTER — OFFICE VISIT (OUTPATIENT)
Dept: WOUND CARE | Facility: HOSPITAL | Age: 58
End: 2023-04-06
Payer: COMMERCIAL

## 2023-04-06 DIAGNOSIS — E11.52 TYPE 2 DIABETES MELLITUS WITH DIABETIC PERIPHERAL ANGIOPATHY WITH GANGRENE, UNSPECIFIED WHETHER LONG TERM INSULIN USE: ICD-10-CM

## 2023-04-06 DIAGNOSIS — E11.621 TYPE 2 DIABETES MELLITUS WITH FOOT ULCER, UNSPECIFIED WHETHER LONG TERM INSULIN USE: ICD-10-CM

## 2023-04-06 DIAGNOSIS — I73.9 PERIPHERAL VASCULAR DISEASE, UNSPECIFIED: ICD-10-CM

## 2023-04-06 DIAGNOSIS — L97.509 TYPE 2 DIABETES MELLITUS WITH FOOT ULCER, UNSPECIFIED WHETHER LONG TERM INSULIN USE: ICD-10-CM

## 2023-04-06 DIAGNOSIS — M86.371 CHRONIC MULTIFOCAL OSTEOMYELITIS, RIGHT ANKLE AND FOOT: Primary | ICD-10-CM

## 2023-04-06 DIAGNOSIS — L97.516 NON-PRESSURE CHRONIC ULCER OF OTHER PART OF RIGHT FOOT WITH BONE INVOLVEMENT WITHOUT EVIDENCE OF NECROSIS: ICD-10-CM

## 2023-04-06 DIAGNOSIS — A49.9 BACTERIAL INFECTION, UNSPECIFIED: ICD-10-CM

## 2023-04-06 PROCEDURE — 88311 DECALCIFY TISSUE: CPT | Performed by: PODIATRIST

## 2023-04-06 PROCEDURE — 88307 TISSUE EXAM BY PATHOLOGIST: CPT | Performed by: PODIATRIST

## 2023-04-06 PROCEDURE — G0463 HOSPITAL OUTPT CLINIC VISIT: HCPCS

## 2023-04-10 LAB
CYTO UR: NORMAL
LAB AP CASE REPORT: NORMAL
Lab: NORMAL
PATH REPORT.FINAL DX SPEC: NORMAL
PATH REPORT.GROSS SPEC: NORMAL

## 2023-04-11 ENCOUNTER — HOSPITAL ENCOUNTER (OUTPATIENT)
Dept: MRI IMAGING | Facility: HOSPITAL | Age: 58
Discharge: HOME | End: 2023-04-11
Admitting: PODIATRIST
Payer: COMMERCIAL

## 2023-04-11 DIAGNOSIS — M86.9 OSTEOMYELITIS OF RIGHT FOOT, UNSPECIFIED TYPE: ICD-10-CM

## 2023-04-11 PROCEDURE — 73718 MRI LOWER EXTREMITY W/O DYE: CPT

## 2023-04-13 ENCOUNTER — OFFICE VISIT (OUTPATIENT)
Dept: WOUND CARE | Facility: HOSPITAL | Age: 58
End: 2023-04-13
Payer: COMMERCIAL

## 2023-04-13 PROCEDURE — G0463 HOSPITAL OUTPT CLINIC VISIT: HCPCS

## 2023-04-17 ENCOUNTER — PREP FOR SURGERY (OUTPATIENT)
Dept: OTHER | Facility: HOSPITAL | Age: 58
End: 2023-04-17
Payer: COMMERCIAL

## 2023-04-17 ENCOUNTER — OFFICE VISIT (OUTPATIENT)
Dept: WOUND CARE | Facility: HOSPITAL | Age: 58
End: 2023-04-17
Payer: COMMERCIAL

## 2023-04-17 DIAGNOSIS — M86.171 ACUTE OSTEOMYELITIS OF TOE, RIGHT: Primary | ICD-10-CM

## 2023-04-17 PROCEDURE — G0463 HOSPITAL OUTPT CLINIC VISIT: HCPCS

## 2023-04-17 NOTE — H&P
[facility] - PODIATRY    Today's Date: 04/17/2023     Patient Name: Luis Echeverria  MRN: [id number]  CSN: [id number]  PCP: Rene Hogue DO  Referring Provider: No ref. provider found    SUBJECTIVE   CC: Necrotic right hallux    HPI: Luis Echeverria, a 57 y.o.male, a(n) established patient complaining of Nonhealing wound to the right hallux. Patient has h/o CAD, CHF, DM 2, HLD, MI, PVD. Patient is NIDDM and unsure of last BG level.  Patient has had a chronic wound to the right hallux that has worsened over the past few months.  He was initially found to have arterial disease and underwent an angiogram with Dr. Wild.  Even with vascular intervention, he has continued to have worsening of the wound.  He has been given conservative versus surgical options and wishes to proceed with amputation of the toe. Denies pain. Relates previous treatment(s) including Local wound care, antibiotics, angiogram. Denies any constitutional symptoms. No other pedal complaints at this time.    Past Medical History:   Diagnosis Date   • CAD (coronary artery disease)    • CAD in native artery     2V STEMI 7/13 STENT TO CX AND STENTX2 TO MID AND DISTAL LAD   • Chronic systolic CHF (congestive heart failure), NYHA class 2 02/04/2020   • Diabetes mellitus    • History of coronary artery stent placement      x 3 - for ACUTE MI 7/2013   • Hyperlipidemia    • Hyperlipidemia, mild    • Myocardial infarct    • Myocardial infarction    • Stented coronary artery      Past Surgical History:   Procedure Laterality Date   • AORTAGRAM Right 12/23/2022    Procedure: AORTAGRAM, RIGHT LOWER EXTREMITY ANGIOGRAM, BALLOON ANGIOPLASTY, MYNX CLOSURE;  Surgeon: Sammy Wild MD;  Location: [facility] HYBRID OR 12;  Service: Vascular;  Laterality: Right;   • CARDIAC CATHETERIZATION N/A 11/06/2018    Procedure: Left Heart Cath;  Surgeon: Tru Rai MD;  Location: [facility] CATH INVASIVE LOCATION;  Service: Cardiology    • CARDIAC ELECTROPHYSIOLOGY PROCEDURE N/A 05/01/2019    Procedure: ICD new;  Surgeon: Tru Rai MD;  Location:  [facility] CATH INVASIVE LOCATION;  Service: Cardiology   • CAROTID STENT       Family History   Problem Relation Age of Onset   • Heart disease Maternal Grandmother    • No Known Problems Mother    • No Known Problems Father      Social History     Socioeconomic History   • Marital status:    Tobacco Use   • Smoking status: Never   • Smokeless tobacco: Never   Vaping Use   • Vaping Use: Never used   Substance and Sexual Activity   • Alcohol use: Yes     Comment: 2 TIMES YEARLY   • Drug use: No   • Sexual activity: Defer     No Known Allergies  Current Outpatient Medications   Medication Sig Dispense Refill   • albuterol sulfate  (90 Base) MCG/ACT inhaler Inhale 2 puffs Every 4 (Four) Hours As Needed for Wheezing or Shortness of Air. 8.5 g 5   • aspirin 81 MG EC tablet Take 1 tablet by mouth Daily.     • atorvastatin (LIPITOR) 80 MG tablet TAKE 1 TABLET BY MOUTH EVERY DAY AT NIGHT 90 tablet 3   • brimonidine (ALPHAGAN) 0.2 % ophthalmic solution Administer 1 drop to both eyes 2 (Two) Times a Day.     • Continuous Blood Gluc Sensor (Dexcom G6 Sensor) 1 each As Needed (glucose control). Every 10 days 9 each 3   • Continuous Blood Gluc Transmit (Dexcom G6 Transmitter) misc 1 each Every 3 (Three) Months. 1 each 3   • DULoxetine (CYMBALTA) 20 MG capsule Take 30 mg by mouth Daily.     • empagliflozin (Jardiance) 25 MG tablet tablet Take 1 tablet by mouth Daily. 90 tablet 11   • ezetimibe (ZETIA) 10 MG tablet Take 1 tablet by mouth Daily. 90 tablet 3   • Glucagon (Baqsimi One Pack) 3 MG/DOSE powder 1 each into the nostril(s) as directed by provider As Needed (Hypoglycemia). Apply intranasal if hypoglycemia 2 each 11   • insulin aspart (novoLOG FLEXPEN) 100 UNIT/ML solution pen-injector sc pen Inject 39 Units under the skin into the appropriate area as directed 4 (Four) Times a Day Before  Meals & at Bedtime.     • insulin degludec (TRESIBA FLEXTOUCH) 100 UNIT/ML solution pen-injector injection Inject 55 Units under the skin into the appropriate area as directed Every Morning.     • levothyroxine (SYNTHROID, LEVOTHROID) 25 MCG tablet Take 1 tablet by mouth Every Morning. 30 tablet 11   • lisinopril (PRINIVIL,ZESTRIL) 2.5 MG tablet TAKE 1 TABLET BY MOUTH EVERY DAY 90 tablet 3   • metoprolol succinate XL (TOPROL-XL) 50 MG 24 hr tablet Take 1.5 tablets by mouth Daily. 135 tablet 11   • nitroglycerin (NITROSTAT) 0.4 MG SL tablet Place 1 tablet under the tongue Every 5 (Five) Minutes As Needed for Chest Pain. Take no more than 3 doses in 15 minutes. 25 tablet 1   • Rivaroxaban (Xarelto) 2.5 MG tablet Take 1 tablet by mouth 2 (Two) Times a Day. 180 tablet 3   • spironolactone (ALDACTONE) 25 MG tablet TAKE 1 TABLET BY MOUTH EVERY DAY 90 tablet 3     No current facility-administered medications for this visit.     Review of Systems   Constitutional: Negative for chills and fever.   HENT: Negative for congestion.    Respiratory: Negative for shortness of breath.    Cardiovascular: Positive for leg swelling. Negative for chest pain.   Gastrointestinal: Negative for constipation, diarrhea, nausea and vomiting.   Musculoskeletal:        Foot pain   Skin: Positive for color change and wound.   Neurological: Positive for numbness.   Psychiatric/Behavioral: Negative for agitation.       OBJECTIVE   There were no vitals filed for this visit.    PHYSICAL EXAM  GEN:   Accompanied by wife.     Physical Exam  Vitals reviewed.   Constitutional:       Appearance: Normal appearance. He is obese.   HENT:      Head: Normocephalic and atraumatic.      Right Ear: Tympanic membrane normal.      Left Ear: Tympanic membrane normal.      Nose: Nose normal.      Mouth/Throat:      Pharynx: Oropharynx is clear.   Eyes:      Extraocular Movements: Extraocular movements intact.      Pupils: Pupils are equal, round, and reactive to  light.   Cardiovascular:      Rate and Rhythm: Normal rate and regular rhythm.      Pulses:           Dorsalis pedis pulses are 1+ on the right side and 1+ on the left side.        Posterior tibial pulses are 1+ on the right side and 1+ on the left side.      Heart sounds: Normal heart sounds.   Pulmonary:      Effort: Pulmonary effort is normal.      Breath sounds: Normal breath sounds.   Abdominal:      General: Bowel sounds are normal.   Musculoskeletal:      Cervical back: Normal range of motion and neck supple.   Feet:      Right foot:      Skin integrity: Ulcer, erythema and warmth present.      Left foot:      Skin integrity: Warmth present.   Neurological:      General: No focal deficit present.      Mental Status: He is alert and oriented to person, place, and time.   Psychiatric:         Mood and Affect: Mood normal.         Behavior: Behavior normal.         Thought Content: Thought content normal.         Judgment: Judgment normal.         Foot/Ankle Exam    GENERAL  Appearance:  appears stated age and obese  Orientation:  AAOx3  Affect:  appropriate  Gait:  unimpaired  Assistance:  independent  Right shoe gear: surgical shoe  Left shoe gear: casual shoe    VASCULAR     Right Foot Vascularity   Dorsalis pedis:  1+  Posterior tibial:  1+  Skin temperature:  warm  Edema grading:  Trace  CFT:  4  Pedal hair growth:  Present  Varicosities:  none     Left Foot Vascularity   Dorsalis pedis:  1+  Posterior tibial:  1+  Skin temperature:  warm  Edema grading:  None  CFT:  3  Pedal hair growth:  Present  Varicosities:  none     NEUROLOGIC     Right Foot Neurologic   Light touch sensation: diminished  Vibratory sensation: diminished  Hot/Cold sensation: diminished     Left Foot Neurologic   Light touch sensation: diminished  Vibratory sensation: diminished  Hot/Cold sensation:  diminished    MUSCULOSKELETAL     Right Foot Musculoskeletal   Tenderness:  toe 1 tenderness    Arch:  Normal     Left Foot  Musculoskeletal   Tenderness:  none  Arch:  Normal    MUSCLE STRENGTH     Right Foot Muscle Strength   Foot dorsiflexion:  5  Foot plantar flexion:  5  Foot inversion:  5  Foot eversion:  5     Left Foot Muscle Strength   Foot dorsiflexion:  5  Foot plantar flexion:  5  Foot inversion:  5  Foot eversion:  5    RANGE OF MOTION     Right Foot Range of Motion   Foot and ankle ROM within normal limits       Left Foot Range of Motion   Foot and ankle ROM within normal limits      DERMATOLOGIC      Right Foot Dermatologic   Skin  Positive for erythema, skin changes, ulcer and gangrene. Negative for cellulitis.      Left Foot Dermatologic   Skin  Left foot skin is intact.      Right foot additional comments: Ulceration necrosis to right distal medial hallux.  Continued eschar with exposed and palpable bone.  No purulence or malodor.  Mild periwound erythema.      RADIOLOGY/NUCLEAR:      LABORATORY/CULTURE RESULTS:      PATHOLOGY RESULTS:       ASSESSMENT/PLAN     Diagnoses and all orders for this visit:    1. Acute osteomyelitis of toe, right (Primary)  -     Case Request; Standing  -     Instructions on coughing, deep breathing, and incentive spirometry.; Future  -     CBC & Differential; Future  -     Basic Metabolic Panel; Future  -     ECG 12 Lead; Future  -     XR chest 2 vw; Future  -     ceFAZolin 2 gm IVPB in 100 mL NS (MBP)  -     Case Request    Other orders  -     Follow Anesthesia Guidelines / Protocol; Future  -     Obtain Informed Consent; Future  -     Follow Anesthesia Guidelines / Protocol; Standing  -     Provide Instructions to Patient Regarding NPO Status; Future  -     Chlorhexidine Skin Prep - Educate and Review With Patient; Future  -     Verify NPO Status; Standing  -     Obtain Informed Consent (If Not Done Inpatient or PAT); Standing  -     Instructions on coughing, deep breathing, and incentive spirometry.; Standing  -     Notify Physician - Standard; Standing  -     Provide NPO Instructions to  Patient      Comprehensive lower extremity examination and evaluation was performed.  Discussed findings and treatment plan including risks, benefits, and treatment options with patient in detail. Patient agreed with treatment plan.  Continue wound care orders.     Patient was given options of continued conservative therapy versus surgical intervention. Patient wishes to proceed with surgery.  Patient will be scheduled for partial versus complete amputation of the right hallux.  All options, benefits, and risks associate with surgery have been discussed with the patient including but not limited to: Standard risk of anesthesia, pain, bleeding, infection, nonhealing/dehiscence, loss of limb or life.  Pre and postoperative course were discussed in detail including minimum 1 week nonweightbearing status postoperatively.  No guarantees were inferred.  Continue antibiotics as prescribed.    All questions were answered to patient/family satisfaction. Should symptoms fail to improve or worsen they agree to call or return to clinic or to go to the Emergency Department. Discussed the importance of following up with any needed screening tests/labs/specialist appointments and any requested follow-up recommended by me today. Importance of maintaining follow-up discussed and patient accepts that missed appointments can delay diagnosis and potentially lead to worsening of conditions.  No follow-ups on file., or sooner if acute issues arise.    Lab Frequency Next Occurrence   Follow Anesthesia Guidelines / Protocol Once 12/18/2022   Obtain Informed Consent Once 12/18/2022   Provide NPO Instructions to Patient Once 12/18/2022   Chlorhexidine Skin Prep Once 12/18/2022   US Ankle / Brachial Indices Extremity Complete Once 04/25/2023   US Carotid Bilateral Once 04/25/2023   Follow Anesthesia Guidelines / Protocol Once 04/22/2023   Obtain Informed Consent Once 04/22/2023   Provide Instructions to Patient Regarding NPO Status Once  04/22/2023   Chlorhexidine Skin Prep - Educate and Review With Patient Once 04/22/2023   Instructions on coughing, deep breathing, and incentive spirometry. Once 04/22/2023   CBC & Differential Once 04/22/2023   Basic Metabolic Panel Once 04/22/2023   ECG 12 Lead Once 04/22/2023   XR chest 2 vw Once 04/22/2023       This document has been electronically signed by Simone Neives DPM on April 17, 2023 18:00 CDT

## 2023-04-17 NOTE — H&P (VIEW-ONLY)
[facility] - PODIATRY    Today's Date: 04/17/2023     Patient Name: Luis Echeverria  MRN: [id number]  CSN: [id number]  PCP: Rene Hogue DO  Referring Provider: No ref. provider found    SUBJECTIVE   CC: Necrotic right hallux    HPI: Luis Echeverria, a 57 y.o.male, a(n) established patient complaining of Nonhealing wound to the right hallux. Patient has h/o CAD, CHF, DM 2, HLD, MI, PVD. Patient is NIDDM and unsure of last BG level.  Patient has had a chronic wound to the right hallux that has worsened over the past few months.  He was initially found to have arterial disease and underwent an angiogram with Dr. Wild.  Even with vascular intervention, he has continued to have worsening of the wound.  He has been given conservative versus surgical options and wishes to proceed with amputation of the toe. Denies pain. Relates previous treatment(s) including Local wound care, antibiotics, angiogram. Denies any constitutional symptoms. No other pedal complaints at this time.    Past Medical History:   Diagnosis Date   • CAD (coronary artery disease)    • CAD in native artery     2V STEMI 7/13 STENT TO CX AND STENTX2 TO MID AND DISTAL LAD   • Chronic systolic CHF (congestive heart failure), NYHA class 2 02/04/2020   • Diabetes mellitus    • History of coronary artery stent placement      x 3 - for ACUTE MI 7/2013   • Hyperlipidemia    • Hyperlipidemia, mild    • Myocardial infarct    • Myocardial infarction    • Stented coronary artery      Past Surgical History:   Procedure Laterality Date   • AORTAGRAM Right 12/23/2022    Procedure: AORTAGRAM, RIGHT LOWER EXTREMITY ANGIOGRAM, BALLOON ANGIOPLASTY, MYNX CLOSURE;  Surgeon: Sammy Wild MD;  Location: [facility] HYBRID OR 12;  Service: Vascular;  Laterality: Right;   • CARDIAC CATHETERIZATION N/A 11/06/2018    Procedure: Left Heart Cath;  Surgeon: Tru Rai MD;  Location: [facility] CATH INVASIVE LOCATION;  Service: Cardiology    • CARDIAC ELECTROPHYSIOLOGY PROCEDURE N/A 05/01/2019    Procedure: ICD new;  Surgeon: Tru Rai MD;  Location:  [facility] CATH INVASIVE LOCATION;  Service: Cardiology   • CAROTID STENT       Family History   Problem Relation Age of Onset   • Heart disease Maternal Grandmother    • No Known Problems Mother    • No Known Problems Father      Social History     Socioeconomic History   • Marital status:    Tobacco Use   • Smoking status: Never   • Smokeless tobacco: Never   Vaping Use   • Vaping Use: Never used   Substance and Sexual Activity   • Alcohol use: Yes     Comment: 2 TIMES YEARLY   • Drug use: No   • Sexual activity: Defer     No Known Allergies  Current Outpatient Medications   Medication Sig Dispense Refill   • albuterol sulfate  (90 Base) MCG/ACT inhaler Inhale 2 puffs Every 4 (Four) Hours As Needed for Wheezing or Shortness of Air. 8.5 g 5   • aspirin 81 MG EC tablet Take 1 tablet by mouth Daily.     • atorvastatin (LIPITOR) 80 MG tablet TAKE 1 TABLET BY MOUTH EVERY DAY AT NIGHT 90 tablet 3   • brimonidine (ALPHAGAN) 0.2 % ophthalmic solution Administer 1 drop to both eyes 2 (Two) Times a Day.     • Continuous Blood Gluc Sensor (Dexcom G6 Sensor) 1 each As Needed (glucose control). Every 10 days 9 each 3   • Continuous Blood Gluc Transmit (Dexcom G6 Transmitter) misc 1 each Every 3 (Three) Months. 1 each 3   • DULoxetine (CYMBALTA) 20 MG capsule Take 30 mg by mouth Daily.     • empagliflozin (Jardiance) 25 MG tablet tablet Take 1 tablet by mouth Daily. 90 tablet 11   • ezetimibe (ZETIA) 10 MG tablet Take 1 tablet by mouth Daily. 90 tablet 3   • Glucagon (Baqsimi One Pack) 3 MG/DOSE powder 1 each into the nostril(s) as directed by provider As Needed (Hypoglycemia). Apply intranasal if hypoglycemia 2 each 11   • insulin aspart (novoLOG FLEXPEN) 100 UNIT/ML solution pen-injector sc pen Inject 39 Units under the skin into the appropriate area as directed 4 (Four) Times a Day Before  Meals & at Bedtime.     • insulin degludec (TRESIBA FLEXTOUCH) 100 UNIT/ML solution pen-injector injection Inject 55 Units under the skin into the appropriate area as directed Every Morning.     • levothyroxine (SYNTHROID, LEVOTHROID) 25 MCG tablet Take 1 tablet by mouth Every Morning. 30 tablet 11   • lisinopril (PRINIVIL,ZESTRIL) 2.5 MG tablet TAKE 1 TABLET BY MOUTH EVERY DAY 90 tablet 3   • metoprolol succinate XL (TOPROL-XL) 50 MG 24 hr tablet Take 1.5 tablets by mouth Daily. 135 tablet 11   • nitroglycerin (NITROSTAT) 0.4 MG SL tablet Place 1 tablet under the tongue Every 5 (Five) Minutes As Needed for Chest Pain. Take no more than 3 doses in 15 minutes. 25 tablet 1   • Rivaroxaban (Xarelto) 2.5 MG tablet Take 1 tablet by mouth 2 (Two) Times a Day. 180 tablet 3   • spironolactone (ALDACTONE) 25 MG tablet TAKE 1 TABLET BY MOUTH EVERY DAY 90 tablet 3     No current facility-administered medications for this visit.     Review of Systems   Constitutional: Negative for chills and fever.   HENT: Negative for congestion.    Respiratory: Negative for shortness of breath.    Cardiovascular: Positive for leg swelling. Negative for chest pain.   Gastrointestinal: Negative for constipation, diarrhea, nausea and vomiting.   Musculoskeletal:        Foot pain   Skin: Positive for color change and wound.   Neurological: Positive for numbness.   Psychiatric/Behavioral: Negative for agitation.       OBJECTIVE   There were no vitals filed for this visit.    PHYSICAL EXAM  GEN:   Accompanied by wife.     Physical Exam  Vitals reviewed.   Constitutional:       Appearance: Normal appearance. He is obese.   HENT:      Head: Normocephalic and atraumatic.      Right Ear: Tympanic membrane normal.      Left Ear: Tympanic membrane normal.      Nose: Nose normal.      Mouth/Throat:      Pharynx: Oropharynx is clear.   Eyes:      Extraocular Movements: Extraocular movements intact.      Pupils: Pupils are equal, round, and reactive to  light.   Cardiovascular:      Rate and Rhythm: Normal rate and regular rhythm.      Pulses:           Dorsalis pedis pulses are 1+ on the right side and 1+ on the left side.        Posterior tibial pulses are 1+ on the right side and 1+ on the left side.      Heart sounds: Normal heart sounds.   Pulmonary:      Effort: Pulmonary effort is normal.      Breath sounds: Normal breath sounds.   Abdominal:      General: Bowel sounds are normal.   Musculoskeletal:      Cervical back: Normal range of motion and neck supple.   Feet:      Right foot:      Skin integrity: Ulcer, erythema and warmth present.      Left foot:      Skin integrity: Warmth present.   Neurological:      General: No focal deficit present.      Mental Status: He is alert and oriented to person, place, and time.   Psychiatric:         Mood and Affect: Mood normal.         Behavior: Behavior normal.         Thought Content: Thought content normal.         Judgment: Judgment normal.         Foot/Ankle Exam    GENERAL  Appearance:  appears stated age and obese  Orientation:  AAOx3  Affect:  appropriate  Gait:  unimpaired  Assistance:  independent  Right shoe gear: surgical shoe  Left shoe gear: casual shoe    VASCULAR     Right Foot Vascularity   Dorsalis pedis:  1+  Posterior tibial:  1+  Skin temperature:  warm  Edema grading:  Trace  CFT:  4  Pedal hair growth:  Present  Varicosities:  none     Left Foot Vascularity   Dorsalis pedis:  1+  Posterior tibial:  1+  Skin temperature:  warm  Edema grading:  None  CFT:  3  Pedal hair growth:  Present  Varicosities:  none     NEUROLOGIC     Right Foot Neurologic   Light touch sensation: diminished  Vibratory sensation: diminished  Hot/Cold sensation: diminished     Left Foot Neurologic   Light touch sensation: diminished  Vibratory sensation: diminished  Hot/Cold sensation:  diminished    MUSCULOSKELETAL     Right Foot Musculoskeletal   Tenderness:  toe 1 tenderness    Arch:  Normal     Left Foot  Musculoskeletal   Tenderness:  none  Arch:  Normal    MUSCLE STRENGTH     Right Foot Muscle Strength   Foot dorsiflexion:  5  Foot plantar flexion:  5  Foot inversion:  5  Foot eversion:  5     Left Foot Muscle Strength   Foot dorsiflexion:  5  Foot plantar flexion:  5  Foot inversion:  5  Foot eversion:  5    RANGE OF MOTION     Right Foot Range of Motion   Foot and ankle ROM within normal limits       Left Foot Range of Motion   Foot and ankle ROM within normal limits      DERMATOLOGIC      Right Foot Dermatologic   Skin  Positive for erythema, skin changes, ulcer and gangrene. Negative for cellulitis.      Left Foot Dermatologic   Skin  Left foot skin is intact.      Right foot additional comments: Ulceration necrosis to right distal medial hallux.  Continued eschar with exposed and palpable bone.  No purulence or malodor.  Mild periwound erythema.      RADIOLOGY/NUCLEAR:      LABORATORY/CULTURE RESULTS:      PATHOLOGY RESULTS:       ASSESSMENT/PLAN     Diagnoses and all orders for this visit:    1. Acute osteomyelitis of toe, right (Primary)  -     Case Request; Standing  -     Instructions on coughing, deep breathing, and incentive spirometry.; Future  -     CBC & Differential; Future  -     Basic Metabolic Panel; Future  -     ECG 12 Lead; Future  -     XR chest 2 vw; Future  -     ceFAZolin 2 gm IVPB in 100 mL NS (MBP)  -     Case Request    Other orders  -     Follow Anesthesia Guidelines / Protocol; Future  -     Obtain Informed Consent; Future  -     Follow Anesthesia Guidelines / Protocol; Standing  -     Provide Instructions to Patient Regarding NPO Status; Future  -     Chlorhexidine Skin Prep - Educate and Review With Patient; Future  -     Verify NPO Status; Standing  -     Obtain Informed Consent (If Not Done Inpatient or PAT); Standing  -     Instructions on coughing, deep breathing, and incentive spirometry.; Standing  -     Notify Physician - Standard; Standing  -     Provide NPO Instructions to  Patient      Comprehensive lower extremity examination and evaluation was performed.  Discussed findings and treatment plan including risks, benefits, and treatment options with patient in detail. Patient agreed with treatment plan.  Continue wound care orders.     Patient was given options of continued conservative therapy versus surgical intervention. Patient wishes to proceed with surgery.  Patient will be scheduled for partial versus complete amputation of the right hallux.  All options, benefits, and risks associate with surgery have been discussed with the patient including but not limited to: Standard risk of anesthesia, pain, bleeding, infection, nonhealing/dehiscence, loss of limb or life.  Pre and postoperative course were discussed in detail including minimum 1 week nonweightbearing status postoperatively.  No guarantees were inferred.  Continue antibiotics as prescribed.    All questions were answered to patient/family satisfaction. Should symptoms fail to improve or worsen they agree to call or return to clinic or to go to the Emergency Department. Discussed the importance of following up with any needed screening tests/labs/specialist appointments and any requested follow-up recommended by me today. Importance of maintaining follow-up discussed and patient accepts that missed appointments can delay diagnosis and potentially lead to worsening of conditions.  No follow-ups on file., or sooner if acute issues arise.    Lab Frequency Next Occurrence   Follow Anesthesia Guidelines / Protocol Once 12/18/2022   Obtain Informed Consent Once 12/18/2022   Provide NPO Instructions to Patient Once 12/18/2022   Chlorhexidine Skin Prep Once 12/18/2022   US Ankle / Brachial Indices Extremity Complete Once 04/25/2023   US Carotid Bilateral Once 04/25/2023   Follow Anesthesia Guidelines / Protocol Once 04/22/2023   Obtain Informed Consent Once 04/22/2023   Provide Instructions to Patient Regarding NPO Status Once  04/22/2023   Chlorhexidine Skin Prep - Educate and Review With Patient Once 04/22/2023   Instructions on coughing, deep breathing, and incentive spirometry. Once 04/22/2023   CBC & Differential Once 04/22/2023   Basic Metabolic Panel Once 04/22/2023   ECG 12 Lead Once 04/22/2023   XR chest 2 vw Once 04/22/2023       This document has been electronically signed by Simone Nieves DPM on April 17, 2023 18:00 CDT

## 2023-04-18 ENCOUNTER — TELEPHONE (OUTPATIENT)
Dept: PODIATRY | Facility: CLINIC | Age: 58
End: 2023-04-18
Payer: COMMERCIAL

## 2023-04-18 PROBLEM — M86.171 ACUTE OSTEOMYELITIS OF TOE, RIGHT: Status: ACTIVE | Noted: 2023-04-18

## 2023-04-18 NOTE — TELEPHONE ENCOUNTER
Called patient and went over surgery information date time and instruction. Will send a copy on paper to the address in the chart.

## 2023-04-19 ENCOUNTER — TELEPHONE (OUTPATIENT)
Dept: PODIATRY | Facility: CLINIC | Age: 58
End: 2023-04-19
Payer: COMMERCIAL

## 2023-04-19 NOTE — TELEPHONE ENCOUNTER
----- Message from Janis Centeno MA sent at 4/19/2023  1:44 PM CDT -----    ----- Message -----  From: Tru Rai MD  Sent: 4/19/2023  10:58 AM CDT  To: Janis Centeno MA    LOW RISK FOR MACE WITH THE PLANNED PROCEDURE INCLUDING INTERRUPTION OF THE XARELTO BUT NOT THE ASA  ----- Message -----  From: Janis Centeno MA  Sent: 4/19/2023   9:39 AM CDT  To: Tru Rai MD    Please advise with risk assessment for upcoming surgery.   ----- Message -----  From: Candelaria Christiansen  Sent: 4/18/2023   2:24 PM CDT  To: Janis Centeno MA      Type of Surgery: Partial Vs complete amputation of hallux-rt  Date of Procedure: 05/03   Medications to be held: Xarelto                                For: 3 days  Will Lovenox Bridging be needed?  Cardiologist/Provider: Dr. Rai  Requesting Provider: Dr. Simone Nieves/podiatry    Please send information back in a timely manner so the patient can be notified. Thank you.

## 2023-04-20 ENCOUNTER — TELEPHONE (OUTPATIENT)
Dept: ENDOCRINOLOGY | Facility: CLINIC | Age: 58
End: 2023-04-20
Payer: COMMERCIAL

## 2023-04-20 ENCOUNTER — TELEPHONE (OUTPATIENT)
Dept: VASCULAR SURGERY | Facility: CLINIC | Age: 58
End: 2023-04-20
Payer: COMMERCIAL

## 2023-04-20 RX ORDER — INSULIN ASPART 100 [IU]/ML
INJECTION, SOLUTION INTRAVENOUS; SUBCUTANEOUS
Qty: 120 ML | Refills: 3 | Status: SHIPPED | OUTPATIENT
Start: 2023-04-20

## 2023-04-20 NOTE — TELEPHONE ENCOUNTER
Pt is new to having Tandem pump; had training with Keri.    He now needs a script for Novolog in vials for his Tandem pump. His insurance prefers a 3 month supply.    Pharmacy is [facility] in [place]

## 2023-04-21 ENCOUNTER — OFFICE VISIT (OUTPATIENT)
Dept: VASCULAR SURGERY | Facility: CLINIC | Age: 58
End: 2023-04-21
Payer: COMMERCIAL

## 2023-04-21 ENCOUNTER — HOSPITAL ENCOUNTER (OUTPATIENT)
Dept: ULTRASOUND IMAGING | Facility: HOSPITAL | Age: 58
Discharge: HOME | End: 2023-04-21
Payer: COMMERCIAL

## 2023-04-21 ENCOUNTER — DOCUMENTATION (OUTPATIENT)
Dept: ENDOCRINOLOGY | Facility: CLINIC | Age: 58
End: 2023-04-21
Payer: COMMERCIAL

## 2023-04-21 VITALS
BODY MASS INDEX: 37.33 KG/M2 | SYSTOLIC BLOOD PRESSURE: 118 MMHG | OXYGEN SATURATION: 98 % | HEART RATE: 77 BPM | DIASTOLIC BLOOD PRESSURE: 70 MMHG | HEIGHT: 69 IN | WEIGHT: 252 LBS

## 2023-04-21 DIAGNOSIS — I73.9 PERIPHERAL ARTERIAL DISEASE WITH HISTORY OF REVASCULARIZATION: ICD-10-CM

## 2023-04-21 DIAGNOSIS — I73.9 PERIPHERAL ARTERIAL DISEASE WITH HISTORY OF REVASCULARIZATION: Primary | ICD-10-CM

## 2023-04-21 DIAGNOSIS — R60.0 EDEMA OF BOTH LOWER EXTREMITIES: ICD-10-CM

## 2023-04-21 DIAGNOSIS — E78.2 MIXED HYPERLIPIDEMIA: ICD-10-CM

## 2023-04-21 DIAGNOSIS — R09.89 BILATERAL CAROTID BRUITS: ICD-10-CM

## 2023-04-21 DIAGNOSIS — Z98.890 PERIPHERAL ARTERIAL DISEASE WITH HISTORY OF REVASCULARIZATION: ICD-10-CM

## 2023-04-21 DIAGNOSIS — Z79.4 TYPE 2 DIABETES MELLITUS WITH HYPERGLYCEMIA, WITH LONG-TERM CURRENT USE OF INSULIN: ICD-10-CM

## 2023-04-21 DIAGNOSIS — Z98.890 PERIPHERAL ARTERIAL DISEASE WITH HISTORY OF REVASCULARIZATION: Primary | ICD-10-CM

## 2023-04-21 DIAGNOSIS — I10 ESSENTIAL HYPERTENSION: ICD-10-CM

## 2023-04-21 DIAGNOSIS — E11.65 TYPE 2 DIABETES MELLITUS WITH HYPERGLYCEMIA, WITH LONG-TERM CURRENT USE OF INSULIN: ICD-10-CM

## 2023-04-21 DIAGNOSIS — I65.23 BILATERAL CAROTID ARTERY STENOSIS: ICD-10-CM

## 2023-04-21 PROCEDURE — 93923 UPR/LXTR ART STDY 3+ LVLS: CPT

## 2023-04-21 PROCEDURE — 93880 EXTRACRANIAL BILAT STUDY: CPT

## 2023-04-21 RX ORDER — PROCHLORPERAZINE 25 MG/1
SUPPOSITORY RECTAL
COMMUNITY
Start: 2023-02-27

## 2023-04-21 NOTE — PROGRESS NOTES
04/21/2023      Rene Hogue,   [address] [facility] [place] [address]        Luis Echeverria  1965    Chief Complaint   Patient presents with   • Follow-up     3 Month follow for carotid and TEENA. Last seen in office 1/6/23. Pt states that they've been having pain in the lower right foot from swelling. Pt also states they've been having swelling in right leg. No compressions        Dear Rene Hogue DO:    HPI     I had the pleasure of seeing your patient in the office today for follow up.  As you recall, the patient is a 57 y.o. male who we are currently following for peripheral arterial disease and a wound to the right first toe.  He had previously undergone right lower extremity angiogram back in late December with intervention on the posterior tibial as well as anterior tibial arteries.  At that time he was noted to have forefoot small vessel disease consistent with his known diabetes.  He has continued following with the wound care center and Dr. Nieves and ultimately plan is for him to undergo amputation of the right hallux on 5/3.  He continues on antibiotics given concern for osteomyelitis at that first hallux site.  He did have TEENA/PVR done today which I did personally review.  It shows a value of 1.51 on the right and unobtainable value on the left due to noncompressible vessels.  On the right Doppler waveform at the PT is triphasic with a biphasic waveform at the DP.  On the left waveforms are biphasic.  He complains of some swelling to the right lower leg and foot as well as some neuropathy symptoms but otherwise denies any current claudication or rest pain from an arterial standpoint.  He also had a carotid duplex done today which I reviewed which shows less than 50% stenosis of the bilateral internal carotid arteries.  He is asymptomatic from a carotid standpoint with no TIA or strokelike symptoms.  He is currently maintained on Xarelto 2.5 mg twice daily as well as 81 mg aspirin  "daily and a statin.      Review of Systems   Constitutional: Negative.  Negative for activity change, appetite change, chills, diaphoresis, fatigue and fever.   HENT: Negative.  Negative for congestion, sneezing, sore throat and trouble swallowing.    Eyes: Negative.  Negative for visual disturbance.   Respiratory: Negative.  Negative for chest tightness and shortness of breath.    Cardiovascular: Positive for leg swelling. Negative for chest pain and palpitations.   Gastrointestinal: Negative.  Negative for abdominal distention, abdominal pain, nausea and vomiting.   Endocrine: Negative.    Genitourinary: Negative.    Musculoskeletal: Negative.    Skin: Positive for wound.   Allergic/Immunologic: Negative.    Neurological: Negative.    Hematological: Negative.    Psychiatric/Behavioral: Negative.        /70   Pulse 77   Ht 175.3 cm (69\")   Wt 114 kg (252 lb)   SpO2 98%   BMI 37.21 kg/m²   Physical Exam  Vitals reviewed.   Constitutional:       Appearance: Normal appearance. He is obese.   HENT:      Head: Normocephalic and atraumatic.      Nose: Nose normal.      Mouth/Throat:      Mouth: Mucous membranes are moist.   Eyes:      Extraocular Movements: Extraocular movements intact.      Pupils: Pupils are equal, round, and reactive to light.   Cardiovascular:      Rate and Rhythm: Normal rate and regular rhythm.      Pulses:           Carotid pulses are 2+ on the right side and 2+ on the left side.       Radial pulses are 2+ on the right side and 2+ on the left side.        Femoral pulses are 2+ on the right side and 2+ on the left side.       Popliteal pulses are 2+ on the right side and 2+ on the left side.        Dorsalis pedis pulses are detected w/ Doppler on the right side and detected w/ Doppler on the left side.        Posterior tibial pulses are detected w/ Doppler on the right side and detected w/ Doppler on the left side.      Comments: He has palpable femoral and popliteal pulses bilaterally.  " In the right foot he has a strong, multiphasic Doppler signal at the PT.  He also has a Doppler signal at the DP and Doppler signal all the way up to the webspace between the first and second toes.  The foot is warm.  He continues with some mild edema of the right foot and distal lower leg.  On the left he has Doppler signals at the DP and PT and foot is warm.  He does have a wound to the right first toe which has a dressing in place and is clean and intact.  Pulmonary:      Effort: Pulmonary effort is normal. No respiratory distress.   Abdominal:      General: There is no distension.      Palpations: Abdomen is soft. There is no mass.      Tenderness: There is no abdominal tenderness.   Musculoskeletal:         General: Normal range of motion.      Cervical back: Normal range of motion and neck supple.      Right lower leg: Edema present.      Left lower leg: Edema present.   Skin:     General: Skin is warm and dry.      Capillary Refill: Capillary refill takes less than 2 seconds.   Neurological:      General: No focal deficit present.      Mental Status: He is alert and oriented to person, place, and time.   Psychiatric:         Mood and Affect: Mood normal.         Behavior: Behavior normal.         Thought Content: Thought content normal.         Judgment: Judgment normal.         DIAGNOSTIC DATA:        MRI Foot Right Without Contrast    Addendum Date: 4/13/2023 Addendum:   ADDENDUM: Second look requested by the ordering provider. Patient had previous great toe biopsy with pathology revealing osteomyelitis. On the MRI there is STIR hyperintensity noted within the first digit proximal and distal phalanx although the T1 marrow fat signal remains normal. If a bone is exposed or can be probed it is generally considered as suspicious for osteomyelitis regardless of the normal T1 signal. Unfortunately this is complicated by unreliable and heterogeneous fat suppression in the phalanges.  END ADDENDUM   This report  was finalized on 04/13/2023 09:15 by Dr Nick Mata, .    Result Date: 4/13/2023  Narrative: MRI FOOT RIGHT WO CONTRAST- 4/11/2023 10:26 AM CDT  REASON FOR EXAM: m86.9; M86.9-Osteomyelitis, unspecified  COMPARISON: MRI dated 04/11/2023  TECHNIQUE: Multiplanar, multisequential MR images of the right foot were obtained without contrast.  FINDINGS:  No visualized fracture or malalignment. Normal T1 marrow signal throughout the bony elements. There is a mild capsular distention at the first MTP joint which appears degenerative. Bipartite medial hallux sesamoid. Edema identified along the dorsum of the foot as well as along the medial aspect of the foot. No discrete soft tissue fluid collection. No tenosynovitis. Intrinsic foot muscle atrophy is noted. Visualized tendinous structures are intact.      Impression: 1. Normal T1 marrow signal throughout the bony elements. No osteomyelitis identified.   This report was finalized on 04/11/2023 14:18 by Dr Nick Mata, .      Patient Active Problem List   Diagnosis   • CAD (coronary artery disease)   • Hypertension   • Hyperlipidemia   • MI (myocardial infarction)   • Diabetes mellitus   • Carotid stent occlusion   • Unstable angina   • Ischemic cardiomyopathy   • RUQ pain   • Dehydration   • Nausea and vomiting   • Acute kidney injury   • Hypotension   • Gallbladder polyp   • BMI 31.0-31.9,adult   • S/P implantation of automatic cardioverter/defibrillator (AICD)   • Chronic systolic CHF (congestive heart failure), NYHA class 2   • Morbidly obese   • DKA (diabetic ketoacidoses)   • Pneumonia due to COVID-19 virus   • Noncompliance   • Cytokine release syndrome, grade 1   • Skin ulcer of toe of right foot, limited to breakdown of skin   • Atherosclerosis of native arteries of the extremities with ulceration   • Diabetic polyneuropathy associated with type 2 diabetes mellitus   • Acute osteomyelitis of toe, right         ICD-10-CM ICD-9-CM   1. Peripheral arterial disease  with history of revascularization  I73.9 443.9    Z98.890 V45.89   2. Bilateral carotid artery stenosis  I65.23 433.10     433.30   3. Edema of both lower extremities  R60.0 782.3   4. Type 2 diabetes mellitus with hyperglycemia, with long-term current use of insulin  E11.65 250.00    Z79.4 790.29     V58.67   5. Essential hypertension  I10 401.9   6. Mixed hyperlipidemia  E78.2 272.2       Lab Frequency Next Occurrence   Follow Anesthesia Guidelines / Protocol Once 12/18/2022   Obtain Informed Consent Once 12/18/2022   Provide NPO Instructions to Patient Once 12/18/2022   Chlorhexidine Skin Prep Once 12/18/2022   Follow Anesthesia Guidelines / Protocol Once 04/22/2023   Obtain Informed Consent Once 04/22/2023   Provide Instructions to Patient Regarding NPO Status Once 04/22/2023   Chlorhexidine Skin Prep - Educate and Review With Patient Once 04/22/2023   Instructions on coughing, deep breathing, and incentive spirometry. Once 04/22/2023   CBC & Differential Once 04/22/2023   Basic Metabolic Panel Once 04/22/2023   ECG 12 Lead Once 04/22/2023   XR chest 2 vw Once 04/22/2023       PLAN: After thoroughly evaluating Luis Echeverria, I believe the best course of action is to remain conservative from a vascular standpoint.  Overall he has done well after his previous right lower extremity angiogram with intervention on tibial vessels.  Repeat TEENA/PVR done today showed a normal value on the right and unobtainable value on the left due to noncompressible vessels.  He has strong multiphasic Doppler signals at the right DP and PT and foot is warm and perfusion appears to be doing well.  He is scheduled to undergo right hallux amputation with Dr. Nieves in the near future and will continue following with wound care.  From a carotid standpoint he had carotid duplex done today which showed less than 50% stenosis of the bilateral internal carotid arteries.  He remains asymptomatic.  As such moving forward plan will be for repeat  TEENA/PVR as well to duplex in 6 months for ongoing surveillance.  He also continues to have some mild edema mainly in the right lower extremity with some minimal edema in the left lower extremity.  I have recommended light compression in the form of an Ace wrap or compression sock to help reduce this edema and he should also use continue leg elevation to further help reduce it.  Hopefully some of his edema in the right lower extremity will improve once the chronic process in the right first toe is resolved after surgery.  However given the chronic edema I am also going to get a venous duplex with insufficiency study when he returns in 6 months.  The patient is to continue taking their medications as previously discussed.   I did discuss vascular risk factors as they pertain to the progression of vascular disease including controlling diabetes, hypertension, and hyperlipidemia. These factors remain stable and controlled on his current regimen. Class 2 Severe Obesity (BMI >=35 and <=39.9). Obesity-related health conditions include the following: hypertension, diabetes mellitus, dyslipidemias, lower extremity venous stasis disease and peripheral vascular disease. Obesity is unchanged. BMI is is above average; BMI management plan is completed. We discussed portion control and increasing exercise.   This was all discussed in full with complete understanding.  Thank you for allowing me to participate in the care of your patient.  Please do not hesitate to call with any questions or concerns.  We will keep you aware of any further encounters with Luis Echeverria.      Sincerely Yours,      Sammy Wild MD

## 2023-04-24 ENCOUNTER — OFFICE VISIT (OUTPATIENT)
Dept: WOUND CARE | Facility: HOSPITAL | Age: 58
End: 2023-04-24
Payer: COMMERCIAL

## 2023-04-24 PROCEDURE — G0463 HOSPITAL OUTPT CLINIC VISIT: HCPCS

## 2023-04-27 ENCOUNTER — OFFICE VISIT (OUTPATIENT)
Dept: ENDOCRINOLOGY | Facility: CLINIC | Age: 58
End: 2023-04-27
Payer: COMMERCIAL

## 2023-04-27 ENCOUNTER — TELEPHONE (OUTPATIENT)
Dept: ENDOCRINOLOGY | Facility: CLINIC | Age: 58
End: 2023-04-27

## 2023-04-27 VITALS
WEIGHT: 250 LBS | DIASTOLIC BLOOD PRESSURE: 78 MMHG | HEART RATE: 69 BPM | SYSTOLIC BLOOD PRESSURE: 120 MMHG | OXYGEN SATURATION: 99 % | HEIGHT: 69 IN | BODY MASS INDEX: 37.03 KG/M2

## 2023-04-27 DIAGNOSIS — E78.2 MIXED HYPERLIPIDEMIA: Primary | ICD-10-CM

## 2023-04-27 DIAGNOSIS — E10.42 DIABETIC POLYNEUROPATHY ASSOCIATED WITH TYPE 1 DIABETES MELLITUS: ICD-10-CM

## 2023-04-27 DIAGNOSIS — E10.65 TYPE 1 DIABETES MELLITUS WITH HYPERGLYCEMIA: Primary | ICD-10-CM

## 2023-04-27 DIAGNOSIS — I10 PRIMARY HYPERTENSION: ICD-10-CM

## 2023-04-27 DIAGNOSIS — E10.65 TYPE 1 DIABETES MELLITUS WITH HYPERGLYCEMIA: ICD-10-CM

## 2023-04-27 RX ORDER — INSULIN ASPART 100 [IU]/ML
100 INJECTION, SOLUTION INTRAVENOUS; SUBCUTANEOUS DAILY
Qty: 30 ML | Refills: 3 | Status: SHIPPED | OUTPATIENT
Start: 2023-04-27 | End: 2024-04-26

## 2023-04-27 NOTE — TELEPHONE ENCOUNTER
Was informed that his insurance will pay for the name brand with no problem and the generic is the only one that requires a PA

## 2023-04-27 NOTE — PROGRESS NOTES
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     Diagnosed at age 23     Timing constant     Quality not controlled     Severity moderate       Macrovascular complications--- CAD --- MI in the past     Stents times 5     Microvascular complications + neuropathy, + DR     Diabetic ulcer on the right great toe       Current diabetes regimen       Insulin by tandem pump     Oral medications     Current glucose monitoring     Fingerstick's     Check 6 times daily     Using dexcom G6                 Objective   Vital Signs:   /78   Pulse 69   Ht 175.3 cm (69\")   Wt 113 kg (250 lb)   SpO2 99%   BMI 36.92 kg/m²     Physical Exam  Constitutional:       Appearance: Normal appearance.   Cardiovascular:      Rate and Rhythm: Regular rhythm.      Heart sounds: Normal heart sounds.   Pulmonary:      Breath sounds: Normal breath sounds.   Musculoskeletal:      Cervical back: Normal range of motion.   Neurological:      Mental Status: He is alert.        Result Review :   The following data was reviewed by: ANDRES Lanza on 02/27/2023:  Common labs        12/20/2022    13:39 2/27/2023    14:09   Common Labs   Glucose 187   70     BUN 31   24     Creatinine 1.49   1.30     Sodium 137   138     Potassium 4.4   4.3     Chloride 101   102     Calcium 8.8   9.7     Albumin  4.0     Total Bilirubin  0.2     Alkaline Phosphatase  94     AST (SGOT)  28     ALT (SGPT)  27     WBC 6.81   7.67     Hemoglobin 12.6   13.5     Hematocrit 39.8   41.5     Platelets 267   308     Hemoglobin A1C  8.70                   Assessment and Plan    Diagnoses and all orders for this " visit:    1. Mixed hyperlipidemia (Primary)    2. Type 1 diabetes mellitus with hyperglycemia    3. Primary hypertension                 Glycemic Management:      Diabetes mellitus type 1 beta cell depletion         Component      Latest Ref Rng 2/27/2023   C-Peptide      1.1 - 4.4 ng/mL <0.1 (L)       (L) Low        Tandem pump with control IQ         Ambulatory Glucose Profile Report    Days Analyzed : 2 week period ending on 04/27/23      Continuous Glucose Monitory Device:  Dexcom G6     - 0% very high target range  - 25% high target range  - 74% in target range  - 2% below target range  - GMI 6.9 %  - Average glucose 152mg/dl    Interpretation : Diabetes Type 1 Controlled    Started pump this week     He is in target 74% of the time     No changes to pump       Basal     MN - 2.4      Carb ratio    MN -- 4    Correction     MN - 15     Target Bg -110                 Taking jardiance 25 mg daily         Previus regimen     Taking Tresiba 55 units once daily       Taking Novolog 35 units each meal     + sliding scale             Goals for sugar    Fasting and before meals 80 to 130    2 hours after meals 180 or less      Aim for 60 grams of carbohydrate per meal    Aim for 15 grams of carbohydrate per snack                 Microvascular Complications Monitoring           Last eye exam==DR, sees eye doctor every 3 months , gets injections     Neuropathy --yes     Taking Cymbalta       No renal disease     Ulcer on big toe right foot , following with wound care --- will have toe removed       Lipid Management:       Taking lipitor 80 mg at night           Blood Pressure Management:    Taking lisinopril 2.5 mg daily     Taking metoprolol xl 50 mg daily     Taking aldactone 25 mg one daily         Thyroid Health    Not on thyroid medication          Preventive Care:       Non smoker                   Follow Up   Return in about 3 months (around 7/27/2023) for Recheck.  Patient was given instructions and counseling  regarding his condition or for health maintenance advice. Please see specific information pulled into the AVS if appropriate.         This document has been electronically signed by ANDRES Lanza on April 27, 2023 09:14 CDT.

## 2023-04-28 ENCOUNTER — HOSPITAL ENCOUNTER (OUTPATIENT)
Dept: GENERAL RADIOLOGY | Facility: HOSPITAL | Age: 58
Discharge: HOME | End: 2023-04-28
Payer: COMMERCIAL

## 2023-04-28 ENCOUNTER — PRE-ADMISSION TESTING (OUTPATIENT)
Dept: PREADMISSION TESTING | Facility: HOSPITAL | Age: 58
End: 2023-04-28
Payer: COMMERCIAL

## 2023-04-28 VITALS
HEIGHT: 69 IN | SYSTOLIC BLOOD PRESSURE: 124 MMHG | WEIGHT: 252.65 LBS | DIASTOLIC BLOOD PRESSURE: 67 MMHG | BODY MASS INDEX: 37.42 KG/M2 | HEART RATE: 75 BPM | OXYGEN SATURATION: 98 % | RESPIRATION RATE: 18 BRPM

## 2023-04-28 DIAGNOSIS — M86.171 ACUTE OSTEOMYELITIS OF TOE, RIGHT: ICD-10-CM

## 2023-04-28 LAB
ANION GAP SERPL CALCULATED.3IONS-SCNC: 10 MMOL/L (ref 5–15)
BASOPHILS # BLD AUTO: 0.04 10*3/MM3 (ref 0–0.2)
BASOPHILS NFR BLD AUTO: 0.6 % (ref 0–1.5)
BUN SERPL-MCNC: 39 MG/DL (ref 6–20)
BUN/CREAT SERPL: 22.3 (ref 7–25)
CALCIUM SPEC-SCNC: 9.3 MG/DL (ref 8.6–10.5)
CHLORIDE SERPL-SCNC: 101 MMOL/L (ref 98–107)
CO2 SERPL-SCNC: 24 MMOL/L (ref 22–29)
CREAT SERPL-MCNC: 1.75 MG/DL (ref 0.76–1.27)
DEPRECATED RDW RBC AUTO: 47.9 FL (ref 37–54)
EGFRCR SERPLBLD CKD-EPI 2021: 45.4 ML/MIN/1.73
EOSINOPHIL # BLD AUTO: 0.27 10*3/MM3 (ref 0–0.4)
EOSINOPHIL NFR BLD AUTO: 4.2 % (ref 0.3–6.2)
ERYTHROCYTE [DISTWIDTH] IN BLOOD BY AUTOMATED COUNT: 14.5 % (ref 12.3–15.4)
GLUCOSE SERPL-MCNC: 114 MG/DL (ref 65–99)
HCT VFR BLD AUTO: 40.1 % (ref 37.5–51)
HGB BLD-MCNC: 12.4 G/DL (ref 13–17.7)
IMM GRANULOCYTES # BLD AUTO: 0.02 10*3/MM3 (ref 0–0.05)
IMM GRANULOCYTES NFR BLD AUTO: 0.3 % (ref 0–0.5)
LYMPHOCYTES # BLD AUTO: 1.57 10*3/MM3 (ref 0.7–3.1)
LYMPHOCYTES NFR BLD AUTO: 24.2 % (ref 19.6–45.3)
MCH RBC QN AUTO: 27.9 PG (ref 26.6–33)
MCHC RBC AUTO-ENTMCNC: 30.9 G/DL (ref 31.5–35.7)
MCV RBC AUTO: 90.3 FL (ref 79–97)
MONOCYTES # BLD AUTO: 0.46 10*3/MM3 (ref 0.1–0.9)
MONOCYTES NFR BLD AUTO: 7.1 % (ref 5–12)
NEUTROPHILS NFR BLD AUTO: 4.13 10*3/MM3 (ref 1.7–7)
NEUTROPHILS NFR BLD AUTO: 63.6 % (ref 42.7–76)
NRBC BLD AUTO-RTO: 0 /100 WBC (ref 0–0.2)
PLATELET # BLD AUTO: 285 10*3/MM3 (ref 140–450)
PMV BLD AUTO: 9 FL (ref 6–12)
POTASSIUM SERPL-SCNC: 4.7 MMOL/L (ref 3.5–5.2)
RBC # BLD AUTO: 4.44 10*6/MM3 (ref 4.14–5.8)
SODIUM SERPL-SCNC: 135 MMOL/L (ref 136–145)
WBC NRBC COR # BLD: 6.49 10*3/MM3 (ref 3.4–10.8)

## 2023-04-28 PROCEDURE — 36415 COLL VENOUS BLD VENIPUNCTURE: CPT

## 2023-04-28 PROCEDURE — 71046 X-RAY EXAM CHEST 2 VIEWS: CPT

## 2023-04-28 PROCEDURE — 93010 ELECTROCARDIOGRAM REPORT: CPT | Performed by: HOSPITALIST

## 2023-04-28 PROCEDURE — 80048 BASIC METABOLIC PNL TOTAL CA: CPT

## 2023-04-28 PROCEDURE — 85025 COMPLETE CBC W/AUTO DIFF WBC: CPT

## 2023-04-28 PROCEDURE — 93005 ELECTROCARDIOGRAM TRACING: CPT

## 2023-04-28 NOTE — DISCHARGE INSTRUCTIONS
Before you come to the hospital        Arrival time: AS DIRECTED BY OFFICE     YOU MAY TAKE THE FOLLOWING MEDICATION(S) THE MORNING OF SURGERY WITH A SIP OF WATER: METOPROLOL SUCCINATE       ***PLEASE HOLD YOUR LISINOPRIL 24 HOURS PRIOR TO SURGERY***             ALL OTHER HOME MEDICATION CHECK WITH YOUR PHYSICIAN (especially if   you are taking diabetes medicines or blood thinners)    Do not take any Erectile Dysfunction medications (EX: CIALIS, VIAGRA) 24 hours prior to surgery.      If you were given and instructed to use a germ- killing soap, use as directed the night before surgery and again the morning of surgery or as directed by your surgeon. (Use one-half of the bottle with each shower.)   See attached information for How to Use Chlorhexidine for Bathing if applicable.            Eating and drinking restrictions prior to scheduled arrival time    2 Hours before arrival time STOP   Drinking Clear liquids (water, apple juice-no pulp)     6 Hours before arrival time STOP   Milk or drinks that contain milk, full liquids    6 Hours before arrival time STOP   Light meals or foods, such as toast or cereal    8 Hours before arrival time STOP   Heavy foods, such as meat, fried foods, or fatty foods    (It is extremely important that you follow these guidelines to prevent delay or cancelation of your procedure)     Clear Liquids  Water and flavored water                                                                      Clear Fruit juices, such as cranberry juice and apple juice.  Black coffee (NO cream of any kind, including powdered).  Plain tea  Clear bouillon or broth.  Flavored gelatin.  Soda.  Gatorade or Powerade.  Full liquid examples  Juices that have pulp.  Frozen ice pops that contain fruit pieces.  Coffee with creamer  Milk.  Yogurt.                MANAGING PAIN AFTER SURGERY    We know you are probably wondering what your pain will be like after surgery.  Following surgery it is unrealistic to expect  you will not have pain.   Pain is how our bodies let us know that something is wrong or cautions us to be careful.  That said, our goal is to make your pain tolerable.    Methods we may use to treat your pain include (oral or IV medications, PCAs, epidurals, nerve blocks, etc.)   While some procedures require IV pain medications for a short time after surgery, transitioning to pain medications by mouth allows for better management of pain.   Your nurse will encourage you to take oral pain medications whenever possible.  IV medications work almost immediately, but only last a short while.  Taking medications by mouth allows for a more constant level of medication in your blood stream for a longer period of time.      Once your pain is out of control it is harder to get back under control.  It is important you are aware when your next dose of pain medication is due.  If you are admitted, your nurse may write the time of your next dose on the white board in your room to help you remember.      We are interested in your pain and encourage you to inform us about aggravating factors during your visit.   Many times a simple repositioning every few hours can make a big difference.    If your physician says it is okay, do not let your pain prevent you from getting out of bed. Be sure to call your nurse for assistance prior to getting up so you do not fall.      Before surgery, please decide your tolerable pain goal.  These faces help describe the pain ratings we use on a 0-10 scale.   Be prepared to tell us your goal and whether or not you take pain or anxiety medications at home.          Preparing for Surgery  Preparing for surgery is an important part of your care. It can make things go more smoothly and help you avoid complications. The steps leading up to surgery may vary among hospitals. Follow all instructions given to you by your health care providers. Ask questions if you do not understand something. Talk about any  concerns that you have.  Here are some questions to consider asking before your surgery:  If my surgery is not an emergency (is elective), when would be the best time to have the surgery?  What arrangements do I need to make for work, home, or school?  What will my recovery be like? How long will it be before I can return to normal activities?  Will I need to prepare my home? Will I need to arrange care for me or my children?  Should I expect to have pain after surgery? What are my pain management options? Are there nonmedical options that I can try for pain?  Tell a health care provider about:  Any allergies you have.  All medicines you are taking, including vitamins, herbs, eye drops, creams, and over-the-counter medicines.  Any problems you or family members have had with anesthetic medicines.  Any blood disorders you have.  Any surgeries you have had.  Any medical conditions you have.  Whether you are pregnant or may be pregnant.  What are the risks?  The risks and complications of surgery depend on the specific procedure that you have. Discuss all the risks with your health care providers before your surgery. Ask about common surgical complications, which may include:  Infection.  Bleeding or a need for blood replacement (transfusion).  Allergic reactions to medicines.  Damage to surrounding nerves, tissues, or structures.  A blood clot.  Scarring.  Failure of the surgery to correct the problem.  Follow these instructions before the procedure:  Several days or weeks before your procedure  You may have a physical exam by your primary health care provider to make sure it is safe for you to have surgery.  You may have testing. This may include a chest X-ray, blood and urine tests, electrocardiogram (ECG), or other testing.  Ask your health care provider about:  Changing or stopping your regular medicines. This is especially important if you are taking diabetes medicines or blood thinners.  Taking medicines such as  aspirin and ibuprofen. These medicines can thin your blood. Do not take these medicines unless your health care provider tells you to take them.  Taking over-the-counter medicines, vitamins, herbs, and supplements.  Do not use any products that contain nicotine or tobacco, such as cigarettes and e-cigarettes. If you need help quitting, ask your health care provider.  Avoid alcohol.  Ask your health care provider if there are exercises you can do to prepare for surgery.  Eat a healthy diet.   Plan to have someone 18 years of age or older to take you home from the hospital. We will need to verify your ride on the morning of surgery if you are being discharged home on the same day. Tell your ride to be expecting a call from the hospital prior to your procedure.   Plan to have a responsible adult care for you for at least 24 hours after you leave the hospital or clinic. This is important.  The day before your procedure  You may be given antibiotic medicine to take by mouth to help prevent infection. Take it as told by your health care provider.  You may be asked to shower with a germ-killing soap.  Follow instructions from your health care provider about eating and drinking restrictions. This includes gum, mints and hard candy.  Pack comfortable clothes according to your procedure.   The day of your procedure  You may need to take another shower with a germ-killing soap before you leave home in the morning.  With a small sip of water, take only the medicines that you are told to take.  Remove all jewelry including rings.   Leave anything you consider valuable at home except hearing aids if needed.  You do not need to bring your home medications into the hospital.   Do not wear any makeup, nail polish, powder, deodorant, lotion, hair accessories, or anything on your skin or body except your clothes.  If you will be staying in the hospital, bring a case to hold your glasses, contacts, or dentures. You may also want to  bring your robe and non-skid footwear.       (Do not use denture adhesives since you will be asked to remove them during  surgery).   If you wear oxygen at home, bring it with you the day of surgery.  If instructed by your health care provider, bring your sleep apnea device with you on the day of your surgery (if this applies to you).  You may want to leave your suitcase and sleep apnea device in the car until after surgery.   Arrive at the hospital as scheduled.  Bring a friend or family member with you who can help to answer questions and be present while you meet with your health care provider.  At the hospital  When you arrive at the hospital:  Go to registration located at the main entrance of the hospital. You will be registered and given a beeper and a sticker sheet. Take the stickers to the Outpatient nurses desk and place in the black tray. This is to notify staff that you have arrived. Then return to the lobby to wait.   When your beeper lights up and vibrates proceed through the double doors, under the stairs, and a member of the Outpatient Surgery staff will escort you to your preoperative room.  You may have to wear compression sleeves. These help to prevent blood clots and reduce swelling in your legs.  An IV may be inserted into one of your veins.              In the operating room, you may be given one or more of the following:        A medicine to help you relax (sedative).        A medicine to numb the area (local anesthetic).        A medicine to make you fall asleep (general anesthetic).        A medicine that is injected into an area of your body to numb everything below the                      injection site (regional anesthetic).  You may be given an antibiotic through your IV to help prevent infection.  Your surgical site will be marked or identified.    Contact a health care provider if you:  Develop a fever of more than 100.4°F (38°C) or other feelings of illness during the 48 hours before  your surgery.  Have symptoms that get worse.  Have questions or concerns about your surgery.  Summary  Preparing for surgery can make the procedure go more smoothly and lower your risk of complications.  Before surgery, make a list of questions and concerns to discuss with your surgeon. Ask about the risks and possible complications.  In the days or weeks before your surgery, follow all instructions from your health care provider. You may need to stop smoking, avoid alcohol, follow eating restrictions, and change or stop your regular medicines.  Contact your surgeon if you develop a fever or other signs of illness during the few days before your surgery.  This information is not intended to replace advice given to you by your health care provider. Make sure you discuss any questions you have with your health care provider.  Document Revised: 12/21/2018 Document Reviewed: 10/23/2018  Elsevier Patient Education © 2021 [employer]

## 2023-04-29 LAB
QT INTERVAL: 418 MS
QTC INTERVAL: 431 MS

## 2023-05-02 NOTE — PROGRESS NOTES
[facility] - PODIATRY    Today's Date: 05/09/2023     Patient Name: Luis Echeverria  MRN: [id number]  CSN: [id number]  PCP: Rene Hogue DO  Referring Provider: No ref. provider found    SUBJECTIVE     Chief Complaint   Patient presents with   • Follow-up     Rene Hogue DO-1 WK POST OP surgery on 05/03-pt states he is here today for a one week post op appointment-pt reports some phantom pains and pain from neuropathy   • Diabetes     162 mg/dl BG     HPI: Luis Echeverria, a 57 y.o.male, comes to clinic as a(n) established patient for post-op appt 1 weeks s/p right hallux amputation. Patient has h/o CAD, CHF, DM 2, HLD, MI, PVD. Patient is IDDM with last stated BG level of 162mg/dl. He has kept the bandage c/d/i. He has remained NWB to right foot and wearing his CAM. Notes occasional phantom pains. Admits pain at 2/10 level and described as aching and dull. He has taken medications as prescribed. Denies any constitutional symptoms. No other pedal complaints at this time.    Past Medical History:   Diagnosis Date   • CAD (coronary artery disease)    • CAD in native artery     2V STEMI 7/13 STENT TO CX AND STENTX2 TO MID AND DISTAL LAD   • Chronic systolic CHF (congestive heart failure), NYHA class 2 02/04/2020   • Diabetes mellitus    • Disease of thyroid gland    • GERD (gastroesophageal reflux disease)    • History of coronary artery stent placement      x 3 - for ACUTE MI 7/2013   • Hyperlipidemia    • Hyperlipidemia, mild    • Hypertension    • Myocardial infarct    • Myocardial infarction    • Stented coronary artery      Past Surgical History:   Procedure Laterality Date   • AMPUTATION DIGIT Right 5/3/2023    Procedure: Partial vs Complete Amputation of Hallux - Right Foot;  Surgeon: Simone Nieves DPM;  Location: [facility] OR;  Service: Podiatry;  Laterality: Right;   • AORTAGRAM Right 12/23/2022    Procedure: AORTAGRAM, RIGHT LOWER EXTREMITY ANGIOGRAM, BALLOON  ANGIOPLASTY, MYNX CLOSURE;  Surgeon: Sammy Wild MD;  Location:  [facility] HYBRID OR 12;  Service: Vascular;  Laterality: Right;   • CARDIAC CATHETERIZATION N/A 11/06/2018    Procedure: Left Heart Cath;  Surgeon: Tru Rai MD;  Location:  [facility] CATH INVASIVE LOCATION;  Service: Cardiology   • CARDIAC ELECTROPHYSIOLOGY PROCEDURE N/A 05/01/2019    Procedure: ICD new;  Surgeon: Tru Rai MD;  Location:  [facility] CATH INVASIVE LOCATION;  Service: Cardiology   • CAROTID STENT       Family History   Problem Relation Age of Onset   • Heart disease Maternal Grandmother    • No Known Problems Mother    • No Known Problems Father      Social History     Socioeconomic History   • Marital status:    Tobacco Use   • Smoking status: Never     Passive exposure: Never   • Smokeless tobacco: Never   Vaping Use   • Vaping Use: Never used   Substance and Sexual Activity   • Alcohol use: Yes     Comment: 2 TIMES YEARLY   • Drug use: No   • Sexual activity: Defer     No Known Allergies  Current Outpatient Medications   Medication Sig Dispense Refill   • albuterol sulfate  (90 Base) MCG/ACT inhaler Inhale 2 puffs Every 4 (Four) Hours As Needed for Wheezing or Shortness of Air. 8.5 g 5   • aspirin 81 MG EC tablet Take 1 tablet by mouth Daily.     • atorvastatin (LIPITOR) 80 MG tablet TAKE 1 TABLET BY MOUTH EVERY DAY AT NIGHT 90 tablet 3   • brimonidine (ALPHAGAN) 0.2 % ophthalmic solution Administer 1 drop to both eyes 2 (Two) Times a Day.     • Continuous Blood Gluc  (Dexcom G6 ) device REMOVED FOR SURGERY     • Continuous Blood Gluc Sensor (Dexcom G6 Sensor) 1 each As Needed (glucose control). Every 10 days 9 each 3   • Continuous Blood Gluc Transmit (Dexcom G6 Transmitter) misc 1 each Every 3 (Three) Months. 1 each 3   • DULoxetine (CYMBALTA) 20 MG capsule Take 30 mg by mouth Daily.     • empagliflozin (Jardiance) 25 MG tablet tablet Take 1 tablet by mouth Daily. 90 tablet  11   • ezetimibe (ZETIA) 10 MG tablet Take 1 tablet by mouth Daily. 90 tablet 3   • Glucagon (Baqsimi One Pack) 3 MG/DOSE powder 1 each into the nostril(s) as directed by provider As Needed (Hypoglycemia). Apply intranasal if hypoglycemia 2 each 11   • HYDROcodone-acetaminophen (NORCO) 7.5-325 MG per tablet Take 1 tablet by mouth Every 6 (Six) Hours As Needed for Moderate Pain for up to 6 days. 24 tablet 0   • Insulin Aspart (novoLOG) 100 UNIT/ML injection Inject 100 Units under the skin into the appropriate area as directed Daily. Name brand only 30 mL 3   • Insulin Degludec (TRESIBA SC) Inject 40 Units under the skin into the appropriate area as directed. DIRECTED TO TAKE THIS BY PRIMARY CARE MD DUE TO INSULIN PUMP BEING EMPTY     • levothyroxine (SYNTHROID, LEVOTHROID) 25 MCG tablet Take 1 tablet by mouth Every Morning. 30 tablet 11   • lisinopril (PRINIVIL,ZESTRIL) 2.5 MG tablet TAKE 1 TABLET BY MOUTH EVERY DAY 90 tablet 3   • metoprolol succinate XL (TOPROL-XL) 50 MG 24 hr tablet Take 1.5 tablets by mouth Daily. 135 tablet 11   • nitroglycerin (NITROSTAT) 0.4 MG SL tablet Place 1 tablet under the tongue Every 5 (Five) Minutes As Needed for Chest Pain. Take no more than 3 doses in 15 minutes. 25 tablet 1   • ondansetron ODT (ZOFRAN-ODT) 4 MG disintegrating tablet Place 1 tablet on the tongue Every 8 (Eight) Hours As Needed for Nausea or Vomiting. 21 tablet 0   • promethazine (PHENERGAN) 12.5 MG tablet Take 1 tablet by mouth Every 8 (Eight) Hours As Needed for Nausea or Vomiting for up to 7 days. 21 tablet 0   • Rivaroxaban (Xarelto) 2.5 MG tablet Take 1 tablet by mouth 2 (Two) Times a Day. (Patient taking differently: Take 1 tablet by mouth 2 (Two) Times a Day. Had to stop taking until his surgery next Wednesday May 3, 2023) 180 tablet 3   • spironolactone (ALDACTONE) 25 MG tablet TAKE 1 TABLET BY MOUTH EVERY DAY 90 tablet 3     No current facility-administered medications for this visit.     Review of Systems    Constitutional: Negative for chills and fever.   HENT: Negative for congestion.    Respiratory: Negative for shortness of breath.    Cardiovascular: Negative for chest pain and leg swelling.   Gastrointestinal: Negative for constipation, diarrhea, nausea and vomiting.   Skin: Positive for wound.   Neurological: Positive for numbness.       OBJECTIVE     Vitals:    05/09/23 1422   BP: 120/72   Pulse: 68   SpO2: 99%       PHYSICAL EXAM  GEN:   Accompanied by wife.     Foot/Ankle Exam    GENERAL  Appearance:  appears stated age and obese  Orientation:  AAOx3  Affect:  appropriate  Gait:  partial weight bearing  Assistance:  crutches  Right shoe gear: CAM boot  Left shoe gear: casual shoe    VASCULAR     Right Foot Vascularity   Dorsalis pedis:  2+  Posterior tibial:  2+  Skin temperature:  warm  Edema grading:  Trace  CFT:  3  Pedal hair growth:  Present  Varicosities:  none     Left Foot Vascularity   Dorsalis pedis:  2+  Posterior tibial:  2+  Skin temperature:  warm  Edema grading:  None  CFT:  3  Pedal hair growth:  Present  Varicosities:  none     NEUROLOGIC     Right Foot Neurologic   Light touch sensation: diminished  Vibratory sensation: diminished  Hot/Cold sensation: diminished     Left Foot Neurologic   Light touch sensation: diminished  Vibratory sensation: diminished  Hot/Cold sensation:  diminished    MUSCULOSKELETAL     Right Foot Musculoskeletal    Amputation   Toes amputated: first toe  Ecchymosis:  none  Tenderness:  none    Arch:  Normal     Left Foot Musculoskeletal   Ecchymosis:  none  Tenderness:  none  Arch:  Normal    MUSCLE STRENGTH     Right Foot Muscle Strength   Foot dorsiflexion:  5  Foot plantar flexion:  5  Foot inversion:  5  Foot eversion:  5     Left Foot Muscle Strength   Foot dorsiflexion:  5  Foot plantar flexion:  5  Foot inversion:  5  Foot eversion:  5    RANGE OF MOTION     Right Foot Range of Motion   Foot and ankle ROM within normal limits       Left Foot Range of Motion    Foot and ankle ROM within normal limits      DERMATOLOGIC        Left Foot Dermatologic   Skin  Left foot skin is intact.      Right foot additional comments: Amputation site with sutures intact. Overall healing well. No SOI or dehiscence.       RADIOLOGY/NUCLEAR:      LABORATORY/CULTURE RESULTS:      PATHOLOGY RESULTS:       ASSESSMENT/PLAN     Diagnoses and all orders for this visit:    1. S/P foot surgery (Primary)    2. Amputee, great toe, right      Comprehensive lower extremity examination and evaluation was performed.  Discussed findings and treatment plan including risks, benefits, and treatment options with patient in detail. Patient agreed with treatment plan.  Reviewed at home diabetic foot care including daily foot checks  Reviewed pathology.   Bandage removed and surgical site evaluated. Overall healing well.   Heel touch in CAM ok on limited basis.   Suture removal in 2 weeks.   Keep bandage c/d/i.  An After Visit Summary was printed and given to the patient at discharge, including (if requested) any available informative/educational handouts regarding diagnosis, treatment, or medications. All questions were answered to patient/family satisfaction. Should symptoms fail to improve or worsen they agree to call or return to clinic or to go to the Emergency Department. Discussed the importance of following up with any needed screening tests/labs/specialist appointments and any requested follow-up recommended by me today. Importance of maintaining follow-up discussed and patient accepts that missed appointments can delay diagnosis and potentially lead to worsening of conditions.  Return in about 2 weeks (around 5/23/2023) for Post-Op appointment, Follow-up with Tom JEROME, or sooner if acute issues arise.    Lab Frequency Next Occurrence   Follow Anesthesia Guidelines / Protocol Once 12/18/2022   Obtain Informed Consent Once 12/18/2022   Provide NPO Instructions to Patient Once 12/18/2022    Chlorhexidine Skin Prep Once 12/18/2022   Follow Anesthesia Guidelines / Protocol Once 04/22/2023   Obtain Informed Consent Once 04/22/2023   Provide Instructions to Patient Regarding NPO Status Once 04/22/2023   Chlorhexidine Skin Prep - Educate and Review With Patient Once 04/22/2023   Instructions on coughing, deep breathing, and incentive spirometry. Once 04/22/2023   US Carotid Bilateral Once 10/18/2023   US Ankle / Brachial Indices Extremity Complete Once 10/18/2023   US Venous Doppler Lower Extremity Bilateral (duplex) Once 10/18/2023       This document has been electronically signed by Simone Nieves DPM on May 9, 2023 14:40 CDT

## 2023-05-03 ENCOUNTER — ANESTHESIA (OUTPATIENT)
Dept: PERIOP | Facility: HOSPITAL | Age: 58
End: 2023-05-03
Payer: COMMERCIAL

## 2023-05-03 ENCOUNTER — HOSPITAL ENCOUNTER (OUTPATIENT)
Facility: HOSPITAL | Age: 58
Setting detail: HOSPITAL OUTPATIENT SURGERY
Discharge: HOME | End: 2023-05-03
Attending: PODIATRIST | Admitting: PODIATRIST
Payer: COMMERCIAL

## 2023-05-03 ENCOUNTER — APPOINTMENT (OUTPATIENT)
Dept: GENERAL RADIOLOGY | Facility: HOSPITAL | Age: 58
End: 2023-05-03
Payer: COMMERCIAL

## 2023-05-03 ENCOUNTER — ANESTHESIA EVENT (OUTPATIENT)
Dept: PERIOP | Facility: HOSPITAL | Age: 58
End: 2023-05-03
Payer: COMMERCIAL

## 2023-05-03 VITALS
HEART RATE: 60 BPM | OXYGEN SATURATION: 96 % | DIASTOLIC BLOOD PRESSURE: 67 MMHG | TEMPERATURE: 99 F | RESPIRATION RATE: 16 BRPM | SYSTOLIC BLOOD PRESSURE: 96 MMHG

## 2023-05-03 DIAGNOSIS — Z98.890 S/P FOOT SURGERY: ICD-10-CM

## 2023-05-03 DIAGNOSIS — M86.171 ACUTE OSTEOMYELITIS OF TOE, RIGHT: ICD-10-CM

## 2023-05-03 DIAGNOSIS — Z98.890 S/P FOOT SURGERY: Primary | ICD-10-CM

## 2023-05-03 LAB
GLUCOSE BLDC GLUCOMTR-MCNC: 101 MG/DL (ref 70–130)
GLUCOSE BLDC GLUCOMTR-MCNC: 38 MG/DL (ref 70–130)
GLUCOSE BLDC GLUCOMTR-MCNC: 90 MG/DL (ref 70–130)

## 2023-05-03 PROCEDURE — 28820 AMPUTATION OF TOE: CPT | Performed by: NURSE PRACTITIONER

## 2023-05-03 PROCEDURE — 25010000002 PROPOFOL 10 MG/ML EMULSION: Performed by: NURSE ANESTHETIST, CERTIFIED REGISTERED

## 2023-05-03 PROCEDURE — 25010000002 CEFAZOLIN PER 500 MG: Performed by: PODIATRIST

## 2023-05-03 PROCEDURE — 88305 TISSUE EXAM BY PATHOLOGIST: CPT | Performed by: PODIATRIST

## 2023-05-03 PROCEDURE — 28820 AMPUTATION OF TOE: CPT | Performed by: PODIATRIST

## 2023-05-03 PROCEDURE — 82948 REAGENT STRIP/BLOOD GLUCOSE: CPT

## 2023-05-03 PROCEDURE — 25010000002 ONDANSETRON PER 1 MG: Performed by: NURSE ANESTHETIST, CERTIFIED REGISTERED

## 2023-05-03 PROCEDURE — 88311 DECALCIFY TISSUE: CPT | Performed by: PODIATRIST

## 2023-05-03 PROCEDURE — 73620 X-RAY EXAM OF FOOT: CPT

## 2023-05-03 RX ORDER — SODIUM CHLORIDE 9 MG/ML
40 INJECTION, SOLUTION INTRAVENOUS AS NEEDED
Status: DISCONTINUED | OUTPATIENT
Start: 2023-05-03 | End: 2023-05-03 | Stop reason: HOSPADM

## 2023-05-03 RX ORDER — LABETALOL HYDROCHLORIDE 5 MG/ML
5 INJECTION, SOLUTION INTRAVENOUS
Status: DISCONTINUED | OUTPATIENT
Start: 2023-05-03 | End: 2023-05-03 | Stop reason: HOSPADM

## 2023-05-03 RX ORDER — BUPIVACAINE HYDROCHLORIDE 5 MG/ML
INJECTION, SOLUTION EPIDURAL; INTRACAUDAL AS NEEDED
Status: DISCONTINUED | OUTPATIENT
Start: 2023-05-03 | End: 2023-05-03 | Stop reason: HOSPADM

## 2023-05-03 RX ORDER — PROPOFOL 10 MG/ML
VIAL (ML) INTRAVENOUS AS NEEDED
Status: DISCONTINUED | OUTPATIENT
Start: 2023-05-03 | End: 2023-05-03 | Stop reason: SURG

## 2023-05-03 RX ORDER — HYDROCODONE BITARTRATE AND ACETAMINOPHEN 7.5; 325 MG/1; MG/1
1 TABLET ORAL EVERY 8 HOURS PRN
Qty: 4 TABLET | Refills: 0 | Status: SHIPPED | OUTPATIENT
Start: 2023-05-03 | End: 2023-05-04 | Stop reason: SDUPTHER

## 2023-05-03 RX ORDER — DROPERIDOL 2.5 MG/ML
0.62 INJECTION, SOLUTION INTRAMUSCULAR; INTRAVENOUS ONCE AS NEEDED
Status: DISCONTINUED | OUTPATIENT
Start: 2023-05-03 | End: 2023-05-03 | Stop reason: HOSPADM

## 2023-05-03 RX ORDER — ONDANSETRON 2 MG/ML
4 INJECTION INTRAMUSCULAR; INTRAVENOUS ONCE AS NEEDED
Status: DISCONTINUED | OUTPATIENT
Start: 2023-05-03 | End: 2023-05-03 | Stop reason: HOSPADM

## 2023-05-03 RX ORDER — SODIUM CHLORIDE, SODIUM LACTATE, POTASSIUM CHLORIDE, CALCIUM CHLORIDE 600; 310; 30; 20 MG/100ML; MG/100ML; MG/100ML; MG/100ML
100 INJECTION, SOLUTION INTRAVENOUS CONTINUOUS
Status: DISCONTINUED | OUTPATIENT
Start: 2023-05-03 | End: 2023-05-03 | Stop reason: HOSPADM

## 2023-05-03 RX ORDER — DEXTROSE MONOHYDRATE 25 G/50ML
INJECTION, SOLUTION INTRAVENOUS
Status: COMPLETED
Start: 2023-05-03 | End: 2023-05-03

## 2023-05-03 RX ORDER — SODIUM CHLORIDE 0.9 % (FLUSH) 0.9 %
3 SYRINGE (ML) INJECTION AS NEEDED
Status: DISCONTINUED | OUTPATIENT
Start: 2023-05-03 | End: 2023-05-03 | Stop reason: HOSPADM

## 2023-05-03 RX ORDER — HYDROCODONE BITARTRATE AND ACETAMINOPHEN 7.5; 325 MG/1; MG/1
1 TABLET ORAL EVERY 8 HOURS PRN
Qty: 21 TABLET | Refills: 0 | Status: SHIPPED | OUTPATIENT
Start: 2023-05-03 | End: 2023-05-03 | Stop reason: RX

## 2023-05-03 RX ORDER — OXYCODONE AND ACETAMINOPHEN 10; 325 MG/1; MG/1
1 TABLET ORAL ONCE AS NEEDED
Status: DISCONTINUED | OUTPATIENT
Start: 2023-05-03 | End: 2023-05-03 | Stop reason: HOSPADM

## 2023-05-03 RX ORDER — ONDANSETRON 4 MG/1
4 TABLET, ORALLY DISINTEGRATING ORAL EVERY 8 HOURS PRN
Qty: 21 TABLET | Refills: 0 | Status: SHIPPED | OUTPATIENT
Start: 2023-05-03

## 2023-05-03 RX ORDER — SODIUM CHLORIDE 0.9 % (FLUSH) 0.9 %
3 SYRINGE (ML) INJECTION EVERY 12 HOURS SCHEDULED
Status: DISCONTINUED | OUTPATIENT
Start: 2023-05-03 | End: 2023-05-03 | Stop reason: HOSPADM

## 2023-05-03 RX ORDER — ASPIRIN 81 MG/1
81 TABLET, CHEWABLE ORAL ONCE
Status: COMPLETED | OUTPATIENT
Start: 2023-05-03 | End: 2023-05-03

## 2023-05-03 RX ORDER — ONDANSETRON 2 MG/ML
INJECTION INTRAMUSCULAR; INTRAVENOUS AS NEEDED
Status: DISCONTINUED | OUTPATIENT
Start: 2023-05-03 | End: 2023-05-03 | Stop reason: SURG

## 2023-05-03 RX ORDER — SODIUM CHLORIDE, SODIUM LACTATE, POTASSIUM CHLORIDE, CALCIUM CHLORIDE 600; 310; 30; 20 MG/100ML; MG/100ML; MG/100ML; MG/100ML
1000 INJECTION, SOLUTION INTRAVENOUS CONTINUOUS
Status: DISCONTINUED | OUTPATIENT
Start: 2023-05-03 | End: 2023-05-03 | Stop reason: HOSPADM

## 2023-05-03 RX ORDER — ACETAMINOPHEN 500 MG
1000 TABLET ORAL ONCE
Status: COMPLETED | OUTPATIENT
Start: 2023-05-03 | End: 2023-05-03

## 2023-05-03 RX ORDER — FENTANYL CITRATE 50 UG/ML
25 INJECTION, SOLUTION INTRAMUSCULAR; INTRAVENOUS
Status: DISCONTINUED | OUTPATIENT
Start: 2023-05-03 | End: 2023-05-03 | Stop reason: HOSPADM

## 2023-05-03 RX ORDER — SODIUM CHLORIDE 0.9 % (FLUSH) 0.9 %
3-10 SYRINGE (ML) INJECTION AS NEEDED
Status: DISCONTINUED | OUTPATIENT
Start: 2023-05-03 | End: 2023-05-03 | Stop reason: HOSPADM

## 2023-05-03 RX ORDER — LIDOCAINE HYDROCHLORIDE 10 MG/ML
0.5 INJECTION, SOLUTION EPIDURAL; INFILTRATION; INTRACAUDAL; PERINEURAL ONCE AS NEEDED
Status: DISCONTINUED | OUTPATIENT
Start: 2023-05-03 | End: 2023-05-03 | Stop reason: HOSPADM

## 2023-05-03 RX ORDER — FLUMAZENIL 0.1 MG/ML
0.2 INJECTION INTRAVENOUS AS NEEDED
Status: DISCONTINUED | OUTPATIENT
Start: 2023-05-03 | End: 2023-05-03 | Stop reason: HOSPADM

## 2023-05-03 RX ORDER — DEXTROSE MONOHYDRATE 25 G/50ML
25 INJECTION, SOLUTION INTRAVENOUS ONCE
Status: COMPLETED | OUTPATIENT
Start: 2023-05-03 | End: 2023-05-03

## 2023-05-03 RX ORDER — HYDROCODONE BITARTRATE AND ACETAMINOPHEN 7.5; 325 MG/1; MG/1
1 TABLET ORAL EVERY 8 HOURS PRN
Qty: 4 TABLET | Refills: 0 | Status: SHIPPED | OUTPATIENT
Start: 2023-05-03 | End: 2023-05-03 | Stop reason: SDUPTHER

## 2023-05-03 RX ORDER — MAGNESIUM HYDROXIDE 1200 MG/15ML
LIQUID ORAL AS NEEDED
Status: DISCONTINUED | OUTPATIENT
Start: 2023-05-03 | End: 2023-05-03 | Stop reason: HOSPADM

## 2023-05-03 RX ORDER — LIDOCAINE HYDROCHLORIDE 20 MG/ML
INJECTION, SOLUTION EPIDURAL; INFILTRATION; INTRACAUDAL; PERINEURAL AS NEEDED
Status: DISCONTINUED | OUTPATIENT
Start: 2023-05-03 | End: 2023-05-03 | Stop reason: SURG

## 2023-05-03 RX ORDER — NALOXONE HCL 0.4 MG/ML
0.4 VIAL (ML) INJECTION AS NEEDED
Status: DISCONTINUED | OUTPATIENT
Start: 2023-05-03 | End: 2023-05-03 | Stop reason: HOSPADM

## 2023-05-03 RX ORDER — PROMETHAZINE HYDROCHLORIDE 12.5 MG/1
12.5 TABLET ORAL EVERY 8 HOURS PRN
Qty: 21 TABLET | Refills: 0 | Status: SHIPPED | OUTPATIENT
Start: 2023-05-03 | End: 2023-05-10

## 2023-05-03 RX ORDER — OXYCODONE AND ACETAMINOPHEN 7.5; 325 MG/1; MG/1
2 TABLET ORAL EVERY 4 HOURS PRN
Status: DISCONTINUED | OUTPATIENT
Start: 2023-05-03 | End: 2023-05-03 | Stop reason: HOSPADM

## 2023-05-03 RX ADMIN — ACETAMINOPHEN 1000 MG: 500 TABLET, FILM COATED ORAL at 10:08

## 2023-05-03 RX ADMIN — CEFAZOLIN 2 G: 2 INJECTION, POWDER, FOR SOLUTION INTRAMUSCULAR; INTRAVENOUS at 10:40

## 2023-05-03 RX ADMIN — PROPOFOL INJECTABLE EMULSION 150 MG: 10 INJECTION, EMULSION INTRAVENOUS at 10:37

## 2023-05-03 RX ADMIN — DEXTROSE MONOHYDRATE 25 ML: 25 INJECTION, SOLUTION INTRAVENOUS at 11:42

## 2023-05-03 RX ADMIN — ONDANSETRON 4 MG: 2 INJECTION INTRAMUSCULAR; INTRAVENOUS at 11:12

## 2023-05-03 RX ADMIN — SODIUM CHLORIDE, POTASSIUM CHLORIDE, SODIUM LACTATE AND CALCIUM CHLORIDE 1000 ML: 600; 310; 30; 20 INJECTION, SOLUTION INTRAVENOUS at 09:43

## 2023-05-03 RX ADMIN — LIDOCAINE HYDROCHLORIDE 100 MG: 20 INJECTION, SOLUTION EPIDURAL; INFILTRATION; INTRACAUDAL; PERINEURAL at 10:37

## 2023-05-03 RX ADMIN — ASPIRIN 81 MG: 81 TABLET, CHEWABLE ORAL at 10:08

## 2023-05-03 NOTE — ANESTHESIA POSTPROCEDURE EVALUATION
Patient: Luis Echeverria    Procedure Summary     Date: 05/03/23 Room / Location: [facility] OR  /  [facility] OR    Anesthesia Start: 1034 Anesthesia Stop: 1117    Procedure: Partial vs Complete Amputation of Hallux - Right Foot (Right: Toes) Diagnosis:       Acute osteomyelitis of toe, right      (Acute osteomyelitis of toe, right [M86.171])    Surgeons: Simone Nieves DPM Provider: Genesis Albrecht CRNA    Anesthesia Type: general ASA Status: 3          Anesthesia Type: general    Vitals  Vitals Value Taken Time   BP 95/65 05/03/23 1235   Temp 99 °F (37.2 °C) 05/03/23 1220   Pulse 58 05/03/23 1235   Resp 16 05/03/23 1235   SpO2 100 % 05/03/23 1235           Post Anesthesia Care and Evaluation    Patient location during evaluation: PACU  Patient participation: complete - patient participated  Level of consciousness: awake and alert  Pain management: adequate    Airway patency: patent  Anesthetic complications: No anesthetic complications  PONV Status: none  Cardiovascular status: acceptable and hemodynamically stable  Respiratory status: acceptable  Hydration status: acceptable    Comments: Blood pressure 96/67, pulse 60, temperature 99 °F (37.2 °C), temperature source Temporal, resp. rate 16, SpO2 96 %.    Patient discharged from PACU based upon Robert score. Please see RN notes for further details

## 2023-05-03 NOTE — OP NOTE
POSTOPERATIVE NOTE  Patient: Luis Echeverria  MRN: [id number]    YOB: 1965  Age: 57 y.o.  Sex: male  Unit:  PAD OR Room/Bed: PAD OR/MAIN OR Location: [facility]    Date of Operation: 5/3/2023     Preoperative Diagnosis:  Acute osteomyelitis of toe, right [M86.171]     Postoperative Diagnosis:  1. Same as pre-operative    Operative Procedures:  1. Complete Amputation of Hallux - Right Foot    Surgeon: Surgeon(s) and Role:     * Simone Nieves DPM - Primary    Assistant: JOSEPH Rabago was responsible for performing the following activities: Retraction, Suction, Irrigation and Placing Dressing and their skilled assistance was necessary for the success of this case.     Anesthesia: General     Hemostasis: Anatomic Dissection, Electric cauterization    Estimated Blood Loss: minimal    Pathology:   Specimens     ID Source Type Tests Collected By Collected At Frozen?    A Toe, Right Tissue · TISSUE PATHOLOGY EXAM   Simone Nieves DPM 5/3/23 1059     Description: great toe    This specimen was not marked as sent.          Materials: Nothing was implanted during the procedure    Injectables: 10mL 0.5% Marcaine Plain    Fluids: See anesthesia log.    Drains: None    Complications: None    Postoperative Condition: Stable. Patient tolerated procedure and anesthesia well. Patient left the operating room with vital signs stable and vascular status intact.     Operative Findings: Consistent with preoperative diagnosis.    Indications for Procedure: This 57 y.o. patient presents with necrosis and osteomyelitis of the right hallux.  Patient states that they have failed conservative therapy and opts for surgical correction at this time. The patient has been NPO for greater than 8 hours. The patient is ready for surgical intervention.    DESCRIPTION OF PROCEDURE  Under mild sedation, patient was brought into the operating room and placed on the operating room table in supine position. Preoperative  antibiotic was given.  The patient was placed under General anesthesia, then local block was performed at the surgical site using the above mentioned local anesthesia. The foot and ankle were then scrubbed, prepped and draped in the usual aseptic manner.     Attention was then directed to the right hallux where a racquet type incision was made encompassing the digit.  The incision was made down to the level of bone.  Healthy bleeding was noted at the resection site.  No purulence was noted or encounter.  Next the hallux was disarticulated at the MTPJ and will be sent to pathology for analysis.  The extensor and flexor tendons were resected as far proximal as possible.  The first metatarsal head was inspected and noted to be intact without signs of infection.  Hemostasis was obtained utilizing electrocautery and ligation.  The wound was then flushed with copious amounts of sterile saline.  Deep and subcutaneous tissues were reapproximated utilizing 3-0 Vicryl.  Skin was reapproximated coapted utilizing simple and vertical mattress suturing technique.    Hemostasis was obtained.  Capillary fill was noted to remaining toes.    A bandage consisting of Adaptic, Betadine soaked gauze, ABD, Kerlix, and Coban with minimal compression was applied.    The patient tolerated the procedures and anesthesia well.  He was transferred to the recovery room with vital signs stable and vascular status intact to the right lower extremity.  After period of postoperative monitoring, the patient will be discharged to home with oral and written postoperative instructions.  He will follow-up in office within 1 week.  He is to be nonweightbearing to the surgical foot.

## 2023-05-03 NOTE — BRIEF OP NOTE
AMPUTATION DIGIT  Progress Note    Luis Echeverria  5/3/2023    Pre-op Diagnosis:   Acute osteomyelitis of toe, right [M86.171]       Post-Op Diagnosis Codes:     * Acute osteomyelitis of toe, right [M86.171]    Procedure/CPT® Codes:        Procedure(s):  1. Complete Amputation of Hallux - Right Foot        Surgeon(s):  Simone Nieves DPM    Anesthesia: General    Staff:   Circulator: Jimmy Dill RN  Scrub Person: Fan Watson         Estimated Blood Loss: minimal    Urine Voided: * No values recorded between 5/3/2023 10:32 AM and 5/3/2023 11:09 AM *    Specimens:                Specimens     ID Source Type Tests Collected By Collected At Frozen?    A Toe, Right Tissue · TISSUE PATHOLOGY EXAM   Simone Nieves DPM 5/3/23 1059     Description: great toe    This specimen was not marked as sent.                Drains: * No LDAs found *    Findings: Consistent with pre-operative diagnoses.         Complications: None           Simone Nieves DPM     Date: 5/3/2023  Time: 11:23 CDT

## 2023-05-03 NOTE — ANESTHESIA PREPROCEDURE EVALUATION
Anesthesia Evaluation     Patient summary reviewed and Nursing notes reviewed   no history of anesthetic complications:  NPO Solid Status: > 8 hours  NPO Liquid Status: > 8 hours           Airway   Mallampati: I  TM distance: >3 FB  No difficulty expected  Dental      Pulmonary    (-) sleep apnea, not a smoker  Cardiovascular   Exercise tolerance: good (4-7 METS)    Patient on routine beta blocker and Beta blocker given within 24 hours of surgery    (+) pacemaker ICD, pacemaker, hypertension, past MI , CAD, cardiac stents (5 stents most recent 11/2018) more than 12 months ago CHF Systolic <55%, PVD, hyperlipidemia,  carotid artery disease    ROS comment: 11/2021  ? Left ventricular ejection fraction appears to be 36 - 40%. Left ventricular systolic function is moderately decreased.  ? Left ventricular diastolic function is consistent with (grade I) impaired relaxation.  ? Normal right ventricular cavity size and systolic function noted.  ? There is no significant (greater than mild) valvular dysfunction.      3/28/23 [facility] [employer] ICD interrogation reviewed- <1% paced      Neuro/Psych  (-) seizures, TIA, CVA  GI/Hepatic/Renal/Endo    (+) obesity,   diabetes mellitus type 2 using insulin,     Musculoskeletal     Abdominal    Substance History      OB/GYN          Other            Phys Exam Other: Scratch on upper lip                  Anesthesia Plan    ASA 3     general     (Place magnet to inhibit defibrillator, interrogate device in recovery)  intravenous induction     Anesthetic plan, risks, benefits, and alternatives have been provided, discussed and informed consent has been obtained with: patient.        CODE STATUS:

## 2023-05-03 NOTE — TELEPHONE ENCOUNTER
Requested Prescriptions     Pending Prescriptions Disp Refills   • HYDROcodone-acetaminophen (NORCO) 7.5-325 MG per tablet 4 tablet 0     Sig: Take 1 tablet by mouth Every 8 (Eight) Hours As Needed for Moderate Pain.       Sent to [facility] pharmacy

## 2023-05-04 ENCOUNTER — TELEPHONE (OUTPATIENT)
Dept: PODIATRY | Facility: CLINIC | Age: 58
End: 2023-05-04
Payer: COMMERCIAL

## 2023-05-04 DIAGNOSIS — Z98.890 S/P FOOT SURGERY: ICD-10-CM

## 2023-05-04 RX ORDER — HYDROCODONE BITARTRATE AND ACETAMINOPHEN 7.5; 325 MG/1; MG/1
1 TABLET ORAL EVERY 6 HOURS PRN
Qty: 24 TABLET | Refills: 0 | Status: SHIPPED | OUTPATIENT
Start: 2023-05-04 | End: 2023-05-10

## 2023-05-08 ENCOUNTER — TELEPHONE (OUTPATIENT)
Dept: PODIATRY | Facility: CLINIC | Age: 58
End: 2023-05-08
Payer: COMMERCIAL

## 2023-05-09 ENCOUNTER — OFFICE VISIT (OUTPATIENT)
Dept: PODIATRY | Facility: CLINIC | Age: 58
End: 2023-05-09
Payer: COMMERCIAL

## 2023-05-09 VITALS
HEART RATE: 68 BPM | HEIGHT: 69 IN | BODY MASS INDEX: 37.33 KG/M2 | OXYGEN SATURATION: 99 % | DIASTOLIC BLOOD PRESSURE: 72 MMHG | WEIGHT: 252 LBS | SYSTOLIC BLOOD PRESSURE: 120 MMHG

## 2023-05-09 DIAGNOSIS — Z98.890 S/P FOOT SURGERY: Primary | ICD-10-CM

## 2023-05-09 DIAGNOSIS — Z89.411 AMPUTEE, GREAT TOE, RIGHT: ICD-10-CM

## 2023-05-15 DIAGNOSIS — I25.10 CORONARY ARTERY DISEASE INVOLVING NATIVE CORONARY ARTERY OF NATIVE HEART WITHOUT ANGINA PECTORIS: ICD-10-CM

## 2023-05-15 RX ORDER — METOPROLOL SUCCINATE 50 MG/1
TABLET, EXTENDED RELEASE ORAL
Qty: 135 TABLET | Refills: 3 | Status: SHIPPED | OUTPATIENT
Start: 2023-05-15

## 2023-05-22 NOTE — PROGRESS NOTES
[facility] - PODIATRY    Today's Date: 05/26/2023     Patient Name: Luis Echeverria  MRN: [id number]  CSN: [id number]  PCP: Rene Hogue DO  Referring Provider: No ref. provider found    SUBJECTIVE     Chief Complaint   Patient presents with    Follow-up     Rene Hogue DO   2 WK POST OP/PER DR NORMAN TELLO 05/03/2023- pt states doing good, no pain or issues- pt denies pain     Diabetes     133mg/dl BG at present      HPI: Luis Echeverria, a 57 y.o.male, comes to clinic as a(n) established patient for post-op appt 3 weeks s/p right hallux amputation . Patient has h/o CAD, CHF, DM 2, HLD, MI, PVD . Patient is IDDM with last stated BG level of 133mg/dl. Admits to being up and on the foot some since last appointment. Denies pain.  He has taken medications as prescribed . Denies any constitutional symptoms. No other pedal complaints at this time.    Past Medical History:   Diagnosis Date    CAD (coronary artery disease)     CAD in native artery     2V STEMI 7/13 STENT TO CX AND STENTX2 TO MID AND DISTAL LAD    Chronic systolic CHF (congestive heart failure), NYHA class 2 02/04/2020    Diabetes mellitus     Disease of thyroid gland     GERD (gastroesophageal reflux disease)     History of coronary artery stent placement      x 3 - for ACUTE MI 7/2013    Hyperlipidemia     Hyperlipidemia, mild     Hypertension     Myocardial infarct     Myocardial infarction     Stented coronary artery      Past Surgical History:   Procedure Laterality Date    AMPUTATION DIGIT Right 5/3/2023    Procedure: Partial vs Complete Amputation of Hallux - Right Foot;  Surgeon: Simone Nieves DPM;  Location: [facility] OR;  Service: Podiatry;  Laterality: Right;    AORTAGRAM Right 12/23/2022    Procedure: AORTAGRAM, RIGHT LOWER EXTREMITY ANGIOGRAM, BALLOON ANGIOPLASTY, MYNX CLOSURE;  Surgeon: Sammy Wild MD;  Location: [facility] HYBRID OR 12;  Service: Vascular;  Laterality: Right;    CARDIAC  CATHETERIZATION N/A 11/06/2018    Procedure: Left Heart Cath;  Surgeon: Tru Rai MD;  Location:  [facility] CATH INVASIVE LOCATION;  Service: Cardiology    CARDIAC ELECTROPHYSIOLOGY PROCEDURE N/A 05/01/2019    Procedure: ICD new;  Surgeon: Tru Rai MD;  Location:  [facility] CATH INVASIVE LOCATION;  Service: Cardiology    CAROTID STENT       Family History   Problem Relation Age of Onset    Heart disease Maternal Grandmother     No Known Problems Mother     No Known Problems Father      Social History     Socioeconomic History    Marital status:    Tobacco Use    Smoking status: Never     Passive exposure: Never    Smokeless tobacco: Never   Vaping Use    Vaping Use: Never used   Substance and Sexual Activity    Alcohol use: Yes     Comment: 2 TIMES YEARLY    Drug use: No    Sexual activity: Defer     No Known Allergies  Current Outpatient Medications   Medication Sig Dispense Refill    albuterol sulfate  (90 Base) MCG/ACT inhaler Inhale 2 puffs Every 4 (Four) Hours As Needed for Wheezing or Shortness of Air. 8.5 g 5    aspirin 81 MG EC tablet Take 1 tablet by mouth Daily.      atorvastatin (LIPITOR) 80 MG tablet TAKE 1 TABLET BY MOUTH EVERY DAY AT NIGHT 90 tablet 3    brimonidine (ALPHAGAN) 0.2 % ophthalmic solution Administer 1 drop to both eyes 2 (Two) Times a Day.      Continuous Blood Gluc  (Dexcom G6 ) device REMOVED FOR SURGERY      Continuous Blood Gluc Sensor (Dexcom G6 Sensor) 1 each As Needed (glucose control). Every 10 days 9 each 3    Continuous Blood Gluc Transmit (Dexcom G6 Transmitter) misc 1 each Every 3 (Three) Months. 1 each 3    DULoxetine (CYMBALTA) 20 MG capsule Take 30 mg by mouth Daily.      empagliflozin (Jardiance) 25 MG tablet tablet Take 1 tablet by mouth Daily. 90 tablet 11    ezetimibe (ZETIA) 10 MG tablet Take 1 tablet by mouth Daily. 90 tablet 3    Glucagon (Baqsimi One Pack) 3 MG/DOSE powder 1 each into the nostril(s) as directed by  provider As Needed (Hypoglycemia). Apply intranasal if hypoglycemia 2 each 11    Insulin Aspart (novoLOG) 100 UNIT/ML injection Inject 100 Units under the skin into the appropriate area as directed Daily. Name brand only 30 mL 3    Insulin Degludec (TRESIBA SC) Inject 40 Units under the skin into the appropriate area as directed. DIRECTED TO TAKE THIS BY PRIMARY CARE MD DUE TO INSULIN PUMP BEING EMPTY      levothyroxine (SYNTHROID, LEVOTHROID) 25 MCG tablet Take 1 tablet by mouth Every Morning. 30 tablet 11    lisinopril (PRINIVIL,ZESTRIL) 2.5 MG tablet TAKE 1 TABLET BY MOUTH EVERY DAY 90 tablet 3    metoprolol succinate XL (TOPROL-XL) 50 MG 24 hr tablet TAKE 1 AND 1/2 TABLETS BY MOUTH EVERY  tablet 3    nitroglycerin (NITROSTAT) 0.4 MG SL tablet Place 1 tablet under the tongue Every 5 (Five) Minutes As Needed for Chest Pain. Take no more than 3 doses in 15 minutes. 25 tablet 1    ondansetron ODT (ZOFRAN-ODT) 4 MG disintegrating tablet Place 1 tablet on the tongue Every 8 (Eight) Hours As Needed for Nausea or Vomiting. 21 tablet 0    Rivaroxaban (Xarelto) 2.5 MG tablet Take 1 tablet by mouth 2 (Two) Times a Day. (Patient taking differently: Take 1 tablet by mouth 2 (Two) Times a Day. Had to stop taking until his surgery next Wednesday May 3, 2023) 180 tablet 3    spironolactone (ALDACTONE) 25 MG tablet TAKE 1 TABLET BY MOUTH EVERY DAY 90 tablet 3     No current facility-administered medications for this visit.     Review of Systems   Constitutional:  Negative for chills and fever.   HENT:  Negative for congestion.    Respiratory:  Negative for shortness of breath.    Cardiovascular:  Negative for chest pain and leg swelling.   Gastrointestinal:  Negative for constipation, diarrhea, nausea and vomiting.   Skin:  Positive for wound.   Neurological:  Positive for numbness.     OBJECTIVE     Vitals:    05/26/23 1446   BP: 132/70   Pulse: 63   SpO2: 98%       PHYSICAL EXAM  GEN:   Accompanied by wife.      Foot/Ankle Exam    GENERAL  Appearance:  appears stated age and obese  Orientation:  AAOx3  Affect:  appropriate  Gait:  partial weight bearing  Assistance:  crutches  Right shoe gear: CAM boot  Left shoe gear: casual shoe    VASCULAR     Right Foot Vascularity   Dorsalis pedis:  2+  Posterior tibial:  2+  Skin temperature:  warm  Edema grading:  Trace  CFT:  3  Pedal hair growth:  Present  Varicosities:  none     Left Foot Vascularity   Dorsalis pedis:  2+  Posterior tibial:  2+  Skin temperature:  warm  Edema grading:  None  CFT:  3  Pedal hair growth:  Present  Varicosities:  none     NEUROLOGIC     Right Foot Neurologic   Light touch sensation: diminished  Vibratory sensation: diminished  Hot/Cold sensation: diminished     Left Foot Neurologic   Light touch sensation: diminished  Vibratory sensation: diminished  Hot/Cold sensation:  diminished    MUSCULOSKELETAL     Right Foot Musculoskeletal    Amputation   Toes amputated: first toe  Ecchymosis:  none  Tenderness:  none    Arch:  Normal     Left Foot Musculoskeletal   Ecchymosis:  none  Tenderness:  none  Arch:  Normal    MUSCLE STRENGTH     Right Foot Muscle Strength   Foot dorsiflexion:  5  Foot plantar flexion:  5  Foot inversion:  5  Foot eversion:  5     Left Foot Muscle Strength   Foot dorsiflexion:  5  Foot plantar flexion:  5  Foot inversion:  5  Foot eversion:  5    RANGE OF MOTION     Right Foot Range of Motion   Foot and ankle ROM within normal limits       Left Foot Range of Motion   Foot and ankle ROM within normal limits      DERMATOLOGIC      Right Foot Dermatologic   Skin  Right foot skin is not intact. Positive for wound.      Left Foot Dermatologic   Skin  Left foot skin is intact.      Right foot additional comments: Amputation site with sutures intact. Area not fully healed, appears mostly superficial. Sutures left intact today.    RADIOLOGY/NUCLEAR:      LABORATORY/CULTURE RESULTS:      PATHOLOGY RESULTS:       ASSESSMENT/PLAN      Diagnoses and all orders for this visit:    1. S/P foot surgery (Primary)    2. Amputee, great toe, right    3. Delayed surgical wound healing, initial encounter      Comprehensive lower extremity examination and evaluation was performed.  Discussed findings and treatment plan including risks, benefits, and treatment options with patient in detail. Patient agreed with treatment plan.  Reviewed at home diabetic foot care including daily foot checks   Bandage removed and surgical site evaluated. Incomplete healing of surgical site at this time. Similar dressing reapplied with betadine soaked gauze, kerlix, and coban. Patient to leave dressing on x1 week then may removed and start daily dressing changes with betadine.    Remain heel touch in CAM on limited basis and NWB would be preferred. Discussed that more time spent off of foot would expedite healing.   An After Visit Summary was printed and given to the patient at discharge, including (if requested) any available informative/educational handouts regarding diagnosis, treatment, or medications. All questions were answered to patient/family satisfaction. Should symptoms fail to improve or worsen they agree to call or return to clinic or to go to the Emergency Department. Discussed the importance of following up with any needed screening tests/labs/specialist appointments and any requested follow-up recommended by me today. Importance of maintaining follow-up discussed and patient accepts that missed appointments can delay diagnosis and potentially lead to worsening of conditions.  Return in about 2 weeks (around 6/9/2023) for Post-Op appointment, Follow-up with Podiatry Provider., or sooner if acute issues arise.    Lab Frequency Next Occurrence   Follow Anesthesia Guidelines / Protocol Once 12/18/2022   Obtain Informed Consent Once 12/18/2022   Provide NPO Instructions to Patient Once 12/18/2022   Chlorhexidine Skin Prep Once 12/18/2022   Follow Anesthesia  Guidelines / Protocol Once 04/22/2023   Obtain Informed Consent Once 04/22/2023   Provide Instructions to Patient Regarding NPO Status Once 04/22/2023   Chlorhexidine Skin Prep - Educate and Review With Patient Once 04/22/2023   Instructions on coughing, deep breathing, and incentive spirometry. Once 04/22/2023   US Carotid Bilateral Once 10/18/2023   US Ankle / Brachial Indices Extremity Complete Once 10/18/2023   US Venous Doppler Lower Extremity Bilateral (duplex) Once 10/18/2023       This document has been electronically signed by Simone Nieves DPM on May 26, 2023 15:11 CDT

## 2023-05-25 ENCOUNTER — TELEPHONE (OUTPATIENT)
Dept: PODIATRY | Facility: CLINIC | Age: 58
End: 2023-05-25
Payer: COMMERCIAL

## 2023-05-26 ENCOUNTER — OFFICE VISIT (OUTPATIENT)
Dept: PODIATRY | Facility: CLINIC | Age: 58
End: 2023-05-26
Payer: COMMERCIAL

## 2023-05-26 VITALS
BODY MASS INDEX: 36.14 KG/M2 | SYSTOLIC BLOOD PRESSURE: 132 MMHG | HEART RATE: 63 BPM | HEIGHT: 69 IN | DIASTOLIC BLOOD PRESSURE: 70 MMHG | OXYGEN SATURATION: 98 % | WEIGHT: 244 LBS

## 2023-05-26 DIAGNOSIS — T81.89XA DELAYED SURGICAL WOUND HEALING, INITIAL ENCOUNTER: ICD-10-CM

## 2023-05-26 DIAGNOSIS — Z89.411 AMPUTEE, GREAT TOE, RIGHT: ICD-10-CM

## 2023-05-26 DIAGNOSIS — Z98.890 S/P FOOT SURGERY: Primary | ICD-10-CM

## 2023-06-06 NOTE — PROGRESS NOTES
[facility] - PODIATRY    Today's Date: 06/09/2023     Patient Name: Luis Echeverria  MRN: [id number]  CSN: [id number]  PCP: Rene Hogue DO  Referring Provider: No ref. provider found    SUBJECTIVE     Chief Complaint   Patient presents with    Follow-up     Rene Hogue DO-11/2/2022-2 WK POST OP surgery DOS 05/03/2023- pt states doing good- pt denies pain     Diabetes     177mg/dl BG at present     HPI: Luis Echeverria, a 57 y.o.male, comes to clinic as a(n) established patient for post-op appt 5 weeks s/p right hallux amputation with delayed healing . Patient has h/o CAD, CHF, DM 2, HLD, MI, PVD . Patient is IDDM with last stated BG level of 177mg/dl. Admits to mild to moderate WB in CAM and crutch. Denies pain.  He has taken medications as prescribed . Denies any constitutional symptoms. No other pedal complaints at this time.    Past Medical History:   Diagnosis Date    CAD (coronary artery disease)     CAD in native artery     2V STEMI 7/13 STENT TO CX AND STENTX2 TO MID AND DISTAL LAD    Chronic systolic CHF (congestive heart failure), NYHA class 2 02/04/2020    Diabetes mellitus     Disease of thyroid gland     GERD (gastroesophageal reflux disease)     History of coronary artery stent placement      x 3 - for ACUTE MI 7/2013    Hyperlipidemia     Hyperlipidemia, mild     Hypertension     Myocardial infarct     Myocardial infarction     Stented coronary artery      Past Surgical History:   Procedure Laterality Date    AMPUTATION DIGIT Right 5/3/2023    Procedure: Partial vs Complete Amputation of Hallux - Right Foot;  Surgeon: Simone Nieves DPM;  Location: [facility] OR;  Service: Podiatry;  Laterality: Right;    AORTAGRAM Right 12/23/2022    Procedure: AORTAGRAM, RIGHT LOWER EXTREMITY ANGIOGRAM, BALLOON ANGIOPLASTY, MYNX CLOSURE;  Surgeon: Sammy Wild MD;  Location:  [facility] HYBRID OR 12;  Service: Vascular;  Laterality: Right;    CARDIAC CATHETERIZATION N/A  11/06/2018    Procedure: Left Heart Cath;  Surgeon: Tru Rai MD;  Location:  [facility] CATH INVASIVE LOCATION;  Service: Cardiology    CARDIAC ELECTROPHYSIOLOGY PROCEDURE N/A 05/01/2019    Procedure: ICD new;  Surgeon: Tru Rai MD;  Location:  [facility] CATH INVASIVE LOCATION;  Service: Cardiology    CAROTID STENT       Family History   Problem Relation Age of Onset    Heart disease Maternal Grandmother     No Known Problems Mother     No Known Problems Father      Social History     Socioeconomic History    Marital status:    Tobacco Use    Smoking status: Never     Passive exposure: Never    Smokeless tobacco: Never   Vaping Use    Vaping Use: Never used   Substance and Sexual Activity    Alcohol use: Yes     Comment: 2 TIMES YEARLY    Drug use: No    Sexual activity: Defer     No Known Allergies  Current Outpatient Medications   Medication Sig Dispense Refill    albuterol sulfate  (90 Base) MCG/ACT inhaler Inhale 2 puffs Every 4 (Four) Hours As Needed for Wheezing or Shortness of Air. 8.5 g 5    aspirin 81 MG EC tablet Take 1 tablet by mouth Daily.      atorvastatin (LIPITOR) 80 MG tablet TAKE 1 TABLET BY MOUTH EVERY DAY AT NIGHT 90 tablet 3    brimonidine (ALPHAGAN) 0.2 % ophthalmic solution Administer 1 drop to both eyes 2 (Two) Times a Day.      Continuous Blood Gluc  (Dexcom G6 ) device REMOVED FOR SURGERY      Continuous Blood Gluc Sensor (Dexcom G6 Sensor) 1 each As Needed (glucose control). Every 10 days 9 each 3    Continuous Blood Gluc Transmit (Dexcom G6 Transmitter) misc 1 each Every 3 (Three) Months. 1 each 3    DULoxetine (CYMBALTA) 20 MG capsule Take 30 mg by mouth Daily.      empagliflozin (Jardiance) 25 MG tablet tablet Take 1 tablet by mouth Daily. 90 tablet 11    ezetimibe (ZETIA) 10 MG tablet Take 1 tablet by mouth Daily. 90 tablet 3    Glucagon (Baqsimi One Pack) 3 MG/DOSE powder 1 each into the nostril(s) as directed by provider As Needed  (Hypoglycemia). Apply intranasal if hypoglycemia 2 each 11    Insulin Aspart (novoLOG) 100 UNIT/ML injection Inject 100 Units under the skin into the appropriate area as directed Daily. Name brand only 30 mL 3    Insulin Degludec (TRESIBA SC) Inject 40 Units under the skin into the appropriate area as directed. DIRECTED TO TAKE THIS BY PRIMARY CARE MD DUE TO INSULIN PUMP BEING EMPTY      levothyroxine (SYNTHROID, LEVOTHROID) 25 MCG tablet Take 1 tablet by mouth Every Morning. 30 tablet 11    lisinopril (PRINIVIL,ZESTRIL) 2.5 MG tablet TAKE 1 TABLET BY MOUTH EVERY DAY 90 tablet 3    metoprolol succinate XL (TOPROL-XL) 50 MG 24 hr tablet TAKE 1 AND 1/2 TABLETS BY MOUTH EVERY  tablet 3    nitroglycerin (NITROSTAT) 0.4 MG SL tablet Place 1 tablet under the tongue Every 5 (Five) Minutes As Needed for Chest Pain. Take no more than 3 doses in 15 minutes. 25 tablet 1    ondansetron ODT (ZOFRAN-ODT) 4 MG disintegrating tablet Place 1 tablet on the tongue Every 8 (Eight) Hours As Needed for Nausea or Vomiting. 21 tablet 0    Rivaroxaban (Xarelto) 2.5 MG tablet Take 1 tablet by mouth 2 (Two) Times a Day. (Patient taking differently: Take 1 tablet by mouth 2 (Two) Times a Day. Had to stop taking until his surgery next Wednesday May 3, 2023) 180 tablet 3    spironolactone (ALDACTONE) 25 MG tablet TAKE 1 TABLET BY MOUTH EVERY DAY 90 tablet 3     No current facility-administered medications for this visit.     Review of Systems   Constitutional:  Negative for chills and fever.   HENT:  Negative for congestion.    Respiratory:  Negative for shortness of breath.    Cardiovascular:  Negative for chest pain and leg swelling.   Gastrointestinal:  Negative for constipation, diarrhea, nausea and vomiting.   Skin:  Positive for wound.   Neurological:  Positive for numbness.     OBJECTIVE     Vitals:    06/09/23 0917   BP: 132/72   Pulse: 91   SpO2: 99%       PHYSICAL EXAM  GEN:   Accompanied by wife.     Foot/Ankle  Exam    GENERAL  Appearance:  appears stated age and obese  Orientation:  AAOx3  Affect:  appropriate  Gait:  partial weight bearing  Assistance:  crutches  Right shoe gear: CAM boot  Left shoe gear: casual shoe    VASCULAR     Right Foot Vascularity   Dorsalis pedis:  2+  Posterior tibial:  2+  Skin temperature:  warm  Edema grading:  Trace  CFT:  3  Pedal hair growth:  Present  Varicosities:  none     Left Foot Vascularity   Dorsalis pedis:  2+  Posterior tibial:  2+  Skin temperature:  warm  Edema grading:  None  CFT:  3  Pedal hair growth:  Present  Varicosities:  none     NEUROLOGIC     Right Foot Neurologic   Light touch sensation: diminished  Vibratory sensation: diminished  Hot/Cold sensation: diminished     Left Foot Neurologic   Light touch sensation: diminished  Vibratory sensation: diminished  Hot/Cold sensation:  diminished    MUSCULOSKELETAL     Right Foot Musculoskeletal    Amputation   Toes amputated: first toe  Ecchymosis:  none  Tenderness:  none    Arch:  Normal     Left Foot Musculoskeletal   Ecchymosis:  none  Tenderness:  none  Arch:  Normal    MUSCLE STRENGTH     Right Foot Muscle Strength   Foot dorsiflexion:  5  Foot plantar flexion:  5  Foot inversion:  5  Foot eversion:  5     Left Foot Muscle Strength   Foot dorsiflexion:  5  Foot plantar flexion:  5  Foot inversion:  5  Foot eversion:  5    RANGE OF MOTION     Right Foot Range of Motion   Foot and ankle ROM within normal limits       Left Foot Range of Motion   Foot and ankle ROM within normal limits      DERMATOLOGIC      Right Foot Dermatologic   Skin  Right foot skin is not intact. Positive for wound.      Left Foot Dermatologic   Skin  Left foot skin is intact.      Right foot additional comments: Amputation site with sutures intact. Upon removal of sutures, small area of healing along incision remains measured at 0.2cm x 2.3cm x 0.1cm. No SOI.     RADIOLOGY/NUCLEAR:      LABORATORY/CULTURE RESULTS:      PATHOLOGY RESULTS:        ASSESSMENT/PLAN     Diagnoses and all orders for this visit:    1. S/P foot surgery (Primary)    2. Amputee, great toe, right    3. Delayed surgical wound healing, initial encounter      Comprehensive lower extremity examination and evaluation was performed.  Discussed findings and treatment plan including risks, benefits, and treatment options with patient in detail. Patient agreed with treatment plan.  Reviewed at home diabetic foot care including daily foot checks   Bandage removed and surgical site evaluated.  Sutures removed. Small area of residual healing remains. Apply abx ointment and light bandage. Reapply bandage daily.   Remain heel touch in CAM on limited basis and NWB would be preferred. Discussed that more time spent off of foot would expedite healing.   An After Visit Summary was printed and given to the patient at discharge, including (if requested) any available informative/educational handouts regarding diagnosis, treatment, or medications. All questions were answered to patient/family satisfaction. Should symptoms fail to improve or worsen they agree to call or return to clinic or to go to the Emergency Department. Discussed the importance of following up with any needed screening tests/labs/specialist appointments and any requested follow-up recommended by me today. Importance of maintaining follow-up discussed and patient accepts that missed appointments can delay diagnosis and potentially lead to worsening of conditions.  Return in about 2 weeks (around 6/23/2023) for Post-Op appointment., or sooner if acute issues arise.    Lab Frequency Next Occurrence   Follow Anesthesia Guidelines / Protocol Once 12/18/2022   Obtain Informed Consent Once 12/18/2022   Provide NPO Instructions to Patient Once 12/18/2022   Chlorhexidine Skin Prep Once 12/18/2022   Follow Anesthesia Guidelines / Protocol Once 04/22/2023   Obtain Informed Consent Once 04/22/2023   Provide Instructions to Patient Regarding NPO  Status Once 04/22/2023   Chlorhexidine Skin Prep - Educate and Review With Patient Once 04/22/2023   Instructions on coughing, deep breathing, and incentive spirometry. Once 04/22/2023   US Carotid Bilateral Once 10/18/2023   US Ankle / Brachial Indices Extremity Complete Once 10/18/2023   US Venous Doppler Lower Extremity Bilateral (duplex) Once 10/18/2023       This document has been electronically signed by Simone Nieves DPM on June 9, 2023 09:40 CDT

## 2023-06-08 ENCOUNTER — TELEPHONE (OUTPATIENT)
Dept: PODIATRY | Facility: CLINIC | Age: 58
End: 2023-06-08
Payer: COMMERCIAL

## 2023-06-09 ENCOUNTER — OFFICE VISIT (OUTPATIENT)
Dept: PODIATRY | Facility: CLINIC | Age: 58
End: 2023-06-09
Payer: COMMERCIAL

## 2023-06-09 VITALS
BODY MASS INDEX: 36.14 KG/M2 | SYSTOLIC BLOOD PRESSURE: 132 MMHG | DIASTOLIC BLOOD PRESSURE: 72 MMHG | HEIGHT: 69 IN | HEART RATE: 91 BPM | OXYGEN SATURATION: 99 % | WEIGHT: 244 LBS

## 2023-06-09 DIAGNOSIS — Z89.411 AMPUTEE, GREAT TOE, RIGHT: ICD-10-CM

## 2023-06-09 DIAGNOSIS — Z98.890 S/P FOOT SURGERY: Primary | ICD-10-CM

## 2023-06-09 DIAGNOSIS — T81.89XA DELAYED SURGICAL WOUND HEALING, INITIAL ENCOUNTER: ICD-10-CM

## 2023-06-13 DIAGNOSIS — I25.10 CORONARY ARTERY DISEASE INVOLVING NATIVE CORONARY ARTERY OF NATIVE HEART WITHOUT ANGINA PECTORIS: ICD-10-CM

## 2023-06-13 RX ORDER — METOPROLOL SUCCINATE 50 MG/1
75 TABLET, EXTENDED RELEASE ORAL DAILY
Qty: 135 TABLET | Refills: 3 | Status: SHIPPED | OUTPATIENT
Start: 2023-06-13

## 2023-08-04 DIAGNOSIS — I50.22 CHRONIC SYSTOLIC CHF (CONGESTIVE HEART FAILURE), NYHA CLASS 2: ICD-10-CM

## 2023-08-04 RX ORDER — SPIRONOLACTONE 25 MG/1
TABLET ORAL
Qty: 90 TABLET | Refills: 3 | Status: SHIPPED | OUTPATIENT
Start: 2023-08-04

## 2023-08-07 ENCOUNTER — TRANSCRIBE ORDERS (OUTPATIENT)
Dept: ADMINISTRATIVE | Facility: HOSPITAL | Age: 58
End: 2023-08-07
Payer: COMMERCIAL

## 2023-08-07 DIAGNOSIS — N18.32 STAGE 3B CHRONIC KIDNEY DISEASE: Primary | ICD-10-CM

## 2023-08-08 ENCOUNTER — LAB (OUTPATIENT)
Dept: LAB | Facility: HOSPITAL | Age: 58
End: 2023-08-08
Payer: COMMERCIAL

## 2023-08-08 ENCOUNTER — OFFICE VISIT (OUTPATIENT)
Dept: ENDOCRINOLOGY | Facility: CLINIC | Age: 58
End: 2023-08-08
Payer: COMMERCIAL

## 2023-08-08 VITALS
HEART RATE: 72 BPM | OXYGEN SATURATION: 99 % | SYSTOLIC BLOOD PRESSURE: 120 MMHG | BODY MASS INDEX: 36.43 KG/M2 | HEIGHT: 69 IN | WEIGHT: 246 LBS | DIASTOLIC BLOOD PRESSURE: 62 MMHG

## 2023-08-08 DIAGNOSIS — E10.42 DIABETIC POLYNEUROPATHY ASSOCIATED WITH TYPE 1 DIABETES MELLITUS: ICD-10-CM

## 2023-08-08 DIAGNOSIS — E78.2 MIXED HYPERLIPIDEMIA: ICD-10-CM

## 2023-08-08 DIAGNOSIS — E10.65 TYPE 1 DIABETES MELLITUS WITH HYPERGLYCEMIA: Primary | ICD-10-CM

## 2023-08-08 DIAGNOSIS — I10 PRIMARY HYPERTENSION: ICD-10-CM

## 2023-08-08 LAB
ALBUMIN SERPL-MCNC: 4.1 G/DL (ref 3.5–5.2)
ALBUMIN/GLOB SERPL: 1.2 G/DL
ALP SERPL-CCNC: 96 U/L (ref 39–117)
ALT SERPL W P-5'-P-CCNC: 25 U/L (ref 1–41)
ANION GAP SERPL CALCULATED.3IONS-SCNC: 11 MMOL/L (ref 5–15)
AST SERPL-CCNC: 21 U/L (ref 1–40)
BASOPHILS # BLD AUTO: 0.06 10*3/MM3 (ref 0–0.2)
BASOPHILS NFR BLD AUTO: 0.7 % (ref 0–1.5)
BILIRUB SERPL-MCNC: 0.3 MG/DL (ref 0–1.2)
BUN SERPL-MCNC: 35 MG/DL (ref 6–20)
BUN/CREAT SERPL: 24.1 (ref 7–25)
CALCIUM SPEC-SCNC: 9 MG/DL (ref 8.6–10.5)
CHLORIDE SERPL-SCNC: 100 MMOL/L (ref 98–107)
CO2 SERPL-SCNC: 24 MMOL/L (ref 22–29)
CREAT SERPL-MCNC: 1.45 MG/DL (ref 0.76–1.27)
DEPRECATED RDW RBC AUTO: 47.4 FL (ref 37–54)
EGFRCR SERPLBLD CKD-EPI 2021: 56.9 ML/MIN/1.73
EOSINOPHIL # BLD AUTO: 0.31 10*3/MM3 (ref 0–0.4)
EOSINOPHIL NFR BLD AUTO: 3.7 % (ref 0.3–6.2)
ERYTHROCYTE [DISTWIDTH] IN BLOOD BY AUTOMATED COUNT: 14.9 % (ref 12.3–15.4)
GLOBULIN UR ELPH-MCNC: 3.3 GM/DL
GLUCOSE SERPL-MCNC: 104 MG/DL (ref 65–99)
HBA1C MFR BLD: 8 % (ref 4.8–5.6)
HCT VFR BLD AUTO: 41.8 % (ref 37.5–51)
HGB BLD-MCNC: 13.4 G/DL (ref 13–17.7)
IMM GRANULOCYTES # BLD AUTO: 0.02 10*3/MM3 (ref 0–0.05)
IMM GRANULOCYTES NFR BLD AUTO: 0.2 % (ref 0–0.5)
LYMPHOCYTES # BLD AUTO: 1.6 10*3/MM3 (ref 0.7–3.1)
LYMPHOCYTES NFR BLD AUTO: 18.9 % (ref 19.6–45.3)
MCH RBC QN AUTO: 28 PG (ref 26.6–33)
MCHC RBC AUTO-ENTMCNC: 32.1 G/DL (ref 31.5–35.7)
MCV RBC AUTO: 87.4 FL (ref 79–97)
MONOCYTES # BLD AUTO: 0.57 10*3/MM3 (ref 0.1–0.9)
MONOCYTES NFR BLD AUTO: 6.7 % (ref 5–12)
NEUTROPHILS NFR BLD AUTO: 5.91 10*3/MM3 (ref 1.7–7)
NEUTROPHILS NFR BLD AUTO: 69.8 % (ref 42.7–76)
NRBC BLD AUTO-RTO: 0 /100 WBC (ref 0–0.2)
PLATELET # BLD AUTO: 275 10*3/MM3 (ref 140–450)
PMV BLD AUTO: 9.3 FL (ref 6–12)
POTASSIUM SERPL-SCNC: 4.4 MMOL/L (ref 3.5–5.2)
PROT SERPL-MCNC: 7.4 G/DL (ref 6–8.5)
RBC # BLD AUTO: 4.78 10*6/MM3 (ref 4.14–5.8)
SODIUM SERPL-SCNC: 135 MMOL/L (ref 136–145)
TSH SERPL DL<=0.05 MIU/L-ACNC: 9.67 UIU/ML (ref 0.27–4.2)
WBC NRBC COR # BLD: 8.47 10*3/MM3 (ref 3.4–10.8)

## 2023-08-08 PROCEDURE — 80050 GENERAL HEALTH PANEL: CPT | Performed by: NURSE PRACTITIONER

## 2023-08-08 PROCEDURE — 83036 HEMOGLOBIN GLYCOSYLATED A1C: CPT | Performed by: NURSE PRACTITIONER

## 2023-08-08 PROCEDURE — 36415 COLL VENOUS BLD VENIPUNCTURE: CPT | Performed by: NURSE PRACTITIONER

## 2023-08-08 RX ORDER — INSULIN LISPRO-AABC 100 [IU]/ML
INJECTION, SOLUTION INTRAVENOUS; SUBCUTANEOUS
Qty: 150 ML | Refills: 3 | Status: SHIPPED | OUTPATIENT
Start: 2023-08-08

## 2023-08-08 NOTE — PROGRESS NOTES
"Chief Complaint  Diabetes (Tandem and Dexcom G6)    Subjective          Luis Echeverria presents to [facility] ENDOCRINOLOGY  Diabetes         In office visit     Referring provider Rene Hogue DO       Chief Complaint   Patient presents with    Diabetes     Tandem and Dexcom G6       HPI      57-year-old male presents for follow-up    Reason diabetes mellitus type:    Lab revealed low C-peptide    Duration since age 23    Timing constant    Quality not controlled    Severity is high    Complications CAD, MI, stent x5, neuropathy and diabetic retinopathy    He has been following with podiatry for diabetic ulcer great toe        Current diabetes regimen       Insulin by tandem pump     Oral medications     Current glucose monitoring     Fingerstick's     Check 6 times daily     Using dexcom G6       See below       Review of Systems - General ROS: negative            Objective   Vital Signs:   /62   Pulse 72   Ht 175.3 cm (69\")   Wt 112 kg (246 lb)   SpO2 99%   BMI 36.33 kg/mý     Physical Exam  Constitutional:       Appearance: Normal appearance.   Cardiovascular:      Rate and Rhythm: Regular rhythm.      Heart sounds: Normal heart sounds.   Pulmonary:      Breath sounds: Normal breath sounds.   Musculoskeletal:      Cervical back: Normal range of motion.   Neurological:      Mental Status: He is alert.      Result Review :   The following data was reviewed by: ANDRES Lanza on 02/27/2023:  Common labs          12/20/2022    13:39 2/27/2023    14:09 4/28/2023    09:11   Common Labs   Glucose 187  70  114    BUN 31  24  39    Creatinine 1.49  1.30  1.75    Sodium 137  138  135    Potassium 4.4  4.3  4.7    Chloride 101  102  101    Calcium 8.8  9.7  9.3    Albumin  4.0     Total Bilirubin  0.2     Alkaline Phosphatase  94     AST (SGOT)  28     ALT (SGPT)  27     WBC 6.81  7.67  6.49    Hemoglobin 12.6  13.5  12.4    Hematocrit 39.8  41.5  40.1    Platelets 267 "  308  285    Hemoglobin A1C  8.70                 Assessment and Plan    Diagnoses and all orders for this visit:    1. Type 1 diabetes mellitus with hyperglycemia (Primary)  -     CBC & Differential  -     Comprehensive Metabolic Panel  -     Hemoglobin A1c  -     TSH    2. Primary hypertension    3. Mixed hyperlipidemia    4. Diabetic polyneuropathy associated with type 1 diabetes mellitus    Other orders  -     Lyumjev 100 UNIT/ML injection; 200 units daily through pump  Dispense: 150 mL; Refill: 3                 Glycemic Management:      Diabetes mellitus type 1 beta cell depletion     Lab Results   Component Value Date    HGBA1C 8.70 (H) 02/27/2023             Component      Latest Ref Rng 2/27/2023   C-Peptide      1.1 - 4.4 ng/mL <0.1 (L)       (L) Low        Tandem pump with control IQ       Ambulatory Glucose Profile Report    Days Analyzed : 2 week period ending on 08/08/23      Continuous Glucose Monitory Device:  Dexcom G6     0% very high target range  44% high target range  55% in target range  1% below target range  GMI 7.8 %  Average glucose 178 mg/dl    Interpretation : Diabetes Type 1 Uncontrolled                  Basal     MN - 2.4      Carb ratio    MN -- 4    Correction     MN - 15     Target Bg -110                 Taking jardiance 25 mg daily         Previus regimen     Taking Tresiba 55 units once daily       Taking Novolog 35 units each meal     + sliding scale             Goals for sugar    Fasting and before meals 80 to 130    2 hours after meals 180 or less      Aim for 60 grams of carbohydrate per meal    Aim for 15 grams of carbohydrate per snack                 Microvascular Complications Monitoring           Last eye exam==, sees eye doctor every 3 months , gets injections     Neuropathy --yes     Taking Cymbalta       No renal disease     Ulcer on big toe right foot , following with wound care --- will have toe removed       Lipid Management:       Taking lipitor 80 mg at night            Blood Pressure Management:    Taking lisinopril 2.5 mg daily     Taking metoprolol xl 50 mg daily     Taking aldactone 25 mg one daily         Thyroid Health    Not on thyroid medication          Preventive Care:       Non smoker                   Follow Up   No follow-ups on file.  Patient was given instructions and counseling regarding his condition or for health maintenance advice. Please see specific information pulled into the AVS if appropriate.         This document has been electronically signed by ANDRES Lanza on August 8, 2023 09:01 CDT.

## 2023-08-11 ENCOUNTER — TELEPHONE (OUTPATIENT)
Dept: ENDOCRINOLOGY | Facility: CLINIC | Age: 58
End: 2023-08-11
Payer: COMMERCIAL

## 2023-08-11 NOTE — TELEPHONE ENCOUNTER
----- Message from ANDRES Lanza sent at 8/9/2023  9:42 AM CDT -----  Let her know his A1c was 8% so this is down from 8.7, his thyroid level is 9.6 placing what dosage of thyroid medication is currently taking.  His GFR was 56 this is improved there is a chemistry    April his GFR was in the 40s, blood count was normal

## 2023-08-14 ENCOUNTER — HOSPITAL ENCOUNTER (OUTPATIENT)
Dept: ULTRASOUND IMAGING | Facility: HOSPITAL | Age: 58
Discharge: HOME | End: 2023-08-14
Admitting: INTERNAL MEDICINE
Payer: COMMERCIAL

## 2023-08-14 DIAGNOSIS — N18.32 STAGE 3B CHRONIC KIDNEY DISEASE: ICD-10-CM

## 2023-08-14 PROCEDURE — 76775 US EXAM ABDO BACK WALL LIM: CPT

## 2023-08-25 ENCOUNTER — DOCUMENTATION (OUTPATIENT)
Dept: ENDOCRINOLOGY | Facility: CLINIC | Age: 58
End: 2023-08-25
Payer: COMMERCIAL

## 2023-08-28 ENCOUNTER — DOCUMENTATION (OUTPATIENT)
Dept: ENDOCRINOLOGY | Facility: CLINIC | Age: 58
End: 2023-08-28
Payer: COMMERCIAL

## 2023-10-12 NOTE — PROGRESS NOTES
[facility] - PODIATRY    Today's Date: 10/20/2023     Patient Name: Luis Echeverria  MRN: [id number]  CSN: [id number]  PCP: Rene Hogue DO  Referring Provider: No ref. provider found    SUBJECTIVE     Chief Complaint   Patient presents with    Follow-up      Rene Hogue 3 MTH FU DIABETIC- pt states feet doing ok, sore on end of left great toe but its healing at this time- pt denies pain     Diabetes     204mg/dl BG at present just after lunch     HPI: Luis Echeverria, a 58 y.o.male, comes to clinic as a(n) established patient presenting for diabetic foot exam and complaining of toenail/callus issues. Patient has h/o CAD, CHF, DM 2, HLD, MI, PVD . Patient is IDDM with last stated BG level of 204mg/dl. Relates his amputation site has fully healed without complications. Notes a small sore to the tip of his left great toe. He bumped the toe about 2-3 weeks ago. Notes that it has slowly been improving. Had updated TEENA today. Denies pain. Denies previous treatment. Denies any constitutional symptoms. No other pedal complaints at this time.    Past Medical History:   Diagnosis Date    CAD (coronary artery disease)     CAD in native artery     2V STEMI 7/13 STENT TO CX AND STENTX2 TO MID AND DISTAL LAD    Chronic systolic CHF (congestive heart failure), NYHA class 2 02/04/2020    Diabetes mellitus     Disease of thyroid gland     GERD (gastroesophageal reflux disease)     History of coronary artery stent placement      x 3 - for ACUTE MI 7/2013    Hyperlipidemia     Hyperlipidemia, mild     Hypertension     Myocardial infarct     Myocardial infarction     Stented coronary artery      Past Surgical History:   Procedure Laterality Date    AMPUTATION DIGIT Right 5/3/2023    Procedure: Partial vs Complete Amputation of Hallux - Right Foot;  Surgeon: Simone Nieves DPM;  Location:  [facility] OR;  Service: Podiatry;  Laterality: Right;    AORTAGRAM Right 12/23/2022    Procedure: AORTAGRAM,  RIGHT LOWER EXTREMITY ANGIOGRAM, BALLOON ANGIOPLASTY, MYNX CLOSURE;  Surgeon: Sammy Wild MD;  Location:  [facility] HYBRID OR 12;  Service: Vascular;  Laterality: Right;    CARDIAC CATHETERIZATION N/A 11/06/2018    Procedure: Left Heart Cath;  Surgeon: Tru Rai MD;  Location:  [facility] CATH INVASIVE LOCATION;  Service: Cardiology    CARDIAC ELECTROPHYSIOLOGY PROCEDURE N/A 05/01/2019    Procedure: ICD new;  Surgeon: Tru Rai MD;  Location:  [facility] CATH INVASIVE LOCATION;  Service: Cardiology    CAROTID STENT       Family History   Problem Relation Age of Onset    Heart disease Maternal Grandmother     No Known Problems Mother     No Known Problems Father      Social History     Socioeconomic History    Marital status:    Tobacco Use    Smoking status: Never     Passive exposure: Never    Smokeless tobacco: Never   Vaping Use    Vaping Use: Never used   Substance and Sexual Activity    Alcohol use: Yes     Comment: 2 TIMES YEARLY    Drug use: No    Sexual activity: Defer     No Known Allergies  Current Outpatient Medications   Medication Sig Dispense Refill    albuterol sulfate  (90 Base) MCG/ACT inhaler Inhale 2 puffs Every 4 (Four) Hours As Needed for Wheezing or Shortness of Air. 8.5 g 5    aspirin 81 MG EC tablet Take 1 tablet by mouth Daily.      atorvastatin (LIPITOR) 80 MG tablet TAKE 1 TABLET BY MOUTH EVERY DAY AT NIGHT 90 tablet 3    brimonidine (ALPHAGAN) 0.2 % ophthalmic solution Administer 1 drop to both eyes 2 (Two) Times a Day.      Continuous Blood Gluc  (Dexcom G6 ) device REMOVED FOR SURGERY      Continuous Blood Gluc Sensor (Dexcom G6 Sensor) 1 each As Needed (glucose control). Every 10 days 9 each 3    Continuous Blood Gluc Transmit (Dexcom G6 Transmitter) misc 1 each Every 3 (Three) Months. 1 each 3    DULoxetine (CYMBALTA) 20 MG capsule Take 30 mg by mouth Daily.      empagliflozin (Jardiance) 25 MG tablet tablet Take 1 tablet by  mouth Daily. 90 tablet 11    ezetimibe (ZETIA) 10 MG tablet Take 1 tablet by mouth Daily. 90 tablet 3    Glucagon (Baqsimi One Pack) 3 MG/DOSE powder 1 each into the nostril(s) as directed by provider As Needed (Hypoglycemia). Apply intranasal if hypoglycemia 2 each 11    Insulin Aspart (novoLOG) 100 UNIT/ML injection 150 units daily Name brand only 60 mL 11    Insulin Degludec (TRESIBA SC) Inject 40 Units under the skin into the appropriate area as directed. DIRECTED TO TAKE THIS BY PRIMARY CARE MD DUE TO INSULIN PUMP BEING EMPTY      levothyroxine (SYNTHROID, LEVOTHROID) 25 MCG tablet Take 1 tablet by mouth Every Morning. 30 tablet 11    lisinopril (PRINIVIL,ZESTRIL) 5 MG tablet Take 1 tablet by mouth Daily. 30 tablet 11    Lyumjev 100 UNIT/ML injection 200 units daily through pump 150 mL 3    metoprolol succinate XL (TOPROL-XL) 50 MG 24 hr tablet Take 1.5 tablets by mouth Daily. 135 tablet 3    nitroglycerin (NITROSTAT) 0.4 MG SL tablet Place 1 tablet under the tongue Every 5 (Five) Minutes As Needed for Chest Pain. Take no more than 3 doses in 15 minutes. 25 tablet 1    ondansetron ODT (ZOFRAN-ODT) 4 MG disintegrating tablet Place 1 tablet on the tongue Every 8 (Eight) Hours As Needed for Nausea or Vomiting. 21 tablet 0    Rivaroxaban (Xarelto) 2.5 MG tablet Take 1 tablet by mouth 2 (Two) Times a Day. (Patient taking differently: Take 1 tablet by mouth 2 (Two) Times a Day. Had to stop taking until his surgery next Wednesday May 3, 2023) 180 tablet 3    spironolactone (ALDACTONE) 25 MG tablet TAKE 1 TABLET BY MOUTH EVERY DAY 90 tablet 3     No current facility-administered medications for this visit.     Review of Systems   Constitutional:  Negative for chills and fever.   HENT:  Negative for congestion.    Respiratory:  Negative for shortness of breath.    Cardiovascular:  Negative for chest pain and leg swelling.   Gastrointestinal:  Negative for constipation, diarrhea, nausea and vomiting.   Skin:  Positive  for wound.   Neurological:  Positive for numbness.       OBJECTIVE     Vitals:    10/20/23 1317   BP: 122/62   Pulse: 77   SpO2: 100%         PHYSICAL EXAM  GEN:   Accompanied by wife.     Foot/Ankle Exam    GENERAL  Appearance:  appears stated age and obese  Orientation:  AAOx3  Affect:  appropriate  Gait:  unimpaired  Assistance:  independent  Right shoe gear: casual shoe  Left shoe gear: casual shoe    VASCULAR     Right Foot Vascularity   Dorsalis pedis:  1+  Posterior tibial:  1+  Skin temperature:  warm  Edema grading:  Trace  CFT:  4  Pedal hair growth:  Present  Varicosities:  none     Left Foot Vascularity   Dorsalis pedis:  1+  Posterior tibial:  1+  Skin temperature:  warm  Edema grading:  None  CFT:  4  Pedal hair growth:  Present  Varicosities:  none     NEUROLOGIC     Right Foot Neurologic   Light touch sensation: diminished  Vibratory sensation: diminished  Hot/Cold sensation: diminished  Protective Sensation using [place] Monofilament:   Sites intact: 5  Sites tested: 9     Left Foot Neurologic   Light touch sensation: diminished  Vibratory sensation: diminished  Hot/Cold sensation:  diminished  Protective Sensation using [place] Monofilament:   Sites intact: 5  Sites tested: 10    MUSCULOSKELETAL     Right Foot Musculoskeletal    Amputation   Toes amputated: first toe  Ecchymosis:  none  Tenderness:  none    Arch:  Normal     Left Foot Musculoskeletal   Ecchymosis:  none  Tenderness:  none  Arch:  Normal    MUSCLE STRENGTH     Right Foot Muscle Strength   Foot dorsiflexion:  5  Foot plantar flexion:  5  Foot inversion:  5  Foot eversion:  5     Left Foot Muscle Strength   Foot dorsiflexion:  5  Foot plantar flexion:  5  Foot inversion:  5  Foot eversion:  5    RANGE OF MOTION     Right Foot Range of Motion   Foot and ankle ROM within normal limits       Left Foot Range of Motion   Foot and ankle ROM within normal limits      DERMATOLOGIC      Right Foot Dermatologic   Skin  Right  foot skin is intact.   Nails  2.  Positive for elongated, abnormal thickness and subungual debris.  3.  Positive for elongated, abnormal thickness and subungual debris.  4.  Positive for elongated, abnormal thickness and subungual debris.  5.  Positive for elongated, abnormal thickness, subungual debris and subungual hematoma.     Left Foot Dermatologic   Skin  Positive for ulcer. Negative for cellulitis, drainage and erythema.   Nails  1.  Positive for elongated, abnormal thickness and subungual debris.  2.  Positive for elongated, abnormal thickness and subungual debris.  3.  Positive for elongated, abnormal thickness and subungual debris.  4.  Positive for elongated, abnormally thick and subungual debris.  5.  Positive for elongated, abnormally thick and subungual debris.     Left foot additional comments: Small partial thickness wound to distal hallux. No SOI or significant depth.      RADIOLOGY/NUCLEAR:      LABORATORY/CULTURE RESULTS:      PATHOLOGY RESULTS:       ASSESSMENT/PLAN     Diagnoses and all orders for this visit:    1. Thickened nails (Primary)    2. Amputee, great toe, right    3. Type 2 diabetes mellitus with diabetic neuropathy, with long-term current use of insulin    4. Open wound of left great toe, initial encounter          Comprehensive lower extremity examination and evaluation was performed.  Discussed findings and treatment plan including risks, benefits, and treatment options with patient in detail. Patient agreed with treatment plan.  After verbal consent obtained, nail(s) x9 debrided of length and thickness with nail nipper without incidence  Patient may maintain nails and calluses at home utilizing emery board or pumice stone between visits as needed  Reviewed at home diabetic foot care including daily foot checks   Apply abx ointment and light bandage until wound heals.   Continue with planned f/u with vascular surgery next month.   An After Visit Summary was printed and given to the  patient at discharge, including (if requested) any available informative/educational handouts regarding diagnosis, treatment, or medications. All questions were answered to patient/family satisfaction. Should symptoms fail to improve or worsen they agree to call or return to clinic or to go to the Emergency Department. Discussed the importance of following up with any needed screening tests/labs/specialist appointments and any requested follow-up recommended by me today. Importance of maintaining follow-up discussed and patient accepts that missed appointments can delay diagnosis and potentially lead to worsening of conditions.  Return in about 3 months (around 1/20/2024) for Follow-up with Podiatry Provider, Schedule [facility]., or sooner if acute issues arise.    Lab Frequency Next Occurrence   Follow Anesthesia Guidelines / Protocol Once 12/18/2022   Obtain Informed Consent Once 12/18/2022   Provide NPO Instructions to Patient Once 12/18/2022   Chlorhexidine Skin Prep Once 12/18/2022   Follow Anesthesia Guidelines / Protocol Once 04/22/2023   Obtain Informed Consent Once 04/22/2023   Provide Instructions to Patient Regarding NPO Status Once 04/22/2023   Chlorhexidine Skin Prep - Educate and Review With Patient Once 04/22/2023   Instructions on coughing, deep breathing, and incentive spirometry. Once 04/22/2023   US Carotid Bilateral Once 10/18/2023   US Ankle / Brachial Indices Extremity Complete Once 10/18/2023   US Venous Doppler Lower Extremity Bilateral (duplex) Once 10/18/2023       This document has been electronically signed by Simone Nieves DPM on October 20, 2023 13:31 CDT

## 2023-10-17 DIAGNOSIS — Z98.890 PERIPHERAL ARTERIAL DISEASE WITH HISTORY OF REVASCULARIZATION: Primary | ICD-10-CM

## 2023-10-17 DIAGNOSIS — I65.23 BILATERAL CAROTID ARTERY STENOSIS: ICD-10-CM

## 2023-10-17 DIAGNOSIS — I73.9 PERIPHERAL ARTERIAL DISEASE WITH HISTORY OF REVASCULARIZATION: Primary | ICD-10-CM

## 2023-10-19 ENCOUNTER — TELEPHONE (OUTPATIENT)
Dept: PODIATRY | Facility: CLINIC | Age: 58
End: 2023-10-19
Payer: COMMERCIAL

## 2023-10-19 ENCOUNTER — HOSPITAL ENCOUNTER (OUTPATIENT)
Dept: ULTRASOUND IMAGING | Facility: HOSPITAL | Age: 58
Discharge: HOME | End: 2023-10-19
Payer: COMMERCIAL

## 2023-10-19 DIAGNOSIS — Z98.890 PERIPHERAL ARTERIAL DISEASE WITH HISTORY OF REVASCULARIZATION: ICD-10-CM

## 2023-10-19 DIAGNOSIS — I73.9 PERIPHERAL ARTERIAL DISEASE WITH HISTORY OF REVASCULARIZATION: ICD-10-CM

## 2023-10-19 DIAGNOSIS — I65.23 BILATERAL CAROTID ARTERY STENOSIS: ICD-10-CM

## 2023-10-19 PROCEDURE — 93880 EXTRACRANIAL BILAT STUDY: CPT

## 2023-10-19 PROCEDURE — 93923 UPR/LXTR ART STDY 3+ LVLS: CPT

## 2023-10-19 PROCEDURE — 93970 EXTREMITY STUDY: CPT

## 2023-10-19 NOTE — TELEPHONE ENCOUNTER
Called patient regarding appt on 10/20/2023. Left message for patient to return call if any questions or concerns arise.

## 2023-10-20 ENCOUNTER — OFFICE VISIT (OUTPATIENT)
Dept: PODIATRY | Facility: CLINIC | Age: 58
End: 2023-10-20
Payer: COMMERCIAL

## 2023-10-20 VITALS
HEART RATE: 77 BPM | WEIGHT: 248 LBS | OXYGEN SATURATION: 100 % | SYSTOLIC BLOOD PRESSURE: 122 MMHG | HEIGHT: 69 IN | DIASTOLIC BLOOD PRESSURE: 62 MMHG | BODY MASS INDEX: 36.73 KG/M2

## 2023-10-20 DIAGNOSIS — Z89.411 AMPUTEE, GREAT TOE, RIGHT: ICD-10-CM

## 2023-10-20 DIAGNOSIS — L60.2 THICKENED NAILS: Primary | ICD-10-CM

## 2023-10-20 DIAGNOSIS — S91.102A OPEN WOUND OF LEFT GREAT TOE, INITIAL ENCOUNTER: ICD-10-CM

## 2023-10-20 DIAGNOSIS — E11.40 TYPE 2 DIABETES MELLITUS WITH DIABETIC NEUROPATHY, WITH LONG-TERM CURRENT USE OF INSULIN: ICD-10-CM

## 2023-10-20 DIAGNOSIS — Z79.4 TYPE 2 DIABETES MELLITUS WITH DIABETIC NEUROPATHY, WITH LONG-TERM CURRENT USE OF INSULIN: ICD-10-CM

## 2023-11-14 DIAGNOSIS — E78.2 MIXED HYPERLIPIDEMIA: ICD-10-CM

## 2023-11-14 RX ORDER — EZETIMIBE 10 MG/1
10 TABLET ORAL DAILY
Qty: 90 TABLET | Refills: 3 | Status: SHIPPED | OUTPATIENT
Start: 2023-11-14

## 2023-11-20 ENCOUNTER — OFFICE VISIT (OUTPATIENT)
Dept: WOUND CARE | Facility: HOSPITAL | Age: 58
End: 2023-11-20
Payer: COMMERCIAL

## 2023-11-20 ENCOUNTER — TELEPHONE (OUTPATIENT)
Dept: VASCULAR SURGERY | Facility: CLINIC | Age: 58
End: 2023-11-20
Payer: COMMERCIAL

## 2023-11-20 ENCOUNTER — LAB REQUISITION (OUTPATIENT)
Dept: LAB | Facility: HOSPITAL | Age: 58
End: 2023-11-20
Payer: COMMERCIAL

## 2023-11-20 DIAGNOSIS — E11.621 TYPE 2 DIABETES MELLITUS WITH FOOT ULCER (CODE): ICD-10-CM

## 2023-11-20 PROCEDURE — 87075 CULTR BACTERIA EXCEPT BLOOD: CPT | Performed by: PODIATRIST

## 2023-11-20 PROCEDURE — 87205 SMEAR GRAM STAIN: CPT | Performed by: PODIATRIST

## 2023-11-20 PROCEDURE — 87176 TISSUE HOMOGENIZATION CULTR: CPT | Performed by: PODIATRIST

## 2023-11-20 PROCEDURE — G0463 HOSPITAL OUTPT CLINIC VISIT: HCPCS

## 2023-11-20 PROCEDURE — 87070 CULTURE OTHR SPECIMN AEROBIC: CPT | Performed by: PODIATRIST

## 2023-11-20 NOTE — TELEPHONE ENCOUNTER
Call placed to patient and reminded of appointment on 11/21/2023 and patient verbalized understanding

## 2023-11-21 ENCOUNTER — OFFICE VISIT (OUTPATIENT)
Dept: VASCULAR SURGERY | Facility: CLINIC | Age: 58
End: 2023-11-21
Payer: COMMERCIAL

## 2023-11-21 VITALS
HEART RATE: 79 BPM | HEIGHT: 64 IN | OXYGEN SATURATION: 96 % | BODY MASS INDEX: 42.3 KG/M2 | SYSTOLIC BLOOD PRESSURE: 128 MMHG | DIASTOLIC BLOOD PRESSURE: 72 MMHG | WEIGHT: 247.8 LBS

## 2023-11-21 DIAGNOSIS — I70.25 ATHEROSCLEROSIS OF NATIVE ARTERIES OF THE EXTREMITIES WITH ULCERATION: Primary | ICD-10-CM

## 2023-11-21 DIAGNOSIS — I10 PRIMARY HYPERTENSION: ICD-10-CM

## 2023-11-21 DIAGNOSIS — E78.2 MIXED HYPERLIPIDEMIA: ICD-10-CM

## 2023-11-21 DIAGNOSIS — I65.23 BILATERAL CAROTID ARTERY STENOSIS: ICD-10-CM

## 2023-11-21 DIAGNOSIS — I73.9 PAD (PERIPHERAL ARTERY DISEASE): ICD-10-CM

## 2023-11-21 NOTE — PROGRESS NOTES
"11/21/2023       Rene Hogue,   [address] [facility] [place] [address]        Luis Echeverria  1965    Chief Complaint   Patient presents with    peripheral artery disease     Left great toe has wound, saw Dr. Nieves yesterday.       Dear Rene Hogue, :    HPI     I had the pleasure of seeing your patient in the office today for follow up.  As you recall, the patient is a 58 y.o. male who was previously followed by Dr. Wild.  He has continued following with wound care and Dr. Nieves for left great toe wound.  He did undergo amputation of the right hallux on 5/3/23.  Currently, he denies any  claudication or rest pain from an arterial standpoint.  He had noninvasive testing performed today which I personally.  He is currently maintained on Xarelto 2.5 mg twice daily as well as 81 mg aspirin daily and a statin.      Review of Systems   Constitutional: Negative.    HENT: Negative.     Eyes: Negative.    Respiratory: Negative.     Cardiovascular: Negative.    Gastrointestinal: Negative.    Endocrine: Negative.    Genitourinary: Negative.    Musculoskeletal: Negative.    Skin:  Positive for wound.   Allergic/Immunologic: Negative.    Neurological: Negative.    Hematological: Negative.    Psychiatric/Behavioral: Negative.     All other systems reviewed and are negative.         /72   Pulse 79   Ht 162.6 cm (64\")   Wt 112 kg (247 lb 12.8 oz)   SpO2 96%   BMI 42.53 kg/m²   Physical Exam  Vitals and nursing note reviewed.   Constitutional:       Appearance: He is well-developed.   HENT:      Head: Normocephalic and atraumatic.   Eyes:      General: No scleral icterus.     Pupils: Pupils are equal, round, and reactive to light.   Neck:      Thyroid: No thyromegaly.      Vascular: No carotid bruit or JVD.   Cardiovascular:      Rate and Rhythm: Normal rate and regular rhythm.      Pulses:           Carotid pulses are 2+ on the right side and 2+ on the left side.       Femoral pulses are 2+ " on the right side and 2+ on the left side.       Popliteal pulses are 2+ on the right side and 2+ on the left side.        Dorsalis pedis pulses are detected w/ Doppler on the right side.        Posterior tibial pulses are detected w/ Doppler on the right side and detected w/ Doppler on the left side.      Heart sounds: Normal heart sounds.      Comments: Left: doppler PT/peroneal, signal to toe  Pulmonary:      Effort: Pulmonary effort is normal.      Breath sounds: Normal breath sounds.   Abdominal:      General: Bowel sounds are normal. There is no distension or abdominal bruit.      Palpations: Abdomen is soft. There is no mass.      Tenderness: There is no abdominal tenderness.   Musculoskeletal:         General: Normal range of motion.      Cervical back: Neck supple.   Lymphadenopathy:      Cervical: No cervical adenopathy.   Skin:     General: Skin is warm and dry.   Neurological:      Mental Status: He is alert and oriented to person, place, and time.      Cranial Nerves: No cranial nerve deficit.      Sensory: No sensory deficit.       Diagnostic data:  Narrative & Impression      History: [facility]     Comments: Bilateral lower extremity arterial with multi-level pulse  volume recordings and segmental pressures were performed at rest and  stress.     The right ankle/brachial index is 1.23. The waveforms are triphasic  without dampening. These findings are consistent with no significant  arterial insufficiency of the right lower extremity at rest.     The left ankle/brachial index is not obtainable due to  noncompressibility of the vessels. The waveforms are biphasic without  dampening.      IMPRESSION:  Impression:  1. No significant arterial insufficiency of the right lower extremity at  rest.  2. The left ankle/brachial index was not obtainable due to  noncompressibility of the vessels.         This report was signed and finalized on 10/19/2023 1:23 PM CDT by Dr. Artie Whitlock MD.     Narrative &  Impression   History: Carotid occlusive disease     IMPRESSION:  Impression:  1. There is less than 50% stenosis of the right internal carotid artery.  2. There is less than 50% stenosis of the left internal carotid artery.  3. Antegrade flow is demonstrated in bilateral vertebral arteries.     Comments: Bilateral carotid vertebral arterial duplex scan was  performed.     Grayscale imaging shows intimal thickening and calcified elements at the  carotid bifurcation. The right internal carotid artery peak systolic  velocity is 86.2 cm/sec. The end-diastolic velocity is 29.3 cm/sec. The  right ICA/CCA ratio is approximately 0.8. These findings correlate with  less than 50% stenosis of the right internal carotid artery.     Grayscale imaging shows intimal thickening and calcified elements at the  carotid bifurcation. The left internal carotid artery peak systolic  velocity is 86.3 cm/sec. The end-diastolic velocity is 33.8 cm/sec. The  left ICA/CCA ratio is approximately 1.0. These findings correlate with  less than 50% stenosis of the left internal carotid artery.     Antegrade flow is demonstrated in bilateral vertebral arteries.  There is greater than 50% stenosis of the right external carotid artery.     This report was signed and finalized on 10/19/2023 1:28 PM CDT by Dr. Artie Whitlock MD.     Narrative & Impression   History: Swelling     Comments: Venous valvular insufficiency testing was performed in the  bilateral lower extremities using duplex ultrasound with compression  techniques.  The common femoral vein, superficial femoral, popliteal  vein, posterior tibial vein, peroneal vein, greater saphenous vein, and  lesser saphenous veins were interrogated.      On the right, the greater saphenous vein at the junction measured 5.8  mm. In the mid thigh measured 3.5 mm. Above the knee measured 3.7 mm. In  the mid calf measured 2.7 mm. At the ankle measured 3 mm. There is no  evidence of reflux in the greater  saphenous vein. The lesser saphenous  vein is within normal limits and no evidence of reflux. There is no  evidence of DVT.     On the left, the greater saphenous vein at the junction measured 5.7 mm.  In the mid thigh measured 3.5 mm. Above the knee measured 3 mm. In the  mid calf measured 2.5 mm. At the ankle measured 2.7 mm. There is no  evidence of reflux in the greater saphenous vein. The lesser saphenous  vein is within normal limits and no evidence of reflux. There is no  evidence of DVT.     IMPRESSION:  Impression: There is no evidence of venous insufficiency in either lower  extremity greater or lesser saphenous veins.              This report was signed and finalized on 10/19/2023 1:37 PM CDT by Dr. Artie Whitlock MD.         Patient Active Problem List   Diagnosis    CAD (coronary artery disease)    Hypertension    Hyperlipidemia    MI (myocardial infarction)    Diabetes mellitus    Carotid stent occlusion    Unstable angina    Ischemic cardiomyopathy    RUQ pain    Dehydration    Nausea and vomiting    Acute kidney injury    Hypotension    Gallbladder polyp    BMI 31.0-31.9,adult    S/P implantation of automatic cardioverter/defibrillator (AICD)    Chronic systolic CHF (congestive heart failure), NYHA class 2    Morbidly obese    DKA (diabetic ketoacidoses)    Pneumonia due to COVID-19 virus    Noncompliance    Cytokine release syndrome, grade 1    Skin ulcer of toe of right foot, limited to breakdown of skin    Atherosclerosis of native arteries of the extremities with ulceration    Diabetic polyneuropathy associated with type 2 diabetes mellitus    Diabetic polyneuropathy associated with type 1 diabetes mellitus         ICD-10-CM ICD-9-CM   1. Atherosclerosis of native arteries of the extremities with ulceration  I70.25 440.23     707.9   2. Primary hypertension  I10 401.9   3. Mixed hyperlipidemia  E78.2 272.2   4. PAD (peripheral artery disease)  I73.9 443.9   5. Bilateral carotid artery stenosis   I65.23 433.10     433.30         PLAN: After thoroughly evaluating Luis Echeverria, I believe the best course of action is to remain conservative from a vascular standpoint.  Currently, he appears to be doing quite well even though he is developed a traumatic left great toe wound.  It does appear to be healing nicely.  He has a Doppler signal at the base of the great toe.  He also states that with Dr. Nieves debrided it bled nicely.  With regards to his other testing, his carotid duplex is less than 50% bilaterally and he is asymptomatic.  His 2 leg VVI did not show any evidence of venous insufficiency.  I will see him back in 1 years time with a repeat TEENA and carotid duplex for surveillance.  If his wound becomes slow to heal or worsens then we can always proceed with an angiogram.  The patient is to continue taking their medications as previously discussed.   I did discuss vascular risk factors as they pertain to the progression of vascular disease including controlling diabetes, hypertension, and hyperlipidemia. These factors remain stable and controlled on his current regimen. Class 2 Severe Obesity (BMI >=35 and <=39.9). Obesity-related health conditions include the following: hypertension, diabetes mellitus, dyslipidemias, lower extremity venous stasis disease and peripheral vascular disease. Obesity is unchanged. BMI is is above average; BMI management plan is completed. We discussed portion control and increasing exercise.   This was all discussed in full with complete understanding.  Thank you for allowing me to participate in the care of your patient.  Please do not hesitate to call with any questions or concerns.  We will keep you aware of any further encounters with Luis Echeverria.      Sincerely Yours,      Artie Whitlock, DO

## 2023-11-21 NOTE — LETTER
"November 21, 2023     Rene Hogue DO  [address] [facility] [address]    Patient: Luis Echeverria   YOB: 1965   Date of Visit: 11/21/2023     Dear Rene Hogue DO:       Thank you for referring Luis Echeverria to me for evaluation. Below are the relevant portions of my assessment and plan of care.    If you have questions, please do not hesitate to call me. I look forward to following Luis along with you.         Sincerely,        Artie Whitlock DO        CC: No Recipients    Artie Whitlock DO  11/21/23 1646  Sign when Signing Visit  11/21/2023       Rene Hogue DO  [address] [facility] [address]        Luis Echeverria  1965    Chief Complaint   Patient presents with   • peripheral artery disease     Left great toe has wound, saw Dr. Nieves yesterday.       Dear Rene Hogue DO:    HPI     I had the pleasure of seeing your patient in the office today for follow up.  As you recall, the patient is a 58 y.o. male who was previously followed by Dr. Wild.  He has continued following with wound care and Dr. Nieves for left great toe wound.  He did undergo amputation of the right hallux on 5/3/23.  Currently, he denies any  claudication or rest pain from an arterial standpoint.  He had noninvasive testing performed today which I personally.  He is currently maintained on Xarelto 2.5 mg twice daily as well as 81 mg aspirin daily and a statin.      Review of Systems   Constitutional: Negative.    HENT: Negative.     Eyes: Negative.    Respiratory: Negative.     Cardiovascular: Negative.    Gastrointestinal: Negative.    Endocrine: Negative.    Genitourinary: Negative.    Musculoskeletal: Negative.    Skin:  Positive for wound.   Allergic/Immunologic: Negative.    Neurological: Negative.    Hematological: Negative.    Psychiatric/Behavioral: Negative.     All other systems reviewed and are negative.         /72   Pulse 79   Ht 162.6 cm (64\")   " Wt 112 kg (247 lb 12.8 oz)   SpO2 96%   BMI 42.53 kg/m²   Physical Exam  Vitals and nursing note reviewed.   Constitutional:       Appearance: He is well-developed.   HENT:      Head: Normocephalic and atraumatic.   Eyes:      General: No scleral icterus.     Pupils: Pupils are equal, round, and reactive to light.   Neck:      Thyroid: No thyromegaly.      Vascular: No carotid bruit or JVD.   Cardiovascular:      Rate and Rhythm: Normal rate and regular rhythm.      Pulses:           Carotid pulses are 2+ on the right side and 2+ on the left side.       Femoral pulses are 2+ on the right side and 2+ on the left side.       Popliteal pulses are 2+ on the right side and 2+ on the left side.        Dorsalis pedis pulses are detected w/ Doppler on the right side.        Posterior tibial pulses are detected w/ Doppler on the right side and detected w/ Doppler on the left side.      Heart sounds: Normal heart sounds.      Comments: Left: doppler PT/peroneal, signal to toe  Pulmonary:      Effort: Pulmonary effort is normal.      Breath sounds: Normal breath sounds.   Abdominal:      General: Bowel sounds are normal. There is no distension or abdominal bruit.      Palpations: Abdomen is soft. There is no mass.      Tenderness: There is no abdominal tenderness.   Musculoskeletal:         General: Normal range of motion.      Cervical back: Neck supple.   Lymphadenopathy:      Cervical: No cervical adenopathy.   Skin:     General: Skin is warm and dry.   Neurological:      Mental Status: He is alert and oriented to person, place, and time.      Cranial Nerves: No cranial nerve deficit.      Sensory: No sensory deficit.       Diagnostic data:  Narrative & Impression      History: PAD     Comments: Bilateral lower extremity arterial with multi-level pulse  volume recordings and segmental pressures were performed at rest and  stress.     The right ankle/brachial index is 1.23. The waveforms are triphasic  without dampening.  These findings are consistent with no significant  arterial insufficiency of the right lower extremity at rest.     The left ankle/brachial index is not obtainable due to  noncompressibility of the vessels. The waveforms are biphasic without  dampening.      IMPRESSION:  Impression:  1. No significant arterial insufficiency of the right lower extremity at  rest.  2. The left ankle/brachial index was not obtainable due to  noncompressibility of the vessels.         This report was signed and finalized on 10/19/2023 1:23 PM CDT by Dr. Artie Whitlock MD.     Narrative & Impression   History: Carotid occlusive disease     IMPRESSION:  Impression:  1. There is less than 50% stenosis of the right internal carotid artery.  2. There is less than 50% stenosis of the left internal carotid artery.  3. Antegrade flow is demonstrated in bilateral vertebral arteries.     Comments: Bilateral carotid vertebral arterial duplex scan was  performed.     Grayscale imaging shows intimal thickening and calcified elements at the  carotid bifurcation. The right internal carotid artery peak systolic  velocity is 86.2 cm/sec. The end-diastolic velocity is 29.3 cm/sec. The  right ICA/CCA ratio is approximately 0.8. These findings correlate with  less than 50% stenosis of the right internal carotid artery.     Grayscale imaging shows intimal thickening and calcified elements at the  carotid bifurcation. The left internal carotid artery peak systolic  velocity is 86.3 cm/sec. The end-diastolic velocity is 33.8 cm/sec. The  left ICA/CCA ratio is approximately 1.0. These findings correlate with  less than 50% stenosis of the left internal carotid artery.     Antegrade flow is demonstrated in bilateral vertebral arteries.  There is greater than 50% stenosis of the right external carotid artery.     This report was signed and finalized on 10/19/2023 1:28 PM CDT by Dr. Artie Whitlock MD.     Narrative & Impression   History: Swelling      Comments: Venous valvular insufficiency testing was performed in the  bilateral lower extremities using duplex ultrasound with compression  techniques.  The common femoral vein, superficial femoral, popliteal  vein, posterior tibial vein, peroneal vein, greater saphenous vein, and  lesser saphenous veins were interrogated.      On the right, the greater saphenous vein at the junction measured 5.8  mm. In the mid thigh measured 3.5 mm. Above the knee measured 3.7 mm. In  the mid calf measured 2.7 mm. At the ankle measured 3 mm. There is no  evidence of reflux in the greater saphenous vein. The lesser saphenous  vein is within normal limits and no evidence of reflux. There is no  evidence of DVT.     On the left, the greater saphenous vein at the junction measured 5.7 mm.  In the mid thigh measured 3.5 mm. Above the knee measured 3 mm. In the  mid calf measured 2.5 mm. At the ankle measured 2.7 mm. There is no  evidence of reflux in the greater saphenous vein. The lesser saphenous  vein is within normal limits and no evidence of reflux. There is no  evidence of DVT.     IMPRESSION:  Impression: There is no evidence of venous insufficiency in either lower  extremity greater or lesser saphenous veins.              This report was signed and finalized on 10/19/2023 1:37 PM CDT by Dr. Artie Whitlock MD.         Patient Active Problem List   Diagnosis   • CAD (coronary artery disease)   • Hypertension   • Hyperlipidemia   • MI (myocardial infarction)   • Diabetes mellitus   • Carotid stent occlusion   • Unstable angina   • Ischemic cardiomyopathy   • RUQ pain   • Dehydration   • Nausea and vomiting   • Acute kidney injury   • Hypotension   • Gallbladder polyp   • BMI 31.0-31.9,adult   • S/P implantation of automatic cardioverter/defibrillator (AICD)   • Chronic systolic CHF (congestive heart failure), NYHA class 2   • Morbidly obese   • DKA (diabetic ketoacidoses)   • Pneumonia due to COVID-19 virus   • Noncompliance    • Cytokine release syndrome, grade 1   • Skin ulcer of toe of right foot, limited to breakdown of skin   • Atherosclerosis of native arteries of the extremities with ulceration   • Diabetic polyneuropathy associated with type 2 diabetes mellitus   • Diabetic polyneuropathy associated with type 1 diabetes mellitus         ICD-10-CM ICD-9-CM   1. Atherosclerosis of native arteries of the extremities with ulceration  I70.25 440.23     707.9   2. Primary hypertension  I10 401.9   3. Mixed hyperlipidemia  E78.2 272.2   4. PAD (peripheral artery disease)  I73.9 443.9   5. Bilateral carotid artery stenosis  I65.23 433.10     433.30         PLAN: After thoroughly evaluating Luis Echeverria, I believe the best course of action is to remain conservative from a vascular standpoint.  Currently, he appears to be doing quite well even though he is developed a traumatic left great toe wound.  It does appear to be healing nicely.  He has a Doppler signal at the base of the great toe.  He also states that with Dr. Nieves debrided it bled nicely.  With regards to his other testing, his carotid duplex is less than 50% bilaterally and he is asymptomatic.  His 2 leg VVI did not show any evidence of venous insufficiency.  I will see him back in 1 years time with a repeat TEENA and carotid duplex for surveillance.  If his wound becomes slow to heal or worsens then we can always proceed with an angiogram.  The patient is to continue taking their medications as previously discussed.   I did discuss vascular risk factors as they pertain to the progression of vascular disease including controlling diabetes, hypertension, and hyperlipidemia. These factors remain stable and controlled on his current regimen. Class 2 Severe Obesity (BMI >=35 and <=39.9). Obesity-related health conditions include the following: hypertension, diabetes mellitus, dyslipidemias, lower extremity venous stasis disease and peripheral vascular disease. Obesity is  unchanged. BMI is is above average; BMI management plan is completed. We discussed portion control and increasing exercise.   This was all discussed in full with complete understanding.  Thank you for allowing me to participate in the care of your patient.  Please do not hesitate to call with any questions or concerns.  We will keep you aware of any further encounters with Luis Echeverria.      Sincerely Yours,      Artie Whitlock, DO

## 2023-11-24 LAB
BACTERIA SPEC AEROBE CULT: NORMAL
GRAM STN SPEC: NORMAL
GRAM STN SPEC: NORMAL

## 2023-11-26 LAB — BACTERIA SPEC ANAEROBE CULT: NORMAL

## 2023-11-27 DIAGNOSIS — Z79.4 TYPE 2 DIABETES MELLITUS WITH OTHER CIRCULATORY COMPLICATION, WITH LONG-TERM CURRENT USE OF INSULIN: ICD-10-CM

## 2023-11-27 DIAGNOSIS — E11.59 TYPE 2 DIABETES MELLITUS WITH OTHER CIRCULATORY COMPLICATION, WITH LONG-TERM CURRENT USE OF INSULIN: ICD-10-CM

## 2023-11-30 ENCOUNTER — OFFICE VISIT (OUTPATIENT)
Dept: WOUND CARE | Facility: HOSPITAL | Age: 58
End: 2023-11-30
Payer: COMMERCIAL

## 2023-12-07 ENCOUNTER — TRANSCRIBE ORDERS (OUTPATIENT)
Dept: ADMINISTRATIVE | Facility: HOSPITAL | Age: 58
End: 2023-12-07
Payer: COMMERCIAL

## 2023-12-07 ENCOUNTER — HOSPITAL ENCOUNTER (OUTPATIENT)
Dept: GENERAL RADIOLOGY | Facility: HOSPITAL | Age: 58
Discharge: HOME | End: 2023-12-07
Payer: COMMERCIAL

## 2023-12-07 ENCOUNTER — OFFICE VISIT (OUTPATIENT)
Dept: WOUND CARE | Facility: HOSPITAL | Age: 58
End: 2023-12-07
Payer: COMMERCIAL

## 2023-12-07 DIAGNOSIS — M86.9 OSTEOMYELITIS OF ANKLE AND FOOT: Primary | ICD-10-CM

## 2023-12-07 DIAGNOSIS — M86.9 OSTEOMYELITIS OF ANKLE AND FOOT: ICD-10-CM

## 2023-12-07 PROCEDURE — 73630 X-RAY EXAM OF FOOT: CPT

## 2023-12-07 PROCEDURE — G0463 HOSPITAL OUTPT CLINIC VISIT: HCPCS

## 2023-12-15 ENCOUNTER — OFFICE VISIT (OUTPATIENT)
Dept: WOUND CARE | Facility: HOSPITAL | Age: 58
End: 2023-12-15
Payer: COMMERCIAL

## 2023-12-15 ENCOUNTER — LAB REQUISITION (OUTPATIENT)
Dept: LAB | Facility: HOSPITAL | Age: 58
End: 2023-12-15
Payer: COMMERCIAL

## 2023-12-15 DIAGNOSIS — E11.621 TYPE 2 DIABETES MELLITUS WITH FOOT ULCER (CODE): ICD-10-CM

## 2023-12-15 PROCEDURE — 87176 TISSUE HOMOGENIZATION CULTR: CPT | Performed by: NURSE PRACTITIONER

## 2023-12-15 PROCEDURE — 87070 CULTURE OTHR SPECIMN AEROBIC: CPT | Performed by: NURSE PRACTITIONER

## 2023-12-15 PROCEDURE — 87075 CULTR BACTERIA EXCEPT BLOOD: CPT | Performed by: NURSE PRACTITIONER

## 2023-12-15 PROCEDURE — 87205 SMEAR GRAM STAIN: CPT | Performed by: NURSE PRACTITIONER

## 2023-12-18 LAB
BACTERIA SPEC AEROBE CULT: NORMAL
GRAM STN SPEC: NORMAL
GRAM STN SPEC: NORMAL

## 2023-12-20 LAB — BACTERIA SPEC ANAEROBE CULT: NORMAL

## 2023-12-21 ENCOUNTER — OFFICE VISIT (OUTPATIENT)
Dept: WOUND CARE | Facility: HOSPITAL | Age: 58
End: 2023-12-21
Payer: COMMERCIAL

## 2023-12-21 PROCEDURE — G0463 HOSPITAL OUTPT CLINIC VISIT: HCPCS

## 2023-12-21 PROCEDURE — 87070 CULTURE OTHR SPECIMN AEROBIC: CPT | Performed by: PODIATRIST

## 2023-12-21 PROCEDURE — 87015 SPECIMEN INFECT AGNT CONCNTJ: CPT | Performed by: PODIATRIST

## 2023-12-21 PROCEDURE — 87176 TISSUE HOMOGENIZATION CULTR: CPT | Performed by: PODIATRIST

## 2023-12-21 PROCEDURE — 87205 SMEAR GRAM STAIN: CPT | Performed by: PODIATRIST

## 2023-12-21 PROCEDURE — 87075 CULTR BACTERIA EXCEPT BLOOD: CPT | Performed by: PODIATRIST

## 2023-12-22 ENCOUNTER — LAB REQUISITION (OUTPATIENT)
Dept: LAB | Facility: HOSPITAL | Age: 58
End: 2023-12-22
Payer: COMMERCIAL

## 2023-12-22 DIAGNOSIS — E11.621 TYPE 2 DIABETES MELLITUS WITH FOOT ULCER (CODE): ICD-10-CM

## 2023-12-24 LAB
BACTERIA SPEC AEROBE CULT: NORMAL
GRAM STN SPEC: NORMAL

## 2023-12-27 LAB — BACTERIA SPEC ANAEROBE CULT: NORMAL

## 2023-12-28 ENCOUNTER — OFFICE VISIT (OUTPATIENT)
Dept: WOUND CARE | Facility: HOSPITAL | Age: 58
End: 2023-12-28
Payer: COMMERCIAL

## 2023-12-28 ENCOUNTER — OFFICE VISIT (OUTPATIENT)
Dept: CARDIOLOGY | Facility: CLINIC | Age: 58
End: 2023-12-28
Payer: COMMERCIAL

## 2023-12-28 VITALS
WEIGHT: 252 LBS | HEIGHT: 69 IN | SYSTOLIC BLOOD PRESSURE: 105 MMHG | HEART RATE: 77 BPM | DIASTOLIC BLOOD PRESSURE: 70 MMHG | BODY MASS INDEX: 37.33 KG/M2

## 2023-12-28 DIAGNOSIS — N18.31 STAGE 3A CHRONIC KIDNEY DISEASE: ICD-10-CM

## 2023-12-28 DIAGNOSIS — E78.2 MIXED HYPERLIPIDEMIA: ICD-10-CM

## 2023-12-28 DIAGNOSIS — I10 PRIMARY HYPERTENSION: ICD-10-CM

## 2023-12-28 DIAGNOSIS — I50.22 CHRONIC SYSTOLIC CHF (CONGESTIVE HEART FAILURE), NYHA CLASS 2: ICD-10-CM

## 2023-12-28 DIAGNOSIS — E11.59 TYPE 2 DIABETES MELLITUS WITH OTHER CIRCULATORY COMPLICATION, WITH LONG-TERM CURRENT USE OF INSULIN: ICD-10-CM

## 2023-12-28 DIAGNOSIS — Z79.4 TYPE 2 DIABETES MELLITUS WITH OTHER CIRCULATORY COMPLICATION, WITH LONG-TERM CURRENT USE OF INSULIN: ICD-10-CM

## 2023-12-28 DIAGNOSIS — I25.10 CORONARY ARTERY DISEASE INVOLVING NATIVE CORONARY ARTERY OF NATIVE HEART WITHOUT ANGINA PECTORIS: Primary | ICD-10-CM

## 2023-12-28 RX ORDER — ERGOCALCIFEROL 1.25 MG/1
50000 CAPSULE ORAL WEEKLY
COMMUNITY

## 2023-12-28 RX ORDER — NITROGLYCERIN 0.4 MG/1
0.4 TABLET SUBLINGUAL
Qty: 25 TABLET | Refills: 1 | Status: SHIPPED | OUTPATIENT
Start: 2023-12-28

## 2023-12-28 NOTE — PROGRESS NOTES
Subjective:     Encounter Date:12/28/2023      Patient ID: Luis Echeverria is a 58 y.o. male     Chief Complaint:  History of Present Illness  Patient presents today for routine cardiology follow up. Patient is followed for [facility] AICD Implanted in 2019), coronary artery disease and ischemic cardiomyopathy. Patient has type II diabetes, obesity, chronic kidney disease, hypertension and hyperlipidemia. Patient had an MI with stent to Left Circ and LAD. Last intervention in 2018 with stent to RCA. At that time noted with diffuse diabetes coronary artery diease. Last LVEF was 35-40%  in 2021. Has peripheral artery disease follows with [facility] vascular- had intervention with Dr. Wild in 2022. He had a toe amputated in the past due to poor wound healing. Patient follows with Dr. Hogue as his PCP. He follows with endocrinology. He has chronic kidney disease and was referred to Dr. Middleton at last appointment. Overall he is stable with no new complaints. He denies chest pain. He notes chronic shortness of breath but at baseline. He is on Xarelto 2.5 mg BID and Aspirin 81 mg daily. He denies volume overload- but notes he swelling some when the seasons change.     The following portions of the patient's history were reviewed and updated as appropriate: allergies, current medications, past medical history, past social history, past and problem list.    No Known Allergies    Current Outpatient Medications:     albuterol sulfate  (90 Base) MCG/ACT inhaler, Inhale 2 puffs Every 4 (Four) Hours As Needed for Wheezing or Shortness of Air., Disp: 8.5 g, Rfl: 5    aspirin 81 MG EC tablet, Take 1 tablet by mouth Daily., Disp: , Rfl:     atorvastatin (LIPITOR) 80 MG tablet, TAKE 1 TABLET BY MOUTH EVERY DAY AT NIGHT, Disp: 90 tablet, Rfl: 3    brimonidine (ALPHAGAN) 0.2 % ophthalmic solution, Administer 1 drop to both eyes 2 (Two) Times a Day., Disp: , Rfl:     Continuous Blood Gluc  (Dexcom G6 ) device,  REMOVED FOR SURGERY, Disp: , Rfl:     Continuous Blood Gluc Sensor (Dexcom G6 Sensor), 1 each As Needed (glucose control). Every 10 days, Disp: 9 each, Rfl: 3    Continuous Blood Gluc Transmit (Dexcom G6 Transmitter) misc, 1 each Every 3 (Three) Months., Disp: 1 each, Rfl: 3    DULoxetine (CYMBALTA) 20 MG capsule, Take 30 mg by mouth Daily., Disp: , Rfl:     empagliflozin (Jardiance) 25 MG tablet tablet, Take 1 tablet by mouth Daily., Disp: 90 tablet, Rfl: 3    ezetimibe (ZETIA) 10 MG tablet, TAKE 1 TABLET BY MOUTH EVERY DAY, Disp: 90 tablet, Rfl: 3    levothyroxine (SYNTHROID, LEVOTHROID) 25 MCG tablet, Take 1 tablet by mouth Every Morning., Disp: 30 tablet, Rfl: 11    lisinopril (PRINIVIL,ZESTRIL) 5 MG tablet, Take 1 tablet by mouth Daily., Disp: 30 tablet, Rfl: 11    Lyumjev 100 UNIT/ML injection, 200 units daily through pump, Disp: 150 mL, Rfl: 3    metoprolol succinate XL (TOPROL-XL) 50 MG 24 hr tablet, Take 1.5 tablets by mouth Daily., Disp: 135 tablet, Rfl: 3    nitroglycerin (NITROSTAT) 0.4 MG SL tablet, Place 1 tablet under the tongue Every 5 (Five) Minutes As Needed for Chest Pain. Take no more than 3 doses in 15 minutes., Disp: 25 tablet, Rfl: 1    Rivaroxaban (Xarelto) 2.5 MG tablet, Take 1 tablet by mouth 2 (Two) Times a Day., Disp: 180 tablet, Rfl: 3    spironolactone (ALDACTONE) 25 MG tablet, TAKE 1 TABLET BY MOUTH EVERY DAY, Disp: 90 tablet, Rfl: 3    vitamin D (ERGOCALCIFEROL) 1.25 MG (81641 UT) capsule capsule, Take 1 capsule by mouth 1 (One) Time Per Week., Disp: , Rfl:     Glucagon (Baqsimi One Pack) 3 MG/DOSE powder, 1 each into the nostril(s) as directed by provider As Needed (Hypoglycemia). Apply intranasal if hypoglycemia, Disp: 2 each, Rfl: 11    Insulin Aspart (novoLOG) 100 UNIT/ML injection, 150 units daily Name brand only (Patient not taking: Reported on 12/28/2023), Disp: 60 mL, Rfl: 11    Insulin Degludec (TRESIBA SC), Inject 40 Units under the skin into the appropriate area as  directed. DIRECTED TO TAKE THIS BY PRIMARY CARE MD DUE TO INSULIN PUMP BEING EMPTY (Patient not taking: Reported on 12/28/2023), Disp: , Rfl:     ondansetron ODT (ZOFRAN-ODT) 4 MG disintegrating tablet, Place 1 tablet on the tongue Every 8 (Eight) Hours As Needed for Nausea or Vomiting. (Patient not taking: Reported on 12/28/2023), Disp: 21 tablet, Rfl: 0    Social History     Socioeconomic History    Marital status:    Tobacco Use    Smoking status: Never     Passive exposure: Never    Smokeless tobacco: Never   Vaping Use    Vaping Use: Never used   Substance and Sexual Activity    Alcohol use: Yes     Comment: 2 TIMES YEARLY    Drug use: No    Sexual activity: Defer       Review of Systems   Constitutional: Positive for malaise/fatigue. Negative for chills, decreased appetite, fever, weight gain and weight loss.   HENT:  Negative for nosebleeds.    Eyes:  Negative for visual disturbance.   Cardiovascular:  Positive for dyspnea on exertion. Negative for chest pain, leg swelling, near-syncope, orthopnea, palpitations, paroxysmal nocturnal dyspnea and syncope.   Respiratory:  Positive for shortness of breath. Negative for cough, hemoptysis and snoring.    Endocrine: Negative for cold intolerance and heat intolerance.   Hematologic/Lymphatic: Negative for bleeding problem. Does not bruise/bleed easily.   Skin:  Negative for rash.   Musculoskeletal:  Negative for back pain and falls.   Gastrointestinal:  Negative for abdominal pain, constipation, diarrhea, heartburn, melena, nausea and vomiting.   Genitourinary:  Negative for hematuria.   Neurological:  Negative for dizziness, headaches and light-headedness.   Psychiatric/Behavioral:  Negative for altered mental status.    Allergic/Immunologic: Negative for persistent infections.              Objective:     Constitutional:       Appearance: Healthy appearance. Well-developed and not in distress. Obese.   Eyes:      Pupils: Pupils are equal, round, and reactive  "to light.   HENT:      Head: Normocephalic and atraumatic.   Neck:      Vascular: No carotid bruit or JVD.   Pulmonary:      Effort: Pulmonary effort is normal.      Breath sounds: Normal breath sounds.   Cardiovascular:      Normal rate. Regular rhythm.   Pulses:     Intact distal pulses.   Edema:     Peripheral edema absent.   Abdominal:      General: Bowel sounds are normal.      Palpations: Abdomen is soft.   Musculoskeletal: Normal range of motion.      Cervical back: Normal range of motion and neck supple. Skin:     General: Skin is warm and dry.   Neurological:      Mental Status: Alert and oriented to person, place, and time.      Deep Tendon Reflexes: Reflexes are normal and symmetric.   Psychiatric:         Behavior: Behavior normal.         Thought Content: Thought content normal.         Judgment: Judgment normal.             ECG 12 Lead    Date/Time: 12/28/2023 3:08 PM  Performed by: Garyr Persaud APRN    Authorized by: Garry Persaud APRN  Rhythm: sinus rhythm  Conduction: right bundle branch block and 1st degree AV block        /70   Pulse 77   Ht 175.3 cm (69\")   Wt 114 kg (252 lb)   BMI 37.21 kg/m²   Lab Review:   I have reviewed   Lab Results   Component Value Date    GLUCOSE 104 (H) 08/08/2023    CALCIUM 9.0 08/08/2023     (L) 08/08/2023    K 4.4 08/08/2023    CO2 24.0 08/08/2023     08/08/2023    BUN 35 (H) 08/08/2023    CREATININE 1.45 (H) 08/08/2023    EGFR 56.9 (L) 08/08/2023    BCR 24.1 08/08/2023    ANIONGAP 11.0 08/08/2023          Lab Results   Component Value Date    CHOL 147 08/31/2021    CHLPL 123 11/29/2023    TRIG 127 11/29/2023    HDL 41 11/29/2023    LDL 57 11/29/2023      Results for orders placed during the hospital encounter of 08/29/21    Adult Transthoracic Echo Complete W/ Cont if Necessary Per Protocol    Interpretation Summary  · Left ventricular ejection fraction appears to be 36 - 40%. Left ventricular systolic function is moderately " decreased.  · Left ventricular diastolic function is consistent with (grade I) impaired relaxation.  · Normal right ventricular cavity size and systolic function noted.  · There is no significant (greater than mild) valvular dysfunction.     Assessment:          Diagnosis Plan   1. Coronary artery disease involving native coronary artery of native heart without angina pectoris  Rivaroxaban (Xarelto) 2.5 MG tablet      2. Chronic systolic CHF (congestive heart failure), NYHA class 2        3. Primary hypertension        4. Mixed hyperlipidemia        5. Type 2 diabetes mellitus with other circulatory complication, with long-term current use of insulin        6. Stage 3a chronic kidney disease               Plan:       Coronary Artery Disease - stable angina. No chest pain. Chronic shortness of breath at baseline. Remote stents and diabetic type disease. On Xarelto 2.5 mg BID and Aspirin. On Lipitor and Zetia with good lipid control on recent check.   Chronic systolic congestive heart failure/Ischemic CMO- no overt heart failure. On Aldactone, Lisinopril, Jardiance and Toprol. Lisinopril increased form 2.5 mg at last OV. BP running low since that time. Low Salt Diet.   Hypertension - well controlled. Borderline low.   Hyperlipidemia - Lipids improved. On Zetia and Lipitor. LDL at goal of less than 70   Type II Diabetes - follows with endocrinology. Has insulin pump. A1C 8.6. discussed importance of control. On Jardiance.   Chronic Kidney Disease - now follows with interrogation  AICD- placed in 2019 - last interrogation in September- stable.     With residential of Dr. Rai. Patient wishes to establish care with Dr. Archer. Follow up in 6 months with Dr. Archer. Will set up in office interrogation at that time

## 2024-01-04 ENCOUNTER — OFFICE VISIT (OUTPATIENT)
Dept: WOUND CARE | Facility: HOSPITAL | Age: 59
End: 2024-01-04
Payer: COMMERCIAL

## 2024-01-11 ENCOUNTER — OFFICE VISIT (OUTPATIENT)
Dept: WOUND CARE | Facility: HOSPITAL | Age: 59
End: 2024-01-11
Payer: COMMERCIAL

## 2024-01-25 ENCOUNTER — OFFICE VISIT (OUTPATIENT)
Dept: WOUND CARE | Facility: HOSPITAL | Age: 59
End: 2024-01-25
Payer: COMMERCIAL

## 2024-01-29 ENCOUNTER — TRANSCRIBE ORDERS (OUTPATIENT)
Dept: ADMINISTRATIVE | Facility: HOSPITAL | Age: 59
End: 2024-01-29
Payer: COMMERCIAL

## 2024-01-29 DIAGNOSIS — M86.9 OSTEOMYELITIS, UNSPECIFIED SITE, UNSPECIFIED TYPE: Primary | ICD-10-CM

## 2024-01-31 ENCOUNTER — OFFICE VISIT (OUTPATIENT)
Dept: WOUND CARE | Facility: HOSPITAL | Age: 59
End: 2024-01-31
Payer: COMMERCIAL

## 2024-02-01 DIAGNOSIS — Z89.411 AMPUTEE, GREAT TOE, RIGHT: Primary | ICD-10-CM

## 2024-02-01 DIAGNOSIS — T81.89XD DELAYED SURGICAL WOUND HEALING, SUBSEQUENT ENCOUNTER: ICD-10-CM

## 2024-02-01 DIAGNOSIS — Z79.4 TYPE 2 DIABETES MELLITUS WITH DIABETIC NEUROPATHY, WITH LONG-TERM CURRENT USE OF INSULIN: ICD-10-CM

## 2024-02-01 DIAGNOSIS — E11.40 TYPE 2 DIABETES MELLITUS WITH DIABETIC NEUROPATHY, WITH LONG-TERM CURRENT USE OF INSULIN: ICD-10-CM

## 2024-02-01 RX ORDER — DOXYCYCLINE 100 MG/1
100 CAPSULE ORAL 2 TIMES DAILY
Qty: 20 CAPSULE | Refills: 3 | OUTPATIENT
Start: 2024-02-01

## 2024-02-08 ENCOUNTER — OFFICE VISIT (OUTPATIENT)
Dept: WOUND CARE | Facility: HOSPITAL | Age: 59
End: 2024-02-08
Payer: COMMERCIAL

## 2024-02-15 ENCOUNTER — APPOINTMENT (OUTPATIENT)
Dept: WOUND CARE | Facility: HOSPITAL | Age: 59
End: 2024-02-15
Payer: COMMERCIAL

## 2024-02-15 PROCEDURE — G0463 HOSPITAL OUTPT CLINIC VISIT: HCPCS

## 2024-03-12 ENCOUNTER — TELEPHONE (OUTPATIENT)
Dept: PODIATRY | Facility: CLINIC | Age: 59
End: 2024-03-12
Payer: COMMERCIAL

## 2024-03-12 NOTE — TELEPHONE ENCOUNTER
Called patient to see if he would like to move his appt to 03/13/14 with ANDRES Engle. Patient did not answer left VM.

## 2024-04-01 NOTE — PROGRESS NOTES
[facility] - PODIATRY    Today's Date: 04/05/2024     Patient Name: Luis Echeverria  MRN: [id number]  CSN: [id number]  PCP: Rene Hogue DO  Referring Provider: No ref. provider found    SUBJECTIVE     Chief Complaint   Patient presents with    Follow-up     Mykel Rene Swann 03/19/2024  3 months for Follow-up- pt states feet doing ok other than its been over 6 months since seen in this office due to being in wound care- pt denies pain     Diabetes     175mg/dl BG at present     HPI: Luis Echeverria, a 58 y.o.male, comes to clinic as a(n) established patient presenting for diabetic foot exam and complaining of toenail/callus issues. Patient has h/o CAD, CHF, DM 2, HLD, MI, PVD . Patient is IDDM with last stated BG level of 175mg/dl.  States that the sore at the distal aspect of his left great toe has continued to improve and is currently epithelialized.  They continue to bandage or pad the area daily.  Notes that his toenails are long, thick, and discolored.  Admits to utilizing nippers to his toenails from time to time and occasionally causing a small sore but none current.  Admits to numbness in his feet. Denies pain. Relates previous treatment(s) including foot care by podiatry . Denies any constitutional symptoms. No other pedal complaints at this time.    Past Medical History:   Diagnosis Date    CAD (coronary artery disease)     CAD in native artery     2V STEMI 7/13 STENT TO CX AND STENTX2 TO MID AND DISTAL LAD    Chronic systolic CHF (congestive heart failure), NYHA class 2 02/04/2020    Diabetes mellitus     Disease of thyroid gland     GERD (gastroesophageal reflux disease)     History of coronary artery stent placement      x 3 - for ACUTE MI 7/2013    Hyperlipidemia     Hyperlipidemia, mild     Hypertension     Myocardial infarct     Myocardial infarction     Nausea and vomiting 12/10/2018    Stented coronary artery      Past Surgical History:   Procedure Laterality Date     AMPUTATION DIGIT Right 5/3/2023    Procedure: Partial vs Complete Amputation of Hallux - Right Foot;  Surgeon: Simone Neives DPM;  Location:  [facility] OR;  Service: Podiatry;  Laterality: Right;    AORTAGRAM Right 12/23/2022    Procedure: AORTAGRAM, RIGHT LOWER EXTREMITY ANGIOGRAM, BALLOON ANGIOPLASTY, MYNX CLOSURE;  Surgeon: Sammy Wild MD;  Location:  [facility] HYBRID OR 12;  Service: Vascular;  Laterality: Right;    CARDIAC CATHETERIZATION N/A 11/06/2018    Procedure: Left Heart Cath;  Surgeon: Tru Rai MD;  Location:  [facility] CATH INVASIVE LOCATION;  Service: Cardiology    CARDIAC ELECTROPHYSIOLOGY PROCEDURE N/A 05/01/2019    Procedure: ICD new;  Surgeon: Tru Rai MD;  Location:  [facility] CATH INVASIVE LOCATION;  Service: Cardiology    CAROTID STENT       Family History   Problem Relation Age of Onset    Heart disease Maternal Grandmother     No Known Problems Mother     No Known Problems Father      Social History     Socioeconomic History    Marital status:    Tobacco Use    Smoking status: Never     Passive exposure: Never    Smokeless tobacco: Never   Vaping Use    Vaping status: Never Used   Substance and Sexual Activity    Alcohol use: Yes     Comment: 2 TIMES YEARLY    Drug use: No    Sexual activity: Defer     No Known Allergies  Current Outpatient Medications   Medication Sig Dispense Refill    albuterol sulfate  (90 Base) MCG/ACT inhaler Inhale 2 puffs Every 4 (Four) Hours As Needed for Wheezing or Shortness of Air. 8.5 g 5    aspirin 81 MG EC tablet Take 1 tablet by mouth Daily.      atorvastatin (LIPITOR) 80 MG tablet TAKE 1 TABLET BY MOUTH EVERY DAY AT NIGHT 90 tablet 3    brimonidine (ALPHAGAN) 0.2 % ophthalmic solution Administer 1 drop to both eyes 2 (Two) Times a Day.      Continuous Blood Gluc  (Dexcom G6 ) device REMOVED FOR SURGERY      Continuous Blood Gluc Sensor (Dexcom G6 Sensor) 1 each As Needed (glucose control). Every 10  days 9 each 3    Continuous Blood Gluc Transmit (Dexcom G6 Transmitter) misc 1 each Every 3 (Three) Months. 1 each 3    DULoxetine (CYMBALTA) 20 MG capsule Take 30 mg by mouth Daily.      empagliflozin (Jardiance) 25 MG tablet tablet Take 1 tablet by mouth Daily. 90 tablet 3    ezetimibe (ZETIA) 10 MG tablet TAKE 1 TABLET BY MOUTH EVERY DAY 90 tablet 3    Glucagon (Baqsimi One Pack) 3 MG/DOSE powder 1 each into the nostril(s) as directed by provider As Needed (Hypoglycemia). Apply intranasal if hypoglycemia 2 each 11    Insulin Aspart (novoLOG) 100 UNIT/ML injection 150 units daily Name brand only 60 mL 11    Insulin Degludec (TRESIBA SC) Inject 40 Units under the skin into the appropriate area as directed. DIRECTED TO TAKE THIS BY PRIMARY CARE MD DUE TO INSULIN PUMP BEING EMPTY      levothyroxine (SYNTHROID, LEVOTHROID) 25 MCG tablet Take 1 tablet by mouth Every Morning. 30 tablet 11    lisinopril (PRINIVIL,ZESTRIL) 5 MG tablet Take 1 tablet by mouth Daily. 30 tablet 11    Lyumjev 100 UNIT/ML injection 200 units daily through pump 150 mL 3    metoprolol succinate XL (TOPROL-XL) 50 MG 24 hr tablet Take 1.5 tablets by mouth Daily. 135 tablet 3    nitroglycerin (NITROSTAT) 0.4 MG SL tablet Place 1 tablet under the tongue Every 5 (Five) Minutes As Needed for Chest Pain. Take no more than 3 doses in 15 minutes. 25 tablet 1    ondansetron ODT (ZOFRAN-ODT) 4 MG disintegrating tablet Place 1 tablet on the tongue Every 8 (Eight) Hours As Needed for Nausea or Vomiting. 21 tablet 0    Rivaroxaban (Xarelto) 2.5 MG tablet Take 1 tablet by mouth 2 (Two) Times a Day. 180 tablet 3    spironolactone (ALDACTONE) 25 MG tablet TAKE 1 TABLET BY MOUTH EVERY DAY 90 tablet 3    vitamin D (ERGOCALCIFEROL) 1.25 MG (18801 UT) capsule capsule Take 1 capsule by mouth 1 (One) Time Per Week.       No current facility-administered medications for this visit.     Review of Systems   Constitutional:  Negative for chills and fever.   HENT:  Negative  for congestion.    Respiratory:  Negative for shortness of breath.    Cardiovascular:  Negative for chest pain and leg swelling.   Gastrointestinal:  Negative for constipation, diarrhea, nausea and vomiting.   Skin:  Negative for wound.   Neurological:  Positive for numbness.       OBJECTIVE     Vitals:    04/05/24 1531   BP: 136/72   Pulse: 63   SpO2: 98%           PHYSICAL EXAM  GEN:   Accompanied by wife.     Foot/Ankle Exam    GENERAL  Appearance:  appears stated age and obese  Orientation:  AAOx3  Affect:  appropriate  Gait:  unimpaired  Assistance:  independent  Right shoe gear: casual shoe  Left shoe gear: casual shoe    VASCULAR     Right Foot Vascularity   Dorsalis pedis:  1+  Posterior tibial:  1+  Skin temperature:  warm  Edema grading:  Trace  CFT:  4  Pedal hair growth:  Present  Varicosities:  none     Left Foot Vascularity   Dorsalis pedis:  1+  Posterior tibial:  1+  Skin temperature:  warm  Edema grading:  Trace  CFT:  4  Pedal hair growth:  Present  Varicosities:  none     NEUROLOGIC     Right Foot Neurologic   Light touch sensation: diminished  Vibratory sensation: diminished  Hot/Cold sensation: diminished  Protective Sensation using [place] Monofilament:   Sites intact: 5  Sites tested: 9     Left Foot Neurologic   Light touch sensation: diminished  Vibratory sensation: diminished  Hot/Cold sensation:  diminished  Protective Sensation using [place] Monofilament:   Sites intact: 5  Sites tested: 10    MUSCULOSKELETAL     Right Foot Musculoskeletal    Amputation   Toes amputated: first toe  Ecchymosis:  none  Tenderness:  none    Arch:  Normal     Left Foot Musculoskeletal   Ecchymosis:  none  Tenderness:  none  Arch:  Normal    MUSCLE STRENGTH     Right Foot Muscle Strength   Foot dorsiflexion:  5  Foot plantar flexion:  5  Foot inversion:  5  Foot eversion:  5     Left Foot Muscle Strength   Foot dorsiflexion:  5  Foot plantar flexion:  5  Foot inversion:  5  Foot eversion:   5    RANGE OF MOTION     Right Foot Range of Motion   Foot and ankle ROM within normal limits       Left Foot Range of Motion   Foot and ankle ROM within normal limits      DERMATOLOGIC      Right Foot Dermatologic   Skin  Right foot skin is intact.   Nails  2.  Positive for elongated, abnormal thickness and subungual debris.  3.  Positive for elongated, abnormal thickness and subungual debris.  4.  Positive for elongated, abnormal thickness and subungual debris.  5.  Positive for elongated, abnormal thickness and subungual debris.     Left Foot Dermatologic   Skin  Left foot skin is intact.   Nails  1.  Positive for elongated, abnormal thickness and subungual debris.  2.  Positive for elongated, abnormal thickness and subungual debris.  3.  Positive for elongated, abnormal thickness and subungual debris.  4.  Positive for elongated, abnormally thick and subungual debris.  5.  Positive for elongated, abnormally thick and subungual debris.      RADIOLOGY/NUCLEAR:      LABORATORY/CULTURE RESULTS:      PATHOLOGY RESULTS:       ASSESSMENT/PLAN     Diagnoses and all orders for this visit:    1. Thickened nails (Primary)    2. Amputee, great toe, right    3. Anticoagulant long-term use    4. Diabetic polyneuropathy associated with type 1 diabetes mellitus    5. Atherosclerosis of native arteries of the extremities with ulceration            Comprehensive lower extremity examination and evaluation was performed.  Discussed findings and treatment plan including risks, benefits, and treatment options with patient in detail. Patient agreed with treatment plan.  After verbal consent obtained, nail(s) x9 debrided of length and thickness with nail nipper without incidence  Patient may maintain nails and calluses at home utilizing emery board or pumice stone between visits as needed  Reviewed at home diabetic foot care including daily foot checks   Expressed need for adherence to treatment protocols and avoid self-care of  toes.  Continue vascular surgery follow-ups.  An After Visit Summary was printed and given to the patient at discharge, including (if requested) any available informative/educational handouts regarding diagnosis, treatment, or medications. All questions were answered to patient/family satisfaction. Should symptoms fail to improve or worsen they agree to call or return to clinic or to go to the Emergency Department. Discussed the importance of following up with any needed screening tests/labs/specialist appointments and any requested follow-up recommended by me today. Importance of maintaining follow-up discussed and patient accepts that missed appointments can delay diagnosis and potentially lead to worsening of conditions.  Return in about 2 months (around 6/5/2024) for Follow-up with Podiatry APRN, Schedule Foot Care Clinic., or sooner if acute issues arise.    Lab Frequency Next Occurrence   Follow Anesthesia Guidelines / Protocol Once 12/18/2022   Obtain Informed Consent Once 12/18/2022   Provide NPO Instructions to Patient Once 12/18/2022   Chlorhexidine Skin Prep Once 12/18/2022   Follow Anesthesia Guidelines / Protocol Once 04/22/2023   Obtain Informed Consent Once 04/22/2023   Provide Instructions to Patient Regarding NPO Status Once 04/22/2023   Chlorhexidine Skin Prep - Educate and Review With Patient Once 04/22/2023   Instructions on coughing, deep breathing, and incentive spirometry. Once 04/22/2023   US Carotid Bilateral Once 10/18/2023   US Ankle / Brachial Indices Extremity Complete Once 10/18/2023   US Venous Doppler Lower Extremity Bilateral (duplex) Once 10/18/2023       This document has been electronically signed by Simone Nieves DPM on April 5, 2024 16:51 CDT

## 2024-04-04 ENCOUNTER — TELEPHONE (OUTPATIENT)
Dept: PODIATRY | Facility: CLINIC | Age: 59
End: 2024-04-04
Payer: COMMERCIAL

## 2024-04-04 NOTE — TELEPHONE ENCOUNTER
Called patient to confirmed appointment for 04/05 @ 5961 with Dr. Nieves. Patient will be here for appointment.

## 2024-04-05 ENCOUNTER — OFFICE VISIT (OUTPATIENT)
Dept: PODIATRY | Facility: CLINIC | Age: 59
End: 2024-04-05
Payer: COMMERCIAL

## 2024-04-05 VITALS
OXYGEN SATURATION: 98 % | BODY MASS INDEX: 37.33 KG/M2 | SYSTOLIC BLOOD PRESSURE: 136 MMHG | WEIGHT: 252 LBS | HEIGHT: 69 IN | DIASTOLIC BLOOD PRESSURE: 72 MMHG | HEART RATE: 63 BPM

## 2024-04-05 DIAGNOSIS — L60.2 THICKENED NAILS: Primary | ICD-10-CM

## 2024-04-05 DIAGNOSIS — Z89.411 AMPUTEE, GREAT TOE, RIGHT: ICD-10-CM

## 2024-04-05 DIAGNOSIS — I70.25 ATHEROSCLEROSIS OF NATIVE ARTERIES OF THE EXTREMITIES WITH ULCERATION: ICD-10-CM

## 2024-04-05 DIAGNOSIS — Z79.01 ANTICOAGULANT LONG-TERM USE: ICD-10-CM

## 2024-04-05 DIAGNOSIS — E10.42 DIABETIC POLYNEUROPATHY ASSOCIATED WITH TYPE 1 DIABETES MELLITUS: ICD-10-CM

## 2024-04-05 PROBLEM — E11.42 DIABETIC POLYNEUROPATHY ASSOCIATED WITH TYPE 2 DIABETES MELLITUS: Status: RESOLVED | Noted: 2023-02-27 | Resolved: 2024-04-05

## 2024-05-31 NOTE — PROGRESS NOTES
[facility] - PODIATRY    Today's Date: 06/07/2024     Patient Name: Luis Echeverria  MRN: [id number]  CSN: [id number]  PCP: Rene Hogue DO  Referring Provider: No ref. provider found    SUBJECTIVE     Chief Complaint   Patient presents with    Follow-up     Mykel Rene Swann 03/19/2024 2 MTH FU DIABETIC- pt states feet doing good- pt denies pain     Diabetes     178mg/dl BG at present      HPI: Luis Echeverria, a 58 y.o.male, comes to clinic as a(n) established patient presenting for diabetic foot exam and complaining of toenail/callus issues. Patient has h/o CAD, CHF, DM 2, HLD, MI, PVD . Patient is IDDM with last stated BG level of 178mg/dl.  Today patient reports that his toenails are long, thick, and discolored.  Admits to utilizing nippers to his toenails from time to time and occasionally causing a small sore but none current.  Admits to numbness in his feet. Patient and wife state they typically keep Mupirocin or Santyl applied to tip of left great toe as there was a small sore there previously. It is callused over today. Denies pain. Relates previous treatment(s) including foot care by podiatry . Denies any constitutional symptoms. No other pedal complaints at this time.    Past Medical History:   Diagnosis Date    CAD (coronary artery disease)     CAD in native artery     2V STEMI 7/13 STENT TO CX AND STENTX2 TO MID AND DISTAL LAD    Chronic systolic CHF (congestive heart failure), NYHA class 2 02/04/2020    Diabetes mellitus     Disease of thyroid gland     GERD (gastroesophageal reflux disease)     History of coronary artery stent placement      x 3 - for ACUTE MI 7/2013    Hyperlipidemia     Hyperlipidemia, mild     Hypertension     Myocardial infarct     Myocardial infarction     Nausea and vomiting 12/10/2018    Stented coronary artery      Past Surgical History:   Procedure Laterality Date    AMPUTATION DIGIT Right 5/3/2023    Procedure: Partial vs Complete Amputation  of Hallux - Right Foot;  Surgeon: Simone Nieves DPM;  Location:  [facility] OR;  Service: Podiatry;  Laterality: Right;    AORTAGRAM Right 12/23/2022    Procedure: AORTAGRAM, RIGHT LOWER EXTREMITY ANGIOGRAM, BALLOON ANGIOPLASTY, MYNX CLOSURE;  Surgeon: Sammy Wild MD;  Location:  [facility] HYBRID OR 12;  Service: Vascular;  Laterality: Right;    CARDIAC CATHETERIZATION N/A 11/06/2018    Procedure: Left Heart Cath;  Surgeon: Tru Rai MD;  Location:  [facility] CATH INVASIVE LOCATION;  Service: Cardiology    CARDIAC ELECTROPHYSIOLOGY PROCEDURE N/A 05/01/2019    Procedure: ICD new;  Surgeon: Tru Rai MD;  Location:  [facility] CATH INVASIVE LOCATION;  Service: Cardiology    CAROTID STENT       Family History   Problem Relation Age of Onset    Heart disease Maternal Grandmother     No Known Problems Mother     No Known Problems Father      Social History     Socioeconomic History    Marital status:    Tobacco Use    Smoking status: Never     Passive exposure: Never    Smokeless tobacco: Never   Vaping Use    Vaping status: Never Used   Substance and Sexual Activity    Alcohol use: Yes     Comment: 2 TIMES YEARLY    Drug use: No    Sexual activity: Defer     No Known Allergies  Current Outpatient Medications   Medication Sig Dispense Refill    albuterol sulfate  (90 Base) MCG/ACT inhaler Inhale 2 puffs Every 4 (Four) Hours As Needed for Wheezing or Shortness of Air. 8.5 g 5    aspirin 81 MG EC tablet Take 1 tablet by mouth Daily.      atorvastatin (LIPITOR) 80 MG tablet TAKE 1 TABLET BY MOUTH EVERY DAY AT NIGHT 90 tablet 3    brimonidine (ALPHAGAN) 0.2 % ophthalmic solution Administer 1 drop to both eyes 2 (Two) Times a Day.      Continuous Blood Gluc  (Dexcom G6 ) device REMOVED FOR SURGERY      Continuous Blood Gluc Sensor (Dexcom G6 Sensor) 1 each As Needed (glucose control). Every 10 days 9 each 3    Continuous Blood Gluc Transmit (Dexcom G6 Transmitter) misc  1 each Every 3 (Three) Months. 1 each 3    DULoxetine (CYMBALTA) 20 MG capsule Take 30 mg by mouth Daily.      empagliflozin (Jardiance) 25 MG tablet tablet Take 1 tablet by mouth Daily. 90 tablet 3    ezetimibe (ZETIA) 10 MG tablet TAKE 1 TABLET BY MOUTH EVERY DAY 90 tablet 3    Glucagon (Baqsimi One Pack) 3 MG/DOSE powder 1 each into the nostril(s) as directed by provider As Needed (Hypoglycemia). Apply intranasal if hypoglycemia 2 each 11    Insulin Aspart (novoLOG) 100 UNIT/ML injection 150 units daily Name brand only 60 mL 11    Insulin Degludec (TRESIBA SC) Inject 40 Units under the skin into the appropriate area as directed. DIRECTED TO TAKE THIS BY PRIMARY CARE MD DUE TO INSULIN PUMP BEING EMPTY      levothyroxine (SYNTHROID, LEVOTHROID) 25 MCG tablet Take 1 tablet by mouth Every Morning. 30 tablet 11    lisinopril (PRINIVIL,ZESTRIL) 5 MG tablet Take 1 tablet by mouth Daily. 30 tablet 11    Lyumjev 100 UNIT/ML injection 200 units daily through pump 150 mL 3    metoprolol succinate XL (TOPROL-XL) 50 MG 24 hr tablet Take 1.5 tablets by mouth Daily. 135 tablet 3    nitroglycerin (NITROSTAT) 0.4 MG SL tablet Place 1 tablet under the tongue Every 5 (Five) Minutes As Needed for Chest Pain. Take no more than 3 doses in 15 minutes. 25 tablet 1    ondansetron ODT (ZOFRAN-ODT) 4 MG disintegrating tablet Place 1 tablet on the tongue Every 8 (Eight) Hours As Needed for Nausea or Vomiting. 21 tablet 0    Rivaroxaban (Xarelto) 2.5 MG tablet Take 1 tablet by mouth 2 (Two) Times a Day. 180 tablet 3    spironolactone (ALDACTONE) 25 MG tablet TAKE 1 TABLET BY MOUTH EVERY DAY 90 tablet 3    vitamin D (ERGOCALCIFEROL) 1.25 MG (74512 UT) capsule capsule Take 1 capsule by mouth 1 (One) Time Per Week.       No current facility-administered medications for this visit.     Review of Systems   Constitutional:  Negative for chills and fever.   HENT:  Negative for congestion.    Respiratory:  Negative for shortness of breath.     Cardiovascular:  Negative for chest pain and leg swelling.   Gastrointestinal:  Negative for constipation, diarrhea, nausea and vomiting.   Musculoskeletal:  Negative for arthralgias and gait problem.   Skin:  Negative for wound.        Callus- thickened elongated toenails.   Neurological:  Positive for numbness. Negative for dizziness and weakness.   Psychiatric/Behavioral:  Negative for agitation, behavioral problems and confusion.        OBJECTIVE     Vitals:    06/07/24 1502   BP: 136/72   Pulse: 79   SpO2: 98%             PHYSICAL EXAM  GEN:   Accompanied by wife.     Foot/Ankle Exam    GENERAL  Appearance:  appears stated age and obese  Orientation:  AAOx3  Affect:  appropriate  Gait:  unimpaired  Assistance:  independent  Right shoe gear: casual shoe  Left shoe gear: casual shoe    VASCULAR     Right Foot Vascularity   Dorsalis pedis:  1+  Posterior tibial:  1+  Skin temperature:  warm  Edema grading:  Trace  CFT:  4  Pedal hair growth:  Present  Varicosities:  none     Left Foot Vascularity   Dorsalis pedis:  1+  Posterior tibial:  1+  Skin temperature:  warm  Edema grading:  Trace  CFT:  4  Pedal hair growth:  Present  Varicosities:  none     NEUROLOGIC     Right Foot Neurologic   Light touch sensation: diminished  Vibratory sensation: diminished  Hot/Cold sensation: diminished  Protective Sensation using [place] Monofilament:   Sites intact: 5  Sites tested: 9     Left Foot Neurologic   Light touch sensation: diminished  Vibratory sensation: diminished  Hot/Cold sensation:  diminished  Protective Sensation using [place] Monofilament:   Sites intact: 5  Sites tested: 10    MUSCULOSKELETAL     Right Foot Musculoskeletal    Amputation   Toes amputated: first toe  Ecchymosis:  none  Tenderness:  none    Arch:  Normal     Left Foot Musculoskeletal   Ecchymosis:  none  Tenderness:  none  Arch:  Normal    MUSCLE STRENGTH     Right Foot Muscle Strength   Foot dorsiflexion:  5  Foot plantar  flexion:  5  Foot inversion:  5  Foot eversion:  5     Left Foot Muscle Strength   Foot dorsiflexion:  5  Foot plantar flexion:  5  Foot inversion:  5  Foot eversion:  5    RANGE OF MOTION     Right Foot Range of Motion   Foot and ankle ROM within normal limits       Left Foot Range of Motion   Foot and ankle ROM within normal limits      DERMATOLOGIC      Right Foot Dermatologic   Skin  Right foot skin is intact.   Nails  2.  Positive for elongated, abnormal thickness and subungual debris.  3.  Positive for elongated, abnormal thickness and subungual debris.  4.  Positive for elongated, abnormal thickness and subungual debris.  5.  Positive for elongated, abnormal thickness and subungual debris.     Left Foot Dermatologic   Skin  Positive for ulcer.   Nails  1.  Positive for elongated, abnormal thickness and subungual debris.  2.  Positive for elongated, abnormal thickness and subungual debris.  3.  Positive for elongated, abnormal thickness and subungual debris.  4.  Positive for elongated, abnormally thick and subungual debris.  5.  Positive for elongated, abnormally thick and subungual debris.    Image:       RADIOLOGY/NUCLEAR:      LABORATORY/CULTURE RESULTS:      PATHOLOGY RESULTS:       ASSESSMENT/PLAN     Diagnoses and all orders for this visit:    1. Thickened nails (Primary)    2. Diabetic polyneuropathy associated with type 1 diabetes mellitus    3. Encounter for diabetic foot exam    4. Atherosclerosis of native arteries of the extremities with ulceration    5. Neuropathic ulcer of toe of left foot, limited to breakdown of skin    6. Amputee, great toe, right    7. Anticoagulant long-term use    8. Pre-ulcerative calluses        Comprehensive lower extremity examination and evaluation was performed.  Discussed findings and treatment plan including risks, benefits, and treatment options with patient in detail. Patient agreed with treatment plan.  After verbal consent obtained, nail(s) x9 debrided of  length and thickness with nail nipper without incidence  After verbal consent obtained, calluses x1 pared utilizing dermal curette and/or scalpel without incidence  Patient may maintain nails and calluses at home utilizing emery board or pumice stone between visits as needed  Reviewed at home diabetic foot care including daily foot checks  After verbal consent obtained, selective debridement performed to remove skin, slough and non-viable tissue utilizing dermal curette and/or scapel. Hemostasis achieved with pressure. Wound area debrided was entire measurement documented.   Instructed to scheduled new appointment with outpatient [facility] for ulceration to left great toe. Patient recently has been seen by them and should not require a new referral.  Instructed to keep antibiotic ointment and band-aid applied to toe and to change dressing twice daily until can be further evaluated. Educated on SOI including fever, warmth, purulent drainage, malodor, pain, redness, etc and to notify us if these symptoms present.  Expressed need for adherence to treatment protocols and avoid self-care of toes.  Continue vascular surgery follow-ups.  Follow-up in 2 months for routine foot and nail care.     An After Visit Summary was printed and given to the patient at discharge, including (if requested) any available informative/educational handouts regarding diagnosis, treatment, or medications. All questions were answered to patient/family satisfaction. Should symptoms fail to improve or worsen they agree to call or return to clinic or to go to the Emergency Department. Discussed the importance of following up with any needed screening tests/labs/specialist appointments and any requested follow-up recommended by me today. Importance of maintaining follow-up discussed and patient accepts that missed appointments can delay diagnosis and potentially lead to worsening of conditions.  Return in about 2 months (around 8/7/2024) for Follow-up  with APRN, Follow-up in Foot Care Clinic., or sooner if acute issues arise.        This document has been electronically signed by ANDRES Naranjo on June 7, 2024 16:44 CDT

## 2024-06-06 ENCOUNTER — TELEPHONE (OUTPATIENT)
Dept: PODIATRY | Facility: CLINIC | Age: 59
End: 2024-06-06
Payer: COMMERCIAL

## 2024-06-06 NOTE — TELEPHONE ENCOUNTER
Hub to relay  Called patient regarding appt on 06/07/2024. Left message for patient to return call if any questions or concerns arise.

## 2024-06-07 ENCOUNTER — OFFICE VISIT (OUTPATIENT)
Dept: PODIATRY | Facility: CLINIC | Age: 59
End: 2024-06-07
Payer: COMMERCIAL

## 2024-06-07 VITALS
HEIGHT: 69 IN | SYSTOLIC BLOOD PRESSURE: 136 MMHG | DIASTOLIC BLOOD PRESSURE: 72 MMHG | HEART RATE: 79 BPM | OXYGEN SATURATION: 98 % | BODY MASS INDEX: 37.03 KG/M2 | WEIGHT: 250 LBS

## 2024-06-07 DIAGNOSIS — I70.25 ATHEROSCLEROSIS OF NATIVE ARTERIES OF THE EXTREMITIES WITH ULCERATION: ICD-10-CM

## 2024-06-07 DIAGNOSIS — Z79.01 ANTICOAGULANT LONG-TERM USE: ICD-10-CM

## 2024-06-07 DIAGNOSIS — E11.9 ENCOUNTER FOR DIABETIC FOOT EXAM: ICD-10-CM

## 2024-06-07 DIAGNOSIS — E10.42 DIABETIC POLYNEUROPATHY ASSOCIATED WITH TYPE 1 DIABETES MELLITUS: ICD-10-CM

## 2024-06-07 DIAGNOSIS — L97.521 NEUROPATHIC ULCER OF TOE OF LEFT FOOT, LIMITED TO BREAKDOWN OF SKIN: ICD-10-CM

## 2024-06-07 DIAGNOSIS — Z89.411 AMPUTEE, GREAT TOE, RIGHT: ICD-10-CM

## 2024-06-07 DIAGNOSIS — L84 PRE-ULCERATIVE CALLUSES: ICD-10-CM

## 2024-06-07 DIAGNOSIS — L60.2 THICKENED NAILS: Primary | ICD-10-CM

## 2024-06-07 PROBLEM — R25.2 MUSCLE CRAMPS: Status: ACTIVE | Noted: 2024-05-31

## 2024-06-07 PROBLEM — E55.9 VITAMIN D DEFICIENCY: Status: ACTIVE | Noted: 2024-06-07

## 2024-06-07 PROBLEM — E10.21 DIABETIC NEPHROPATHY ASSOCIATED WITH TYPE 1 DIABETES MELLITUS: Status: ACTIVE | Noted: 2024-06-07

## 2024-06-07 PROBLEM — N18.31 STAGE 3A CHRONIC KIDNEY DISEASE: Status: ACTIVE | Noted: 2024-06-07

## 2024-06-24 RX ORDER — LISINOPRIL 5 MG/1
5 TABLET ORAL DAILY
Qty: 90 TABLET | Refills: 3 | Status: SHIPPED | OUTPATIENT
Start: 2024-06-24

## 2024-07-03 ENCOUNTER — OFFICE VISIT (OUTPATIENT)
Dept: CARDIOLOGY | Facility: CLINIC | Age: 59
End: 2024-07-03
Payer: COMMERCIAL

## 2024-07-03 VITALS
BODY MASS INDEX: 37.03 KG/M2 | DIASTOLIC BLOOD PRESSURE: 80 MMHG | OXYGEN SATURATION: 99 % | HEART RATE: 75 BPM | SYSTOLIC BLOOD PRESSURE: 120 MMHG | HEIGHT: 69 IN | WEIGHT: 250 LBS

## 2024-07-03 DIAGNOSIS — E78.2 MIXED HYPERLIPIDEMIA: ICD-10-CM

## 2024-07-03 DIAGNOSIS — I25.5 ISCHEMIC CARDIOMYOPATHY: ICD-10-CM

## 2024-07-03 DIAGNOSIS — I25.10 CORONARY ARTERY DISEASE INVOLVING NATIVE CORONARY ARTERY OF NATIVE HEART WITHOUT ANGINA PECTORIS: Primary | ICD-10-CM

## 2024-07-03 PROCEDURE — 99214 OFFICE O/P EST MOD 30 MIN: CPT | Performed by: INTERNAL MEDICINE

## 2024-07-03 PROCEDURE — 93000 ELECTROCARDIOGRAM COMPLETE: CPT | Performed by: INTERNAL MEDICINE

## 2024-07-03 NOTE — PROGRESS NOTES
Subjective:     Encounter Date:07/03/2024      Patient ID: Luis Echeverria is a 58 y.o. male with coronary artery disease (prior stent to the LAD, circumflex and right coronary artery), ischemic cardiomyopathy with an ICD in place, hypertension, hyperlipidemia, peripheral arterial disease, insulin requiring type 2 diabetes mellitus, chronic kidney disease, presenting today for follow-up.    Chief Complaint: Here for follow-up of coronary artery disease, ischemic cardiomyopathy    History of Present Illness    This patient presents today for routine follow-up.  He does have coronary artery disease, ischemic cardiomyopathy, does have an ICD in place, also peripheral arterial disease, hypertension, hyperlipidemia and insulin requiring diabetes mellitus.  He notes that he has been doing well and feeling well.  He denies having any chest discomfort.  He describes stable breathing with no significant shortness of breath or dyspnea with exertion.  He denies having any problems with his ICD for his medications.  Blood pressure is generally well-controlled.  He denies having lightheadedness, dizziness, syncope, palpitations.  No lower extremity edema.  His weight has been stable.  He does see endocrinology for control of his diabetes.  His cholesterol has been well-controlled.  Overall, he says that he seems to be doing well at this time.      Current Outpatient Medications:     albuterol sulfate  (90 Base) MCG/ACT inhaler, Inhale 2 puffs Every 4 (Four) Hours As Needed for Wheezing or Shortness of Air., Disp: 8.5 g, Rfl: 5    aspirin 81 MG EC tablet, Take 1 tablet by mouth Daily., Disp: , Rfl:     atorvastatin (LIPITOR) 80 MG tablet, TAKE 1 TABLET BY MOUTH EVERY DAY AT NIGHT, Disp: 90 tablet, Rfl: 3    brimonidine (ALPHAGAN) 0.2 % ophthalmic solution, Administer 1 drop to both eyes 2 (Two) Times a Day., Disp: , Rfl:     Continuous Blood Gluc  (Dexcom G6 ) device, REMOVED FOR SURGERY, Disp: , Rfl:      Continuous Blood Gluc Sensor (Dexcom G6 Sensor), 1 each As Needed (glucose control). Every 10 days, Disp: 9 each, Rfl: 3    Continuous Blood Gluc Transmit (Dexcom G6 Transmitter) misc, 1 each Every 3 (Three) Months., Disp: 1 each, Rfl: 3    DULoxetine (CYMBALTA) 20 MG capsule, Take 30 mg by mouth Daily., Disp: , Rfl:     empagliflozin (Jardiance) 25 MG tablet tablet, Take 1 tablet by mouth Daily., Disp: 90 tablet, Rfl: 3    ezetimibe (ZETIA) 10 MG tablet, TAKE 1 TABLET BY MOUTH EVERY DAY, Disp: 90 tablet, Rfl: 3    Glucagon (Baqsimi One Pack) 3 MG/DOSE powder, 1 each into the nostril(s) as directed by provider As Needed (Hypoglycemia). Apply intranasal if hypoglycemia, Disp: 2 each, Rfl: 11    Insulin Aspart (novoLOG) 100 UNIT/ML injection, 150 units daily Name brand only, Disp: 60 mL, Rfl: 11    Insulin Degludec (TRESIBA SC), Inject 40 Units under the skin into the appropriate area as directed. DIRECTED TO TAKE THIS BY PRIMARY CARE MD DUE TO INSULIN PUMP BEING EMPTY, Disp: , Rfl:     levothyroxine (SYNTHROID, LEVOTHROID) 25 MCG tablet, Take 1 tablet by mouth Every Morning. (Patient taking differently: Take 3 tablets by mouth Every Morning.), Disp: 30 tablet, Rfl: 11    lisinopril (PRINIVIL,ZESTRIL) 5 MG tablet, TAKE 1 TABLET BY MOUTH EVERY DAY, Disp: 90 tablet, Rfl: 3    Lyumjev 100 UNIT/ML injection, 200 units daily through pump, Disp: 150 mL, Rfl: 3    metoprolol succinate XL (TOPROL-XL) 50 MG 24 hr tablet, Take 1.5 tablets by mouth Daily., Disp: 135 tablet, Rfl: 3    nitroglycerin (NITROSTAT) 0.4 MG SL tablet, Place 1 tablet under the tongue Every 5 (Five) Minutes As Needed for Chest Pain. Take no more than 3 doses in 15 minutes., Disp: 25 tablet, Rfl: 1    ondansetron ODT (ZOFRAN-ODT) 4 MG disintegrating tablet, Place 1 tablet on the tongue Every 8 (Eight) Hours As Needed for Nausea or Vomiting., Disp: 21 tablet, Rfl: 0    Rivaroxaban (Xarelto) 2.5 MG tablet, Take 1 tablet by mouth 2 (Two) Times a Day.,  Disp: 180 tablet, Rfl: 3    spironolactone (ALDACTONE) 25 MG tablet, TAKE 1 TABLET BY MOUTH EVERY DAY, Disp: 90 tablet, Rfl: 3    vitamin D (ERGOCALCIFEROL) 1.25 MG (86076 UT) capsule capsule, Take 1 capsule by mouth 1 (One) Time Per Week., Disp: , Rfl:     No Known Allergies    Social History     Tobacco Use    Smoking status: Never     Passive exposure: Never    Smokeless tobacco: Never   Substance Use Topics    Alcohol use: Yes     Comment: 2 TIMES YEARLY     Review of Systems   Constitutional: Negative for fever and weight loss.   Cardiovascular:  Negative for chest pain, dyspnea on exertion, leg swelling, orthopnea, palpitations, paroxysmal nocturnal dyspnea and syncope.   Respiratory:  Negative for cough, shortness of breath and wheezing.    Gastrointestinal:  Negative for abdominal pain, nausea and vomiting.   Neurological:  Negative for dizziness and light-headedness.       ECG 12 Lead    Date/Time: 7/3/2024 3:14 PM  Performed by: Ranjan Archer MD    Authorized by: Ranjan Archer MD  Comparison: compared with previous ECG   Similar to previous ECG  Rhythm: sinus rhythm  Rate: normal  BPM: 75  Conduction: right bundle branch block and 1st degree AV block  QRS axis: indeterminate  Other findings: non-specific ST-T wave changes and poor R wave progression    Clinical impression: abnormal EKG           Objective:     Vitals reviewed.   Constitutional:       General: Not in acute distress.     Appearance: Well-developed and not in distress.   Eyes:      Extraocular Movements: Extraocular movements intact.   HENT:      Head: Normocephalic and atraumatic.   Pulmonary:      Effort: Pulmonary effort is normal.      Breath sounds: Normal breath sounds. No wheezing. No rhonchi. No rales.   Cardiovascular:      Normal rate. Regular rhythm.      Murmurs: There is no murmur.      No gallop.    Pulses:     Intact distal pulses.   Edema:     Peripheral edema absent.   Abdominal:      General: Bowel sounds  "are normal. There is no distension.      Palpations: Abdomen is soft.      Tenderness: There is no abdominal tenderness.   Skin:     General: Skin is warm and dry. There is no cyanosis.      Findings: No erythema or rash.   Neurological:      Mental Status: Alert and oriented to person, place, and time.      Cranial Nerves: No cranial nerve deficit.       /80   Pulse 75   Ht 175.3 cm (69\")   Wt 113 kg (250 lb)   SpO2 99%   BMI 36.92 kg/m²     Data/Lab Review:     I reviewed today's ICD interrogation.  Normal device function noted.  No ventricular high rate episodes and no therapies delivered.  Normal sensing, capture, threshold.  4.1% atrial pacing, less than 1% ventricular pacing.    Lab Results   Component Value Date    CHOL 147 08/31/2021    CHLPL 123 11/29/2023    TRIG 127 11/29/2023    HDL 41 11/29/2023    LDL 57 11/29/2023     Results for orders placed during the hospital encounter of 08/29/21    Adult Transthoracic Echo Complete W/ Cont if Necessary Per Protocol    Interpretation Summary  · Left ventricular ejection fraction appears to be 36 - 40%. Left ventricular systolic function is moderately decreased.  · Left ventricular diastolic function is consistent with (grade I) impaired relaxation.  · Normal right ventricular cavity size and systolic function noted.  · There is no significant (greater than mild) valvular dysfunction.        Assessment:          Diagnosis Plan   1. Coronary artery disease involving native coronary artery of native heart without angina pectoris  ECG 12 Lead      2. Ischemic cardiomyopathy        3. Mixed hyperlipidemia             Plan:       1.  Coronary artery disease: Stable at this time with no anginal symptoms described to the patient remains on aspirin and also has been on low-dose Xarelto as previously prescribed by Dr. Rai.  He does remain on beta-blocker and high intensity statin therapies.  No testing indicated at this time as the patient is asymptomatic.  " Continue current therapies.    2.  Ischemic cardiomyopathy: Most recent assessment of ejection fraction as noted above with moderate left ventricular systolic dysfunction.  The patient does have a primary prevention ICD in place that is appropriately functioning based on today's interrogation.  He does continue Toprol-XL, lisinopril, spironolactone, Jardiance.  Continue these therapies.    3.  Mixed hyperlipidemia: LDL cholesterol at last check is very near goal.  The LDL is less than 70 and nearly less than 55, which might be desirable given his type 2 diabetes mellitus, peripheral arterial disease, etc.  Continue high intensity statin therapy.    We will plan to see the patient again in 6 months unless otherwise needed sooner.

## 2024-08-19 NOTE — PROGRESS NOTES
[facility] - PODIATRY    Today's Date: 08/21/2024     Patient Name: Luis Echeverria  MRN: [id number]  CSN: [id number]  PCP: Rene Hogue DO  Referring Provider: No ref. provider found    SUBJECTIVE     Chief Complaint   Patient presents with    Follow-up     Rene Hogueren 03/19/2024 [facility]= pt states feet doing ok- pt denies pain     Diabetes     137mg/dl BG at present      HPI: Luis Echeverria, a 58 y.o.male, comes to clinic as a(n) established patient presenting for diabetic foot exam and complaining of toenail/callus issues. Patient has h/o CAD, CHF, DM 2, HLD, MI, PVD . Patient is IDDM with last stated BG level of 137mg/dl.  Patient presents with complaints that his toenails are long, thick, and discolored.  Continues to relay numbness in his feet. Patient and wife state they occasionally apply mupirocin or Santyl to tip of left great toe as there was a small sore there previously.  Notes it is callused over today not causing any issues currently.  Denies pain. Relates previous treatment(s) including foot care by podiatry . Denies any constitutional symptoms. No other pedal complaints at this time.    Past Medical History:   Diagnosis Date    CAD (coronary artery disease)     CAD in native artery     2V STEMI 7/13 STENT TO CX AND STENTX2 TO MID AND DISTAL LAD    Chronic systolic CHF (congestive heart failure), NYHA class 2 02/04/2020    Diabetes mellitus     Disease of thyroid gland     GERD (gastroesophageal reflux disease)     History of coronary artery stent placement      x 3 - for ACUTE MI 7/2013    Hyperlipidemia     Hyperlipidemia, mild     Hypertension     Myocardial infarct     Myocardial infarction     Nausea and vomiting 12/10/2018    Stented coronary artery      Past Surgical History:   Procedure Laterality Date    AMPUTATION DIGIT Right 5/3/2023    Procedure: Partial vs Complete Amputation of Hallux - Right Foot;  Surgeon: Simone Nieves DPM;   Location:  [facility] OR;  Service: Podiatry;  Laterality: Right;    AORTAGRAM Right 12/23/2022    Procedure: AORTAGRAM, RIGHT LOWER EXTREMITY ANGIOGRAM, BALLOON ANGIOPLASTY, MYNX CLOSURE;  Surgeon: Sammy Wild MD;  Location:  [facility] HYBRID OR 12;  Service: Vascular;  Laterality: Right;    CARDIAC CATHETERIZATION N/A 11/06/2018    Procedure: Left Heart Cath;  Surgeon: Tru Rai MD;  Location:  [facility] CATH INVASIVE LOCATION;  Service: Cardiology    CARDIAC ELECTROPHYSIOLOGY PROCEDURE N/A 05/01/2019    Procedure: ICD new;  Surgeon: Tru Rai MD;  Location:  [facility] CATH INVASIVE LOCATION;  Service: Cardiology    CAROTID STENT       Family History   Problem Relation Age of Onset    Heart disease Maternal Grandmother     No Known Problems Mother     No Known Problems Father      Social History     Socioeconomic History    Marital status:    Tobacco Use    Smoking status: Never     Passive exposure: Never    Smokeless tobacco: Never   Vaping Use    Vaping status: Never Used   Substance and Sexual Activity    Alcohol use: Yes     Comment: 2 TIMES YEARLY    Drug use: No    Sexual activity: Defer     No Known Allergies  Current Outpatient Medications   Medication Sig Dispense Refill    albuterol sulfate  (90 Base) MCG/ACT inhaler Inhale 2 puffs Every 4 (Four) Hours As Needed for Wheezing or Shortness of Air. 8.5 g 5    aspirin 81 MG EC tablet Take 1 tablet by mouth Daily.      atorvastatin (LIPITOR) 80 MG tablet TAKE 1 TABLET BY MOUTH EVERY DAY AT NIGHT 90 tablet 3    brimonidine (ALPHAGAN) 0.2 % ophthalmic solution Administer 1 drop to both eyes 2 (Two) Times a Day.      Continuous Blood Gluc  (Dexcom G6 ) device REMOVED FOR SURGERY      Continuous Blood Gluc Sensor (Dexcom G6 Sensor) 1 each As Needed (glucose control). Every 10 days 9 each 3    Continuous Blood Gluc Transmit (Dexcom G6 Transmitter) misc 1 each Every 3 (Three) Months. 1 each 3    DULoxetine  (CYMBALTA) 20 MG capsule Take 30 mg by mouth Daily.      empagliflozin (Jardiance) 25 MG tablet tablet Take 1 tablet by mouth Daily. 90 tablet 3    ezetimibe (ZETIA) 10 MG tablet TAKE 1 TABLET BY MOUTH EVERY DAY 90 tablet 3    Glucagon (Baqsimi One Pack) 3 MG/DOSE powder 1 each into the nostril(s) as directed by provider As Needed (Hypoglycemia). Apply intranasal if hypoglycemia 2 each 11    Insulin Aspart (novoLOG) 100 UNIT/ML injection 150 units daily Name brand only 60 mL 11    Insulin Degludec (TRESIBA SC) Inject 40 Units under the skin into the appropriate area as directed. DIRECTED TO TAKE THIS BY PRIMARY CARE MD DUE TO INSULIN PUMP BEING EMPTY      levothyroxine (SYNTHROID, LEVOTHROID) 25 MCG tablet Take 1 tablet by mouth Every Morning. (Patient taking differently: Take 3 tablets by mouth Every Morning.) 30 tablet 11    lisinopril (PRINIVIL,ZESTRIL) 5 MG tablet TAKE 1 TABLET BY MOUTH EVERY DAY 90 tablet 3    Lyumjev 100 UNIT/ML injection 200 units daily through pump 150 mL 3    metoprolol succinate XL (TOPROL-XL) 50 MG 24 hr tablet Take 1.5 tablets by mouth Daily. 135 tablet 3    nitroglycerin (NITROSTAT) 0.4 MG SL tablet Place 1 tablet under the tongue Every 5 (Five) Minutes As Needed for Chest Pain. Take no more than 3 doses in 15 minutes. 25 tablet 1    ondansetron ODT (ZOFRAN-ODT) 4 MG disintegrating tablet Place 1 tablet on the tongue Every 8 (Eight) Hours As Needed for Nausea or Vomiting. 21 tablet 0    Rivaroxaban (Xarelto) 2.5 MG tablet Take 1 tablet by mouth 2 (Two) Times a Day. 180 tablet 3    spironolactone (ALDACTONE) 25 MG tablet TAKE 1 TABLET BY MOUTH EVERY DAY 90 tablet 3    vitamin D (ERGOCALCIFEROL) 1.25 MG (21540 UT) capsule capsule Take 1 capsule by mouth 1 (One) Time Per Week.       No current facility-administered medications for this visit.     Review of Systems   Constitutional:  Negative for chills and fever.   HENT:  Negative for congestion.    Respiratory:  Negative for shortness of  breath.    Cardiovascular:  Negative for chest pain and leg swelling.   Gastrointestinal:  Negative for constipation, diarrhea, nausea and vomiting.   Musculoskeletal:  Negative for arthralgias and gait problem.   Skin:  Negative for wound.        Callus- thickened elongated toenails.   Neurological:  Positive for numbness. Negative for dizziness and weakness.   Psychiatric/Behavioral:  Negative for agitation, behavioral problems and confusion.        OBJECTIVE     Vitals:    08/21/24 1507   BP: 132/68   Pulse: 63   SpO2: 98%               PHYSICAL EXAM  GEN:   Accompanied by wife.     Foot/Ankle Exam    GENERAL  Appearance:  appears stated age and obese  Orientation:  AAOx3  Affect:  appropriate  Gait:  unimpaired  Assistance:  independent  Right shoe gear: casual shoe  Left shoe gear: casual shoe    VASCULAR     Right Foot Vascularity   Dorsalis pedis:  1+  Posterior tibial:  1+  Skin temperature:  warm  Edema grading:  Trace  CFT:  4  Pedal hair growth:  Present  Varicosities:  none     Left Foot Vascularity   Dorsalis pedis:  1+  Posterior tibial:  1+  Skin temperature:  warm  Edema grading:  Trace  CFT:  4  Pedal hair growth:  Present  Varicosities:  none     NEUROLOGIC     Right Foot Neurologic   Light touch sensation: diminished  Vibratory sensation: diminished  Hot/Cold sensation: diminished  Protective Sensation using [place] Monofilament:   Sites intact: 4  Sites tested: 9     Left Foot Neurologic   Light touch sensation: diminished  Vibratory sensation: diminished  Hot/Cold sensation:  diminished  Protective Sensation using [place] Monofilament:   Sites intact: 5  Sites tested: 10    MUSCULOSKELETAL     Right Foot Musculoskeletal    Amputation   Toes amputated: first toe  Ecchymosis:  none  Tenderness:  none    Arch:  Normal     Left Foot Musculoskeletal   Ecchymosis:  none  Tenderness:  none  Arch:  Normal    MUSCLE STRENGTH     Right Foot Muscle Strength   Foot dorsiflexion:  5  Foot  plantar flexion:  5  Foot inversion:  5  Foot eversion:  5     Left Foot Muscle Strength   Foot dorsiflexion:  5  Foot plantar flexion:  5  Foot inversion:  5  Foot eversion:  5    RANGE OF MOTION     Right Foot Range of Motion   Foot and ankle ROM within normal limits       Left Foot Range of Motion   Foot and ankle ROM within normal limits      DERMATOLOGIC      Right Foot Dermatologic   Skin  Right foot skin is intact.   Nails  2.  Positive for elongated, abnormal thickness and subungual debris.  3.  Positive for elongated, abnormal thickness and subungual debris.  4.  Positive for elongated, abnormal thickness and subungual debris.  5.  Positive for elongated, abnormal thickness and subungual debris.     Left Foot Dermatologic   Skin  Positive for corn and ulcer.   Nails  1.  Positive for elongated, abnormal thickness and subungual debris.  2.  Positive for elongated, abnormal thickness and subungual debris.  3.  Positive for elongated, abnormal thickness and subungual debris.  4.  Positive for elongated, abnormally thick and subungual debris.  5.  Positive for elongated, abnormally thick and subungual debris.    Image:       RADIOLOGY/NUCLEAR:      LABORATORY/CULTURE RESULTS:      PATHOLOGY RESULTS:       ASSESSMENT/PLAN     Diagnoses and all orders for this visit:    1. Thickened nails (Primary)    2. Diabetic polyneuropathy associated with type 1 diabetes mellitus    3. Encounter for diabetic foot exam    4. Atherosclerosis of native arteries of the extremities with ulceration    5. Amputee, great toe, right    6. Anticoagulant long-term use    7. Pre-ulcerative calluses          Comprehensive lower extremity examination and evaluation was performed.  Discussed findings and treatment plan including risks, benefits, and treatment options with patient in detail. Patient agreed with treatment plan.  After verbal consent obtained, nail(s) x9 debrided of length and thickness with nail nipper without  incidence  Patient may maintain nails and calluses at home utilizing emery board or pumice stone between visits as needed  Reviewed at home diabetic foot care including daily foot checks   DFE performed at today's visit.  Control and management of type I DM to be continued per PCP.  Patient requesting not to have callus to distal left great toe pared today's visit.   Continue to recommend patient avoid self-care of toes.  Continue vascular surgery follow-ups.  Follow-up in 2 months for routine foot and nail care.     An After Visit Summary was printed and given to the patient at discharge, including (if requested) any available informative/educational handouts regarding diagnosis, treatment, or medications. All questions were answered to patient/family satisfaction. Should symptoms fail to improve or worsen they agree to call or return to clinic or to go to the Emergency Department. Discussed the importance of following up with any needed screening tests/labs/specialist appointments and any requested follow-up recommended by me today. Importance of maintaining follow-up discussed and patient accepts that missed appointments can delay diagnosis and potentially lead to worsening of conditions.  Return in about 2 months (around 10/23/2024) for Follow-up with ANDRES, Follow[facility]up in [facility] or sooner if acute issues arise.        This document has been electronically signed by ANDRES Naranjo on August 21, 2024 16:59 CDT

## 2024-08-20 ENCOUNTER — TELEPHONE (OUTPATIENT)
Age: 59
End: 2024-08-20
Payer: COMMERCIAL

## 2024-08-20 NOTE — TELEPHONE ENCOUNTER
Hub to relay  Called patient regarding appt on 08/21/2024. Left message for patient to return call if any questions or concerns arise.

## 2024-08-21 ENCOUNTER — OFFICE VISIT (OUTPATIENT)
Age: 59
End: 2024-08-21
Payer: COMMERCIAL

## 2024-08-21 VITALS
HEART RATE: 63 BPM | BODY MASS INDEX: 36.29 KG/M2 | SYSTOLIC BLOOD PRESSURE: 132 MMHG | WEIGHT: 245 LBS | HEIGHT: 69 IN | DIASTOLIC BLOOD PRESSURE: 68 MMHG | OXYGEN SATURATION: 98 %

## 2024-08-21 DIAGNOSIS — E10.42 DIABETIC POLYNEUROPATHY ASSOCIATED WITH TYPE 1 DIABETES MELLITUS: ICD-10-CM

## 2024-08-21 DIAGNOSIS — Z79.01 ANTICOAGULANT LONG-TERM USE: ICD-10-CM

## 2024-08-21 DIAGNOSIS — L60.2 THICKENED NAILS: Primary | ICD-10-CM

## 2024-08-21 DIAGNOSIS — L84 PRE-ULCERATIVE CALLUSES: ICD-10-CM

## 2024-08-21 DIAGNOSIS — I70.25 ATHEROSCLEROSIS OF NATIVE ARTERIES OF THE EXTREMITIES WITH ULCERATION: ICD-10-CM

## 2024-08-21 DIAGNOSIS — E11.9 ENCOUNTER FOR DIABETIC FOOT EXAM: ICD-10-CM

## 2024-08-21 DIAGNOSIS — Z89.411 AMPUTEE, GREAT TOE, RIGHT: ICD-10-CM

## 2024-08-21 PROCEDURE — 99213 OFFICE O/P EST LOW 20 MIN: CPT

## 2024-08-21 PROCEDURE — 11721 DEBRIDE NAIL 6 OR MORE: CPT

## 2024-09-09 DIAGNOSIS — I50.22 CHRONIC SYSTOLIC CHF (CONGESTIVE HEART FAILURE), NYHA CLASS 2: ICD-10-CM

## 2024-09-09 RX ORDER — SPIRONOLACTONE 25 MG/1
25 TABLET ORAL DAILY
Qty: 90 TABLET | Refills: 3 | Status: SHIPPED | OUTPATIENT
Start: 2024-09-09

## 2024-09-14 ENCOUNTER — APPOINTMENT (OUTPATIENT)
Dept: CT IMAGING | Facility: HOSPITAL | Age: 59
End: 2024-09-14
Payer: COMMERCIAL

## 2024-09-14 ENCOUNTER — APPOINTMENT (OUTPATIENT)
Dept: ULTRASOUND IMAGING | Facility: HOSPITAL | Age: 59
End: 2024-09-14
Payer: COMMERCIAL

## 2024-09-14 ENCOUNTER — HOSPITAL ENCOUNTER (EMERGENCY)
Facility: HOSPITAL | Age: 59
Discharge: HOME | End: 2024-09-14
Attending: FAMILY MEDICINE
Payer: COMMERCIAL

## 2024-09-14 VITALS
WEIGHT: 242 LBS | BODY MASS INDEX: 35.84 KG/M2 | OXYGEN SATURATION: 98 % | HEART RATE: 68 BPM | TEMPERATURE: 97.9 F | RESPIRATION RATE: 18 BRPM | DIASTOLIC BLOOD PRESSURE: 68 MMHG | SYSTOLIC BLOOD PRESSURE: 117 MMHG | HEIGHT: 69 IN

## 2024-09-14 DIAGNOSIS — N18.30 STAGE 3 CHRONIC KIDNEY DISEASE, UNSPECIFIED WHETHER STAGE 3A OR 3B CKD: ICD-10-CM

## 2024-09-14 DIAGNOSIS — R10.11 RUQ ABDOMINAL PAIN: ICD-10-CM

## 2024-09-14 DIAGNOSIS — K80.20 GALL STONES: ICD-10-CM

## 2024-09-14 DIAGNOSIS — K80.50 BILIARY COLIC: Primary | ICD-10-CM

## 2024-09-14 LAB
ALBUMIN SERPL-MCNC: 4 G/DL (ref 3.5–5.2)
ALBUMIN/GLOB SERPL: 1.3 G/DL
ALP SERPL-CCNC: 95 U/L (ref 39–117)
ALT SERPL W P-5'-P-CCNC: 27 U/L (ref 1–41)
ANION GAP SERPL CALCULATED.3IONS-SCNC: 11 MMOL/L (ref 5–15)
APTT PPP: 23.7 SECONDS (ref 24.5–36)
AST SERPL-CCNC: 20 U/L (ref 1–40)
BASOPHILS # BLD AUTO: 0.04 10*3/MM3 (ref 0–0.2)
BASOPHILS NFR BLD AUTO: 0.3 % (ref 0–1.5)
BILIRUB SERPL-MCNC: 0.2 MG/DL (ref 0–1.2)
BILIRUB UR QL STRIP: NEGATIVE
BUN SERPL-MCNC: 34 MG/DL (ref 6–20)
BUN/CREAT SERPL: 21.4 (ref 7–25)
CALCIUM SPEC-SCNC: 9.2 MG/DL (ref 8.6–10.5)
CHLORIDE SERPL-SCNC: 101 MMOL/L (ref 98–107)
CLARITY UR: CLEAR
CO2 SERPL-SCNC: 26 MMOL/L (ref 22–29)
COLOR UR: YELLOW
CREAT SERPL-MCNC: 1.59 MG/DL (ref 0.76–1.27)
D-LACTATE SERPL-SCNC: 2 MMOL/L (ref 0.5–2)
DEPRECATED RDW RBC AUTO: 44.2 FL (ref 37–54)
EGFRCR SERPLBLD CKD-EPI 2021: 50.3 ML/MIN/1.73
EOSINOPHIL # BLD AUTO: 0.26 10*3/MM3 (ref 0–0.4)
EOSINOPHIL NFR BLD AUTO: 2.2 % (ref 0.3–6.2)
ERYTHROCYTE [DISTWIDTH] IN BLOOD BY AUTOMATED COUNT: 13.8 % (ref 12.3–15.4)
GEN 5 2HR TROPONIN T REFLEX: 33 NG/L
GLOBULIN UR ELPH-MCNC: 3.1 GM/DL
GLUCOSE SERPL-MCNC: 185 MG/DL (ref 65–99)
GLUCOSE UR STRIP-MCNC: ABNORMAL MG/DL
HCT VFR BLD AUTO: 42.9 % (ref 37.5–51)
HGB BLD-MCNC: 14.1 G/DL (ref 13–17.7)
HGB UR QL STRIP.AUTO: NEGATIVE
HOLD SPECIMEN: NORMAL
IMM GRANULOCYTES # BLD AUTO: 0.04 10*3/MM3 (ref 0–0.05)
IMM GRANULOCYTES NFR BLD AUTO: 0.3 % (ref 0–0.5)
INR PPP: 0.89 (ref 0.91–1.09)
KETONES UR QL STRIP: NEGATIVE
LEUKOCYTE ESTERASE UR QL STRIP.AUTO: NEGATIVE
LIPASE SERPL-CCNC: 25 U/L (ref 13–60)
LYMPHOCYTES # BLD AUTO: 1.62 10*3/MM3 (ref 0.7–3.1)
LYMPHOCYTES NFR BLD AUTO: 13.6 % (ref 19.6–45.3)
MAGNESIUM SERPL-MCNC: 2 MG/DL (ref 1.6–2.6)
MCH RBC QN AUTO: 29.1 PG (ref 26.6–33)
MCHC RBC AUTO-ENTMCNC: 32.9 G/DL (ref 31.5–35.7)
MCV RBC AUTO: 88.6 FL (ref 79–97)
MONOCYTES # BLD AUTO: 0.76 10*3/MM3 (ref 0.1–0.9)
MONOCYTES NFR BLD AUTO: 6.4 % (ref 5–12)
NEUTROPHILS NFR BLD AUTO: 77.2 % (ref 42.7–76)
NEUTROPHILS NFR BLD AUTO: 9.21 10*3/MM3 (ref 1.7–7)
NITRITE UR QL STRIP: NEGATIVE
NRBC BLD AUTO-RTO: 0 /100 WBC (ref 0–0.2)
PH UR STRIP.AUTO: 5.5 [PH] (ref 5–8)
PLATELET # BLD AUTO: 262 10*3/MM3 (ref 140–450)
PMV BLD AUTO: 9.2 FL (ref 6–12)
POTASSIUM SERPL-SCNC: 4.4 MMOL/L (ref 3.5–5.2)
PROT SERPL-MCNC: 7.1 G/DL (ref 6–8.5)
PROT UR QL STRIP: NEGATIVE
PROTHROMBIN TIME: 12.4 SECONDS (ref 11.8–14.8)
RBC # BLD AUTO: 4.84 10*6/MM3 (ref 4.14–5.8)
SODIUM SERPL-SCNC: 138 MMOL/L (ref 136–145)
SP GR UR STRIP: >1.03 (ref 1–1.03)
TROPONIN T DELTA: 0 NG/L
TROPONIN T SERPL HS-MCNC: 33 NG/L
UROBILINOGEN UR QL STRIP: ABNORMAL
WBC NRBC COR # BLD AUTO: 11.93 10*3/MM3 (ref 3.4–10.8)
WHOLE BLOOD HOLD COAG: NORMAL
WHOLE BLOOD HOLD SPECIMEN: NORMAL

## 2024-09-14 PROCEDURE — 96374 THER/PROPH/DIAG INJ IV PUSH: CPT

## 2024-09-14 PROCEDURE — 83690 ASSAY OF LIPASE: CPT | Performed by: FAMILY MEDICINE

## 2024-09-14 PROCEDURE — 0 HYDROMORPHONE 1 MG/ML SOLUTION: Performed by: FAMILY MEDICINE

## 2024-09-14 PROCEDURE — 84484 ASSAY OF TROPONIN QUANT: CPT | Performed by: FAMILY MEDICINE

## 2024-09-14 PROCEDURE — 99285 EMERGENCY DEPT VISIT HI MDM: CPT

## 2024-09-14 PROCEDURE — 80053 COMPREHEN METABOLIC PANEL: CPT | Performed by: FAMILY MEDICINE

## 2024-09-14 PROCEDURE — 93010 ELECTROCARDIOGRAM REPORT: CPT | Performed by: HOSPITALIST

## 2024-09-14 PROCEDURE — 25810000003 SODIUM CHLORIDE 0.9 % SOLUTION: Performed by: FAMILY MEDICINE

## 2024-09-14 PROCEDURE — 85730 THROMBOPLASTIN TIME PARTIAL: CPT | Performed by: FAMILY MEDICINE

## 2024-09-14 PROCEDURE — 25510000001 IOPAMIDOL 61 % SOLUTION: Performed by: FAMILY MEDICINE

## 2024-09-14 PROCEDURE — 96375 TX/PRO/DX INJ NEW DRUG ADDON: CPT

## 2024-09-14 PROCEDURE — 83605 ASSAY OF LACTIC ACID: CPT | Performed by: FAMILY MEDICINE

## 2024-09-14 PROCEDURE — 93005 ELECTROCARDIOGRAM TRACING: CPT | Performed by: FAMILY MEDICINE

## 2024-09-14 PROCEDURE — 74177 CT ABD & PELVIS W/CONTRAST: CPT

## 2024-09-14 PROCEDURE — 83735 ASSAY OF MAGNESIUM: CPT | Performed by: FAMILY MEDICINE

## 2024-09-14 PROCEDURE — 76705 ECHO EXAM OF ABDOMEN: CPT

## 2024-09-14 PROCEDURE — 81003 URINALYSIS AUTO W/O SCOPE: CPT | Performed by: FAMILY MEDICINE

## 2024-09-14 PROCEDURE — 85025 COMPLETE CBC W/AUTO DIFF WBC: CPT | Performed by: FAMILY MEDICINE

## 2024-09-14 PROCEDURE — 36415 COLL VENOUS BLD VENIPUNCTURE: CPT

## 2024-09-14 PROCEDURE — 85610 PROTHROMBIN TIME: CPT | Performed by: FAMILY MEDICINE

## 2024-09-14 PROCEDURE — 25010000002 ONDANSETRON PER 1 MG: Performed by: FAMILY MEDICINE

## 2024-09-14 RX ORDER — FAMOTIDINE 20 MG/1
20 TABLET, FILM COATED ORAL 2 TIMES DAILY
Qty: 28 TABLET | Refills: 0 | Status: SHIPPED | OUTPATIENT
Start: 2024-09-14

## 2024-09-14 RX ORDER — ONDANSETRON 2 MG/ML
4 INJECTION INTRAMUSCULAR; INTRAVENOUS ONCE
Status: COMPLETED | OUTPATIENT
Start: 2024-09-14 | End: 2024-09-14

## 2024-09-14 RX ORDER — ONDANSETRON 8 MG/1
8 TABLET, ORALLY DISINTEGRATING ORAL EVERY 8 HOURS PRN
Qty: 15 TABLET | Refills: 0 | Status: SHIPPED | OUTPATIENT
Start: 2024-09-14 | End: 2024-09-23

## 2024-09-14 RX ORDER — FAMOTIDINE 10 MG/ML
20 INJECTION, SOLUTION INTRAVENOUS ONCE
Status: COMPLETED | OUTPATIENT
Start: 2024-09-14 | End: 2024-09-14

## 2024-09-14 RX ORDER — DICYCLOMINE HCL 20 MG
20 TABLET ORAL EVERY 8 HOURS PRN
Qty: 30 TABLET | Refills: 0 | Status: SHIPPED | OUTPATIENT
Start: 2024-09-14

## 2024-09-14 RX ORDER — SODIUM CHLORIDE 0.9 % (FLUSH) 0.9 %
10 SYRINGE (ML) INJECTION AS NEEDED
Status: DISCONTINUED | OUTPATIENT
Start: 2024-09-14 | End: 2024-09-14 | Stop reason: HOSPADM

## 2024-09-14 RX ORDER — IOPAMIDOL 612 MG/ML
100 INJECTION, SOLUTION INTRAVASCULAR
Status: COMPLETED | OUTPATIENT
Start: 2024-09-14 | End: 2024-09-14

## 2024-09-14 RX ADMIN — HYDROMORPHONE HYDROCHLORIDE 1 MG: 1 INJECTION, SOLUTION INTRAMUSCULAR; INTRAVENOUS; SUBCUTANEOUS at 03:23

## 2024-09-14 RX ADMIN — SODIUM CHLORIDE 1000 ML: 9 INJECTION, SOLUTION INTRAVENOUS at 03:23

## 2024-09-14 RX ADMIN — FAMOTIDINE 20 MG: 10 INJECTION INTRAVENOUS at 03:23

## 2024-09-14 RX ADMIN — ONDANSETRON 4 MG: 2 INJECTION INTRAMUSCULAR; INTRAVENOUS at 03:24

## 2024-09-14 RX ADMIN — IOPAMIDOL 100 ML: 612 INJECTION, SOLUTION INTRAVENOUS at 03:56

## 2024-09-14 NOTE — ED PROVIDER NOTES
"HPI:     Patient is a 59-year-old white male who presents to the emergency room with complaint of epigastric lower chest and right upper quadrant right lower quadrant abdominal pain.  Patient states he is having symptom like this once before to the that occurred early in the morning a.m. and it lasted from 3 AM to 6 AM and then went away.  This episode started yesterday on early Friday morning at 3 AM and it came and went all day long.  Patient took Tums multiple times throughout the day and had a couple episodes of vomiting that was nonbloody.  With the pain continued.  Due to her continuing until tonight at 2 AM he decided to come in to be seen.  No trauma no blood in the stool.  Patient states some years ago he had problems with his \"gallbladder\".  He was supposed to have a taken out however he ended up having cardiac stents and being placed on Brilinta thus had to put it off and never had it done because the pain got better.  No fevers chills or night sweats.  No headache or visual changes.  No trauma.      REVIEW OF SYSTEMS      CONSTITUTIONAL:  positive for generalized weakness and fatigue  EYES:  No complaints of discharge   ENT: No complaints of sore throat or ear pain  CARDIOVASCULAR: Positive lower substernal chest pain in epigastric area  RESPIRATORY:  No complaints of cough or shortness of breath  GI: Positive epigastric, right upper quadrant, and right lower quadrant abdominal pain with nausea and vomiting but no constipation or diarrhea.  MUSCULOSKELETAL:  No complaints of back pain  SKIN:  No complaints of rash  NEUROLOGIC:  No complaints of headache, focal weakness, or sensory changes  ENDOCRINE:  No complaints of polyuria or polydipsia  LYMPHATIC:  No complaints of swollen glands  GENITOURINARY: No complaints of urinary frequency or hematuria      PAST MEDICAL HISTORY  Past Medical History:   Diagnosis Date    CAD (coronary artery disease)     CAD in native artery     2V STEMI 7/13 STENT TO CX AND " STENTX2 TO MID AND DISTAL LAD    Chronic systolic CHF (congestive heart failure), NYHA class 2 02/04/2020    Diabetes mellitus     Disease of thyroid gland     GERD (gastroesophageal reflux disease)     History of coronary artery stent placement      x 3 - for ACUTE MI 7/2013    Hyperlipidemia     Hyperlipidemia, mild     Hypertension     Myocardial infarct     Myocardial infarction     Nausea and vomiting 12/10/2018    Stented coronary artery        FAMILY HISTORY  Family History   Problem Relation Age of Onset    Heart disease Maternal Grandmother     No Known Problems Mother     No Known Problems Father        SOCIAL HISTORY  Social History     Socioeconomic History    Marital status:    Tobacco Use    Smoking status: Never     Passive exposure: Never    Smokeless tobacco: Never   Vaping Use    Vaping status: Never Used   Substance and Sexual Activity    Alcohol use: Yes     Comment: 2 TIMES YEARLY    Drug use: No    Sexual activity: Defer       IMMUNIZATION HISTORY  Deferred to primary care physician.    SURGICAL HISTORY  Past Surgical History:   Procedure Laterality Date    AMPUTATION DIGIT Right 5/3/2023    Procedure: Partial vs Complete Amputation of Hallux - Right Foot;  Surgeon: Simone Nieves DPM;  Location: [facility] OR;  Service: Podiatry;  Laterality: Right;    AORTAGRAM Right 12/23/2022    Procedure: AORTAGRAM, RIGHT LOWER EXTREMITY ANGIOGRAM, BALLOON ANGIOPLASTY, MYNX CLOSURE;  Surgeon: Sammy Wild MD;  Location: [facility] HYBRID OR 12;  Service: Vascular;  Laterality: Right;    CARDIAC CATHETERIZATION N/A 11/06/2018    Procedure: Left Heart Cath;  Surgeon: Tru Rai MD;  Location:  [facility] CATH INVASIVE [facility];  Service: Cardiology    CARDIAC ELECTROPHYSIOLOGY PROCEDURE N/A 05/01/2019    Procedure: ICD new;  Surgeon: Tru Rai MD;  Location:  [facility] CATH [facility]VAS[facility];  Service: Cardiology    CAROTID STENT         CURRENT MEDICATIONS    Current  Facility-Administered Medications:     sodium chloride 0.9 % flush 10 mL, 10 mL, Intravenous, PRN, Jr Verma Jr., MD    Current Outpatient Medications:     albuterol sulfate  (90 Base) MCG/ACT inhaler, Inhale 2 puffs Every 4 (Four) Hours As Needed for Wheezing or Shortness of Air., Disp: 8.5 g, Rfl: 5    aspirin 81 MG EC tablet, Take 1 tablet by mouth Daily., Disp: , Rfl:     atorvastatin (LIPITOR) 80 MG tablet, TAKE 1 TABLET BY MOUTH EVERY DAY AT NIGHT, Disp: 90 tablet, Rfl: 3    brimonidine (ALPHAGAN) 0.2 % ophthalmic solution, Administer 1 drop to both eyes 2 (Two) Times a Day., Disp: , Rfl:     Continuous Blood Gluc  (Dexcom G6 ) device, REMOVED FOR SURGERY, Disp: , Rfl:     Continuous Blood Gluc Sensor (Dexcom G6 Sensor), 1 each As Needed (glucose control). Every 10 days, Disp: 9 each, Rfl: 3    Continuous Blood Gluc Transmit (Dexcom G6 Transmitter) misc, 1 each Every 3 (Three) Months., Disp: 1 each, Rfl: 3    dicyclomine (BENTYL) 20 MG tablet, Take 1 tablet by mouth Every 8 (Eight) Hours As Needed for Abdominal Cramping., Disp: 30 tablet, Rfl: 0    DULoxetine (CYMBALTA) 20 MG capsule, Take 30 mg by mouth Daily., Disp: , Rfl:     empagliflozin (Jardiance) 25 MG tablet tablet, Take 1 tablet by mouth Daily., Disp: 90 tablet, Rfl: 3    ezetimibe (ZETIA) 10 MG tablet, TAKE 1 TABLET BY MOUTH EVERY DAY, Disp: 90 tablet, Rfl: 3    famotidine (PEPCID) 20 MG tablet, Take 1 tablet by mouth 2 (Two) Times a Day., Disp: 28 tablet, Rfl: 0    Glucagon (Baqsimi One Pack) 3 MG/DOSE powder, 1 each into the nostril(s) as directed by provider As Needed (Hypoglycemia). Apply intranasal if hypoglycemia, Disp: 2 each, Rfl: 11    Insulin Aspart (novoLOG) 100 UNIT/ML injection, 150 units daily Name brand only, Disp: 60 mL, Rfl: 11    Insulin Degludec (TRESIBA SC), Inject 40 Units under the skin into the appropriate area as directed. DIRECTED TO TAKE THIS BY PRIMARY CARE MD DUE TO INSULIN PUMP BEING  "EMPTY, Disp: , Rfl:     levothyroxine (SYNTHROID, LEVOTHROID) 25 MCG tablet, Take 1 tablet by mouth Every Morning. (Patient taking differently: Take 3 tablets by mouth Every Morning.), Disp: 30 tablet, Rfl: 11    lisinopril (PRINIVIL,ZESTRIL) 5 MG tablet, TAKE 1 TABLET BY MOUTH EVERY DAY, Disp: 90 tablet, Rfl: 3    Lyumjev 100 UNIT/ML injection, 200 units daily through pump, Disp: 150 mL, Rfl: 3    metoprolol succinate XL (TOPROL-XL) 50 MG 24 hr tablet, Take 1.5 tablets by mouth Daily., Disp: 135 tablet, Rfl: 3    nitroglycerin (NITROSTAT) 0.4 MG SL tablet, Place 1 tablet under the tongue Every 5 (Five) Minutes As Needed for Chest Pain. Take no more than 3 doses in 15 minutes., Disp: 25 tablet, Rfl: 1    ondansetron ODT (ZOFRAN-ODT) 4 MG disintegrating tablet, Place 1 tablet on the tongue Every 8 (Eight) Hours As Needed for Nausea or Vomiting., Disp: 21 tablet, Rfl: 0    ondansetron ODT (ZOFRAN-ODT) 8 MG disintegrating tablet, Place 1 tablet on the tongue Every 8 (Eight) Hours As Needed for Vomiting., Disp: 15 tablet, Rfl: 0    Rivaroxaban (Xarelto) 2.5 MG tablet, Take 1 tablet by mouth 2 (Two) Times a Day., Disp: 180 tablet, Rfl: 3    spironolactone (ALDACTONE) 25 MG tablet, Take 1 tablet by mouth Daily., Disp: 90 tablet, Rfl: 3    vitamin D (ERGOCALCIFEROL) 1.25 MG (30436 UT) capsule capsule, Take 1 capsule by mouth 1 (One) Time Per Week., Disp: , Rfl:     ALLERGIES  No Known Allergies    ABDOMINAL EXAM    VITAL SIGNS:   /69 (BP Location: Right arm, Patient Position: Sitting)   Pulse 80   Temp 97.9 °F (36.6 °C) (Oral)   Resp 19   Ht 175.3 cm (69\")   Wt 110 kg (242 lb)   SpO2 94%   BMI 35.74 kg/m²     Constitutional: Patient is alert and in no distress.  Patient with moderate epigastric, lower chest, right upper quadrant and right lower quadrant abdominal discomfort.    ENT: There is a normal pharynx with no acute erythema or exudate and oral mucosa is moist.  Nose is clear with no drainage.  Tympanic " membranes intact and non-erythemic    Cardiovascular: S1-S2 regular rate and rhythm. No murmurs, rubs or gallops.  Pulses are equal bilaterally and there is no pitting edema.    Respiratory: Patient is clear to auscultation bilaterally with no wheezing or rhonchi.  Chest wall is nontender.  There are no external lesions on the chest.  There is no crepitance.    Gastrointestinal: There is epigastric tenderness and a positive Hodgson sign.  There is also tenderness at McBurney's point with no rebound or guarding.  There is no abdominal distention or fluid wave.    Genitourinary: Patient is voiding appropriately.    Integument: No acute lesions noted.  Color appears to be normal.    [place] Coma Scale: Total score 15    Neurological: Patient is alert and oriented x4 and no acute findings noted.  Speech is fluent and cognition is normal.  No evidence of acute CVA.  Cranial nerves II through XII intact.  Patient with normal motor function as well as reflexes and sensation    Psychiatric: Normal affect and mood.            RADIOLOGY/PROCEDURES        US Gallbladder   Final Result   1. Ultrasound confirms multiple echogenic gallbladder stones with   posterior shadowing. No mass lesion. No evidence of acute cholecystitis.   2. Biliary tree is nondilated.               This report was signed and finalized on 9/14/2024 7:13 AM by Dr Nick Mata.          CT Abdomen Pelvis With Contrast   Final Result       1.   No acute process in the abdomen or pelvis.   2.  There is a 2.7 cm oval and hyperdense filling defect within the   gallbladder near the gallbladder neck. This is likely cholelithiasis,   however, a gallbladder enhancing mass cannot be excluded on this CT   based on the level of density. I would recommend a follow-up gallbladder   ultrasound to further assess cholelithiasis versus gallbladder mass,   looking potentially for stone mobility, shadowing and lack of masslike   features such as focal wall thickening and  internal vascularity.   3.  Findings and recommendations were discussed with MAYTE JHA JR on 9/14/2024 5:28 AM.           This report was signed and finalized on 9/14/2024 5:28 AM by Dr Nick Mata.                 FUTURE APPOINTMENTS     Future Appointments   Date Time Provider Department Center   11/7/2024  3:00 PM Allison Coe APRN [facility]   11/14/2024 11:00 AM [facility] US [facility] CART 3 [facility]   11/14/2024  1:00 PM [facility] US [facility]IV[facility] CART 3 [facility]   11/14/2024  2:30 PM BickingArtie, DO [facility]   1/2/2025  3:15 PM Ranjan Archer MD [facility]   7/10/2025  1:30 PM [facility] DEVICE CHECK [facility]            COURSE & MEDICAL DECISION MAKING       Patient's partial differential diagnosis can include:    Cholecystitis, choledocholithiasis, cholelithiasis, pancreatitis, acute appendicitis, gastritis, gastroenteritis, colitis, enteritis, volvulus, intussusception, esophageal spasms, gastroparesis, GERD, peptic ulcer disease, perforated esophagus, perforated peptic ulcer, partial bowel obstruction, bowel obstruction,, AAA, mesenteric adenitis, mesenteric ischemia, constipation, irritable bowel, diverticulitis, diverticulosis, Crohn's disease, ulcerative colitis, celiac disease and others    Discussed with the patient results of his laboratory test.  Troponin elevated but flat x 2 this is likely related to his chronic renal failure.  CT scan shows multiple stones in the gallbladder.  However, patient's liver numbers are stable and normal.    Prior to patient being discharged a call from radiologist  Dr Nick Mata was received.  He reviewed the CT scan of the abdomen and pelvis previously read by [facility] radiology.  He felt that the patient should get an ultrasound to further evaluate this stones in the gallbladder.  He was concerned that one of the shad.  Ings within the gallbladder could possibly be a mass.    Discussed with the patient  to wait to get ultrasound to further evaluate his gallbladder was discussed with the patient and his wife at bedside.  They agreed to stay to get the ultrasound to get further evaluation of this.  Pending on the results of this gallbladder ultrasound the patient will have his final disposition.  Either discharge with outpatient follow-up with general surgery for gallbladder removal however if this is a possible cancerous lesion within the gallbladder the patient should likely be admitted to the hospital for further evaluation treatment and removal of the gallbladder.    Final disposition of this patient will be turned over to oncoming physician Dr. Alexis.        Patient's level of risk: Moderate       CRITICAL CARE    CRITICAL CARE: No    CRITICAL CARE TIME: None      Recent Results (from the past 24 hour(s))   ECG 12 Lead Chest Pain    Collection Time: 09/14/24  2:33 AM   Result Value Ref Range    QT Interval 438 ms    QTC Interval 459 ms   Green Top (Gel)    Collection Time: 09/14/24  2:40 AM   Result Value Ref Range    Extra Tube Hold for add-ons.    Lavender Top    Collection Time: 09/14/24  2:40 AM   Result Value Ref Range    Extra Tube hold for add-on    Red Top    Collection Time: 09/14/24  2:40 AM   Result Value Ref Range    Extra Tube Hold for add-ons.    Light Blue Top    Collection Time: 09/14/24  2:40 AM   Result Value Ref Range    Extra Tube Hold for add-ons.    Protime-INR    Collection Time: 09/14/24  2:40 AM    Specimen: Blood   Result Value Ref Range    Protime 12.4 11.8 - 14.8 Seconds    INR 0.89 (L) 0.91 - 1.09   aPTT    Collection Time: 09/14/24  2:40 AM    Specimen: Blood   Result Value Ref Range    PTT 23.7 (L) 24.5 - 36.0 seconds   CBC Auto Differential    Collection Time: 09/14/24  2:40 AM    Specimen: Blood   Result Value Ref Range    WBC 11.93 (H) 3.40 - 10.80 10*3/mm3    RBC 4.84 4.14 - 5.80 10*6/mm3    Hemoglobin 14.1 13.0 - 17.7 g/dL    Hematocrit 42.9 37.5 - 51.0 %    MCV 88.6 79.0 -  97.0 fL    MCH 29.1 26.6 - 33.0 pg    MCHC 32.9 31.5 - 35.7 g/dL    RDW 13.8 12.3 - 15.4 %    RDW-SD 44.2 37.0 - 54.0 fl    MPV 9.2 6.0 - 12.0 fL    Platelets 262 140 - 450 10*3/mm3    Neutrophil % 77.2 (H) 42.7 - 76.0 %    Lymphocyte % 13.6 (L) 19.6 - 45.3 %    Monocyte % 6.4 5.0 - 12.0 %    Eosinophil % 2.2 0.3 - 6.2 %    Basophil % 0.3 0.0 - 1.5 %    Immature Grans % 0.3 0.0 - 0.5 %    Neutrophils, Absolute 9.21 (H) 1.70 - 7.00 10*3/mm3    Lymphocytes, Absolute 1.62 0.70 - 3.10 10*3/mm3    Monocytes, Absolute 0.76 0.10 - 0.90 10*3/mm3    Eosinophils, Absolute 0.26 0.00 - 0.40 10*3/mm3    Basophils, Absolute 0.04 0.00 - 0.20 10*3/mm3    Immature Grans, Absolute 0.04 0.00 - 0.05 10*3/mm3    nRBC 0.0 0.0 - 0.2 /100 WBC   Green Top (Gel)    Collection Time: 09/14/24  3:05 AM   Result Value Ref Range    Extra Tube Hold for add-ons.    Comprehensive Metabolic Panel    Collection Time: 09/14/24  3:05 AM    Specimen: Blood   Result Value Ref Range    Glucose 185 (H) 65 - 99 mg/dL    BUN 34 (H) 6 - 20 mg/dL    Creatinine 1.59 (H) 0.76 - 1.27 mg/dL    Sodium 138 136 - 145 mmol/L    Potassium 4.4 3.5 - 5.2 mmol/L    Chloride 101 98 - 107 mmol/L    CO2 26.0 22.0 - 29.0 mmol/L    Calcium 9.2 8.6 - 10.5 mg/dL    Total Protein 7.1 6.0 - 8.5 g/dL    Albumin 4.0 3.5 - 5.2 g/dL    ALT (SGPT) 27 1 - 41 U/L    AST (SGOT) 20 1 - 40 U/L    Alkaline Phosphatase 95 39 - 117 U/L    Total Bilirubin 0.2 0.0 - 1.2 mg/dL    Globulin 3.1 gm/dL    A/G Ratio 1.3 g/dL    BUN/Creatinine Ratio 21.4 7.0 - 25.0    Anion Gap 11.0 5.0 - 15.0 mmol/L    eGFR 50.3 (L) >60.0 mL/min/1.73   Lipase    Collection Time: 09/14/24  3:05 AM    Specimen: Blood   Result Value Ref Range    Lipase 25 13 - 60 U/L   Magnesium    Collection Time: 09/14/24  3:05 AM    Specimen: Blood   Result Value Ref Range    Magnesium 2.0 1.6 - 2.6 mg/dL   High Sensitivity Troponin T    Collection Time: 09/14/24  3:05 AM    Specimen: Blood   Result Value Ref Range    HS Troponin T 33  (H) <22 ng/L   Lactic Acid, Plasma    Collection Time: 09/14/24  4:42 AM    Specimen: Blood   Result Value Ref Range    Lactate 2.0 0.5 - 2.0 mmol/L   High Sensitivity Troponin T 2Hr    Collection Time: 09/14/24  5:00 AM    Specimen: Blood   Result Value Ref Range    HS Troponin T 33 (H) <22 ng/L    Troponin T Delta 0 >=-4 - <+4 ng/L   Urinalysis With Culture If Indicated - Urine, Clean Catch    Collection Time: 09/14/24  5:56 AM    Specimen: Urine, Clean Catch   Result Value Ref Range    Color, UA Yellow Yellow, Straw    Appearance, UA Clear Clear    pH, UA 5.5 5.0 - 8.0    Specific Gravity, UA >1.030 (H) 1.005 - 1.030    Glucose, UA >=1000 mg/dL (3+) (A) Negative    Ketones, UA Negative Negative    Bilirubin, UA Negative Negative    Blood, UA Negative Negative    Protein, UA Negative Negative    Leuk Esterase, UA Negative Negative    Nitrite, UA Negative Negative    Urobilinogen, UA 0.2 E.U./dL 0.2 - 1.0 E.U./dL          Old charts were reviewed per [facility] EMR.  Pertinent details are summarized above.  All laboratory, radiologic, and EKG studies that were performed in the Emergency Department were a necessary part of the evaluation needed to exclude unstable or  emergent medical conditions.     Patient was hemodynamically and neurologically stable in the ED.   Pertinent studies were reviewed as above.     The patient received:  Medications   sodium chloride 0.9 % flush 10 mL (has no administration in time range)   HYDROmorphone (DILAUDID) injection 1 mg (1 mg Intravenous Given 9/14/24 0323)   ondansetron (ZOFRAN) injection 4 mg (4 mg Intravenous Given 9/14/24 0324)   sodium chloride 0.9 % bolus 1,000 mL (0 mL Intravenous Stopped 9/14/24 0439)   famotidine (PEPCID) injection 20 mg (20 mg Intravenous Given 9/14/24 0323)   iopamidol (ISOVUE-300) 61 % injection 100 mL (100 mL Intravenous Given 9/14/24 0356)       ED Course as of 09/14/24 0819   Sat Sep 14, 2024   0812 I have assumed patient care from Dr. Verma.  For  full details of ACP, please refer to Dr. Verma's note.  Briefly, patient is a 59-year-old male with history of coronary disease, hypertension, hyperlipidemia, diabetes who presents to the ED with several day history of intermittent episodes of epigastric and right upper quadrant postprandial pain.  Has had 3 episodes over the past 5 days.  Last night he had a large meal of chicken wings.  Shortly after developed epigastric and right upper quadrant pain that radiated through to his back.  Reports associated nausea.  No shortness of breath or diaphoresis.  EKG nonischemic.  Initial and repeat high sensitive troponin 33, delta of 0 and flat trajectory.  Do not suspect acute coronary syndrome.  CT was obtained, revealed a potential stone versus mass near the gallbladder neck.  Ultrasound was recommended.  Ultrasound obtained, revealed multiple echogenic stones in the gallbladder, no evidence of cholecystitis.  No common bile duct dilation, no intrahepatic ductal dilation.  LFTs including ALT, AST, alk phos and total bilirubin within normal limits.  Patient does not have a fever.  On my examination around 7 AM abdomen was soft, nontender, nondistended.  No right upper quadrant tenderness, negative Hodgson sign.  Suspect biliary colic.    He is currently pain-free.  Will have patient follow-up with general surgery, return to the ED if he develops recurrent pain, or if he develops fever.  Patient was given biliary colic diet restriction, voiced understanding symptoms that should prompt return to the ED. [AW]      ED Course User Index  [AW] Sourav Alexis MD       Diagnoses that have been ruled out:   None   Diagnoses that are still under consideration:   None   Final diagnoses:   Gall stones   RUQ abdominal pain   Stage 3 chronic kidney disease, unspecified whether stage 3a or 3b CKD   Biliary colic        FINAL IMPRESSION   Diagnosis Plan   1. Biliary colic  Ambulatory Referral to General Surgery      2. Gall  stones  Ambulatory Referral to General Surgery      3. RUQ abdominal pain        4. Stage 3 chronic kidney disease, unspecified whether stage 3a or 3b CKD              Sourav Alexis MD        ED Disposition       ED Disposition   Discharge    Condition   Stable    Comment   --                 Dragon disclaimer:  Part of this note may be an electronic transcription/translation of spoken language to printed text using the Dragon Dictation System.     I have reviewed the patient’s prescription history via a prescription monitoring program.  This information is consistent with my knowledge of the patient’s controlled substance use history.        Sourav Alexis MD  09/14/24 0819

## 2024-09-15 ENCOUNTER — ANESTHESIA EVENT (OUTPATIENT)
Dept: PERIOP | Facility: HOSPITAL | Age: 59
End: 2024-09-15
Payer: COMMERCIAL

## 2024-09-15 ENCOUNTER — APPOINTMENT (OUTPATIENT)
Dept: ULTRASOUND IMAGING | Facility: HOSPITAL | Age: 59
End: 2024-09-15
Payer: COMMERCIAL

## 2024-09-15 ENCOUNTER — HOSPITAL ENCOUNTER (OUTPATIENT)
Facility: HOSPITAL | Age: 59
Discharge: HOME | End: 2024-09-16
Attending: EMERGENCY MEDICINE | Admitting: SURGERY
Payer: COMMERCIAL

## 2024-09-15 ENCOUNTER — APPOINTMENT (OUTPATIENT)
Dept: CT IMAGING | Facility: HOSPITAL | Age: 59
End: 2024-09-15
Payer: COMMERCIAL

## 2024-09-15 ENCOUNTER — ANESTHESIA (OUTPATIENT)
Dept: PERIOP | Facility: HOSPITAL | Age: 59
End: 2024-09-15
Payer: COMMERCIAL

## 2024-09-15 DIAGNOSIS — Z90.49 S/P LAPAROSCOPIC CHOLECYSTECTOMY: Primary | ICD-10-CM

## 2024-09-15 DIAGNOSIS — K81.0 ACUTE CHOLECYSTITIS: ICD-10-CM

## 2024-09-15 LAB
ALBUMIN SERPL-MCNC: 3.9 G/DL (ref 3.5–5.2)
ALBUMIN/GLOB SERPL: 1.3 G/DL
ALP SERPL-CCNC: 93 U/L (ref 39–117)
ALT SERPL W P-5'-P-CCNC: 21 U/L (ref 1–41)
ANION GAP SERPL CALCULATED.3IONS-SCNC: 11 MMOL/L (ref 5–15)
AST SERPL-CCNC: 18 U/L (ref 1–40)
BASOPHILS # BLD AUTO: 0.02 10*3/MM3 (ref 0–0.2)
BASOPHILS NFR BLD AUTO: 0.2 % (ref 0–1.5)
BILIRUB SERPL-MCNC: 0.4 MG/DL (ref 0–1.2)
BILIRUB UR QL STRIP: NEGATIVE
BUN SERPL-MCNC: 20 MG/DL (ref 6–20)
BUN/CREAT SERPL: 15.6 (ref 7–25)
CALCIUM SPEC-SCNC: 8.4 MG/DL (ref 8.6–10.5)
CHLORIDE SERPL-SCNC: 98 MMOL/L (ref 98–107)
CLARITY UR: CLEAR
CO2 SERPL-SCNC: 26 MMOL/L (ref 22–29)
COLOR UR: YELLOW
CREAT SERPL-MCNC: 1.28 MG/DL (ref 0.76–1.27)
DEPRECATED RDW RBC AUTO: 45.6 FL (ref 37–54)
EGFRCR SERPLBLD CKD-EPI 2021: 65.3 ML/MIN/1.73
EOSINOPHIL # BLD AUTO: 0.04 10*3/MM3 (ref 0–0.4)
EOSINOPHIL NFR BLD AUTO: 0.4 % (ref 0.3–6.2)
ERYTHROCYTE [DISTWIDTH] IN BLOOD BY AUTOMATED COUNT: 13.9 % (ref 12.3–15.4)
GLOBULIN UR ELPH-MCNC: 3 GM/DL
GLUCOSE BLDC GLUCOMTR-MCNC: 290 MG/DL (ref 70–130)
GLUCOSE BLDC GLUCOMTR-MCNC: 316 MG/DL (ref 70–130)
GLUCOSE SERPL-MCNC: 202 MG/DL (ref 65–99)
GLUCOSE UR STRIP-MCNC: ABNORMAL MG/DL
HCT VFR BLD AUTO: 43 % (ref 37.5–51)
HGB BLD-MCNC: 13.9 G/DL (ref 13–17.7)
HGB UR QL STRIP.AUTO: NEGATIVE
IMM GRANULOCYTES # BLD AUTO: 0.01 10*3/MM3 (ref 0–0.05)
IMM GRANULOCYTES NFR BLD AUTO: 0.1 % (ref 0–0.5)
INR PPP: 0.89 (ref 0.91–1.09)
KETONES UR QL STRIP: ABNORMAL
LEUKOCYTE ESTERASE UR QL STRIP.AUTO: NEGATIVE
LIPASE SERPL-CCNC: 16 U/L (ref 13–60)
LYMPHOCYTES # BLD AUTO: 0.99 10*3/MM3 (ref 0.7–3.1)
LYMPHOCYTES NFR BLD AUTO: 10.3 % (ref 19.6–45.3)
MCH RBC QN AUTO: 29 PG (ref 26.6–33)
MCHC RBC AUTO-ENTMCNC: 32.3 G/DL (ref 31.5–35.7)
MCV RBC AUTO: 89.6 FL (ref 79–97)
MONOCYTES # BLD AUTO: 0.45 10*3/MM3 (ref 0.1–0.9)
MONOCYTES NFR BLD AUTO: 4.7 % (ref 5–12)
NEUTROPHILS NFR BLD AUTO: 8.12 10*3/MM3 (ref 1.7–7)
NEUTROPHILS NFR BLD AUTO: 84.3 % (ref 42.7–76)
NITRITE UR QL STRIP: NEGATIVE
NRBC BLD AUTO-RTO: 0 /100 WBC (ref 0–0.2)
PH UR STRIP.AUTO: 5.5 [PH] (ref 5–8)
PLATELET # BLD AUTO: 282 10*3/MM3 (ref 140–450)
PMV BLD AUTO: 9.2 FL (ref 6–12)
POTASSIUM SERPL-SCNC: 4.7 MMOL/L (ref 3.5–5.2)
PROT SERPL-MCNC: 6.9 G/DL (ref 6–8.5)
PROT UR QL STRIP: NEGATIVE
PROTHROMBIN TIME: 12.4 SECONDS (ref 11.8–14.8)
QT INTERVAL: 438 MS
QTC INTERVAL: 459 MS
RBC # BLD AUTO: 4.8 10*6/MM3 (ref 4.14–5.8)
SODIUM SERPL-SCNC: 135 MMOL/L (ref 136–145)
SP GR UR STRIP: >1.03 (ref 1–1.03)
UROBILINOGEN UR QL STRIP: ABNORMAL
WBC NRBC COR # BLD AUTO: 9.63 10*3/MM3 (ref 3.4–10.8)

## 2024-09-15 PROCEDURE — 81003 URINALYSIS AUTO W/O SCOPE: CPT | Performed by: EMERGENCY MEDICINE

## 2024-09-15 PROCEDURE — 25810000003 LACTATED RINGERS SOLUTION: Performed by: EMERGENCY MEDICINE

## 2024-09-15 PROCEDURE — 25010000002 PROPOFOL 10 MG/ML EMULSION: Performed by: NURSE ANESTHETIST, CERTIFIED REGISTERED

## 2024-09-15 PROCEDURE — 25010000002 BUPIVACAINE (PF) 0.25 % SOLUTION 30 ML VIAL: Performed by: SURGERY

## 2024-09-15 PROCEDURE — 25010000002 GLYCOPYRROLATE 0.4 MG/2ML SOLUTION: Performed by: NURSE ANESTHETIST, CERTIFIED REGISTERED

## 2024-09-15 PROCEDURE — 96361 HYDRATE IV INFUSION ADD-ON: CPT

## 2024-09-15 PROCEDURE — 96365 THER/PROPH/DIAG IV INF INIT: CPT

## 2024-09-15 PROCEDURE — G0378 HOSPITAL OBSERVATION PER HR: HCPCS

## 2024-09-15 PROCEDURE — 25010000002 INDOCYANINE GREEN 25 MG RECONSTITUTED SOLUTION: Performed by: SURGERY

## 2024-09-15 PROCEDURE — 63710000001 INSULIN REGULAR HUMAN PER 5 UNITS: Performed by: NURSE ANESTHETIST, CERTIFIED REGISTERED

## 2024-09-15 PROCEDURE — 74177 CT ABD & PELVIS W/CONTRAST: CPT

## 2024-09-15 PROCEDURE — 82948 REAGENT STRIP/BLOOD GLUCOSE: CPT

## 2024-09-15 PROCEDURE — 25810000003 LACTATED RINGERS PER 1000 ML: Performed by: EMERGENCY MEDICINE

## 2024-09-15 PROCEDURE — 25010000002 ONDANSETRON PER 1 MG: Performed by: EMERGENCY MEDICINE

## 2024-09-15 PROCEDURE — 85610 PROTHROMBIN TIME: CPT | Performed by: EMERGENCY MEDICINE

## 2024-09-15 PROCEDURE — 0 HYDROMORPHONE 1 MG/ML SOLUTION: Performed by: EMERGENCY MEDICINE

## 2024-09-15 PROCEDURE — 25010000002 MORPHINE SULFATE (PF) 2 MG/ML SOLUTION 1 ML CARTRIDGE: Performed by: SURGERY

## 2024-09-15 PROCEDURE — 25010000002 MIDAZOLAM PER 1MG: Performed by: NURSE ANESTHETIST, CERTIFIED REGISTERED

## 2024-09-15 PROCEDURE — 80053 COMPREHEN METABOLIC PANEL: CPT | Performed by: EMERGENCY MEDICINE

## 2024-09-15 PROCEDURE — 25010000002 HYDROMORPHONE 1 MG/ML SOLUTION: Performed by: NURSE ANESTHETIST, CERTIFIED REGISTERED

## 2024-09-15 PROCEDURE — 25010000002 DEXAMETHASONE PER 1 MG: Performed by: SURGERY

## 2024-09-15 PROCEDURE — 83690 ASSAY OF LIPASE: CPT | Performed by: EMERGENCY MEDICINE

## 2024-09-15 PROCEDURE — 99285 EMERGENCY DEPT VISIT HI MDM: CPT

## 2024-09-15 PROCEDURE — 25010000002 ONDANSETRON PER 1 MG: Performed by: NURSE ANESTHETIST, CERTIFIED REGISTERED

## 2024-09-15 PROCEDURE — 25010000002 DROPERIDOL PER 5 MG: Performed by: EMERGENCY MEDICINE

## 2024-09-15 PROCEDURE — 25510000001 IOPAMIDOL 61 % SOLUTION: Performed by: EMERGENCY MEDICINE

## 2024-09-15 PROCEDURE — 25010000002 LIDOCAINE 1 % SOLUTION 20 ML VIAL: Performed by: SURGERY

## 2024-09-15 PROCEDURE — 47563 LAPARO CHOLECYSTECTOMY/GRAPH: CPT | Performed by: SURGERY

## 2024-09-15 PROCEDURE — 25010000002 FENTANYL CITRATE (PF) 100 MCG/2ML SOLUTION: Performed by: NURSE ANESTHETIST, CERTIFIED REGISTERED

## 2024-09-15 PROCEDURE — 25010000002 BUPIVACAINE 0.5 % SOLUTION 50 ML VIAL: Performed by: SURGERY

## 2024-09-15 PROCEDURE — 85025 COMPLETE CBC W/AUTO DIFF WBC: CPT | Performed by: EMERGENCY MEDICINE

## 2024-09-15 PROCEDURE — 76705 ECHO EXAM OF ABDOMEN: CPT

## 2024-09-15 PROCEDURE — 96376 TX/PRO/DX INJ SAME DRUG ADON: CPT

## 2024-09-15 PROCEDURE — 25010000002 PIPERACILLIN SOD-TAZOBACTAM PER 1 G: Performed by: EMERGENCY MEDICINE

## 2024-09-15 PROCEDURE — 88304 TISSUE EXAM BY PATHOLOGIST: CPT | Performed by: SURGERY

## 2024-09-15 PROCEDURE — 96375 TX/PRO/DX INJ NEW DRUG ADDON: CPT

## 2024-09-15 DEVICE — HEMOST ABS SURGICEL PWDR 3GM: Type: IMPLANTABLE DEVICE | Site: ABDOMEN | Status: FUNCTIONAL

## 2024-09-15 DEVICE — LARGE LIGATION CLIPS 6 CLIPS/CART
Type: IMPLANTABLE DEVICE | Site: ABDOMEN | Status: FUNCTIONAL
Brand: VAS-Q-CLIP

## 2024-09-15 RX ORDER — PROPOFOL 10 MG/ML
VIAL (ML) INTRAVENOUS AS NEEDED
Status: DISCONTINUED | OUTPATIENT
Start: 2024-09-15 | End: 2024-09-15 | Stop reason: SURG

## 2024-09-15 RX ORDER — SODIUM CHLORIDE 9 MG/ML
40 INJECTION, SOLUTION INTRAVENOUS AS NEEDED
Status: DISCONTINUED | OUTPATIENT
Start: 2024-09-15 | End: 2024-09-16 | Stop reason: HOSPADM

## 2024-09-15 RX ORDER — SODIUM CHLORIDE 0.9 % (FLUSH) 0.9 %
10 SYRINGE (ML) INJECTION AS NEEDED
Status: CANCELLED | OUTPATIENT
Start: 2024-09-15

## 2024-09-15 RX ORDER — FAMOTIDINE 10 MG/ML
20 INJECTION, SOLUTION INTRAVENOUS EVERY 12 HOURS SCHEDULED
Status: DISCONTINUED | OUTPATIENT
Start: 2024-09-16 | End: 2024-09-16 | Stop reason: HOSPADM

## 2024-09-15 RX ORDER — SUCCINYLCHOLINE/SOD CL,ISO/PF 200MG/10ML
SYRINGE (ML) INTRAVENOUS AS NEEDED
Status: DISCONTINUED | OUTPATIENT
Start: 2024-09-15 | End: 2024-09-15 | Stop reason: SURG

## 2024-09-15 RX ORDER — SODIUM CHLORIDE 0.9 % (FLUSH) 0.9 %
SYRINGE (ML) INJECTION AS NEEDED
Status: DISCONTINUED | OUTPATIENT
Start: 2024-09-15 | End: 2024-09-15 | Stop reason: HOSPADM

## 2024-09-15 RX ORDER — NEOSTIGMINE METHYLSULFATE 5 MG/5 ML
SYRINGE (ML) INTRAVENOUS AS NEEDED
Status: DISCONTINUED | OUTPATIENT
Start: 2024-09-15 | End: 2024-09-15 | Stop reason: SURG

## 2024-09-15 RX ORDER — INDOCYANINE GREEN AND WATER 25 MG
KIT INJECTION AS NEEDED
Status: DISCONTINUED | OUTPATIENT
Start: 2024-09-15 | End: 2024-09-15 | Stop reason: HOSPADM

## 2024-09-15 RX ORDER — DROPERIDOL 2.5 MG/ML
0.62 INJECTION, SOLUTION INTRAMUSCULAR; INTRAVENOUS ONCE AS NEEDED
Status: DISCONTINUED | OUTPATIENT
Start: 2024-09-15 | End: 2024-09-15 | Stop reason: HOSPADM

## 2024-09-15 RX ORDER — DIPHENHYDRAMINE HYDROCHLORIDE 50 MG/ML
25 INJECTION INTRAMUSCULAR; INTRAVENOUS EVERY 6 HOURS PRN
Status: DISCONTINUED | OUTPATIENT
Start: 2024-09-15 | End: 2024-09-16 | Stop reason: HOSPADM

## 2024-09-15 RX ORDER — FENTANYL CITRATE 50 UG/ML
INJECTION, SOLUTION INTRAMUSCULAR; INTRAVENOUS AS NEEDED
Status: DISCONTINUED | OUTPATIENT
Start: 2024-09-15 | End: 2024-09-15 | Stop reason: SURG

## 2024-09-15 RX ORDER — ROCURONIUM BROMIDE 10 MG/ML
INJECTION, SOLUTION INTRAVENOUS AS NEEDED
Status: DISCONTINUED | OUTPATIENT
Start: 2024-09-15 | End: 2024-09-15 | Stop reason: SURG

## 2024-09-15 RX ORDER — SODIUM CHLORIDE 9 MG/ML
40 INJECTION, SOLUTION INTRAVENOUS AS NEEDED
Status: CANCELLED | OUTPATIENT
Start: 2024-09-15

## 2024-09-15 RX ORDER — MIDAZOLAM HYDROCHLORIDE 2 MG/2ML
INJECTION, SOLUTION INTRAMUSCULAR; INTRAVENOUS AS NEEDED
Status: DISCONTINUED | OUTPATIENT
Start: 2024-09-15 | End: 2024-09-15 | Stop reason: SURG

## 2024-09-15 RX ORDER — SODIUM CHLORIDE 0.9 % (FLUSH) 0.9 %
10 SYRINGE (ML) INJECTION EVERY 12 HOURS SCHEDULED
Status: CANCELLED | OUTPATIENT
Start: 2024-09-15

## 2024-09-15 RX ORDER — ASPIRIN 81 MG/1
81 TABLET ORAL DAILY
Status: DISCONTINUED | OUTPATIENT
Start: 2024-09-16 | End: 2024-09-16 | Stop reason: HOSPADM

## 2024-09-15 RX ORDER — FENTANYL CITRATE 50 UG/ML
50 INJECTION, SOLUTION INTRAMUSCULAR; INTRAVENOUS
Status: DISCONTINUED | OUTPATIENT
Start: 2024-09-15 | End: 2024-09-15 | Stop reason: HOSPADM

## 2024-09-15 RX ORDER — ONDANSETRON 2 MG/ML
4 INJECTION INTRAMUSCULAR; INTRAVENOUS ONCE
Status: COMPLETED | OUTPATIENT
Start: 2024-09-15 | End: 2024-09-15

## 2024-09-15 RX ORDER — SODIUM CHLORIDE 0.9 % (FLUSH) 0.9 %
10 SYRINGE (ML) INJECTION AS NEEDED
Status: DISCONTINUED | OUTPATIENT
Start: 2024-09-15 | End: 2024-09-16 | Stop reason: HOSPADM

## 2024-09-15 RX ORDER — SCOLOPAMINE TRANSDERMAL SYSTEM 1 MG/1
1 PATCH, EXTENDED RELEASE TRANSDERMAL ONCE
Status: CANCELLED | OUTPATIENT
Start: 2024-09-15 | End: 2024-09-15

## 2024-09-15 RX ORDER — SODIUM CHLORIDE 0.9 % (FLUSH) 0.9 %
10 SYRINGE (ML) INJECTION EVERY 12 HOURS SCHEDULED
Status: DISCONTINUED | OUTPATIENT
Start: 2024-09-16 | End: 2024-09-16 | Stop reason: HOSPADM

## 2024-09-15 RX ORDER — SODIUM CHLORIDE 0.9 % (FLUSH) 0.9 %
3 SYRINGE (ML) INJECTION EVERY 12 HOURS SCHEDULED
Status: CANCELLED | OUTPATIENT
Start: 2024-09-15

## 2024-09-15 RX ORDER — OXYCODONE AND ACETAMINOPHEN 10; 325 MG/1; MG/1
1 TABLET ORAL EVERY 4 HOURS PRN
Status: DISCONTINUED | OUTPATIENT
Start: 2024-09-15 | End: 2024-09-15 | Stop reason: HOSPADM

## 2024-09-15 RX ORDER — KETOROLAC TROMETHAMINE 30 MG/ML
30 INJECTION, SOLUTION INTRAMUSCULAR; INTRAVENOUS EVERY 6 HOURS PRN
Status: DISCONTINUED | OUTPATIENT
Start: 2024-09-15 | End: 2024-09-16 | Stop reason: HOSPADM

## 2024-09-15 RX ORDER — MAGNESIUM HYDROXIDE 1200 MG/15ML
LIQUID ORAL AS NEEDED
Status: DISCONTINUED | OUTPATIENT
Start: 2024-09-15 | End: 2024-09-15 | Stop reason: HOSPADM

## 2024-09-15 RX ORDER — HYDROMORPHONE HYDROCHLORIDE 1 MG/ML
0.5 INJECTION, SOLUTION INTRAMUSCULAR; INTRAVENOUS; SUBCUTANEOUS
Status: DISCONTINUED | OUTPATIENT
Start: 2024-09-15 | End: 2024-09-15 | Stop reason: HOSPADM

## 2024-09-15 RX ORDER — NALOXONE HCL 0.4 MG/ML
0.1 VIAL (ML) INJECTION
Status: DISCONTINUED | OUTPATIENT
Start: 2024-09-15 | End: 2024-09-16 | Stop reason: HOSPADM

## 2024-09-15 RX ORDER — HYDROCODONE BITARTRATE AND ACETAMINOPHEN 5; 325 MG/1; MG/1
1 TABLET ORAL EVERY 4 HOURS PRN
Status: DISCONTINUED | OUTPATIENT
Start: 2024-09-15 | End: 2024-09-16 | Stop reason: HOSPADM

## 2024-09-15 RX ORDER — SODIUM CHLORIDE, SODIUM LACTATE, POTASSIUM CHLORIDE, CALCIUM CHLORIDE 600; 310; 30; 20 MG/100ML; MG/100ML; MG/100ML; MG/100ML
135 INJECTION, SOLUTION INTRAVENOUS CONTINUOUS
Status: CANCELLED | OUTPATIENT
Start: 2024-09-15

## 2024-09-15 RX ORDER — ONDANSETRON 2 MG/ML
4 INJECTION INTRAMUSCULAR; INTRAVENOUS EVERY 6 HOURS PRN
Status: DISCONTINUED | OUTPATIENT
Start: 2024-09-15 | End: 2024-09-16 | Stop reason: HOSPADM

## 2024-09-15 RX ORDER — LISINOPRIL 5 MG/1
5 TABLET ORAL DAILY
Status: DISCONTINUED | OUTPATIENT
Start: 2024-09-16 | End: 2024-09-16 | Stop reason: HOSPADM

## 2024-09-15 RX ORDER — ACETAMINOPHEN 325 MG/1
325 TABLET ORAL EVERY 8 HOURS
Status: DISCONTINUED | OUTPATIENT
Start: 2024-09-16 | End: 2024-09-16 | Stop reason: HOSPADM

## 2024-09-15 RX ORDER — LABETALOL HYDROCHLORIDE 5 MG/ML
5 INJECTION, SOLUTION INTRAVENOUS
Status: DISCONTINUED | OUTPATIENT
Start: 2024-09-15 | End: 2024-09-15 | Stop reason: HOSPADM

## 2024-09-15 RX ORDER — LABETALOL HYDROCHLORIDE 5 MG/ML
10 INJECTION, SOLUTION INTRAVENOUS
Status: DISCONTINUED | OUTPATIENT
Start: 2024-09-15 | End: 2024-09-16 | Stop reason: HOSPADM

## 2024-09-15 RX ORDER — ONDANSETRON 2 MG/ML
INJECTION INTRAMUSCULAR; INTRAVENOUS AS NEEDED
Status: DISCONTINUED | OUTPATIENT
Start: 2024-09-15 | End: 2024-09-15 | Stop reason: SURG

## 2024-09-15 RX ORDER — SODIUM CHLORIDE, SODIUM LACTATE, POTASSIUM CHLORIDE, CALCIUM CHLORIDE 600; 310; 30; 20 MG/100ML; MG/100ML; MG/100ML; MG/100ML
100 INJECTION, SOLUTION INTRAVENOUS CONTINUOUS
Status: CANCELLED | OUTPATIENT
Start: 2024-09-15

## 2024-09-15 RX ORDER — DEXAMETHASONE SODIUM PHOSPHATE 4 MG/ML
4 INJECTION, SOLUTION INTRA-ARTICULAR; INTRALESIONAL; INTRAMUSCULAR; INTRAVENOUS; SOFT TISSUE EVERY 8 HOURS PRN
Status: DISCONTINUED | OUTPATIENT
Start: 2024-09-15 | End: 2024-09-16 | Stop reason: HOSPADM

## 2024-09-15 RX ORDER — DROPERIDOL 2.5 MG/ML
2.5 INJECTION, SOLUTION INTRAMUSCULAR; INTRAVENOUS ONCE
Status: COMPLETED | OUTPATIENT
Start: 2024-09-15 | End: 2024-09-15

## 2024-09-15 RX ORDER — ACETAMINOPHEN 325 MG/1
650 TABLET ORAL ONCE
Status: CANCELLED | OUTPATIENT
Start: 2024-09-15 | End: 2024-09-15

## 2024-09-15 RX ORDER — SPIRONOLACTONE 25 MG/1
25 TABLET ORAL DAILY
Status: DISCONTINUED | OUTPATIENT
Start: 2024-09-16 | End: 2024-09-16 | Stop reason: HOSPADM

## 2024-09-15 RX ORDER — FLUMAZENIL 0.1 MG/ML
0.2 INJECTION INTRAVENOUS AS NEEDED
Status: DISCONTINUED | OUTPATIENT
Start: 2024-09-15 | End: 2024-09-15 | Stop reason: HOSPADM

## 2024-09-15 RX ORDER — GLYCOPYRROLATE 0.2 MG/ML
INJECTION INTRAMUSCULAR; INTRAVENOUS AS NEEDED
Status: DISCONTINUED | OUTPATIENT
Start: 2024-09-15 | End: 2024-09-15 | Stop reason: SURG

## 2024-09-15 RX ORDER — SODIUM CHLORIDE, SODIUM LACTATE, POTASSIUM CHLORIDE, CALCIUM CHLORIDE 600; 310; 30; 20 MG/100ML; MG/100ML; MG/100ML; MG/100ML
125 INJECTION, SOLUTION INTRAVENOUS CONTINUOUS
Status: DISCONTINUED | OUTPATIENT
Start: 2024-09-16 | End: 2024-09-16 | Stop reason: HOSPADM

## 2024-09-15 RX ORDER — THIAMINE HYDROCHLORIDE 100 MG/ML
100 INJECTION, SOLUTION INTRAMUSCULAR; INTRAVENOUS ONCE
Status: COMPLETED | OUTPATIENT
Start: 2024-09-16 | End: 2024-09-16

## 2024-09-15 RX ORDER — ONDANSETRON 2 MG/ML
4 INJECTION INTRAMUSCULAR; INTRAVENOUS
Status: DISCONTINUED | OUTPATIENT
Start: 2024-09-15 | End: 2024-09-15 | Stop reason: HOSPADM

## 2024-09-15 RX ORDER — METOCLOPRAMIDE HYDROCHLORIDE 5 MG/ML
5 INJECTION INTRAMUSCULAR; INTRAVENOUS EVERY 6 HOURS PRN
Status: DISCONTINUED | OUTPATIENT
Start: 2024-09-15 | End: 2024-09-16 | Stop reason: HOSPADM

## 2024-09-15 RX ORDER — SODIUM CHLORIDE, SODIUM LACTATE, POTASSIUM CHLORIDE, CALCIUM CHLORIDE 600; 310; 30; 20 MG/100ML; MG/100ML; MG/100ML; MG/100ML
125 INJECTION, SOLUTION INTRAVENOUS CONTINUOUS
Status: DISCONTINUED | OUTPATIENT
Start: 2024-09-15 | End: 2024-09-16 | Stop reason: HOSPADM

## 2024-09-15 RX ORDER — IOPAMIDOL 612 MG/ML
100 INJECTION, SOLUTION INTRAVASCULAR
Status: COMPLETED | OUTPATIENT
Start: 2024-09-15 | End: 2024-09-15

## 2024-09-15 RX ORDER — INSULIN LISPRO 100 [IU]/ML
0-7 INJECTION, SOLUTION INTRAVENOUS; SUBCUTANEOUS
Status: DISCONTINUED | OUTPATIENT
Start: 2024-09-16 | End: 2024-09-16 | Stop reason: HOSPADM

## 2024-09-15 RX ORDER — SODIUM CHLORIDE 0.9 % (FLUSH) 0.9 %
1-10 SYRINGE (ML) INJECTION AS NEEDED
Status: DISCONTINUED | OUTPATIENT
Start: 2024-09-15 | End: 2024-09-16 | Stop reason: HOSPADM

## 2024-09-15 RX ORDER — SODIUM CHLORIDE 0.9 % (FLUSH) 0.9 %
3-10 SYRINGE (ML) INJECTION AS NEEDED
Status: CANCELLED | OUTPATIENT
Start: 2024-09-15

## 2024-09-15 RX ORDER — INDOCYANINE GREEN AND WATER 25 MG
3.75 KIT INJECTION ONCE
Status: CANCELLED | OUTPATIENT
Start: 2024-09-15 | End: 2024-09-15

## 2024-09-15 RX ORDER — NALOXONE HCL 0.4 MG/ML
0.04 VIAL (ML) INJECTION AS NEEDED
Status: DISCONTINUED | OUTPATIENT
Start: 2024-09-15 | End: 2024-09-15 | Stop reason: HOSPADM

## 2024-09-15 RX ORDER — NITROGLYCERIN 0.4 MG/1
0.4 TABLET SUBLINGUAL
Status: DISCONTINUED | OUTPATIENT
Start: 2024-09-15 | End: 2024-09-15 | Stop reason: SDUPTHER

## 2024-09-15 RX ORDER — NITROGLYCERIN 0.4 MG/1
0.4 TABLET SUBLINGUAL
Status: DISCONTINUED | OUTPATIENT
Start: 2024-09-15 | End: 2024-09-16 | Stop reason: HOSPADM

## 2024-09-15 RX ORDER — IBUPROFEN 600 MG/1
600 TABLET, FILM COATED ORAL EVERY 6 HOURS PRN
Status: DISCONTINUED | OUTPATIENT
Start: 2024-09-15 | End: 2024-09-15 | Stop reason: HOSPADM

## 2024-09-15 RX ORDER — LIDOCAINE HYDROCHLORIDE 20 MG/ML
INJECTION, SOLUTION EPIDURAL; INFILTRATION; INTRACAUDAL; PERINEURAL AS NEEDED
Status: DISCONTINUED | OUTPATIENT
Start: 2024-09-15 | End: 2024-09-15 | Stop reason: SURG

## 2024-09-15 RX ADMIN — ONDANSETRON 4 MG: 2 INJECTION INTRAMUSCULAR; INTRAVENOUS at 22:05

## 2024-09-15 RX ADMIN — Medication 3 MG: at 22:18

## 2024-09-15 RX ADMIN — SODIUM CHLORIDE, POTASSIUM CHLORIDE, SODIUM LACTATE AND CALCIUM CHLORIDE: 600; 310; 30; 20 INJECTION, SOLUTION INTRAVENOUS at 21:29

## 2024-09-15 RX ADMIN — GLYCOPYRROLATE 0.4 MG: 0.2 INJECTION INTRAMUSCULAR; INTRAVENOUS at 22:18

## 2024-09-15 RX ADMIN — DROPERIDOL 2.5 MG: 2.5 INJECTION, SOLUTION INTRAMUSCULAR; INTRAVENOUS at 15:10

## 2024-09-15 RX ADMIN — HYDROMORPHONE HYDROCHLORIDE 1 MG: 1 INJECTION, SOLUTION INTRAMUSCULAR; INTRAVENOUS; SUBCUTANEOUS at 21:58

## 2024-09-15 RX ADMIN — Medication 140 MG: at 19:28

## 2024-09-15 RX ADMIN — ROCURONIUM BROMIDE 30 MG: 10 INJECTION, SOLUTION INTRAVENOUS at 19:36

## 2024-09-15 RX ADMIN — SODIUM CHLORIDE, POTASSIUM CHLORIDE, SODIUM LACTATE AND CALCIUM CHLORIDE 125 ML/HR: 600; 310; 30; 20 INJECTION, SOLUTION INTRAVENOUS at 16:54

## 2024-09-15 RX ADMIN — INSULIN HUMAN 10 UNITS: 100 INJECTION, SOLUTION PARENTERAL at 22:46

## 2024-09-15 RX ADMIN — ONDANSETRON 4 MG: 2 INJECTION INTRAMUSCULAR; INTRAVENOUS at 13:55

## 2024-09-15 RX ADMIN — IOPAMIDOL 100 ML: 612 INJECTION, SOLUTION INTRAVENOUS at 15:25

## 2024-09-15 RX ADMIN — PIPERACILLIN SODIUM AND TAZOBACTAM SODIUM 3.38 G: 3; .375 INJECTION, POWDER, LYOPHILIZED, FOR SOLUTION INTRAVENOUS at 16:16

## 2024-09-15 RX ADMIN — LIDOCAINE HYDROCHLORIDE 100 MG: 20 INJECTION, SOLUTION EPIDURAL; INFILTRATION; INTRACAUDAL; PERINEURAL at 19:28

## 2024-09-15 RX ADMIN — ONDANSETRON 4 MG: 2 INJECTION INTRAMUSCULAR; INTRAVENOUS at 10:11

## 2024-09-15 RX ADMIN — HYDROMORPHONE HYDROCHLORIDE 1 MG: 1 INJECTION, SOLUTION INTRAMUSCULAR; INTRAVENOUS; SUBCUTANEOUS at 10:11

## 2024-09-15 RX ADMIN — PROPOFOL 200 MG: 10 INJECTION, EMULSION INTRAVENOUS at 19:28

## 2024-09-15 RX ADMIN — FENTANYL CITRATE 100 MCG: 50 INJECTION, SOLUTION INTRAMUSCULAR; INTRAVENOUS at 19:28

## 2024-09-15 RX ADMIN — ROCURONIUM BROMIDE 10 MG: 10 INJECTION, SOLUTION INTRAVENOUS at 19:28

## 2024-09-15 RX ADMIN — SODIUM CHLORIDE, POTASSIUM CHLORIDE, SODIUM LACTATE AND CALCIUM CHLORIDE 1000 ML: 600; 310; 30; 20 INJECTION, SOLUTION INTRAVENOUS at 10:10

## 2024-09-15 RX ADMIN — MIDAZOLAM HYDROCHLORIDE 2 MG: 1 INJECTION, SOLUTION INTRAMUSCULAR; INTRAVENOUS at 19:22

## 2024-09-15 RX ADMIN — FENTANYL CITRATE 100 MCG: 50 INJECTION, SOLUTION INTRAMUSCULAR; INTRAVENOUS at 20:30

## 2024-09-16 ENCOUNTER — READMISSION MANAGEMENT (OUTPATIENT)
Dept: CALL CENTER | Facility: HOSPITAL | Age: 59
End: 2024-09-16
Payer: COMMERCIAL

## 2024-09-16 VITALS
BODY MASS INDEX: 35.4 KG/M2 | OXYGEN SATURATION: 97 % | HEART RATE: 80 BPM | WEIGHT: 239 LBS | SYSTOLIC BLOOD PRESSURE: 114 MMHG | DIASTOLIC BLOOD PRESSURE: 59 MMHG | HEIGHT: 69 IN | TEMPERATURE: 98.2 F | RESPIRATION RATE: 16 BRPM

## 2024-09-16 LAB
ALBUMIN SERPL-MCNC: 3.6 G/DL (ref 3.5–5.2)
ALBUMIN/GLOB SERPL: 1.4 G/DL
ALP SERPL-CCNC: 89 U/L (ref 39–117)
ALT SERPL W P-5'-P-CCNC: 197 U/L (ref 1–41)
ANION GAP SERPL CALCULATED.3IONS-SCNC: 11 MMOL/L (ref 5–15)
AST SERPL-CCNC: 198 U/L (ref 1–40)
BILIRUB SERPL-MCNC: 0.5 MG/DL (ref 0–1.2)
BUN SERPL-MCNC: 24 MG/DL (ref 6–20)
BUN/CREAT SERPL: 16.6 (ref 7–25)
CALCIUM SPEC-SCNC: 8 MG/DL (ref 8.6–10.5)
CHLORIDE SERPL-SCNC: 102 MMOL/L (ref 98–107)
CO2 SERPL-SCNC: 25 MMOL/L (ref 22–29)
CREAT SERPL-MCNC: 1.45 MG/DL (ref 0.76–1.27)
DEPRECATED RDW RBC AUTO: 46.1 FL (ref 37–54)
EGFRCR SERPLBLD CKD-EPI 2021: 56.2 ML/MIN/1.73
ERYTHROCYTE [DISTWIDTH] IN BLOOD BY AUTOMATED COUNT: 14 % (ref 12.3–15.4)
GLOBULIN UR ELPH-MCNC: 2.6 GM/DL
GLUCOSE BLDC GLUCOMTR-MCNC: 122 MG/DL (ref 70–130)
GLUCOSE BLDC GLUCOMTR-MCNC: 304 MG/DL (ref 70–130)
GLUCOSE SERPL-MCNC: 223 MG/DL (ref 65–99)
HCT VFR BLD AUTO: 40 % (ref 37.5–51)
HGB BLD-MCNC: 13.1 G/DL (ref 13–17.7)
MCH RBC QN AUTO: 29.4 PG (ref 26.6–33)
MCHC RBC AUTO-ENTMCNC: 32.8 G/DL (ref 31.5–35.7)
MCV RBC AUTO: 89.7 FL (ref 79–97)
PLATELET # BLD AUTO: 234 10*3/MM3 (ref 140–450)
PMV BLD AUTO: 9.3 FL (ref 6–12)
POTASSIUM SERPL-SCNC: 4.5 MMOL/L (ref 3.5–5.2)
PROT SERPL-MCNC: 6.2 G/DL (ref 6–8.5)
RBC # BLD AUTO: 4.46 10*6/MM3 (ref 4.14–5.8)
SODIUM SERPL-SCNC: 138 MMOL/L (ref 136–145)
WBC NRBC COR # BLD AUTO: 10.34 10*3/MM3 (ref 3.4–10.8)

## 2024-09-16 PROCEDURE — 80053 COMPREHEN METABOLIC PANEL: CPT | Performed by: SURGERY

## 2024-09-16 PROCEDURE — 82948 REAGENT STRIP/BLOOD GLUCOSE: CPT

## 2024-09-16 PROCEDURE — 25010000002 CEFAZOLIN PER 500 MG: Performed by: SURGERY

## 2024-09-16 PROCEDURE — 25010000002 ONDANSETRON PER 1 MG: Performed by: SURGERY

## 2024-09-16 PROCEDURE — 25010000002 THIAMINE PER 100 MG: Performed by: SURGERY

## 2024-09-16 PROCEDURE — G0378 HOSPITAL OBSERVATION PER HR: HCPCS

## 2024-09-16 PROCEDURE — 85027 COMPLETE CBC AUTOMATED: CPT | Performed by: SURGERY

## 2024-09-16 PROCEDURE — 25810000003 SODIUM CHLORIDE 0.9 % SOLUTION 1,000 ML FLEX CONT: Performed by: SURGERY

## 2024-09-16 PROCEDURE — 25010000002 THIAMINE HCL 200 MG/2ML SOLUTION 2 ML VIAL: Performed by: SURGERY

## 2024-09-16 RX ORDER — ONDANSETRON 4 MG/1
4 TABLET, ORALLY DISINTEGRATING ORAL ONCE AS NEEDED
Status: DISCONTINUED | OUTPATIENT
Start: 2024-09-16 | End: 2024-09-16 | Stop reason: HOSPADM

## 2024-09-16 RX ORDER — ONDANSETRON 4 MG/1
4 TABLET, ORALLY DISINTEGRATING ORAL EVERY 8 HOURS PRN
Qty: 30 TABLET | Refills: 1 | Status: SHIPPED | OUTPATIENT
Start: 2024-09-16

## 2024-09-16 RX ORDER — HYDROCODONE BITARTRATE AND ACETAMINOPHEN 5; 325 MG/1; MG/1
1 TABLET ORAL EVERY 4 HOURS PRN
Qty: 36 TABLET | Refills: 0 | Status: SHIPPED | OUTPATIENT
Start: 2024-09-16 | End: 2024-09-17 | Stop reason: HOSPADM

## 2024-09-16 RX ORDER — SIMETHICONE 80 MG
40 TABLET,CHEWABLE ORAL EVERY 6 HOURS PRN
Qty: 30 TABLET | Refills: 1 | Status: SHIPPED | OUTPATIENT
Start: 2024-09-16

## 2024-09-16 RX ADMIN — ONDANSETRON 8 MG: 2 INJECTION INTRAMUSCULAR; INTRAVENOUS at 00:26

## 2024-09-16 RX ADMIN — Medication 10 ML: at 08:26

## 2024-09-16 RX ADMIN — SPIRONOLACTONE 25 MG: 25 TABLET ORAL at 08:20

## 2024-09-16 RX ADMIN — LISINOPRIL 5 MG: 5 TABLET ORAL at 08:20

## 2024-09-16 RX ADMIN — THIAMINE HYDROCHLORIDE 100 MG: 100 INJECTION, SOLUTION INTRAMUSCULAR; INTRAVENOUS at 00:22

## 2024-09-16 RX ADMIN — FAMOTIDINE 20 MG: 10 INJECTION INTRAVENOUS at 00:20

## 2024-09-16 RX ADMIN — ACETAMINOPHEN 325 MG: 325 TABLET ORAL at 08:20

## 2024-09-16 RX ADMIN — METOPROLOL SUCCINATE 75 MG: 25 TABLET, EXTENDED RELEASE ORAL at 08:20

## 2024-09-16 RX ADMIN — CEFAZOLIN 2 G: 2 INJECTION, POWDER, FOR SOLUTION INTRAMUSCULAR; INTRAVENOUS at 00:39

## 2024-09-16 RX ADMIN — ASPIRIN 81 MG: 81 TABLET, COATED ORAL at 08:20

## 2024-09-16 RX ADMIN — HYDROCODONE BITARTRATE AND ACETAMINOPHEN 1 TABLET: 5; 325 TABLET ORAL at 11:06

## 2024-09-16 RX ADMIN — ACETAMINOPHEN 325 MG: 325 TABLET ORAL at 00:20

## 2024-09-16 RX ADMIN — ONDANSETRON 8 MG: 2 INJECTION INTRAMUSCULAR; INTRAVENOUS at 06:32

## 2024-09-16 RX ADMIN — THIAMINE HYDROCHLORIDE 100 ML/HR: 100 INJECTION, SOLUTION INTRAMUSCULAR; INTRAVENOUS at 00:40

## 2024-09-16 RX ADMIN — FAMOTIDINE 20 MG: 10 INJECTION INTRAVENOUS at 08:20

## 2024-09-16 RX ADMIN — CEFAZOLIN 2 G: 2 INJECTION, POWDER, FOR SOLUTION INTRAMUSCULAR; INTRAVENOUS at 08:25

## 2024-09-17 RX ORDER — HYDROCODONE BITARTRATE AND ACETAMINOPHEN 5; 300 MG/1; MG/1
1 TABLET ORAL EVERY 4 HOURS PRN
Qty: 36 TABLET | Refills: 0 | Status: SHIPPED | OUTPATIENT
Start: 2024-09-17 | End: 2024-09-23

## 2024-09-20 ENCOUNTER — READMISSION MANAGEMENT (OUTPATIENT)
Dept: CALL CENTER | Facility: HOSPITAL | Age: 59
End: 2024-09-20
Payer: COMMERCIAL

## 2024-09-23 ENCOUNTER — OFFICE VISIT (OUTPATIENT)
Dept: BARIATRICS/WEIGHT MGMT | Facility: CLINIC | Age: 59
End: 2024-09-23
Payer: COMMERCIAL

## 2024-09-23 VITALS
OXYGEN SATURATION: 98 % | TEMPERATURE: 98.4 F | BODY MASS INDEX: 36.05 KG/M2 | HEART RATE: 72 BPM | WEIGHT: 243.4 LBS | SYSTOLIC BLOOD PRESSURE: 119 MMHG | HEIGHT: 69 IN | DIASTOLIC BLOOD PRESSURE: 57 MMHG

## 2024-09-23 DIAGNOSIS — Z90.49 S/P LAPAROSCOPIC CHOLECYSTECTOMY: Primary | ICD-10-CM

## 2024-09-23 PROCEDURE — 99024 POSTOP FOLLOW-UP VISIT: CPT | Performed by: SURGERY

## 2024-09-23 RX ORDER — LEVOTHYROXINE SODIUM 75 UG/1
TABLET ORAL
COMMUNITY
Start: 2024-09-08

## 2024-11-05 NOTE — PROGRESS NOTES
[facility] - PODIATRY    Today's Date: 11/07/2024     Patient Name: Luis Echeverria  MRN: [id number]  CSN: [id number]  PCP: Rene Hogue DO  Referring Provider: No ref. provider found    SUBJECTIVE     Chief Complaint   Patient presents with    Follow-up     Mykel Rene Swann 03/19/2024 2 month fu for nail and foot care- pt states feet doing good- pt denies pain     Diabetes     155mg/dl BG at present      HPI: Luis Echeverria, a 59 y.o.male, comes to clinic as a(n) established patient presenting for diabetic foot exam and complaining of toenail/callus issues. Patient has h/o CAD, CHF, DM 2, HLD, MI, PVD . Patient is IDDM with last stated BG level of 155mg/dl. Last A1c was around 6.7%- however patient states this reading was from his insulin pump.  Reports toenails are long, thick, and discolored and in need of care today. Did have his Gallbladder removed in September. States he is feeling well since then. Continues to relay numbness in his feet.  Continues to note callused area to left greater toe.  Denies open wound, drainage or other signs of infection associated with area today.  Denies pain. Relates previous treatment(s) including foot care by podiatry . Denies any constitutional symptoms. No other pedal complaints at this time.    Past Medical History:   Diagnosis Date    CAD (coronary artery disease)     CAD in native artery     2V STEMI 7/13 STENT TO CX AND STENTX2 TO MID AND DISTAL LAD    Chronic systolic CHF (congestive heart failure), NYHA class 2 02/04/2020    Diabetes mellitus     Disease of thyroid gland     GERD (gastroesophageal reflux disease)     History of coronary artery stent placement      x 3 - for ACUTE MI 7/2013    Hyperlipidemia     Hyperlipidemia, mild     Hypertension     Myocardial infarct     Myocardial infarction     Nausea and vomiting 12/10/2018    Stented coronary artery      Past Surgical History:   Procedure Laterality Date    AMPUTATION DIGIT Right  5/3/2023    Procedure: Partial vs Complete Amputation of Hallux - Right Foot;  Surgeon: Simone Nieves DPM;  Location:  [facility] OR;  Service: Podiatry;  Laterality: Right;    AORTAGRAM Right 12/23/2022    Procedure: AORTAGRAM, RIGHT LOWER EXTREMITY ANGIOGRAM, BALLOON ANGIOPLASTY, MYNX CLOSURE;  Surgeon: Sammy Wild MD;  Location:  [facility] HYBRID OR 12;  Service: Vascular;  Laterality: Right;    CARDIAC CATHETERIZATION N/A 11/06/2018    Procedure: Left Heart Cath;  Surgeon: Tru Rai MD;  Location:  [facility] CATH INVASIVE LOCATION;  Service: Cardiology    CARDIAC ELECTROPHYSIOLOGY PROCEDURE N/A 05/01/2019    Procedure: ICD new;  Surgeon: Tru Rai MD;  Location:  [facility] CATH INVASIVE LOCATION;  Service: Cardiology    CAROTID STENT      CHOLECYSTECTOMY N/A 9/15/2024    Procedure: CHOLECYSTECTOMY LAPAROSCOPIC WITH DAVINCI ROBOT;  Surgeon: Tyshawn Vang MD;  Location:  [facility] OR;  Service: General;  Laterality: N/A;     Family History   Problem Relation Age of Onset    Heart disease Maternal Grandmother     No Known Problems Mother     No Known Problems Father      Social History     Socioeconomic History    Marital status:    Tobacco Use    Smoking status: Never     Passive exposure: Never    Smokeless tobacco: Never   Vaping Use    Vaping status: Never Used   Substance and Sexual Activity    Alcohol use: Yes     Comment: 2 TIMES YEARLY    Drug use: No    Sexual activity: Defer     No Known Allergies  Current Outpatient Medications   Medication Sig Dispense Refill    albuterol sulfate  (90 Base) MCG/ACT inhaler Inhale 2 puffs Every 4 (Four) Hours As Needed for Wheezing or Shortness of Air. 8.5 g 5    aspirin 81 MG EC tablet Take 1 tablet by mouth Daily.      atorvastatin (LIPITOR) 80 MG tablet TAKE 1 TABLET BY MOUTH EVERY DAY AT NIGHT 90 tablet 3    brimonidine (ALPHAGAN) 0.2 % ophthalmic solution Administer 1 drop to both eyes 2 (Two) Times a Day.      Continuous  Blood Gluc  (Dexcom G6 ) device REMOVED FOR SURGERY      Continuous Blood Gluc Sensor (Dexcom G6 Sensor) 1 each As Needed (glucose control). Every 10 days 9 each 3    Continuous Blood Gluc Transmit (Dexcom G6 Transmitter) misc 1 each Every 3 (Three) Months. 1 each 3    dicyclomine (BENTYL) 20 MG tablet Take 1 tablet by mouth Every 8 (Eight) Hours As Needed for Abdominal Cramping. 30 tablet 0    DULoxetine (CYMBALTA) 20 MG capsule Take 30 mg by mouth Daily.      empagliflozin (Jardiance) 25 MG tablet tablet Take 1 tablet by mouth Daily. 90 tablet 3    ezetimibe (ZETIA) 10 MG tablet TAKE 1 TABLET BY MOUTH EVERY DAY 90 tablet 3    famotidine (PEPCID) 20 MG tablet Take 1 tablet by mouth 2 (Two) Times a Day. 28 tablet 0    Glucagon (Baqsimi One Pack) 3 MG/DOSE powder 1 each into the nostril(s) as directed by provider As Needed (Hypoglycemia). Apply intranasal if hypoglycemia 2 each 11    Insulin Aspart (novoLOG) 100 UNIT/ML injection 150 units daily Name brand only 60 mL 11    Insulin Degludec (TRESIBA SC) Inject 40 Units under the skin into the appropriate area as directed. DIRECTED TO TAKE THIS BY PRIMARY CARE MD DUE TO INSULIN PUMP BEING EMPTY      levothyroxine (SYNTHROID, LEVOTHROID) 75 MCG tablet TAKE 1 TABLET BY MOUTH EVERY MORNING (BEFORE BREAKFAST).      lisinopril (PRINIVIL,ZESTRIL) 5 MG tablet TAKE 1 TABLET BY MOUTH EVERY DAY 90 tablet 3    Lyumjev 100 UNIT/ML injection 200 units daily through pump 150 mL 3    metoprolol succinate XL (TOPROL-XL) 50 MG 24 hr tablet Take 1.5 tablets by mouth Daily. 135 tablet 3    naloxone (NARCAN) 4 MG/0.1ML nasal spray Call 911. Don't prime. [place] in 1 nostril for overdose. Repeat in 2-3 minutes in other nostril if no or minimal breathing/responsiveness. 2 each 0    nitroglycerin (NITROSTAT) 0.4 MG SL tablet Place 1 tablet under the tongue Every 5 (Five) Minutes As Needed for Chest Pain. Take no more than 3 doses in 15 minutes. 25 tablet 1    ondansetron ODT  (ZOFRAN-ODT) 4 MG disintegrating tablet Place 1 tablet on the tongue Every 8 (Eight) Hours As Needed for Nausea or Vomiting. 30 tablet 1    Rivaroxaban (Xarelto) 2.5 MG tablet Take 1 tablet by mouth 2 (Two) Times a Day. 180 tablet 3    simethicone (MYLICON) 80 MG chewable tablet Chew 0.5 tablets Every 6 (Six) Hours As Needed for Flatulence (belching). 30 tablet 1    spironolactone (ALDACTONE) 25 MG tablet Take 1 tablet by mouth Daily. 90 tablet 3    vitamin D (ERGOCALCIFEROL) 1.25 MG (77485 UT) capsule capsule Take 1 capsule by mouth 1 (One) Time Per Week.       No current facility-administered medications for this visit.     Review of Systems   Constitutional:  Negative for chills and fever.   HENT:  Negative for congestion.    Respiratory:  Negative for shortness of breath.    Cardiovascular:  Negative for chest pain and leg swelling.   Gastrointestinal:  Negative for constipation, diarrhea, nausea and vomiting.   Musculoskeletal:  Negative for arthralgias and gait problem.   Skin:  Negative for wound.        Callus- thickened elongated toenails.   Neurological:  Positive for numbness. Negative for dizziness and weakness.   Psychiatric/Behavioral:  Negative for agitation, behavioral problems and confusion.        OBJECTIVE     Vitals:    11/07/24 1455   BP: 118/60   Pulse: 78   SpO2: 97%                 PHYSICAL EXAM  GEN:   Accompanied by wife.     Foot/Ankle Exam    GENERAL  Appearance:  appears stated age and obese  Orientation:  AAOx3  Affect:  appropriate  Gait:  unimpaired  Assistance:  independent  Right shoe gear: casual shoe  Left shoe gear: casual shoe    VASCULAR     Right Foot Vascularity   Dorsalis pedis:  1+  Posterior tibial:  1+  Skin temperature:  warm  Edema grading:  Trace  CFT:  4  Pedal hair growth:  Present  Varicosities:  none     Left Foot Vascularity   Dorsalis pedis:  1+  Posterior tibial:  1+  Skin temperature:  warm  Edema grading:  Trace  CFT:  4  Pedal hair growth:   Present  Varicosities:  none     NEUROLOGIC     Right Foot Neurologic   Light touch sensation: diminished  Vibratory sensation: diminished  Hot/Cold sensation: diminished  Protective Sensation using [place] Monofilament:   Sites intact: 4  Sites tested: 9     Left Foot Neurologic   Light touch sensation: diminished  Vibratory sensation: diminished  Hot/Cold sensation:  diminished  Protective Sensation using [place] Monofilament:   Sites intact: 5  Sites tested: 10    MUSCULOSKELETAL     Right Foot Musculoskeletal    Amputation   Toes amputated: first toe  Ecchymosis:  none  Tenderness:  none    Arch:  Normal     Left Foot Musculoskeletal   Ecchymosis:  none  Tenderness:  none  Arch:  Normal    MUSCLE STRENGTH     Right Foot Muscle Strength   Foot dorsiflexion:  5  Foot plantar flexion:  5  Foot inversion:  5  Foot eversion:  5     Left Foot Muscle Strength   Foot dorsiflexion:  5  Foot plantar flexion:  5  Foot inversion:  5  Foot eversion:  5    RANGE OF MOTION     Right Foot Range of Motion   Foot and ankle ROM within normal limits       Left Foot Range of Motion   Foot and ankle ROM within normal limits      DERMATOLOGIC      Right Foot Dermatologic   Skin  Right foot skin is intact.   Nails  2.  Positive for elongated, abnormal thickness and subungual debris.  3.  Positive for elongated, abnormal thickness and subungual debris.  4.  Positive for elongated, abnormal thickness and subungual debris.  5.  Positive for elongated, abnormal thickness and subungual debris.     Left Foot Dermatologic   Skin  Positive for corn and ulcer.   Nails  1.  Positive for elongated, abnormal thickness and subungual debris.  2.  Positive for elongated, abnormal thickness and subungual debris.  3.  Positive for elongated, abnormal thickness and subungual debris.  4.  Positive for elongated, abnormally thick and subungual debris.  5.  Positive for elongated, abnormally thick and subungual debris.    Image:        RADIOLOGY/NUCLEAR:      LABORATORY/CULTURE RESULTS:      PATHOLOGY RESULTS:       ASSESSMENT/PLAN     Diagnoses and all orders for this visit:    1. Thickened nails (Primary)    2. Pre-ulcerative calluses    3. Atherosclerosis of native arteries of the extremities with ulceration    4. Amputee, great toe, right    5. Anticoagulant long-term use    6. Diabetic polyneuropathy associated with type 1 diabetes mellitus          Comprehensive lower extremity examination and evaluation was performed.  Discussed findings and treatment plan including risks, benefits, and treatment options with patient in detail. Patient agreed with treatment plan.  After verbal consent obtained, nail(s) x9 debrided of length and thickness with nail nipper without incidence  Patient may maintain nails and calluses at home utilizing emery board or pumice stone between visits as needed  Reviewed at home diabetic foot care including daily foot checks   Control and management of type I DM to be continued per PCP.  Patient did not want  callus to distal left great toe pared today's visit. Educated on SOI including fever, localized warmth, redness, pain, purulent drainage, malodor, etc., and to notify us if any of these symptoms present or area opens up into a wound.  Continue to recommend patient avoid self-care of toes.  Continue vascular surgery follow-ups.  Follow-up in 10 weeks for routine foot and nail care.     An After Visit Summary was printed and given to the patient at discharge, including (if requested) any available informative/educational handouts regarding diagnosis, treatment, or medications. All questions were answered to patient/family satisfaction. Should symptoms fail to improve or worsen they agree to call or return to clinic or to go to the Emergency Department. Discussed the importance of following up with any needed screening tests/labs/specialist appointments and any requested follow-up recommended by me today.  Importance of maintaining follow-up discussed and patient accepts that missed appointments can delay diagnosis and potentially lead to worsening of conditions.  Return in about 10 weeks (around 1/16/2025) for Follow-up with APRN, Follow-up in [facility] or sooner if acute issues arise.        This document has been electronically signed by ANDRES Naranjo on November 7, 2024 16:07 CST

## 2024-11-07 ENCOUNTER — OFFICE VISIT (OUTPATIENT)
Age: 59
End: 2024-11-07
Payer: COMMERCIAL

## 2024-11-07 VITALS
SYSTOLIC BLOOD PRESSURE: 118 MMHG | WEIGHT: 239 LBS | BODY MASS INDEX: 35.4 KG/M2 | DIASTOLIC BLOOD PRESSURE: 60 MMHG | HEIGHT: 69 IN | OXYGEN SATURATION: 97 % | HEART RATE: 78 BPM

## 2024-11-07 DIAGNOSIS — E10.42 DIABETIC POLYNEUROPATHY ASSOCIATED WITH TYPE 1 DIABETES MELLITUS: ICD-10-CM

## 2024-11-07 DIAGNOSIS — L84 PRE-ULCERATIVE CALLUSES: ICD-10-CM

## 2024-11-07 DIAGNOSIS — Z79.01 ANTICOAGULANT LONG-TERM USE: ICD-10-CM

## 2024-11-07 DIAGNOSIS — Z89.411 AMPUTEE, GREAT TOE, RIGHT: ICD-10-CM

## 2024-11-07 DIAGNOSIS — I70.25 ATHEROSCLEROSIS OF NATIVE ARTERIES OF THE EXTREMITIES WITH ULCERATION: ICD-10-CM

## 2024-11-07 DIAGNOSIS — L60.2 THICKENED NAILS: Primary | ICD-10-CM

## 2024-11-13 ENCOUNTER — TELEPHONE (OUTPATIENT)
Dept: VASCULAR SURGERY | Facility: CLINIC | Age: 59
End: 2024-11-13
Payer: COMMERCIAL

## 2024-11-13 NOTE — TELEPHONE ENCOUNTER
Call placed with no answer, voicemail left reminding of testing and appointment beginning at 1030 on 11/14/2024

## 2024-11-14 ENCOUNTER — OFFICE VISIT (OUTPATIENT)
Dept: VASCULAR SURGERY | Facility: CLINIC | Age: 59
End: 2024-11-14
Payer: COMMERCIAL

## 2024-11-14 ENCOUNTER — HOSPITAL ENCOUNTER (OUTPATIENT)
Dept: ULTRASOUND IMAGING | Facility: HOSPITAL | Age: 59
Discharge: HOME | End: 2024-11-14
Payer: COMMERCIAL

## 2024-11-14 VITALS
BODY MASS INDEX: 35.4 KG/M2 | RESPIRATION RATE: 18 BRPM | SYSTOLIC BLOOD PRESSURE: 120 MMHG | WEIGHT: 239 LBS | DIASTOLIC BLOOD PRESSURE: 72 MMHG | HEART RATE: 74 BPM | HEIGHT: 69 IN

## 2024-11-14 DIAGNOSIS — I73.9 PAD (PERIPHERAL ARTERY DISEASE): Primary | ICD-10-CM

## 2024-11-14 DIAGNOSIS — I73.9 PAD (PERIPHERAL ARTERY DISEASE): ICD-10-CM

## 2024-11-14 DIAGNOSIS — I65.23 BILATERAL CAROTID ARTERY STENOSIS: ICD-10-CM

## 2024-11-14 DIAGNOSIS — I10 PRIMARY HYPERTENSION: ICD-10-CM

## 2024-11-14 DIAGNOSIS — E78.2 MIXED HYPERLIPIDEMIA: ICD-10-CM

## 2024-11-14 PROCEDURE — 93880 EXTRACRANIAL BILAT STUDY: CPT

## 2024-11-14 PROCEDURE — 93923 UPR/LXTR ART STDY 3+ LVLS: CPT

## 2024-11-14 NOTE — LETTER
"November 14, 2024     Rene Hogue DO  [address]  [facility] [address]    Patient: Luis Echeverria   YOB: 1965   Date of Visit: 11/14/2024     Dear Rene Hogue DO:       Thank you for referring Luis Echeverria to me for evaluation. Below are the relevant portions of my assessment and plan of care.    If you have questions, please do not hesitate to call me. I look forward to following Luis along with you.         Sincerely,        ANDRES Shetty        CC: No Recipients    Shea Jacob APRN  11/14/24 1433  Sign when Signing Visit  11/14/2024       Rene Hogue DO  [address] [facility] [address]        Luis Echeverria  1965    Chief Complaint   Patient presents with   • Follow-up     Atherosclerosis of native arteries of the extremities        Dear Rene Hogue DO:    HPI     I had the pleasure of seeing your patient in the office today for follow up.  As you recall, the patient is a 59 y.o. male who was previously followed by Dr. Wild.  Currently he is doing well and denies any claudication to his lower extremities.  He denies any further wounds.  He has history of previous amputation of the right hallux.  He is maintained on Xarelto 2.5 mg twice daily, aspirin, Zetia, and Lipitor.  He did have noninvasive testing performed today, which I did review in office.      Review of Systems   Constitutional: Negative.    HENT: Negative.     Eyes: Negative.    Respiratory: Negative.     Cardiovascular: Negative.    Gastrointestinal: Negative.    Endocrine: Negative.    Genitourinary: Negative.    Musculoskeletal: Negative.    Skin: Negative.    Allergic/Immunologic: Negative.    Neurological: Negative.    Hematological: Negative.    Psychiatric/Behavioral: Negative.     All other systems reviewed and are negative.         /72 (BP Location: Left arm, Patient Position: Sitting)   Pulse 74   Resp 18   Ht 175.3 cm (69\")   Wt 108 kg (239 " lb)   BMI 35.29 kg/m²   Physical Exam  Vitals and nursing note reviewed.   Constitutional:       General: He is not in acute distress.     Appearance: Normal appearance. He is well-developed. He is obese. He is not diaphoretic.   HENT:      Head: Normocephalic and atraumatic.   Neck:      Vascular: No carotid bruit or JVD.   Cardiovascular:      Rate and Rhythm: Normal rate and regular rhythm.      Pulses:           Femoral pulses are 2+ on the right side and 2+ on the left side.       Dorsalis pedis pulses are detected w/ Doppler on the right side and detected w/ Doppler on the left side.        Posterior tibial pulses are detected w/ Doppler on the right side and detected w/ Doppler on the left side.      Heart sounds: Normal heart sounds, S1 normal and S2 normal. No murmur heard.     No friction rub. No gallop.   Pulmonary:      Effort: Pulmonary effort is normal.      Breath sounds: Normal breath sounds.   Abdominal:      General: Bowel sounds are normal. There is no abdominal bruit.      Palpations: Abdomen is soft.      Tenderness: There is no abdominal tenderness.   Musculoskeletal:         General: Normal range of motion.   Skin:     General: Skin is warm and dry.   Neurological:      Mental Status: He is alert and oriented to person, place, and time.      Cranial Nerves: No cranial nerve deficit.   Psychiatric:         Behavior: Behavior normal.         Thought Content: Thought content normal.         Judgment: Judgment normal.       Diagnostic data:  Noninvasive testing including ABIs show no arterial insufficiency to bilateral lower extremities.  Carotid duplex shows less than 50% carotid stenosis bilaterally.      Patient Active Problem List   Diagnosis   • CAD (coronary artery disease)   • Hypertension   • Hyperlipidemia   • MI (myocardial infarction)   • Diabetes mellitus   • Carotid stent occlusion   • Unstable angina   • Ischemic cardiomyopathy   • RUQ pain   • Dehydration   • Nausea and vomiting    • Acute kidney injury   • Hypotension   • Gallbladder polyp   • BMI 31.0-31.9,adult   • S/P implantation of automatic cardioverter/defibrillator (AICD)   • Chronic systolic CHF (congestive heart failure), NYHA class 2   • Morbidly obese   • DKA (diabetic ketoacidoses)   • Pneumonia due to COVID-19 virus   • Noncompliance   • Cytokine release syndrome, grade 1   • Skin ulcer of toe of right foot, limited to breakdown of skin   • Atherosclerosis of native arteries of the extremities with ulceration   • Diabetic polyneuropathy associated with type 1 diabetes mellitus   • Stage 3a chronic kidney disease   • Diabetic nephropathy associated with type 1 diabetes mellitus   • Vitamin D deficiency   • Muscle cramps   • Acute cholecystitis   • S/P laparoscopic cholecystectomy         ICD-10-CM ICD-9-CM   1. PAD (peripheral artery disease)  I73.9 443.9   2. Bilateral carotid artery stenosis  I65.23 433.10     433.30   3. Primary hypertension  I10 401.9   4. Mixed hyperlipidemia  E78.2 272.2           PLAN: After thoroughly evaluating Luis Echeverria, I believe the best course of action is to remain conservative from vascular surgery standpoint.  Currently he is doing well and denies any claudication or strokelike symptoms.  He does continue to follow with podiatry.  He reports he had a wound to his foot that has scabbed over but is not open.  I did review his testing which shows no arterial insufficiency to his lower extremities.  Carotid duplex shows less than 50% carotid stenosis bilaterally.  I did discuss vascular risk factors as they pertain to the progression of vascular disease including controlling his hypertension, hyperlipidemia, and diabetes.  His blood pressure is stable on his current medications.  He should continue his aspirin 81 mg daily, Xarelto 2.5 mg twice daily and Lipitor 80 mg daily in addition to his other medications.  We will see him back in 1 year with repeat noninvasive testing for continued  surveillance, including ABIs and a carotid duplex.  His last hemoglobin A1c was uncontrolled at 8.3%.  Body mass index is 35.29 kg/m².    This was all discussed in full with complete understanding.  Thank you for allowing me to participate in the care of your patient.  Please do not hesitate to call with any questions or concerns.  We will keep you aware of any further encounters with Luis Echeverria.      Sincerely Yours,      ANDRES Shetty

## 2024-11-14 NOTE — PROGRESS NOTES
"11/14/2024       Rene Hogue,   [address]  [place] [address]        Luis Echeverria  1965    Chief Complaint   Patient presents with    Follow-up     Atherosclerosis of native arteries of the extremities        Dear Rene Hogue DO:    HPI     I had the pleasure of seeing your patient in the office today for follow up.  As you recall, the patient is a 59 y.o. male who was previously followed by Dr. Wild.  Currently he is doing well and denies any claudication to his lower extremities.  He denies any further wounds.  He has history of previous amputation of the right hallux.  He is maintained on Xarelto 2.5 mg twice daily, aspirin, Zetia, and Lipitor.  He did have noninvasive testing performed today, which I did review in office.      Review of Systems   Constitutional: Negative.    HENT: Negative.     Eyes: Negative.    Respiratory: Negative.     Cardiovascular: Negative.    Gastrointestinal: Negative.    Endocrine: Negative.    Genitourinary: Negative.    Musculoskeletal: Negative.    Skin: Negative.    Allergic/Immunologic: Negative.    Neurological: Negative.    Hematological: Negative.    Psychiatric/Behavioral: Negative.     All other systems reviewed and are negative.         /72 (BP Location: Left arm, Patient Position: Sitting)   Pulse 74   Resp 18   Ht 175.3 cm (69\")   Wt 108 kg (239 lb)   BMI 35.29 kg/m²   Physical Exam  Vitals and nursing note reviewed.   Constitutional:       General: He is not in acute distress.     Appearance: Normal appearance. He is well-developed. He is obese. He is not diaphoretic.   HENT:      Head: Normocephalic and atraumatic.   Neck:      Vascular: No carotid bruit or JVD.   Cardiovascular:      Rate and Rhythm: Normal rate and regular rhythm.      Pulses:           Femoral pulses are 2+ on the right side and 2+ on the left side.       Dorsalis pedis pulses are detected w/ Doppler on the right side and detected w/ Doppler on the " left side.        Posterior tibial pulses are detected w/ Doppler on the right side and detected w/ Doppler on the left side.      Heart sounds: Normal heart sounds, S1 normal and S2 normal. No murmur heard.     No friction rub. No gallop.   Pulmonary:      Effort: Pulmonary effort is normal.      Breath sounds: Normal breath sounds.   Abdominal:      General: Bowel sounds are normal. There is no abdominal bruit.      Palpations: Abdomen is soft.      Tenderness: There is no abdominal tenderness.   Musculoskeletal:         General: Normal range of motion.   Skin:     General: Skin is warm and dry.   Neurological:      Mental Status: He is alert and oriented to person, place, and time.      Cranial Nerves: No cranial nerve deficit.   Psychiatric:         Behavior: Behavior normal.         Thought Content: Thought content normal.         Judgment: Judgment normal.       Diagnostic data:  Noninvasive testing including ABIs show no arterial insufficiency to bilateral lower extremities.  Carotid duplex shows less than 50% carotid stenosis bilaterally.      Patient Active Problem List   Diagnosis    CAD (coronary artery disease)    Hypertension    Hyperlipidemia    MI (myocardial infarction)    Diabetes mellitus    Carotid stent occlusion    Unstable angina    Ischemic cardiomyopathy    RUQ pain    Dehydration    Nausea and vomiting    Acute kidney injury    Hypotension    Gallbladder polyp    BMI 31.0-31.9,adult    S/P implantation of automatic cardioverter/defibrillator (AICD)    Chronic systolic CHF (congestive heart failure), NYHA class 2    Morbidly obese    DKA (diabetic ketoacidoses)    Pneumonia due to COVID-19 virus    Noncompliance    Cytokine release syndrome, grade 1    Skin ulcer of toe of right foot, limited to breakdown of skin    Atherosclerosis of native arteries of the extremities with ulceration    Diabetic polyneuropathy associated with type 1 diabetes mellitus    Stage 3a chronic kidney disease     Diabetic nephropathy associated with type 1 diabetes mellitus    Vitamin D deficiency    Muscle cramps    Acute cholecystitis    S/P laparoscopic cholecystectomy         ICD-10-CM ICD-9-CM   1. PAD (peripheral artery disease)  I73.9 443.9   2. Bilateral carotid artery stenosis  I65.23 433.10     433.30   3. Primary hypertension  I10 401.9   4. Mixed hyperlipidemia  E78.2 272.2           PLAN: After thoroughly evaluating Luis Echeverria, I believe the best course of action is to remain conservative from vascular surgery standpoint.  Currently he is doing well and denies any claudication or strokelike symptoms.  He does continue to follow with podiatry.  He reports he had a wound to his foot that has scabbed over but is not open.  I did review his testing which shows no arterial insufficiency to his lower extremities.  Carotid duplex shows less than 50% carotid stenosis bilaterally.  I did discuss vascular risk factors as they pertain to the progression of vascular disease including controlling his hypertension, hyperlipidemia, and diabetes.  His blood pressure is stable on his current medications.  He should continue his aspirin 81 mg daily, Xarelto 2.5 mg twice daily and Lipitor 80 mg daily in addition to his other medications.  We will see him back in 1 year with repeat noninvasive testing for continued surveillance, including ABIs and a carotid duplex.  His last hemoglobin A1c was uncontrolled at 8.3%.  Body mass index is 35.29 kg/m².    This was all discussed in full with complete understanding.  Thank you for allowing me to participate in the care of your patient.  Please do not hesitate to call with any questions or concerns.  We will keep you aware of any further encounters with Luis Echeverria.      Sincerely Yours,      ANDRES Shetty

## 2024-11-21 RX ORDER — ATORVASTATIN CALCIUM 80 MG/1
80 TABLET, FILM COATED ORAL
Qty: 90 TABLET | Refills: 3 | Status: SHIPPED | OUTPATIENT
Start: 2024-11-21

## 2024-11-21 NOTE — TELEPHONE ENCOUNTER
Caller: Anali Echeverria    Relationship: Emergency Contact    Best call back number: [phone]    Requested Prescriptions:   Requested Prescriptions     Pending Prescriptions Disp Refills    atorvastatin (LIPITOR) 80 MG tablet 90 tablet 3     Sig: Take 1 tablet by mouth every night at bedtime.        Pharmacy where request should be sent: [facility]ACY #[id number] - [place], [place] - [address] AT ACROSS FROM [facility] [phone] [facility] [phone]      Last office visit with prescribing clinician: 7/3/2024   Last telemedicine visit with prescribing clinician: Visit date not found   Next office visit with prescribing clinician: 1/2/2025     Additional details provided by patient: WANTS 90 DAY SUPPLY     Does the patient have less than a 3 day supply:  [x] Yes  [] No    Would you like a call back once the refill request has been completed: [] Yes [x] No    If the office needs to give you a call back, can they leave a voicemail: [] Yes [x] No    Ab Galloway Rep   11/21/24 10:39 CST

## 2024-12-02 NOTE — ANESTHESIA PROCEDURE NOTES
Airway  Urgency: elective    Date/Time: 5/3/2023 10:38 AM  Airway not difficult    General Information and Staff    Patient location during procedure: OR  CRNA/CAA: Genesis Albrecht CRNA    Indications and Patient Condition  Indications for airway management: airway protection    Preoxygenated: yes  Mask difficulty assessment: 1 - vent by mask    Final Airway Details  Final airway type: supraglottic airway      Successful airway: I-gel  Size 5     Number of attempts at approach: 1  Assessment: lips, teeth, and gum same as pre-op          
Statement Selected

## 2024-12-09 DIAGNOSIS — E11.59 TYPE 2 DIABETES MELLITUS WITH OTHER CIRCULATORY COMPLICATION, WITH LONG-TERM CURRENT USE OF INSULIN: ICD-10-CM

## 2024-12-09 DIAGNOSIS — I25.10 CORONARY ARTERY DISEASE INVOLVING NATIVE CORONARY ARTERY OF NATIVE HEART WITHOUT ANGINA PECTORIS: ICD-10-CM

## 2024-12-09 DIAGNOSIS — Z79.4 TYPE 2 DIABETES MELLITUS WITH OTHER CIRCULATORY COMPLICATION, WITH LONG-TERM CURRENT USE OF INSULIN: ICD-10-CM

## 2024-12-09 RX ORDER — METOPROLOL SUCCINATE 50 MG/1
75 TABLET, EXTENDED RELEASE ORAL DAILY
Qty: 135 TABLET | Refills: 3 | Status: SHIPPED | OUTPATIENT
Start: 2024-12-09

## 2025-01-07 NOTE — PROGRESS NOTES
[facility] - PODIATRY    Today's Date: 01/16/2025     Patient Name: Luis Echeverria  MRN: [id number]  CSN: [id number]  PCP: Rene Hogue DO  Referring Provider: No ref. provider found    SUBJECTIVE     Chief Complaint   Patient presents with    Follow-up     Rene Hogue DO 12/06/24  [facility]   Pt states he is here today for diabetic foot/nail care. Pt denies pain.     Diabetes     142 Mg/dLbg      HPI: Luis Echeverria, a 59 y.o.male, comes to clinic as a(n) established patient presenting for diabetic foot exam and complaining of toenail/callus issues. Patient has h/o CAD, CHF, DM 2, HLD, MI, PVD . Patient is IDDM with last stated BG level of 142mg/dl.  Unsure of last A1c.  Today relates toenails are elongated, thick, and discolored. Continues to relay numbness in his feet.  Continues to note callused area to left greater toe-rates it has remained unchanged.  Denies open wound, drainage or other signs of infection associated with area today.  Denies pain currently.  No new issues or complaints.  Relates previous treatment(s) including foot care by podiatry . Denies any constitutional symptoms. No other pedal complaints at this time.    Past Medical History:   Diagnosis Date    CAD (coronary artery disease)     CAD in native artery     2V STEMI 7/13 STENT TO CX AND STENTX2 TO MID AND DISTAL LAD    Chronic systolic CHF (congestive heart failure), NYHA class 2 02/04/2020    Diabetes mellitus     Disease of thyroid gland     GERD (gastroesophageal reflux disease)     History of coronary artery stent placement      x 3 - for ACUTE MI 7/2013    Hyperlipidemia     Hyperlipidemia, mild     Hypertension     Myocardial infarct     Myocardial infarction     Nausea and vomiting 12/10/2018    Stented coronary artery      Past Surgical History:   Procedure Laterality Date    AMPUTATION DIGIT Right 05/03/2023    Procedure: Partial vs Complete Amputation of Hallux -  Right Foot;  Surgeon: Simone Nieves DPM;  Location:  [facility] OR;  Service: Podiatry;  Laterality: Right;    AORTAGRAM Right 12/23/2022    Procedure: AORTAGRAM, RIGHT LOWER EXTREMITY ANGIOGRAM, BALLOON ANGIOPLASTY, MYNX CLOSURE;  Surgeon: Sammy Wild MD;  Location:  [facility] HYBRID OR 12;  Service: Vascular;  Laterality: Right;    CARDIAC CATHETERIZATION N/A 11/06/2018    Procedure: Left Heart Cath;  Surgeon: Tru Rai MD;  Location:  [facility] CATH INVASIVE LOCATION;  Service: Cardiology    CARDIAC ELECTROPHYSIOLOGY PROCEDURE N/A 05/01/2019    Procedure: ICD new;  Surgeon: Tru Rai MD;  Location:  [facility] CATH INVASIVE LOCATION;  Service: Cardiology    CAROTID STENT      CHOLECYSTECTOMY N/A 09/15/2024    Procedure: CHOLECYSTECTOMY LAPAROSCOPIC WITH DAVINCI ROBOT;  Surgeon: Tyshawn Vang MD;  Location:  [facility] OR;  Service: General;  Laterality: N/A;    EYE SURGERY  12/03/2024     Family History   Problem Relation Age of Onset    Heart disease Maternal Grandmother     No Known Problems Mother     No Known Problems Father      Social History     Socioeconomic History    Marital status:    Tobacco Use    Smoking status: Never     Passive exposure: Never    Smokeless tobacco: Never   Vaping Use    Vaping status: Never Used   Substance and Sexual Activity    Alcohol use: Yes     Comment: 2 TIMES YEARLY    Drug use: No    Sexual activity: Defer     No Known Allergies  Current Outpatient Medications   Medication Sig Dispense Refill    albuterol sulfate  (90 Base) MCG/ACT inhaler Inhale 2 puffs Every 4 (Four) Hours As Needed for Wheezing or Shortness of Air. 8.5 g 5    aspirin 81 MG EC tablet Take 1 tablet by mouth Daily.      atorvastatin (LIPITOR) 80 MG tablet Take 1 tablet by mouth every night at bedtime. 90 tablet 3    brimonidine (ALPHAGAN) 0.2 % ophthalmic solution Administer 1 drop to both eyes 2 (Two) Times a Day.      Continuous Blood Gluc  (Dexcom G6  ) device REMOVED FOR SURGERY      Continuous Blood Gluc Sensor (Dexcom G6 Sensor) 1 each As Needed (glucose control). Every 10 days 9 each 3    Continuous Blood Gluc Transmit (Dexcom G6 Transmitter) misc 1 each Every 3 (Three) Months. 1 each 3    dicyclomine (BENTYL) 20 MG tablet Take 1 tablet by mouth Every 8 (Eight) Hours As Needed for Abdominal Cramping. 30 tablet 0    DULoxetine (CYMBALTA) 20 MG capsule Take 30 mg by mouth Daily.      empagliflozin (Jardiance) 25 MG tablet tablet Take 1 tablet by mouth Daily. 90 tablet 3    ezetimibe (ZETIA) 10 MG tablet TAKE 1 TABLET BY MOUTH EVERY DAY 90 tablet 3    famotidine (PEPCID) 20 MG tablet Take 1 tablet by mouth 2 (Two) Times a Day. 28 tablet 0    Glucagon (Baqsimi One Pack) 3 MG/DOSE powder 1 each into the nostril(s) as directed by provider As Needed (Hypoglycemia). Apply intranasal if hypoglycemia 2 each 11    Insulin Aspart (novoLOG) 100 UNIT/ML injection 150 units daily Name brand only 60 mL 11    Insulin Degludec (TRESIBA SC) Inject 40 Units under the skin into the appropriate area as directed. DIRECTED TO TAKE THIS BY PRIMARY CARE MD DUE TO INSULIN PUMP BEING EMPTY      levothyroxine (SYNTHROID, LEVOTHROID) 75 MCG tablet TAKE 1 TABLET BY MOUTH EVERY MORNING (BEFORE BREAKFAST).      lisinopril (PRINIVIL,ZESTRIL) 5 MG tablet TAKE 1 TABLET BY MOUTH EVERY DAY 90 tablet 3    Lyumjev 100 UNIT/ML injection 200 units daily through pump 150 mL 3    metoprolol succinate XL (TOPROL-XL) 50 MG 24 hr tablet Take 1.5 tablets by mouth Daily. 135 tablet 3    naloxone (NARCAN) 4 MG/0.1ML nasal spray Call 911. Don't prime. [place] in 1 nostril for overdose. Repeat in 2-3 minutes in other nostril if no or minimal breathing/responsiveness. 2 each 0    nitroglycerin (NITROSTAT) 0.4 MG SL tablet Place 1 tablet under the tongue Every 5 (Five) Minutes As Needed for Chest Pain. Take no more than 3 doses in 15 minutes. 25 tablet 1    ondansetron ODT (ZOFRAN-ODT) 4 MG disintegrating  tablet Place 1 tablet on the tongue Every 8 (Eight) Hours As Needed for Nausea or Vomiting. 30 tablet 1    Rivaroxaban (Xarelto) 2.5 MG tablet Take 1 tablet by mouth 2 (Two) Times a Day. 180 tablet 3    simethicone (MYLICON) 80 MG chewable tablet Chew 0.5 tablets Every 6 (Six) Hours As Needed for Flatulence (belching). 30 tablet 1    spironolactone (ALDACTONE) 25 MG tablet Take 1 tablet by mouth Daily. 90 tablet 3    vitamin D (ERGOCALCIFEROL) 1.25 MG (07990 UT) capsule capsule Take 1 capsule by mouth 1 (One) Time Per Week.       No current facility-administered medications for this visit.     Review of Systems   Constitutional:  Negative for chills and fever.   HENT:  Negative for congestion.    Respiratory:  Negative for shortness of breath.    Cardiovascular:  Negative for chest pain and leg swelling.   Gastrointestinal:  Negative for constipation, diarrhea, nausea and vomiting.   Musculoskeletal:  Negative for arthralgias and gait problem.   Skin:  Negative for wound.        Callus- thickened elongated toenails.   Neurological:  Positive for numbness. Negative for dizziness and weakness.   Psychiatric/Behavioral:  Negative for agitation, behavioral problems and confusion.        OBJECTIVE     Vitals:    01/16/25 1514   BP: 110/76   Pulse: 70   SpO2: 99%                   PHYSICAL EXAM  GEN:   Accompanied by none.  Foot/Ankle Exam    GENERAL  Appearance:  appears stated age and obese  Orientation:  AAOx3  Affect:  appropriate  Gait:  unimpaired  Assistance:  independent  Right shoe gear: casual shoe  Left shoe gear: casual shoe    VASCULAR     Right Foot Vascularity   Dorsalis pedis:  1+  Posterior tibial:  1+  Skin temperature:  warm  Edema grading:  Trace  CFT:  4  Pedal hair growth:  Present  Varicosities:  none     Left Foot Vascularity   Dorsalis pedis:  1+  Posterior tibial:  1+  Skin temperature:  warm  Edema grading:  Trace  CFT:  4  Pedal hair growth:  Present  Varicosities:  none     NEUROLOGIC      Right Foot Neurologic   Light touch sensation: diminished  Vibratory sensation: diminished  Hot/Cold sensation: diminished  Protective Sensation using [place] Monofilament:   Sites intact: 4  Sites tested: 9     Left Foot Neurologic   Light touch sensation: diminished  Vibratory sensation: diminished  Hot/Cold sensation:  diminished  Protective Sensation using [place] Monofilament:   Sites intact: 5  Sites tested: 10    MUSCULOSKELETAL     Right Foot Musculoskeletal    Amputation   Toes amputated: first toe  Ecchymosis:  none  Tenderness:  none    Arch:  Normal     Left Foot Musculoskeletal   Ecchymosis:  none  Tenderness:  none  Arch:  Normal    MUSCLE STRENGTH     Right Foot Muscle Strength   Foot dorsiflexion:  5  Foot plantar flexion:  5  Foot inversion:  5  Foot eversion:  5     Left Foot Muscle Strength   Foot dorsiflexion:  5  Foot plantar flexion:  5  Foot inversion:  5  Foot eversion:  5    RANGE OF MOTION     Right Foot Range of Motion   Foot and ankle ROM within normal limits       Left Foot Range of Motion   Foot and ankle ROM within normal limits      DERMATOLOGIC      Right Foot Dermatologic   Skin  Right foot skin is intact.   Nails  2.  Positive for elongated, abnormal thickness and subungual debris.  3.  Positive for elongated, abnormal thickness and subungual debris.  4.  Positive for elongated, abnormal thickness and subungual debris.  5.  Positive for elongated, abnormal thickness and subungual debris.     Left Foot Dermatologic   Skin  Positive for corn and ulcer.   Nails  1.  Positive for elongated, abnormal thickness and subungual debris.  2.  Positive for elongated, abnormal thickness and subungual debris.  3.  Positive for elongated, abnormal thickness and subungual debris.  4.  Positive for elongated, abnormally thick and subungual debris.  5.  Positive for elongated, abnormally thick and subungual debris.    Image:       RADIOLOGY/NUCLEAR:      LABORATORY/CULTURE  RESULTS:      PATHOLOGY RESULTS:       ASSESSMENT/PLAN     Diagnoses and all orders for this visit:    1. Diabetic polyneuropathy associated with type 1 diabetes mellitus (Primary)    2. Anticoagulant long-term use    3. Amputee, great toe, right    4. Pre-ulcerative calluses    5. Thickened nails            Comprehensive lower extremity examination and evaluation was performed.  Discussed findings and treatment plan including risks, benefits, and treatment options with patient in detail. Patient agreed with treatment plan.  After verbal consent obtained, nail(s) x9 debrided of length and thickness with nail nipper without incidence  Patient may maintain nails and calluses at home utilizing emery board or pumice stone between visits as needed  Reviewed at home diabetic foot care including daily foot checks   Control and management of type I DM to be continued per PCP.  Area of previous wound and callus to left greater toe remains unchanged from previous visit.  There are no open areas, redness, drainage, warmth or other active SOI present today.  Educated on SOI including fever, localized warmth, redness, pain, purulent drainage, malodor, etc., and to notify us if any of these symptoms present or area opens up into a wound.  Continue to recommend patient avoid self-care of toes.  Continue vascular surgery follow-ups.  Follow-up in 3 months for routine foot and nail care.     An After Visit Summary was printed and given to the patient at discharge, including (if requested) any available informative/educational handouts regarding diagnosis, treatment, or medications. All questions were answered to patient/family satisfaction. Should symptoms fail to improve or worsen they agree to call or return to clinic or to go to the Emergency Department. Discussed the importance of following up with any needed screening tests/labs/specialist appointments and any requested follow-up recommended by me today. Importance of  maintaining follow-up discussed and patient accepts that missed appointments can delay diagnosis and potentially lead to worsening of conditions.  Return in about 3 months (around 4/16/2025) for Follow-up with APRN, Follow-up in [facility]., or sooner if acute issues arise.    I spent 10 minutes caring for Luis on this date of service. This time includes time spent by me in the following activities: preparing for the visit, performing a medically appropriate examination and/or evaluation, counseling and educating the patient/family/caregiver, and documenting information in the medical record  Spent an additional 5 minutes caring for Luis on this date of service.  This time includes time to me in the following activities: Debridement of toenails.      This document has been electronically signed by ANDRES Naranjo on January 16, 2025 15:58 CST

## 2025-01-16 ENCOUNTER — OFFICE VISIT (OUTPATIENT)
Age: 60
End: 2025-01-16
Payer: COMMERCIAL

## 2025-01-16 VITALS
BODY MASS INDEX: 36.89 KG/M2 | WEIGHT: 249.1 LBS | OXYGEN SATURATION: 99 % | DIASTOLIC BLOOD PRESSURE: 76 MMHG | SYSTOLIC BLOOD PRESSURE: 110 MMHG | HEART RATE: 70 BPM | HEIGHT: 69 IN

## 2025-01-16 DIAGNOSIS — Z89.411 AMPUTEE, GREAT TOE, RIGHT: ICD-10-CM

## 2025-01-16 DIAGNOSIS — L84 PRE-ULCERATIVE CALLUSES: ICD-10-CM

## 2025-01-16 DIAGNOSIS — Z79.01 ANTICOAGULANT LONG-TERM USE: ICD-10-CM

## 2025-01-16 DIAGNOSIS — L60.2 THICKENED NAILS: ICD-10-CM

## 2025-01-16 DIAGNOSIS — E10.42 DIABETIC POLYNEUROPATHY ASSOCIATED WITH TYPE 1 DIABETES MELLITUS: Primary | ICD-10-CM

## 2025-04-02 DIAGNOSIS — I50.22 CHRONIC SYSTOLIC CHF (CONGESTIVE HEART FAILURE), NYHA CLASS 2: ICD-10-CM

## 2025-04-02 RX ORDER — SPIRONOLACTONE 25 MG/1
25 TABLET ORAL DAILY
Qty: 90 TABLET | Refills: 3 | Status: SHIPPED | OUTPATIENT
Start: 2025-04-02

## 2025-04-02 NOTE — PROGRESS NOTES
[facility] - PODIATRY    Today's Date: 04/17/2025     Patient Name: Luis Echeverria  MRN: [id number]  CSN: [id number]  PCP: Rene Hogue DO  Referring Provider: No ref. provider found    SUBJECTIVE     Chief Complaint   Patient presents with    Follow-up     PCP: Rene Hogue DO  3 months (around 4/16/2025) for Follow-up with APRN, Follow-up in [facility].   Pt here for nail/foot care. Pt reports no pain.     HPI: Luis Echeverria, a 59 y.o.male, comes to clinic as a(n) established patient presenting for diabetic foot exam and complaining of toenail/callus issues. Patient has h/o CAD, CHF, DM 2, HLD, MI, PVD . Patient is IDDM and unsure of last BG level.  Unsure of last A1c.  Nails in need of care today due to thickness and length. Continues to note callused area to left greater toe-rates it has remained unchanged.  Denies open wound, drainage or other signs of infection.  Denies pain currently.    Relates previous treatment(s) including foot care by podiatry . Denies any constitutional symptoms. No other pedal complaints at this time.    Past Medical History:   Diagnosis Date    CAD (coronary artery disease)     CAD in native artery     2V STEMI 7/13 STENT TO CX AND STENTX2 TO MID AND DISTAL LAD    Chronic systolic CHF (congestive heart failure), NYHA class 2 02/04/2020    Diabetes mellitus     Disease of thyroid gland     GERD (gastroesophageal reflux disease)     History of coronary artery stent placement      x 3 - for ACUTE MI 7/2013    Hyperlipidemia     Hyperlipidemia, mild     Hypertension     Myocardial infarct     Myocardial infarction July 2013    Nausea and vomiting 12/10/2018    Stented coronary artery      Past Surgical History:   Procedure Laterality Date    AMPUTATION DIGIT Right 05/03/2023    Procedure: Partial vs Complete Amputation of Hallux - Right Foot;  Surgeon: Simone Nieves DPM;  Location: [facility] OR;  Service: Podiatry;  Laterality: Right;     AORTAGRAM Right 12/23/2022    Procedure: AORTAGRAM, RIGHT LOWER EXTREMITY ANGIOGRAM, BALLOON ANGIOPLASTY, MYNX CLOSURE;  Surgeon: Sammy Wild MD;  Location:  [facility] HYBRID OR 12;  Service: Vascular;  Laterality: Right;    CARDIAC CATHETERIZATION N/A 11/06/2018    Procedure: Left Heart Cath;  Surgeon: Tru Rai MD;  Location:  [facility] CATH INVASIVE LOCATION;  Service: Cardiology    CARDIAC ELECTROPHYSIOLOGY PROCEDURE N/A 05/01/2019    Procedure: ICD new;  Surgeon: Tru Rai MD;  Location:  [facility] CATH INVASIVE LOCATION;  Service: Cardiology    CAROTID STENT  7/2013 & 11/2018    CHOLECYSTECTOMY N/A 09/15/2024    Procedure: CHOLECYSTECTOMY LAPAROSCOPIC WITH DAVINCI ROBOT;  Surgeon: Tyshawn Vang MD;  Location:  [facility] OR;  Service: General;  Laterality: N/A;    EYE SURGERY  12/03/2024    INSERT / REPLACE / REMOVE PACEMAKER  5/2019     Family History   Problem Relation Age of Onset    Heart disease Maternal Grandmother     No Known Problems Mother     No Known Problems Father      Social History     Socioeconomic History    Marital status:    Tobacco Use    Smoking status: Never     Passive exposure: Never    Smokeless tobacco: Never   Vaping Use    Vaping status: Never Used   Substance and Sexual Activity    Alcohol use: Yes     Comment: 2 TIMES YEARLY    Drug use: No    Sexual activity: Defer     No Known Allergies  Current Outpatient Medications   Medication Sig Dispense Refill    albuterol sulfate  (90 Base) MCG/ACT inhaler Inhale 2 puffs Every 4 (Four) Hours As Needed for Wheezing or Shortness of Air. 8.5 g 5    aspirin 81 MG EC tablet Take 1 tablet by mouth Daily.      atorvastatin (LIPITOR) 80 MG tablet Take 1 tablet by mouth every night at bedtime. 90 tablet 3    brimonidine (ALPHAGAN) 0.2 % ophthalmic solution Administer 1 drop to both eyes 2 (Two) Times a Day.      Continuous Blood Gluc  (Dexcom G6 ) device REMOVED FOR SURGERY       Continuous Blood Gluc Sensor (Dexcom G6 Sensor) 1 each As Needed (glucose control). Every 10 days 9 each 3    Continuous Blood Gluc Transmit (Dexcom G6 Transmitter) misc 1 each Every 3 (Three) Months. 1 each 3    dicyclomine (BENTYL) 20 MG tablet Take 1 tablet by mouth Every 8 (Eight) Hours As Needed for Abdominal Cramping. 30 tablet 0    DULoxetine (CYMBALTA) 20 MG capsule Take 30 mg by mouth Daily.      empagliflozin (Jardiance) 25 MG tablet tablet Take 1 tablet by mouth Daily. 90 tablet 3    ezetimibe (ZETIA) 10 MG tablet TAKE 1 TABLET BY MOUTH EVERY DAY 90 tablet 3    famotidine (PEPCID) 20 MG tablet Take 1 tablet by mouth 2 (Two) Times a Day. 28 tablet 0    Glucagon (Baqsimi One Pack) 3 MG/DOSE powder 1 each into the nostril(s) as directed by provider As Needed (Hypoglycemia). Apply intranasal if hypoglycemia 2 each 11    Insulin Aspart (novoLOG) 100 UNIT/ML injection 150 units daily Name brand only 60 mL 11    Insulin Degludec (TRESIBA SC) Inject 40 Units under the skin into the appropriate area as directed. DIRECTED TO TAKE THIS BY PRIMARY CARE MD DUE TO INSULIN PUMP BEING EMPTY      levothyroxine (SYNTHROID, LEVOTHROID) 75 MCG tablet TAKE 1 TABLET BY MOUTH EVERY MORNING (BEFORE BREAKFAST).      lisinopril (PRINIVIL,ZESTRIL) 5 MG tablet TAKE 1 TABLET BY MOUTH EVERY DAY 90 tablet 3    Lyumjev 100 UNIT/ML injection 200 units daily through pump 150 mL 3    metoprolol succinate XL (TOPROL-XL) 50 MG 24 hr tablet Take 1.5 tablets by mouth Daily. 135 tablet 3    naloxone (NARCAN) 4 MG/0.1ML nasal spray Call 911. Don't prime. [place] in 1 nostril for overdose. Repeat in 2-3 minutes in other nostril if no or minimal breathing/responsiveness. 2 each 0    nitroglycerin (NITROSTAT) 0.4 MG SL tablet Place 1 tablet under the tongue Every 5 (Five) Minutes As Needed for Chest Pain. Take no more than 3 doses in 15 minutes. 25 tablet 1    ondansetron ODT (ZOFRAN-ODT) 4 MG disintegrating tablet Place 1 tablet on the tongue Every  8 (Eight) Hours As Needed for Nausea or Vomiting. 30 tablet 1    Rivaroxaban (Xarelto) 2.5 MG tablet Take 1 tablet by mouth 2 (Two) Times a Day. 180 tablet 3    simethicone (MYLICON) 80 MG chewable tablet Chew 0.5 tablets Every 6 (Six) Hours As Needed for Flatulence (belching). 30 tablet 1    spironolactone (ALDACTONE) 25 MG tablet Take 1 tablet by mouth Daily. 90 tablet 3    vitamin D (ERGOCALCIFEROL) 1.25 MG (10475 UT) capsule capsule Take 1 capsule by mouth 1 (One) Time Per Week.      Wegovy 1 MG/0.5ML solution auto-injector Inject 0.5 mL under the skin into the appropriate area as directed.       No current facility-administered medications for this visit.     Review of Systems   Constitutional:  Negative for chills and fever.   HENT:  Negative for congestion.    Respiratory:  Negative for shortness of breath.    Cardiovascular:  Negative for chest pain and leg swelling.   Gastrointestinal:  Negative for constipation, diarrhea, nausea and vomiting.   Musculoskeletal:  Negative for arthralgias and gait problem.   Skin:  Negative for wound.        Callus- thickened elongated toenails.   Neurological:  Positive for numbness. Negative for dizziness and weakness.   Psychiatric/Behavioral:  Negative for agitation, behavioral problems and confusion.        OBJECTIVE     Vitals:    04/17/25 1534   BP: 124/76   Pulse: 87   SpO2: 97%                     PHYSICAL EXAM  GEN:   Accompanied by none.  Foot/Ankle Exam    GENERAL  Appearance:  appears stated age and obese  Orientation:  AAOx3  Affect:  appropriate  Gait:  unimpaired  Assistance:  independent  Right shoe gear: casual shoe  Left shoe gear: casual shoe    VASCULAR     Right Foot Vascularity   Dorsalis pedis:  1+  Posterior tibial:  1+  Skin temperature:  warm  Edema grading:  Trace  CFT:  4  Pedal hair growth:  Present  Varicosities:  none     Left Foot Vascularity   Dorsalis pedis:  1+  Posterior tibial:  1+  Skin temperature:  warm  Edema grading:  Trace  CFT:   4  Pedal hair growth:  Present  Varicosities:  none     NEUROLOGIC     Right Foot Neurologic   Light touch sensation: diminished  Vibratory sensation: diminished  Hot/Cold sensation: diminished  Protective Sensation using [place] Monofilament:   Sites intact: 4  Sites tested: 9     Left Foot Neurologic   Light touch sensation: diminished  Vibratory sensation: diminished  Hot/Cold sensation:  diminished  Protective Sensation using [place] Monofilament:   Sites intact: 5  Sites tested: 10    MUSCULOSKELETAL     Right Foot Musculoskeletal    Amputation   Toes amputated: first toe  Ecchymosis:  none  Tenderness:  none    Arch:  Normal     Left Foot Musculoskeletal   Ecchymosis:  none  Tenderness:  none  Arch:  Normal    MUSCLE STRENGTH     Right Foot Muscle Strength   Foot dorsiflexion:  5  Foot plantar flexion:  5  Foot inversion:  5  Foot eversion:  5     Left Foot Muscle Strength   Foot dorsiflexion:  5  Foot plantar flexion:  5  Foot inversion:  5  Foot eversion:  5    RANGE OF MOTION     Right Foot Range of Motion   Foot and ankle ROM within normal limits       Left Foot Range of Motion   Foot and ankle ROM within normal limits      DERMATOLOGIC      Right Foot Dermatologic   Skin  Right foot skin is intact.   Nails  2.  Positive for elongated, abnormal thickness and subungual debris.  3.  Positive for elongated, abnormal thickness and subungual debris.  4.  Positive for elongated, abnormal thickness and subungual debris.  5.  Positive for elongated, abnormal thickness and subungual debris.     Left Foot Dermatologic   Skin  Positive for corn and ulcer.   Nails  1.  Positive for elongated, abnormal thickness and subungual debris.  2.  Positive for elongated, abnormal thickness and subungual debris.  3.  Positive for elongated, abnormal thickness and subungual debris.  4.  Positive for elongated, abnormally thick and subungual debris.  5.  Positive for elongated, abnormally thick and subungual  debris.    Image:       RADIOLOGY/NUCLEAR:      LABORATORY/CULTURE RESULTS:      PATHOLOGY RESULTS:       ASSESSMENT/PLAN     Diagnoses and all orders for this visit:    1. Thickened nails (Primary)    2. Diabetic polyneuropathy associated with type 1 diabetes mellitus    3. Encounter for diabetic foot exam    4. Anticoagulant long-term use    5. Amputee, great toe, right    6. Pre-ulcerative calluses              Comprehensive lower extremity examination and evaluation was performed.  Discussed findings and treatment plan including risks, benefits, and treatment options with patient in detail. Patient agreed with treatment plan.  After verbal consent obtained, nail(s) x9 debrided of length and thickness with nail nipper without incidence  Patient may maintain nails and calluses at home utilizing emery board or pumice stone between visits as needed  Reviewed at home diabetic foot care including daily foot checks   DFE performed today.  Reviewed PCPs most recent notes  Last A1c for review 8%.  Control and management of type I DM to be continued per PCP.  Area of previous wound and callus to left greater toe remains unchanged from previous visit.  There are no open areas, redness, drainage, warmth or other active SOI present today.  Educated on SOI including fever, localized warmth, redness, pain, purulent drainage, malodor, etc., and to notify us if any of these symptoms present or area opens up into a wound.  Continue to recommend patient avoid self-care of toes.  Continue vascular surgery follow-ups.  Follow-up in 3 months for routine foot and nail care.     An After Visit Summary was printed and given to the patient at discharge, including (if requested) any available informative/educational handouts regarding diagnosis, treatment, or medications. All questions were answered to patient/family satisfaction. Should symptoms fail to improve or worsen they agree to call or return to clinic or to go to the Emergency  Department. Discussed the importance of following up with any needed screening tests/labs/specialist appointments and any requested follow-up recommended by me today. Importance of maintaining follow-up discussed and patient accepts that missed appointments can delay diagnosis and potentially lead to worsening of conditions.  Return in about 3 months (around 7/17/2025) for Follow-up with ANDRES, Follow-up in [facility]., or sooner if acute issues arise.    I spent 10 minutes caring for Luis on this date of service. This time includes time spent by me in the following activities: preparing for the visit, performing a medically appropriate examination and/or evaluation, counseling and educating the patient/family/caregiver, and documenting information in the medical record  Spent an additional 5 minutes caring for Luis on this date of service.  This time includes time to me in the following activities: Debridement of toenails.      This document has been electronically signed by ANDRES Naranjo on April 17, 2025 16:05 CDT

## 2025-04-17 ENCOUNTER — OFFICE VISIT (OUTPATIENT)
Age: 60
End: 2025-04-17
Payer: COMMERCIAL

## 2025-04-17 VITALS
OXYGEN SATURATION: 97 % | BODY MASS INDEX: 36.88 KG/M2 | HEART RATE: 87 BPM | SYSTOLIC BLOOD PRESSURE: 124 MMHG | DIASTOLIC BLOOD PRESSURE: 76 MMHG | WEIGHT: 249 LBS | HEIGHT: 69 IN

## 2025-04-17 DIAGNOSIS — Z79.01 ANTICOAGULANT LONG-TERM USE: ICD-10-CM

## 2025-04-17 DIAGNOSIS — E11.9 ENCOUNTER FOR DIABETIC FOOT EXAM: ICD-10-CM

## 2025-04-17 DIAGNOSIS — L84 PRE-ULCERATIVE CALLUSES: ICD-10-CM

## 2025-04-17 DIAGNOSIS — Z89.411 AMPUTEE, GREAT TOE, RIGHT: ICD-10-CM

## 2025-04-17 DIAGNOSIS — E10.42 DIABETIC POLYNEUROPATHY ASSOCIATED WITH TYPE 1 DIABETES MELLITUS: ICD-10-CM

## 2025-04-17 DIAGNOSIS — L60.2 THICKENED NAILS: Primary | ICD-10-CM

## 2025-04-17 RX ORDER — SEMAGLUTIDE 1 MG/.5ML
1 INJECTION, SOLUTION SUBCUTANEOUS
COMMUNITY
Start: 2025-03-17 | End: 2025-09-14

## 2025-05-15 ENCOUNTER — OFFICE VISIT (OUTPATIENT)
Dept: CARDIOLOGY | Facility: CLINIC | Age: 60
End: 2025-05-15
Payer: COMMERCIAL

## 2025-05-15 VITALS
SYSTOLIC BLOOD PRESSURE: 130 MMHG | HEIGHT: 69 IN | DIASTOLIC BLOOD PRESSURE: 82 MMHG | HEART RATE: 91 BPM | WEIGHT: 224 LBS | BODY MASS INDEX: 33.18 KG/M2 | OXYGEN SATURATION: 99 %

## 2025-05-15 DIAGNOSIS — I25.10 CORONARY ARTERY DISEASE INVOLVING NATIVE CORONARY ARTERY OF NATIVE HEART WITHOUT ANGINA PECTORIS: Primary | ICD-10-CM

## 2025-05-15 DIAGNOSIS — I10 PRIMARY HYPERTENSION: ICD-10-CM

## 2025-05-15 DIAGNOSIS — I25.5 ISCHEMIC CARDIOMYOPATHY: ICD-10-CM

## 2025-05-15 DIAGNOSIS — E78.2 MIXED HYPERLIPIDEMIA: ICD-10-CM

## 2025-05-15 PROCEDURE — 93000 ELECTROCARDIOGRAM COMPLETE: CPT | Performed by: INTERNAL MEDICINE

## 2025-05-15 PROCEDURE — 99214 OFFICE O/P EST MOD 30 MIN: CPT | Performed by: INTERNAL MEDICINE

## 2025-05-15 RX ORDER — LISINOPRIL 5 MG/1
5 TABLET ORAL DAILY
Qty: 90 TABLET | Refills: 3 | Status: SHIPPED | OUTPATIENT
Start: 2025-05-15

## 2025-05-15 RX ORDER — EZETIMIBE 10 MG/1
10 TABLET ORAL DAILY
Qty: 90 TABLET | Refills: 3 | Status: SHIPPED | OUTPATIENT
Start: 2025-05-15

## 2025-05-15 NOTE — PROGRESS NOTES
Subjective:     Encounter Date:05/15/2025      Patient ID: Luis Echeverria is a 59 y.o. male with coronary artery disease (prior stent to the LAD, circumflex and right coronary artery), ischemic cardiomyopathy with an ICD in place, hypertension, hyperlipidemia, peripheral arterial disease, insulin requiring type 2 diabetes mellitus, chronic kidney disease, presenting today for follow-up.     Chief Complaint: Here for routine follow-up of CAD, cardiomyopathy    History of Present Illness    This patient presents today for routine follow-up.  He has a history of coronary artery disease with prior stent placement.  He denies having any chest pain at this time.  He also has an ischemic cardiomyopathy does have an ICD in place.  He has been losing some weight due to starting Wegovy.  He denies having significant shortness of breath or dyspnea with exertion.  He denies having orthopnea, PND or significant lower extremity edema.  He denies having palpitations, lightheadedness, dizziness or syncope.  He has not experienced any issues with his ICD.  His blood pressure has been well-controlled.  He denies having any side effects from his medications including no bleeding on low-dose Xarelto and aspirin.  Overall, he says that he is doing well and feeling well at this time.      Current Outpatient Medications:     albuterol sulfate  (90 Base) MCG/ACT inhaler, Inhale 2 puffs Every 4 (Four) Hours As Needed for Wheezing or Shortness of Air., Disp: 8.5 g, Rfl: 5    aspirin 81 MG EC tablet, Take 1 tablet by mouth Daily., Disp: , Rfl:     atorvastatin (LIPITOR) 80 MG tablet, Take 1 tablet by mouth every night at bedtime., Disp: 90 tablet, Rfl: 3    brimonidine (ALPHAGAN) 0.2 % ophthalmic solution, Administer 1 drop to both eyes 2 (Two) Times a Day., Disp: , Rfl:     Continuous Blood Gluc  (Dexcom G6 ) device, REMOVED FOR SURGERY, Disp: , Rfl:     Continuous Blood Gluc Sensor (Dexcom G6 Sensor), 1 each As  Needed (glucose control). Every 10 days, Disp: 9 each, Rfl: 3    Continuous Blood Gluc Transmit (Dexcom G6 Transmitter) misc, 1 each Every 3 (Three) Months., Disp: 1 each, Rfl: 3    dicyclomine (BENTYL) 20 MG tablet, Take 1 tablet by mouth Every 8 (Eight) Hours As Needed for Abdominal Cramping., Disp: 30 tablet, Rfl: 0    DULoxetine (CYMBALTA) 20 MG capsule, Take 30 mg by mouth Daily., Disp: , Rfl:     empagliflozin (Jardiance) 25 MG tablet tablet, Take 1 tablet by mouth Daily., Disp: 90 tablet, Rfl: 3    ezetimibe (ZETIA) 10 MG tablet, Take 1 tablet by mouth Daily., Disp: 90 tablet, Rfl: 3    famotidine (PEPCID) 20 MG tablet, Take 1 tablet by mouth 2 (Two) Times a Day., Disp: 28 tablet, Rfl: 0    Glucagon (Baqsimi One Pack) 3 MG/DOSE powder, 1 each into the nostril(s) as directed by provider As Needed (Hypoglycemia). Apply intranasal if hypoglycemia, Disp: 2 each, Rfl: 11    Insulin Aspart (novoLOG) 100 UNIT/ML injection, 150 units daily Name brand only, Disp: 60 mL, Rfl: 11    Insulin Degludec (TRESIBA SC), Inject 40 Units under the skin into the appropriate area as directed. DIRECTED TO TAKE THIS BY PRIMARY CARE MD DUE TO INSULIN PUMP BEING EMPTY, Disp: , Rfl:     levothyroxine (SYNTHROID, LEVOTHROID) 75 MCG tablet, TAKE 1 TABLET BY MOUTH EVERY MORNING (BEFORE BREAKFAST)., Disp: , Rfl:     lisinopril (PRINIVIL,ZESTRIL) 5 MG tablet, Take 1 tablet by mouth Daily., Disp: 90 tablet, Rfl: 3    Lyumjev 100 UNIT/ML injection, 200 units daily through pump, Disp: 150 mL, Rfl: 3    metoprolol succinate XL (TOPROL-XL) 50 MG 24 hr tablet, Take 1.5 tablets by mouth Daily., Disp: 135 tablet, Rfl: 3    naloxone (NARCAN) 4 MG/0.1ML nasal spray, Call 911. Don't prime. [place] in 1 nostril for overdose. Repeat in 2-3 minutes in other nostril if no or minimal breathing/responsiveness., Disp: 2 each, Rfl: 0    nitroglycerin (NITROSTAT) 0.4 MG SL tablet, Place 1 tablet under the tongue Every 5 (Five) Minutes As Needed for Chest Pain.  Take no more than 3 doses in 15 minutes., Disp: 25 tablet, Rfl: 1    ondansetron ODT (ZOFRAN-ODT) 4 MG disintegrating tablet, Place 1 tablet on the tongue Every 8 (Eight) Hours As Needed for Nausea or Vomiting., Disp: 30 tablet, Rfl: 1    Rivaroxaban (Xarelto) 2.5 MG tablet, Take 1 tablet by mouth 2 (Two) Times a Day., Disp: 180 tablet, Rfl: 3    simethicone (MYLICON) 80 MG chewable tablet, Chew 0.5 tablets Every 6 (Six) Hours As Needed for Flatulence (belching)., Disp: 30 tablet, Rfl: 1    spironolactone (ALDACTONE) 25 MG tablet, Take 1 tablet by mouth Daily., Disp: 90 tablet, Rfl: 3    vitamin D (ERGOCALCIFEROL) 1.25 MG (01833 UT) capsule capsule, Take 1 capsule by mouth 1 (One) Time Per Week., Disp: , Rfl:     Wegovy 1 MG/0.5ML solution auto-injector, Inject 0.5 mL under the skin into the appropriate area as directed., Disp: , Rfl:     No Known Allergies    Social History     Tobacco Use    Smoking status: Never     Passive exposure: Never    Smokeless tobacco: Never   Substance Use Topics    Alcohol use: Yes     Comment: 2 TIMES YEARLY     Review of Systems   Constitutional: Positive for weight loss. Negative for weight gain.   Cardiovascular:  Negative for chest pain, dyspnea on exertion, leg swelling, orthopnea, palpitations, paroxysmal nocturnal dyspnea and syncope.   Respiratory:  Negative for cough, shortness of breath and wheezing.    Hematologic/Lymphatic: Negative for bleeding problem. Does not bruise/bleed easily.   Gastrointestinal:  Negative for abdominal pain, hematemesis, hematochezia, melena, nausea and vomiting.   Neurological:  Negative for dizziness and light-headedness.       ECG 12 Lead    Date/Time: 5/15/2025 8:23 AM  Performed by: Ranjan Archer MD    Authorized by: Ranjan Archer MD  Comparison: compared with previous ECG from 9/14/2024  Similar to previous ECG  Rhythm: sinus rhythm  Rate: normal  BPM: 87  Conduction: right bundle branch block and 1st degree AV block  Q  "waves: III and aVF    QRS axis: normal  Other findings: non-specific ST-T wave changes    Clinical impression: abnormal EKG           Objective:     Vitals reviewed.   Constitutional:       General: Not in acute distress.     Appearance: Well-developed and not in distress. Obese.   HENT:      Head: Normocephalic and atraumatic.   Neck:      Vascular: No carotid bruit or JVD.   Pulmonary:      Effort: Pulmonary effort is normal.      Breath sounds: Normal breath sounds. No wheezing. No rhonchi. No rales.   Cardiovascular:      Normal rate. Regular rhythm.      Murmurs: There is no murmur.      No gallop.    Pulses:     Intact distal pulses.   Edema:     Peripheral edema absent.   Abdominal:      General: Bowel sounds are normal. There is no distension.      Palpations: Abdomen is soft.      Tenderness: There is no abdominal tenderness.   Skin:     General: Skin is warm and dry. There is no cyanosis.      Findings: No erythema or rash.   Neurological:      Mental Status: Alert. Mental status is at baseline.       /82   Pulse 91   Ht 175.3 cm (69\")   Wt 102 kg (224 lb)   SpO2 99%   BMI 33.08 kg/m²     Data/Lab Review:     Lab Results   Component Value Date    CHOL 147 08/31/2021    CHLPL 85 05/02/2025    TRIG 79 05/02/2025    HDL 30 (L) 05/02/2025    LDL 39 05/02/2025     I reviewed the patient's most recent device interrogation from 4/3/2025: Adequate battery reserve noted.  0% atrial fibrillation burden.  Approximately 1% atrial paced and right ventricular paced.  Measured values are all within the normal range.  No ventricular high rate episodes.  Overall, normal device function.    Results for orders placed during the hospital encounter of 08/29/21    Adult Transthoracic Echo Complete W/ Cont if Necessary Per Protocol    Interpretation Summary  · Left ventricular ejection fraction appears to be 36 - 40%. Left ventricular systolic function is moderately decreased.  · Left ventricular diastolic function is " consistent with (grade I) impaired relaxation.  · Normal right ventricular cavity size and systolic function noted.  · There is no significant (greater than mild) valvular dysfunction.        Assessment:          Diagnosis Plan   1. Coronary artery disease involving native coronary artery of native heart without angina pectoris  ECG 12 Lead      2. Ischemic cardiomyopathy        3. Primary hypertension  lisinopril (PRINIVIL,ZESTRIL) 5 MG tablet      4. Mixed hyperlipidemia  ezetimibe (ZETIA) 10 MG tablet           Plan:       1.  Coronary artery disease: The patient remains stable at this time.  He does not describe any anginal symptoms.  He remains on beta-blocker and high intensity statin therapies.  He is also on aspirin and low-dose also which was previously prescribed by Dr. Rai.  No changes recommended at this time and no testing as the patient is asymptomatic.     2.  Ischemic cardiomyopathy: The patient is not experiencing any symptoms of heart failure at this time.  He certainly shows no evidence of volume overload on his exam.  His ICD is appropriately functioning.  He is on appropriate guideline medical therapy for heart failure.  Continue Toprol-XL, lisinopril, spironolactone, Jardiance.     3.  Mixed hyperlipidemia: LDL is currently at goal, most recently 39.  Continue current therapies.    4.  Primary hypertension: Blood pressure is well-controlled.  The patient needs a refill on lisinopril today.  Otherwise, continue Toprol-XL, spironolactone     Will plan to see the patient again in 6 months unless otherwise needed sooner.

## 2025-07-03 LAB
MC_CV_MDC_IDC_RATE_1: 171
MC_CV_MDC_IDC_RATE_1: 214
MC_CV_MDC_IDC_SHOCK_MEASURED_IMPEDANCE: 77
MC_CV_MDC_IDC_THERAPIES: NORMAL
MC_CV_MDC_IDC_THERAPIES: NORMAL
MC_CV_MDC_IDC_ZONE_ID: 1
MC_CV_MDC_IDC_ZONE_ID: 2
MC_CV_MDC_IDC_ZONE_ID: 3
MDC_IDC_MSMT_BATTERY_REMAINING_LONGEVITY: 43 MO
MDC_IDC_MSMT_BATTERY_REMAINING_PERCENTAGE: 43 %
MDC_IDC_MSMT_BATTERY_RRT_TRIGGER: 2.59
MDC_IDC_MSMT_BATTERY_STATUS: NORMAL
MDC_IDC_MSMT_BATTERY_VOLTAGE: 2.93
MDC_IDC_MSMT_CAP_CHARGE_TIME: 8.1
MDC_IDC_MSMT_LEADCHNL_RA_DTM: NORMAL
MDC_IDC_MSMT_LEADCHNL_RA_IMPEDANCE_VALUE: 430
MDC_IDC_MSMT_LEADCHNL_RA_PACING_THRESHOLD_AMPLITUDE: 0.62
MDC_IDC_MSMT_LEADCHNL_RA_PACING_THRESHOLD_POLARITY: NORMAL
MDC_IDC_MSMT_LEADCHNL_RA_PACING_THRESHOLD_PULSEWIDTH: 0.5
MDC_IDC_MSMT_LEADCHNL_RA_SENSING_INTR_AMPL: 3.4
MDC_IDC_MSMT_LEADCHNL_RV_IMPEDANCE_VALUE: 430
MDC_IDC_MSMT_LEADCHNL_RV_PACING_THRESHOLD_POLARITY: NORMAL
MDC_IDC_MSMT_LEADCHNL_RV_SENSING_INTR_AMPL: 7.9
MDC_IDC_PG_IMPLANT_DTM: NORMAL
MDC_IDC_PG_MFG: NORMAL
MDC_IDC_PG_MODEL: NORMAL
MDC_IDC_PG_SERIAL: NORMAL
MDC_IDC_PG_TYPE: NORMAL
MDC_IDC_SESS_DTM: NORMAL
MDC_IDC_SESS_TYPE: NORMAL
MDC_IDC_SET_BRADY_AT_MODE_SWITCH_RATE: 180
MDC_IDC_SET_BRADY_LOWRATE: 60
MDC_IDC_SET_BRADY_MAX_SENSOR_RATE: 110
MDC_IDC_SET_BRADY_MAX_TRACKING_RATE: 110
MDC_IDC_SET_BRADY_MODE: NORMAL
MDC_IDC_SET_BRADY_PAV_DELAY: 250
MDC_IDC_SET_BRADY_SAV_DELAY: 250
MDC_IDC_SET_LEADCHNL_RA_PACING_AMPLITUDE: 2
MDC_IDC_SET_LEADCHNL_RA_PACING_POLARITY: NORMAL
MDC_IDC_SET_LEADCHNL_RA_PACING_PULSEWIDTH: 0.5
MDC_IDC_SET_LEADCHNL_RA_SENSING_POLARITY: NORMAL
MDC_IDC_SET_LEADCHNL_RA_SENSING_SENSITIVITY: 0.3
MDC_IDC_SET_LEADCHNL_RV_PACING_AMPLITUDE: 2
MDC_IDC_SET_LEADCHNL_RV_PACING_POLARITY: NORMAL
MDC_IDC_SET_LEADCHNL_RV_PACING_PULSEWIDTH: 0.5
MDC_IDC_SET_LEADCHNL_RV_SENSING_POLARITY: NORMAL
MDC_IDC_SET_LEADCHNL_RV_SENSING_SENSITIVITY: 0.5
MDC_IDC_SET_ZONE_STATUS: NORMAL
MDC_IDC_SET_ZONE_TYPE: NORMAL
MDC_IDC_STAT_AT_BURDEN_PERCENT: 0
MDC_IDC_STAT_BRADY_RA_PERCENT_PACED: 1
MDC_IDC_STAT_BRADY_RV_PERCENT_PACED: 1
MDC_IDC_STAT_TACHYTHERAPY_ATP_DELIVERED_RECENT: 0
MDC_IDC_STAT_TACHYTHERAPY_SHOCKS_ABORTED_RECENT: 0
MDC_IDC_STAT_TACHYTHERAPY_SHOCKS_DELIVERED_RECENT: 0

## 2025-07-15 NOTE — PROGRESS NOTES
[facility] - PODIATRY    Today's Date: 07/17/2025     Patient Name: Luis Echeverria  MRN: [id number]  CSN: [id number]  PCP: Rene Hogue DO  Referring Provider: No ref. provider found    SUBJECTIVE     Chief Complaint   Patient presents with    Follow-up     PCP: Rene Hogue DO 05/02/25  Return in about 3 months (around 7/17/2025) for Follow-up with APRN, Follow[facility]up in [facility].   Pt states he is here today for diabetic foot/nail care. Pt denies pain outside of neuropathy and phantom pain from right great toe.     Diabetes     184 mg/dLbg      HPI: Luis Echeverria, a 59 y.o.male, comes to clinic as a(n) established patient presenting for diabetic foot exam and complaining of toenail/callus issues. Patient has h/o CAD, CHF, DM 1, HLD, MI, PVD. Patient is IDDM with last stated BG level of 184mg/dl.  Unsure of last A1c.  Toenails are thickened elongated and in need of trimming.  Notes nail began to grow into right second toe.  Patient had to trim this area due to pain and discomfort.  Notices this is very callus.  Denies drainage or further pain.  Does have occasional neuropathy to his right foot that is more bothersome at nighttime.  Also complains of occasional phantom pain to his right greater toe.  Feels as if it is in his shoe and he is able to wiggle it around still.  Notes callused area to left greater toe-rates it has remained unchanged.  Denies open wound, drainage or other signs of infection.  Denies pain currently.    Relates previous treatment(s) including foot care by podiatry. Denies any constitutional symptoms. No other pedal complaints at this time.    Past Medical History:   Diagnosis Date    CAD (coronary artery disease)     CAD in native artery     2V STEMI 7/13 STENT TO CX AND STENTX2 TO MID AND DISTAL LAD    Chronic systolic CHF (congestive heart failure), NYHA class 2 02/04/2020    Diabetes mellitus     Disease of thyroid gland     GERD  (gastroesophageal reflux disease)     History of coronary artery stent placement      x 3 - for ACUTE MI 7/2013    Hyperlipidemia     Hyperlipidemia, mild     Hypertension     Myocardial infarct     Myocardial infarction July 2013    Nausea and vomiting 12/10/2018    Stented coronary artery      Past Surgical History:   Procedure Laterality Date    AMPUTATION DIGIT Right 05/03/2023    Procedure: Partial vs Complete Amputation of Hallux - Right Foot;  Surgeon: Simone Nieves DPM;  Location:  [facility] OR;  Service: Podiatry;  Laterality: Right;    AORTOGRAM Right 12/23/2022    Procedure: AORTAGRAM, RIGHT LOWER EXTREMITY ANGIOGRAM, BALLOON ANGIOPLASTY, MYNX CLOSURE;  Surgeon: Sammy Wild MD;  Location:  [facility] HYBRID OR 12;  Service: Vascular;  Laterality: Right;    CARDIAC CATHETERIZATION N/A 11/06/2018    Procedure: Left Heart Cath;  Surgeon: Tru Rai MD;  Location:  [facility] CATH INVASIVE LOCATION;  Service: Cardiology    CARDIAC ELECTROPHYSIOLOGY PROCEDURE N/A 05/01/2019    Procedure: ICD new;  Surgeon: Tru Rai MD;  Location: [facility] CATH INVASIVE LOCATION;  Service: Cardiology    CAROTID STENT  7/2013 & 11/2018    CHOLECYSTECTOMY N/A 09/15/2024    Procedure: CHOLECYSTECTOMY LAPAROSCOPIC WITH DAVINCI ROBOT;  Surgeon: Tyshawn Vang MD;  Location:  [facility] OR;  Service: General;  Laterality: N/A;    EYE SURGERY  12/03/2024    INSERT / REPLACE / REMOVE PACEMAKER  5/2019     Family History   Problem Relation Age of Onset    Heart disease Maternal Grandmother     No Known Problems Mother     No Known Problems Father      Social History     Socioeconomic History    Marital status:    Tobacco Use    Smoking status: Never     Passive exposure: Never    Smokeless tobacco: Never   Vaping Use    Vaping status: Never Used   Substance and Sexual Activity    Alcohol use: Yes     Comment: 2 TIMES YEARLY    Drug use: No    Sexual activity: Defer     No Known Allergies  Current  Outpatient Medications   Medication Sig Dispense Refill    albuterol sulfate  (90 Base) MCG/ACT inhaler Inhale 2 puffs Every 4 (Four) Hours As Needed for Wheezing or Shortness of Air. 8.5 g 5    aspirin 81 MG EC tablet Take 1 tablet by mouth Daily.      atorvastatin (LIPITOR) 80 MG tablet Take 1 tablet by mouth every night at bedtime. 90 tablet 3    brimonidine (ALPHAGAN) 0.2 % ophthalmic solution Administer 1 drop to both eyes 2 (Two) Times a Day.      Continuous Blood Gluc  (Dexcom G6 ) device REMOVED FOR SURGERY      Continuous Blood Gluc Sensor (Dexcom G6 Sensor) 1 each As Needed (glucose control). Every 10 days 9 each 3    Continuous Blood Gluc Transmit (Dexcom G6 Transmitter) misc 1 each Every 3 (Three) Months. 1 each 3    dicyclomine (BENTYL) 20 MG tablet Take 1 tablet by mouth Every 8 (Eight) Hours As Needed for Abdominal Cramping. 30 tablet 0    DULoxetine (CYMBALTA) 20 MG capsule Take 30 mg by mouth Daily.      empagliflozin (Jardiance) 25 MG tablet tablet Take 1 tablet by mouth Daily. 90 tablet 3    ezetimibe (ZETIA) 10 MG tablet Take 1 tablet by mouth Daily. 90 tablet 3    famotidine (PEPCID) 20 MG tablet Take 1 tablet by mouth 2 (Two) Times a Day. 28 tablet 0    Glucagon (Baqsimi One Pack) 3 MG/DOSE powder 1 each into the nostril(s) as directed by provider As Needed (Hypoglycemia). Apply intranasal if hypoglycemia 2 each 11    Insulin Aspart (novoLOG) 100 UNIT/ML injection 150 units daily Name brand only 60 mL 11    Insulin Degludec (TRESIBA SC) Inject 40 Units under the skin into the appropriate area as directed. DIRECTED TO TAKE THIS BY PRIMARY CARE MD DUE TO INSULIN PUMP BEING EMPTY      levothyroxine (SYNTHROID, LEVOTHROID) 75 MCG tablet TAKE 1 TABLET BY MOUTH EVERY MORNING (BEFORE BREAKFAST).      lisinopril (PRINIVIL,ZESTRIL) 5 MG tablet Take 1 tablet by mouth Daily. 90 tablet 3    Lyumjev 100 UNIT/ML injection 200 units daily through pump 150 mL 3    metoprolol succinate XL  (TOPROL-XL) 50 MG 24 hr tablet Take 1.5 tablets by mouth Daily. 135 tablet 3    naloxone (NARCAN) 4 MG/0.1ML nasal spray Call 911. Don't prime. Wayne in 1 nostril for overdose. Repeat in 2-3 minutes in other nostril if no or minimal breathing/responsiveness. 2 each 0    nitroglycerin (NITROSTAT) 0.4 MG SL tablet Place 1 tablet under the tongue Every 5 (Five) Minutes As Needed for Chest Pain. Take no more than 3 doses in 15 minutes. 25 tablet 1    ondansetron ODT (ZOFRAN-ODT) 4 MG disintegrating tablet Place 1 tablet on the tongue Every 8 (Eight) Hours As Needed for Nausea or Vomiting. 30 tablet 1    Rivaroxaban (Xarelto) 2.5 MG tablet Take 1 tablet by mouth 2 (Two) Times a Day. 180 tablet 3    simethicone (MYLICON) 80 MG chewable tablet Chew 0.5 tablets Every 6 (Six) Hours As Needed for Flatulence (belching). 30 tablet 1    spironolactone (ALDACTONE) 25 MG tablet Take 1 tablet by mouth Daily. 90 tablet 3    vitamin D (ERGOCALCIFEROL) 1.25 MG (79379 UT) capsule capsule Take 1 capsule by mouth 1 (One) Time Per Week.      Wegovy 1 MG/0.5ML solution auto-injector Inject 0.5 mL under the skin into the appropriate area as directed.       No current facility-administered medications for this visit.     Review of Systems   Constitutional:  Negative for chills and fever.   HENT:  Negative for congestion.    Respiratory:  Negative for shortness of breath.    Cardiovascular:  Negative for chest pain and leg swelling.   Gastrointestinal:  Negative for constipation, diarrhea, nausea and vomiting.   Musculoskeletal:  Negative for arthralgias and gait problem.   Skin:  Negative for wound.        Callus- thickened elongated toenails.   Neurological:  Positive for numbness. Negative for dizziness and weakness.   Psychiatric/Behavioral:  Negative for agitation, behavioral problems and confusion.        OBJECTIVE     Vitals:    07/17/25 1515   BP: 126/82   Pulse: 101   SpO2: 98%       PHYSICAL EXAM  GEN:   Accompanied by  none.  Foot/Ankle Exam    GENERAL  Appearance:  appears stated age and obese  Orientation:  AAOx3  Affect:  appropriate  Gait:  unimpaired  Assistance:  independent  Right shoe gear: casual shoe  Left shoe gear: casual shoe    VASCULAR     Right Foot Vascularity   Dorsalis pedis:  1+  Posterior tibial:  1+  Skin temperature:  warm  Edema grading:  Trace  CFT:  4  Pedal hair growth:  Present  Varicosities:  none     Left Foot Vascularity   Dorsalis pedis:  1+  Posterior tibial:  1+  Skin temperature:  warm  Edema grading:  Trace  CFT:  4  Pedal hair growth:  Present  Varicosities:  none     NEUROLOGIC     Right Foot Neurologic   Light touch sensation: diminished  Vibratory sensation: diminished  Hot/Cold sensation: diminished  Protective Sensation using [place] Monofilament:   Sites intact: 4  Sites tested: 9     Left Foot Neurologic   Light touch sensation: diminished  Vibratory sensation: diminished  Hot/Cold sensation:  diminished  Protective Sensation using [place] Monofilament:   Sites intact: 5  Sites tested: 10    MUSCULOSKELETAL     Right Foot Musculoskeletal    Amputation   Toes amputated: first toe  Ecchymosis:  none  Tenderness:  none    Arch:  Normal     Left Foot Musculoskeletal   Ecchymosis:  none  Tenderness:  none  Arch:  Normal    MUSCLE STRENGTH     Right Foot Muscle Strength   Foot dorsiflexion:  5  Foot plantar flexion:  5  Foot inversion:  5  Foot eversion:  5     Left Foot Muscle Strength   Foot dorsiflexion:  5  Foot plantar flexion:  5  Foot inversion:  5  Foot eversion:  5    RANGE OF MOTION     Right Foot Range of Motion   Foot and ankle ROM within normal limits       Left Foot Range of Motion   Foot and ankle ROM within normal limits      DERMATOLOGIC      Right Foot Dermatologic   Skin  Positive for corn. Negative for drainage, erythema, ulcer and wound.   Nails  2.  Positive for elongated, abnormal thickness and subungual debris.  3.  Positive for elongated, abnormal  thickness and subungual debris.  4.  Positive for elongated, abnormal thickness and subungual debris.  5.  Positive for elongated, abnormal thickness and subungual debris.     Left Foot Dermatologic   Skin  Positive for corn and ulcer.   Nails  1.  Positive for elongated, abnormal thickness and subungual debris.  2.  Positive for elongated, abnormal thickness and subungual debris.  3.  Positive for elongated, abnormal thickness and subungual debris.  4.  Positive for elongated, abnormally thick and subungual debris.  5.  Positive for elongated, abnormally thick and subungual debris.    Image:       RADIOLOGY/NUCLEAR:      LABORATORY/CULTURE RESULTS:      PATHOLOGY RESULTS:       ASSESSMENT/PLAN     Diagnoses and all orders for this visit:    1. Thickened nails (Primary)    2. Diabetic polyneuropathy associated with type 1 diabetes mellitus    3. Anticoagulant long-term use    4. Amputee, great toe, right    5. Pre-ulcerative calluses      Comprehensive lower extremity examination and evaluation was performed.  Discussed findings and treatment plan including risks, benefits, and treatment options with patient in detail. Patient agreed with treatment plan.  After verbal consent obtained, nail(s) x9 debrided of length and thickness with nail nipper without incidence  Patient may maintain nails and calluses at home utilizing emery board or pumice stone between visits as needed  Reviewed at home diabetic foot care including daily foot checks   Reviewed PCPs most recent notes  Last A1c for review 7.3%.  Control and management of type I DM to be continued per PCP.  Area of previous wound and callus to left greater toe remains unchanged from previous visit.  There are no open areas, redness, drainage, warmth or other active SOI present today.  Educated on SOI including fever, localized warmth, redness, pain, purulent drainage, malodor, etc., and to notify us if any of these symptoms present or area opens up into a  wound.  Continue to recommend patient avoid self-care of toes.  Continue vascular surgery follow-ups.  Follow-up in 3 months for routine foot and nail care.     An After Visit Summary was printed and given to the patient at discharge, including (if requested) any available informative/educational handouts regarding diagnosis, treatment, or medications. All questions were answered to patient/family satisfaction. Should symptoms fail to improve or worsen they agree to call or return to clinic or to go to the Emergency Department. Discussed the importance of following up with any needed screening tests/labs/specialist appointments and any requested follow-up recommended by me today. Importance of maintaining follow-up discussed and patient accepts that missed appointments can delay diagnosis and potentially lead to worsening of conditions.  Return in about 3 months (around 10/17/2025) for Follow-up with ANDRES, Follow-up in [facility] or sooner if acute issues arise.    I spent 10 minutes caring for Luis on this date of service. This time includes time spent by me in the following activities: preparing for the visit, performing a medically appropriate examination and/or evaluation, counseling and educating the patient/family/caregiver, and documenting information in the medical record  Spent an additional 5 minutes caring for Lusi on this date of service.  This time includes time to me in the following activities: Debridement of toenails.      This document has been electronically signed by ANDRES Naranjo on July 17, 2025 15:55 CDT

## 2025-07-17 ENCOUNTER — OFFICE VISIT (OUTPATIENT)
Age: 60
End: 2025-07-17
Payer: COMMERCIAL

## 2025-07-17 VITALS
BODY MASS INDEX: 33.18 KG/M2 | SYSTOLIC BLOOD PRESSURE: 126 MMHG | HEIGHT: 69 IN | WEIGHT: 224 LBS | OXYGEN SATURATION: 98 % | HEART RATE: 101 BPM | DIASTOLIC BLOOD PRESSURE: 82 MMHG

## 2025-07-17 DIAGNOSIS — E10.42 DIABETIC POLYNEUROPATHY ASSOCIATED WITH TYPE 1 DIABETES MELLITUS: ICD-10-CM

## 2025-07-17 DIAGNOSIS — L60.2 THICKENED NAILS: Primary | ICD-10-CM

## 2025-07-17 DIAGNOSIS — Z79.01 ANTICOAGULANT LONG-TERM USE: ICD-10-CM

## 2025-07-17 DIAGNOSIS — L84 PRE-ULCERATIVE CALLUSES: ICD-10-CM

## 2025-07-17 DIAGNOSIS — Z89.411 AMPUTEE, GREAT TOE, RIGHT: ICD-10-CM

## 2025-07-29 ENCOUNTER — TELEPHONE (OUTPATIENT)
Dept: CARDIOLOGY | Facility: CLINIC | Age: 60
End: 2025-07-29
Payer: COMMERCIAL

## 2025-07-29 NOTE — TELEPHONE ENCOUNTER
Patient is having dermatological surgery with same day reconstruction on 8/12/2025 and they are asking for him to stop his Xarelto 1 day before and 1 day post.   Please advise.

## (undated) DEVICE — PINNACLE R/O II INTRODUCER SHEATH WITH RADIOPAQUE MARKER: Brand: PINNACLE

## (undated) DEVICE — BAPTIST TURNOVER KIT: Brand: MEDLINE INDUSTRIES, INC.

## (undated) DEVICE — SOL NS 500ML

## (undated) DEVICE — DISPOSABLE TOURNIQUET CUFF SINGLE BLADDER, SINGLE PORT AND QUICK CONNECT CONNECTOR: Brand: COLOR CUFF

## (undated) DEVICE — SYR LL TP 10ML STRL

## (undated) DEVICE — TRAP FLD MINIVAC MEGADYNE 100ML

## (undated) DEVICE — GLV SURG DERMASSURE GRN LF PF 8.0

## (undated) DEVICE — ST MIC/INTRO ACC SHRP/NDL TUNG/TP NITNL 5F 45CM 7CM

## (undated) DEVICE — STRAP SFTY OR/TABL STRTCHR 60X3IN WHT LF

## (undated) DEVICE — MODEL BT2000 P/N 700287-012KIT CONTENTS: MANIFOLD WITH SALINE AND CONTRAST PORTS, SALINE TUBING WITH SPIKE AND HAND SYRINGE, TRANSDUCER: Brand: BT2000 AUTOMATED MANIFOLD KIT

## (undated) DEVICE — PAD,ABDOMINAL,8"X10",ST,LF: Brand: MEDLINE

## (undated) DEVICE — GW STARTER JB STR .035 15X150CM

## (undated) DEVICE — SOL BETADINE 4OZ

## (undated) DEVICE — STERILE (14X122CM) TELESCOPICALLY-FOLDED COVER: Brand: CIV-CLEAR™ TRANSDUCER COVER

## (undated) DEVICE — SEAL

## (undated) DEVICE — PINNACLE INTRODUCER SHEATH: Brand: PINNACLE

## (undated) DEVICE — DAVINCI: Brand: MEDLINE INDUSTRIES, INC.

## (undated) DEVICE — APPL HEMO ENDO SURGICEL 2IN1 1P/U STRL

## (undated) DEVICE — INTRO TEAR AWAY/LVD W/SD PRT 7F 13CM

## (undated) DEVICE — GLV SURG SENSICARE PI ORTHO SZ7.5 LF STRL

## (undated) DEVICE — 6F .070 JR 4 SH 100CM: Brand: VISTA BRITE TIP

## (undated) DEVICE — GW STARTER FXD CORE J .035 3X150CM 3MM

## (undated) DEVICE — SOL IRR NACL 0.9PCT BT 1000ML

## (undated) DEVICE — BNDG ELAS CO-FLEX SLF ADHR 4IN5YD LF STRL

## (undated) DEVICE — CABL BIPOL W/ALLGTR CLIP/SM 12FT

## (undated) DEVICE — DEV TORQ GW HOT/PINK

## (undated) DEVICE — PK PM 30

## (undated) DEVICE — INFLATION DEVICE: Brand: ENCORE™ 26

## (undated) DEVICE — CATH FLSH OMNI SFT 5F 90CM

## (undated) DEVICE — ADHS SKIN PREMIERPRO EXOFIN TOPICAL HI/VISC .5ML

## (undated) DEVICE — PAD ENDOVASCULAR: Brand: MEDLINE INDUSTRIES, INC.

## (undated) DEVICE — A2000 MULTI-USE SYRINGE KIT, P/N 701277-003KIT CONTENTS: 100ML CONTRAST RESERVOIR AND TUBING WITH CONTRAST SPIKE AND CLAMP: Brand: A2000 MULTI-USE SYRINGE KIT

## (undated) DEVICE — DRSNG WND GZ CURAD OIL EMULSION 3X3IN STRL

## (undated) DEVICE — DRSNG SURESITE WNDW 4X4.5

## (undated) DEVICE — KT CLN CLEANOR SCPE

## (undated) DEVICE — PROXIMATE RH ROTATING HEAD SKIN STAPLERS (35 WIDE) CONTAINS 35 STAINLESS STEEL STAPLES: Brand: PROXIMATE

## (undated) DEVICE — GW CHOICE PT XSUP .014 182CM

## (undated) DEVICE — ARM DRAPE

## (undated) DEVICE — INTENDED FOR TISSUE SEPARATION, AND OTHER PROCEDURES THAT REQUIRE A SHARP SURGICAL BLADE TO PUNCTURE OR CUT.: Brand: BARD-PARKER ® STAINLESS STEEL BLADES

## (undated) DEVICE — GLOVE,SURG,SENSICARE,ALOE,LF,PF,6: Brand: MEDLINE

## (undated) DEVICE — NAVICROSS SUPPORT CATHETER: Brand: NAVICROSS

## (undated) DEVICE — CANN CO2/O2 NASL A/

## (undated) DEVICE — SYR LUERLOK 20CC BX/50

## (undated) DEVICE — Device

## (undated) DEVICE — ST TBG PNEUMOCLEAR EVAC SMOKE HIFLO

## (undated) DEVICE — SOLIDIFIER LIQUI LOC PLUS 2000CC

## (undated) DEVICE — PK EXTRM 30

## (undated) DEVICE — ANGIO-SEAL VIP VASCULAR CLOSURE DEVICE: Brand: ANGIO-SEAL

## (undated) DEVICE — 4-PORT MANIFOLD: Brand: NEPTUNE 2

## (undated) DEVICE — SUT MNCRYL 4/0 PS2 27IN UD MCP426H

## (undated) DEVICE — GLV SURG SENSICARE W/ALOE PF LF 6.5 STRL

## (undated) DEVICE — ELECTRD PAD DEFIB A/

## (undated) DEVICE — APPL CHLORAPREP W/TINT 26ML ORNG

## (undated) DEVICE — GLV SURG SENSICARE W/ALOE PF LF 7.5 STRL

## (undated) DEVICE — TISSUE RETRIEVAL SYSTEM: Brand: INZII RETRIEVAL SYSTEM

## (undated) DEVICE — KT VLV HEMO MAP ACC PLS LG/BORE MTL/INTRO

## (undated) DEVICE — DEV CLS VASC MYNXCONTROL 6FTO7F

## (undated) DEVICE — NDL HYPO PRECISIONGLIDE REG 22G 1 1/2

## (undated) DEVICE — MODEL AT P65, P/N 701554-001KIT CONTENTS: HAND CONTROLLER, 3-WAY HIGH-PRESSURE STOPCOCK WITH ROTATING END AND PREMIUM HIGH-PRESSURE TUBING: Brand: ANGIOTOUCH® KIT

## (undated) DEVICE — SKIN AFFIX SURG ADHESIVE 72/CS 0.55ML: Brand: MEDLINE

## (undated) DEVICE — PREP SOL POVIDONE/IODINE BT 4OZ

## (undated) DEVICE — PATIENT RETURN ELECTRODE, SINGLE-USE, CONTACT QUALITY MONITORING, ADULT, WITH 9FT CORD, FOR PATIENTS WEIGING OVER 33LBS. (15KG): Brand: MEGADYNE

## (undated) DEVICE — PK CATH CARD 30

## (undated) DEVICE — BLADELESS OBTURATOR: Brand: WECK VISTA

## (undated) DEVICE — ELECTRD BLD EZ CLN MOD XLNG 2.75IN

## (undated) DEVICE — RADIFOCUS GLIDEWIRE ADVANTAGE GUIDEWIRE: Brand: GLIDEWIRE ADVANTAGE

## (undated) DEVICE — CATH DIAG IMPULSE M/ PK 145 5FR

## (undated) DEVICE — NDL HYPO PRECISIONGLIDE REG 21G 1 1/2

## (undated) DEVICE — BALN NANOCROSS OTW .014 4F 2X210 150CM

## (undated) DEVICE — EACH VASCULAR SOLUTIONS D-STAT® FLOWABLE HEMOSTAT (D-STAT) INCLUDES THE FOLLOWING COMPONENTS: THROMBIN VIAL (5,000 UNITS); COLLAGEN (200MG), CONTAINED IN 10ML SYRINGE WITH ATTACHED MIXING LUER; DILUENT VIAL (5ML); MIXING ACCESSORIES (10ML SYRINGE AND NEEDLELESS, NON-CORING VIAL ACCESS DEVICE); APPLICATOR TIPS: (1) SMALL BORE TIP, (1) 20-GAUGE, 2.75" NEEDLE.THE THROMBIN IS A PROTEIN SUBSTANCE PRODUCED THROUGH A CONVERSION REACTION IN WHICH PROTHROMBIN OF BOVINE ORIGIN IS ACTIVATED BY TISSUE THROMBOPLASTIN OF BOVINE-ORIGIN IN THE PRESENCE OF CALCIUM CHLORIDE. IT IS SUPPLIED AS A STERILE POWDER THAT HAS BEEN FREEZE-DRIED IN THE FINAL CONTAINER. ALSO CONTAINED IN THE THROMBIN VIAL ARE MANNITOL AND SODIUM CHLORIDE. MANNITOL IS INCLUDED TO MAKE THE DRIED PRODUCT FRIABLE AND MORE READILY SOLUBLE. THE MATERIAL CONTAINS NO PRESERVATIVE AND HAS BEEN CHROMATOGRAPHICALLY PURIFIED. THROMBIN REQUIRES NO INTERMEDIATE PHYSIOLOGICAL AGENT FOR ITS REACTION. IT CONVERTS FIBRINOGEN DIRECTLY TO FIBRIN.THE COLLAGEN IS A SOFT, WHITE, PLIABLE, ABSORBENT HEMOSTATIC AGENT DERIVED FROM PURIFIED BOVINE DEEP FLEXOR TENDON. IT IS PREPARED IN A LOOSE FIBROUS FORM. COLLAGEN ATTRACTS PLATELETS THAT ADHERE TO THE FIBRILS AND UNDERGO THE RELEASE PHENOMENON TO TRIGGER AGGREGATION OF PLATELETS INTO THROMBI IN THE INTERSTICES OF THE FIBROUS MASS. THE COLLAGEN PROVIDES A THREE-DIMENSIONAL MATRIX FOR ADDITIONAL STRENGTHENING OF THE CLOT. THE EFFECT ON PLATELET ADHESION AND AGGREGATION IS NOT INHIBITED BY HEPARIN IN VITRO.THE DILUENT IS A STERILE SOLUTION OF CALCIUM CHLORIDE AND WATER, BUFFERED WITH TROMETHAMINE (TRIS). USING THE MIXING ACCESSORIES, BOTH THE THROMBIN AND COLLAGEN ARE RECONSTITUTED WITH THE DILUENT PRIOR TO USE. THE HEMOSTAT IS DELIVERED TO THE INTENDED TREATMENT SITE USING THE PROVIDED APPLICATOR TIPS. HEMOSTASIS IS ACHIEVED BY THE PHYSIOLOGICAL COAGULATION-INDUCING PROPERTIES OF THE D-STAT. THE D-STAT IS BIOCOMPATIBLE, NON-PYROGENIC, AND INTENDED TO BE LEFT IN SITU.: Brand: D-STAT® FLOWABLE HEMOSTAT

## (undated) DEVICE — DESTINATION PERIPHERAL GUIDING SHEATH: Brand: DESTINATION

## (undated) DEVICE — INTRO TEAR AWAY/LVD W/SD PRT 6F 13CM

## (undated) DEVICE — APPL CHLORAPREP HI/LITE 26ML ORNG

## (undated) DEVICE — BANDAGE,GAUZE,BULKEE II,4.5"X4.1YD,STRL: Brand: MEDLINE